# Patient Record
Sex: FEMALE | Race: OTHER | NOT HISPANIC OR LATINO | URBAN - METROPOLITAN AREA
[De-identification: names, ages, dates, MRNs, and addresses within clinical notes are randomized per-mention and may not be internally consistent; named-entity substitution may affect disease eponyms.]

---

## 2017-05-01 ENCOUNTER — OUTPATIENT (OUTPATIENT)
Dept: OUTPATIENT SERVICES | Facility: HOSPITAL | Age: 82
LOS: 1 days | End: 2017-05-01
Payer: MEDICAID

## 2017-05-04 DIAGNOSIS — R69 ILLNESS, UNSPECIFIED: ICD-10-CM

## 2017-06-01 PROCEDURE — G9001: CPT

## 2017-07-06 ENCOUNTER — RESULT REVIEW (OUTPATIENT)
Age: 82
End: 2017-07-06

## 2017-09-05 ENCOUNTER — INPATIENT (INPATIENT)
Facility: HOSPITAL | Age: 82
LOS: 2 days | Discharge: HOME | DRG: 378 | End: 2017-09-08
Attending: INTERNAL MEDICINE | Admitting: INTERNAL MEDICINE
Payer: COMMERCIAL

## 2017-09-05 VITALS
OXYGEN SATURATION: 93 % | RESPIRATION RATE: 18 BRPM | DIASTOLIC BLOOD PRESSURE: 75 MMHG | HEART RATE: 64 BPM | TEMPERATURE: 99 F | SYSTOLIC BLOOD PRESSURE: 127 MMHG

## 2017-09-05 DIAGNOSIS — K57.92 DIVERTICULITIS OF INTESTINE, PART UNSPECIFIED, WITHOUT PERFORATION OR ABSCESS WITHOUT BLEEDING: ICD-10-CM

## 2017-09-05 LAB
ALBUMIN SERPL ELPH-MCNC: 3.8 G/DL — SIGNIFICANT CHANGE UP (ref 3.3–5)
ALP SERPL-CCNC: 95 U/L — SIGNIFICANT CHANGE UP (ref 40–120)
ALT FLD-CCNC: 12 U/L RC — SIGNIFICANT CHANGE UP (ref 10–45)
ANION GAP SERPL CALC-SCNC: 12 MMOL/L — SIGNIFICANT CHANGE UP (ref 5–17)
APTT BLD: 28 SEC — SIGNIFICANT CHANGE UP (ref 27.5–37.4)
AST SERPL-CCNC: 19 U/L — SIGNIFICANT CHANGE UP (ref 10–40)
BASOPHILS # BLD AUTO: 0 K/UL — SIGNIFICANT CHANGE UP (ref 0–0.2)
BASOPHILS NFR BLD AUTO: 0.2 % — SIGNIFICANT CHANGE UP (ref 0–2)
BILIRUB SERPL-MCNC: 0.8 MG/DL — SIGNIFICANT CHANGE UP (ref 0.2–1.2)
BLD GP AB SCN SERPL QL: NEGATIVE — SIGNIFICANT CHANGE UP
BUN SERPL-MCNC: 14 MG/DL — SIGNIFICANT CHANGE UP (ref 7–23)
CALCIUM SERPL-MCNC: 9.8 MG/DL — SIGNIFICANT CHANGE UP (ref 8.4–10.5)
CHLORIDE SERPL-SCNC: 98 MMOL/L — SIGNIFICANT CHANGE UP (ref 96–108)
CO2 SERPL-SCNC: 30 MMOL/L — SIGNIFICANT CHANGE UP (ref 22–31)
CREAT SERPL-MCNC: 0.75 MG/DL — SIGNIFICANT CHANGE UP (ref 0.5–1.3)
EOSINOPHIL # BLD AUTO: 0 K/UL — SIGNIFICANT CHANGE UP (ref 0–0.5)
EOSINOPHIL NFR BLD AUTO: 0.3 % — SIGNIFICANT CHANGE UP (ref 0–6)
GAS PNL BLDV: SIGNIFICANT CHANGE UP
GLUCOSE SERPL-MCNC: 106 MG/DL — HIGH (ref 70–99)
HCT VFR BLD CALC: 43.1 % — SIGNIFICANT CHANGE UP (ref 34.5–45)
HGB BLD-MCNC: 14.2 G/DL — SIGNIFICANT CHANGE UP (ref 11.5–15.5)
INR BLD: 1.09 RATIO — SIGNIFICANT CHANGE UP (ref 0.88–1.16)
LYMPHOCYTES # BLD AUTO: 1.4 K/UL — SIGNIFICANT CHANGE UP (ref 1–3.3)
LYMPHOCYTES # BLD AUTO: 10.2 % — LOW (ref 13–44)
MCHC RBC-ENTMCNC: 31 PG — SIGNIFICANT CHANGE UP (ref 27–34)
MCHC RBC-ENTMCNC: 33 GM/DL — SIGNIFICANT CHANGE UP (ref 32–36)
MCV RBC AUTO: 94.2 FL — SIGNIFICANT CHANGE UP (ref 80–100)
MONOCYTES # BLD AUTO: 0.8 K/UL — SIGNIFICANT CHANGE UP (ref 0–0.9)
MONOCYTES NFR BLD AUTO: 5.6 % — SIGNIFICANT CHANGE UP (ref 2–14)
NEUTROPHILS # BLD AUTO: 11.5 K/UL — HIGH (ref 1.8–7.4)
NEUTROPHILS NFR BLD AUTO: 83.8 % — HIGH (ref 43–77)
PLATELET # BLD AUTO: 161 K/UL — SIGNIFICANT CHANGE UP (ref 150–400)
POTASSIUM SERPL-MCNC: 4.3 MMOL/L — SIGNIFICANT CHANGE UP (ref 3.5–5.3)
POTASSIUM SERPL-SCNC: 4.3 MMOL/L — SIGNIFICANT CHANGE UP (ref 3.5–5.3)
PROT SERPL-MCNC: 7.3 G/DL — SIGNIFICANT CHANGE UP (ref 6–8.3)
PROTHROM AB SERPL-ACNC: 11.8 SEC — SIGNIFICANT CHANGE UP (ref 9.8–12.7)
RBC # BLD: 4.58 M/UL — SIGNIFICANT CHANGE UP (ref 3.8–5.2)
RBC # FLD: 12 % — SIGNIFICANT CHANGE UP (ref 10.3–14.5)
RH IG SCN BLD-IMP: POSITIVE — SIGNIFICANT CHANGE UP
SODIUM SERPL-SCNC: 140 MMOL/L — SIGNIFICANT CHANGE UP (ref 135–145)
WBC # BLD: 13.7 K/UL — HIGH (ref 3.8–10.5)
WBC # FLD AUTO: 13.7 K/UL — HIGH (ref 3.8–10.5)

## 2017-09-05 PROCEDURE — 74177 CT ABD & PELVIS W/CONTRAST: CPT | Mod: 26

## 2017-09-05 PROCEDURE — 99285 EMERGENCY DEPT VISIT HI MDM: CPT

## 2017-09-05 RX ORDER — METRONIDAZOLE 500 MG
500 TABLET ORAL ONCE
Qty: 0 | Refills: 0 | Status: COMPLETED | OUTPATIENT
Start: 2017-09-05 | End: 2017-09-05

## 2017-09-05 RX ORDER — CIPROFLOXACIN LACTATE 400MG/40ML
400 VIAL (ML) INTRAVENOUS ONCE
Qty: 0 | Refills: 0 | Status: COMPLETED | OUTPATIENT
Start: 2017-09-05 | End: 2017-09-05

## 2017-09-05 RX ORDER — ACETAMINOPHEN 500 MG
1000 TABLET ORAL ONCE
Qty: 0 | Refills: 0 | Status: COMPLETED | OUTPATIENT
Start: 2017-09-05 | End: 2017-09-05

## 2017-09-05 RX ADMIN — Medication 400 MILLIGRAM(S): at 22:51

## 2017-09-05 RX ADMIN — Medication 100 MILLIGRAM(S): at 21:35

## 2017-09-05 RX ADMIN — Medication 200 MILLIGRAM(S): at 19:58

## 2017-09-05 NOTE — ED ADULT NURSE REASSESSMENT NOTE - NS ED NURSE REASSESS COMMENT FT1
received report from Chelsy PHILLIPS. Patient resting in bed with no complaints at this time. Awaiting CT results and disposition. VSS. Safety and comfort maintained.

## 2017-09-05 NOTE — ED PROVIDER NOTE - PROGRESS NOTE DETAILS
CT A/P showing acute diverticulitis. Bld Cx's already drawn and patient started on IV Cipro/Flagyl. Findings discussed with patient and grandson. [facility] Physician on call paged for admission. CT A/P showing acute diverticulitis. patient started on IV Cipro/Flagyl. Findings discussed with patient and grandson. [facility] Physician on call paged for admission. CT A/P showing acute diverticulitis. patient started on IV Cipro/Flagyl. Findings discussed with patient and grandson. Hospitalist paged for admission.

## 2017-09-05 NOTE — ED ADULT NURSE NOTE - OBJECTIVE STATEMENT
87 y/o female patient presents ambulatory to ED with family at the bedside complaining of diffuse abdominal pain and cramping with 4-5 episodes of bright red blood on bowel movement today. +nausea Patient states she feels generalized weakness/fatigue. Patient denies SOB and CP, no V/D, afebrile in ED, no headache, no lightheadedness/dizziness, no syncope, no cough, no URI, no numbness or tingling. Patient has PMHX HTN, HLD, CVA and Alzheimer's disease 87 y/o female patient presents ambulatory to ED with family at the bedside complaining of diffuse abdominal pain and cramping with 4-5 episodes of bright red blood on bowel movement today. +nausea +abdominal distention . Patient states she feels generalized weakness/fatigue. Patient denies SOB and CP, no V/D, afebrile in ED, no headache, no lightheadedness/dizziness, no syncope, no cough, no URI, no numbness or tingling. Patient has PMHX HTN, HLD, CVA and Alzheimer's disease

## 2017-09-05 NOTE — ED PROVIDER NOTE - MEDICAL DECISION MAKING DETAILS
87yo F HTN, HLD, CVA  p/w abdominal pain and BRBPR x 1 day. Patient with RUQ/RLQ pain on exam, +Guaiac. Will check CBC/CMP/T&S/Coags. Will check CT A/P. f/u labs and imaging and reassess.

## 2017-09-05 NOTE — ED PROVIDER NOTE - ATTENDING CONTRIBUTION TO CARE
Private Physician Reilly Hearn ([employer])  88y female pmh HTN,No habits, no hx abd surg,dm,hld, Pt comes to ed complains of since this am, across whole abd without localizing sign, and BRBPR with each bowel movement. 4-5episodes, No fever chills. nausea without vomiting, no prior episodes of similar problems. No travel. recent abx.  Fatigued without loc,sob, Private Physician Reilly Hearn ([employer])  88y female pmh HTN,No habits, no hx abd surg,dm,hld, Pt comes to ed complains of since this am, across whole abd without localizing sign, and BRBPR with each bowel movement. 4-5episodes, No fever chills. nausea without vomiting, no prior episodes of similar problems. No travel. recent abx.  Fatigued without loc,sob, PE WDWN elderly female NAD NCAT chest clear anterior & posterior abd soft +bs neruo no focal defects. Abd +bs, Mild mid-ruq ttp without montes's,. No mass guarding or rebound or cvat. Stool guiac +, CV no rubs, gallops or murmurs  Collin Donato MD, Facep

## 2017-09-05 NOTE — ED PROVIDER NOTE - OBJECTIVE STATEMENT
89yo F with HTN, HLD, CVA (2012) and Alzheimer's disease p/w abdominal pain and BRBPR x1 day. Patient reports sudden onset diffuse abdominal pain. Has had 4-5 BMs with BRBPR. Associated with nausea. Denies fever, chills, vomiting. No recent abdominal surgeries, travel, sick contacts.

## 2017-09-06 DIAGNOSIS — Z29.9 ENCOUNTER FOR PROPHYLACTIC MEASURES, UNSPECIFIED: ICD-10-CM

## 2017-09-06 DIAGNOSIS — E78.5 HYPERLIPIDEMIA, UNSPECIFIED: ICD-10-CM

## 2017-09-06 DIAGNOSIS — I63.9 CEREBRAL INFARCTION, UNSPECIFIED: ICD-10-CM

## 2017-09-06 DIAGNOSIS — K57.33 DIVERTICULITIS OF LARGE INTESTINE WITHOUT PERFORATION OR ABSCESS WITH BLEEDING: ICD-10-CM

## 2017-09-06 DIAGNOSIS — I10 ESSENTIAL (PRIMARY) HYPERTENSION: ICD-10-CM

## 2017-09-06 DIAGNOSIS — R41.89 OTHER SYMPTOMS AND SIGNS INVOLVING COGNITIVE FUNCTIONS AND AWARENESS: ICD-10-CM

## 2017-09-06 DIAGNOSIS — K92.2 GASTROINTESTINAL HEMORRHAGE, UNSPECIFIED: ICD-10-CM

## 2017-09-06 PROCEDURE — 12345: CPT | Mod: NC

## 2017-09-06 PROCEDURE — 99223 1ST HOSP IP/OBS HIGH 75: CPT | Mod: GC

## 2017-09-06 RX ORDER — METOPROLOL TARTRATE 50 MG
50 TABLET ORAL
Qty: 0 | Refills: 0 | Status: DISCONTINUED | OUTPATIENT
Start: 2017-09-06 | End: 2017-09-08

## 2017-09-06 RX ORDER — MORPHINE SULFATE 50 MG/1
2 CAPSULE, EXTENDED RELEASE ORAL EVERY 4 HOURS
Qty: 0 | Refills: 0 | Status: DISCONTINUED | OUTPATIENT
Start: 2017-09-06 | End: 2017-09-08

## 2017-09-06 RX ORDER — METRONIDAZOLE 500 MG
500 TABLET ORAL EVERY 8 HOURS
Qty: 0 | Refills: 0 | Status: DISCONTINUED | OUTPATIENT
Start: 2017-09-06 | End: 2017-09-08

## 2017-09-06 RX ORDER — METOPROLOL TARTRATE 50 MG
100 TABLET ORAL
Qty: 0 | Refills: 0 | Status: DISCONTINUED | OUTPATIENT
Start: 2017-09-06 | End: 2017-09-06

## 2017-09-06 RX ORDER — AMLODIPINE BESYLATE 2.5 MG/1
5 TABLET ORAL DAILY
Qty: 0 | Refills: 0 | Status: DISCONTINUED | OUTPATIENT
Start: 2017-09-06 | End: 2017-09-08

## 2017-09-06 RX ORDER — CIPROFLOXACIN LACTATE 400MG/40ML
400 VIAL (ML) INTRAVENOUS EVERY 12 HOURS
Qty: 0 | Refills: 0 | Status: DISCONTINUED | OUTPATIENT
Start: 2017-09-06 | End: 2017-09-08

## 2017-09-06 RX ORDER — SENNA PLUS 8.6 MG/1
2 TABLET ORAL AT BEDTIME
Qty: 0 | Refills: 0 | Status: DISCONTINUED | OUTPATIENT
Start: 2017-09-06 | End: 2017-09-08

## 2017-09-06 RX ORDER — ATORVASTATIN CALCIUM 80 MG/1
40 TABLET, FILM COATED ORAL AT BEDTIME
Qty: 0 | Refills: 0 | Status: DISCONTINUED | OUTPATIENT
Start: 2017-09-06 | End: 2017-09-08

## 2017-09-06 RX ORDER — HYDROCHLOROTHIAZIDE 25 MG
25 TABLET ORAL DAILY
Qty: 0 | Refills: 0 | Status: DISCONTINUED | OUTPATIENT
Start: 2017-09-06 | End: 2017-09-06

## 2017-09-06 RX ORDER — LISINOPRIL 2.5 MG/1
20 TABLET ORAL DAILY
Qty: 0 | Refills: 0 | Status: DISCONTINUED | OUTPATIENT
Start: 2017-09-06 | End: 2017-09-08

## 2017-09-06 RX ORDER — ACETAMINOPHEN 500 MG
650 TABLET ORAL EVERY 6 HOURS
Qty: 0 | Refills: 0 | Status: DISCONTINUED | OUTPATIENT
Start: 2017-09-06 | End: 2017-09-08

## 2017-09-06 RX ADMIN — Medication 100 MILLIGRAM(S): at 15:14

## 2017-09-06 RX ADMIN — Medication 650 MILLIGRAM(S): at 21:32

## 2017-09-06 RX ADMIN — Medication 100 MILLIGRAM(S): at 05:36

## 2017-09-06 RX ADMIN — LISINOPRIL 20 MILLIGRAM(S): 2.5 TABLET ORAL at 05:34

## 2017-09-06 RX ADMIN — Medication 650 MILLIGRAM(S): at 22:33

## 2017-09-06 RX ADMIN — Medication 1 TABLET(S): at 11:28

## 2017-09-06 RX ADMIN — SENNA PLUS 2 TABLET(S): 8.6 TABLET ORAL at 21:31

## 2017-09-06 RX ADMIN — Medication 50 MILLIGRAM(S): at 05:34

## 2017-09-06 RX ADMIN — Medication 200 MILLIGRAM(S): at 21:30

## 2017-09-06 RX ADMIN — Medication 650 MILLIGRAM(S): at 06:16

## 2017-09-06 RX ADMIN — Medication 650 MILLIGRAM(S): at 05:55

## 2017-09-06 RX ADMIN — AMLODIPINE BESYLATE 5 MILLIGRAM(S): 2.5 TABLET ORAL at 05:34

## 2017-09-06 RX ADMIN — Medication 50 MILLIGRAM(S): at 17:16

## 2017-09-06 RX ADMIN — ATORVASTATIN CALCIUM 40 MILLIGRAM(S): 80 TABLET, FILM COATED ORAL at 21:31

## 2017-09-06 RX ADMIN — Medication 200 MILLIGRAM(S): at 08:37

## 2017-09-06 NOTE — CHART NOTE - NSCHARTNOTEFT_GEN_A_CORE
Pt admitted with acute sigmoid diverticulitis. Abdominal pain improved. No diarrhea today.     Advance diet to clears. Continue iv abx.

## 2017-09-06 NOTE — H&P ADULT - PROBLEM SELECTOR PLAN 5
per family not AD as previously charted. rivastigamine patch used as ppx? hold for now given bowel disease c/w statin. hx of pfo per chart

## 2017-09-06 NOTE — H&P ADULT - HISTORY OF PRESENT ILLNESS
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 The patient indicates that she acutely developed abdominal pain after eating corn and foods that she knows triggers diverticulitis. Associated w/ the abdominal pain is diarrhea described as gelatinous and red. She also experienced sensation of inability to compelte evacuation of stool. She denies fevers, chills, vomiting, sick contacts, recent travel [place] of [place]lived here [place] 50yr, over 1yr to ), previous abdominal surgery, and colonoscopy positive for colon cancer (has had polyps cauterized per daughter). 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 The patient indicates that she acutely developed abdominal pain after eating corn and foods that she knows triggers diverticulitis. Associated w/ the abdominal pain is diarrhea described as gelatinous and red. She also experienced sensation of inability to compelte evacuation of stool. She denies fevers, chills, vomiting, sick contacts, recent travel outside of [place], over 1yr to ), previous abdominal surgery, and colonoscopy positive for colon cancer (has had polyps cauterized per daughter).

## 2017-09-06 NOTE — H&P ADULT - NSHPLABSRESULTS_GEN_ALL_CORE
14.2   13.7  )-----------( 161      ( 05 Sep 2017 16:23 )             43.1       09-05    140  |  98  |  14  ----------------------------<  106<H>  4.3   |  30  |  0.75    Ca    9.8      05 Sep 2017 16:23    TPro  7.3  /  Alb  3.8  /  TBili  0.8  /  DBili  x   /  AST  19  /  ALT  12  /  AlkPhos  95  09-05                < from: CT Abdomen and Pelvis w/ Oral Cont and w/ IV Cont (09.05.17 @ 18:35) >    EXAM:  CT ABDOMEN AND PELVIS OC IC                            PROCEDURE DATE:  09/05/2017            INTERPRETATION:  CLINICAL INFORMATION: Right-sided abdominal pain with   bright red blood per rectum    COMPARISON: Right-sided abdominal pain    PROCEDURE:   CT of the Abdomen and Pelvis was performed with intravenous contrast.   Intravenous contrast: 90 ml Omnipaque 350. 10 ml discarded.  Oral contrast: positive contrast was administered.  Sagittal and coronal reformats were performed.    FINDINGS:    LOWER CHEST: Aortic valve calcifications.    LIVER:2 mm right hepatic lobe lesion, too small to characterize (series   2, image 28).  BILE DUCTS: Normal caliber.  GALLBLADDER: Within normal limits.  SPLEEN: Within normal limits.  PANCREAS: Within normal limits.  ADRENALS: Within normal limits.  KIDNEYS/URETERS: Symmetrically enhance without hydronephrosis.   Subcentimeter bilateral hypodense lesions, too small to characterize.    BLADDER: Within normal limits.  REPRODUCTIVE ORGANS: The uterus and adnexa are within normal limits.    BOWEL: There is acute diverticulitis involving the sigmoid colon with   surrounding inflammatory changes. No pericolonic fluid collection or   abscess. Extensive diverticular disease of the colon. Appendix is normal.  PERITONEUM: No ascites.  VESSELS:  Atherosclerotic calcifications.  RETROPERITONEUM: No lymphadenopathy.    ABDOMINAL WALL: Small fat-containing bilateral inguinal hernias.  BONES: Multilevel degenerative changes.    IMPRESSION:   Acute uncomplicated diverticulitis of the sigmoid colon.    < end of copied text >      PT/INR - ( 05 Sep 2017 16:23 )   PT: 11.8 sec;   INR: 1.09 ratio         PTT - ( 05 Sep 2017 16:23 )  PTT:28.0 sec    Lactate Trend            CAPILLARY BLOOD GLUCOSE Personally reviewed imaging, labs.    14.2   13.7  )-----------( 161      ( 05 Sep 2017 16:23 )             43.1       09-05    140  |  98  |  14  ----------------------------<  106<H>  4.3   |  30  |  0.75    Ca    9.8      05 Sep 2017 16:23    TPro  7.3  /  Alb  3.8  /  TBili  0.8  /  DBili  x   /  AST  19  /  ALT  12  /  AlkPhos  95  09-05                < from: CT Abdomen and Pelvis w/ Oral Cont and w/ IV Cont (09.05.17 @ 18:35) >    EXAM:  CT ABDOMEN AND PELVIS OC IC                            PROCEDURE DATE:  09/05/2017            INTERPRETATION:  CLINICAL INFORMATION: Right-sided abdominal pain with   bright red blood per rectum    COMPARISON: Right-sided abdominal pain    PROCEDURE:   CT of the Abdomen and Pelvis was performed with intravenous contrast.   Intravenous contrast: 90 ml Omnipaque 350. 10 ml discarded.  Oral contrast: positive contrast was administered.  Sagittal and coronal reformats were performed.    FINDINGS:    LOWER CHEST: Aortic valve calcifications.    LIVER:2 mm right hepatic lobe lesion, too small to characterize (series   2, image 28).  BILE DUCTS: Normal caliber.  GALLBLADDER: Within normal limits.  SPLEEN: Within normal limits.  PANCREAS: Within normal limits.  ADRENALS: Within normal limits.  KIDNEYS/URETERS: Symmetrically enhance without hydronephrosis.   Subcentimeter bilateral hypodense lesions, too small to characterize.    BLADDER: Within normal limits.  REPRODUCTIVE ORGANS: The uterus and adnexa are within normal limits.    BOWEL: There is acute diverticulitis involving the sigmoid colon with   surrounding inflammatory changes. No pericolonic fluid collection or   abscess. Extensive diverticular disease of the colon. Appendix is normal.  PERITONEUM: No ascites.  VESSELS:  Atherosclerotic calcifications.  RETROPERITONEUM: No lymphadenopathy.    ABDOMINAL WALL: Small fat-containing bilateral inguinal hernias.  BONES: Multilevel degenerative changes.    IMPRESSION:   Acute uncomplicated diverticulitis of the sigmoid colon.    < end of copied text >      PT/INR - ( 05 Sep 2017 16:23 )   PT: 11.8 sec;   INR: 1.09 ratio         PTT - ( 05 Sep 2017 16:23 )  PTT:28.0 sec    Lactate Trend            CAPILLARY BLOOD GLUCOSE

## 2017-09-06 NOTE — H&P ADULT - PROBLEM SELECTOR PLAN 6
lovenox for dvt ppx per family not AD as previously charted. rivastigamine patch used as ppx? hold for now given bowel disease

## 2017-09-06 NOTE — H&P ADULT - PROBLEM SELECTOR PLAN 2
c/w home antihypertensives  - c/w metoprolol, amlodipine, lisinopril, & hctz - per history, likely 2/2 diverticulosis  - reportedly per daughter, last cscope was within the past 5 yrs  - Hb stable  - type and screen  - hold diuretics  - followup with GI as outpt for colonoscopy  - CBC daily unless large bleeding

## 2017-09-06 NOTE — H&P ADULT - NSHPREVIEWOFSYSTEMS_GEN_ALL_CORE
CONSTITUTIONAL: No fever, no chills  EYES: No eye pain, no visual disturbance  Mouth: no pain in mouth, no cavities  RESPIRATORY: No cough, No sob  CARDIOVASCULAR: No CP, no palpitations  GASTROINTESTINAL: abdominal pain+, blood in diarrhea+  GENITOURINARY: No dysuria, no hematuria  NEUROLOGICAL: No headaches, no weakness  SKIN: No itching, no rashes  MUSCULOSKELETAL: No joint pain, no joint swelling

## 2017-09-06 NOTE — H&P ADULT - ATTENDING COMMENTS
Pt seen and examined. Discussed case with resident. H&P reviewed and edited where appropriate. Time spent 70 minutes on total encounter.

## 2017-09-06 NOTE — H&P ADULT - PROBLEM SELECTOR PLAN 3
c/w atrovastatin c/w home antihypertensives  - c/w metoprolol, amlodipine, lisinopril  - well controlled, hold hctz for now  - consider reducing BP meds if pt symptomatic, or further bleeding c/w home antihypertensives  - c/w amlodipine, lisinopril  - reduce metoprolol dose  - well controlled, hold hctz for now  - consider reducing BP meds if pt symptomatic, or further bleeding

## 2017-09-06 NOTE — H&P ADULT - NSHPPHYSICALEXAM_GEN_ALL_CORE
Vital Signs Last 24 Hrs  T(C): 36.3 (06 Sep 2017 00:06), Max: 37.1 (05 Sep 2017 14:26)  T(F): 97.3 (06 Sep 2017 00:06), Max: 98.7 (05 Sep 2017 14:26)  HR: 60 (06 Sep 2017 00:06) (60 - 66)  BP: 127/69 (06 Sep 2017 00:06) (125/71 - 173/78)  BP(mean): --  RR: 16 (06 Sep 2017 00:06) (16 - 18)  SpO2: 94% (06 Sep 2017 00:06) (93% - 97%)    GENERAL: NAD, well-developed, sleeping  HEAD:  Atraumatic, Normocephalic  EYES: EOMI, PERRLA, conjunctiva and sclera clear  Mouth: MMM, no lesions  NECK: Supple, no appreciable masses  Pulm: normal work of breathing, cta b/l  Card: S1&S2+, rrr, no m/r/g appreciated  ABDOMEN: bs+, soft,mild tenderness over center and left abdomen, nd, no appreciable masses  EXTREMITIES:  2+ Peripheral Pulses, No clubbing, cyanosis, or edema  Neuro: A&Ox3, no focal deficits  SKIN: warm and dry, no visible rashes

## 2017-09-06 NOTE — H&P ADULT - PROBLEM SELECTOR PLAN 1
Noted to have uncomplicated diverticulitis  - c/w cipro/flagyl IV, switch to PO abx when pain subsides and pt tolerates po intake to be determined by primary team  - pt w/ diverticulitis in past, w/ know inducer given diet. previous colonoscopy w/in last 5 year was normal per daughter  - leukocytosis to 13-trend w. cbc daily. monitor for fevers  - treat pain w/ tylenol. currently pain free Noted to have uncomplicated diverticulitis  - c/w cipro/flagyl IV, switch to PO abx when pain subsides and pt tolerates po intake to be determined by primary team  - pt w/ diverticulitis in past. previous colonoscopy w/in last 5 year was normal per daughter  - leukocytosis to 13-trend w. cbc daily. monitor for fevers  - treat pain w/ IV morphine PRN Noted to have uncomplicated diverticulitis  - c/w cipro/flagyl IV, switch to PO abx when pain subsides and pt tolerates po intake to be determined by primary team  - pt w/ diverticulitis in past. previous colonoscopy w/in last 5 year was normal per daughter  - leukocytosis to 13-trend w. cbc daily. monitor for fevers  - treat pain w/ IV morphine PRN  - given hx of valvular and diastolic hf, hold IVF, hold diuretics encourage po intake when tolerates.

## 2017-09-06 NOTE — H&P ADULT - NSHPSOCIALHISTORY_GEN_ALL_CORE
no toxic habits. former smoker quit 30 yr a go w./ 10 years 4pack per day? (unclear if proper translation). lives with daughter

## 2017-09-06 NOTE — H&P ADULT - ASSESSMENT
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

## 2017-09-07 ENCOUNTER — TRANSCRIPTION ENCOUNTER (OUTPATIENT)
Age: 82
End: 2017-09-07

## 2017-09-07 LAB
ALBUMIN SERPL ELPH-MCNC: 3.5 G/DL — SIGNIFICANT CHANGE UP (ref 3.3–5)
ALP SERPL-CCNC: 85 U/L — SIGNIFICANT CHANGE UP (ref 40–120)
ALT FLD-CCNC: 12 U/L RC — SIGNIFICANT CHANGE UP (ref 10–45)
ANION GAP SERPL CALC-SCNC: 12 MMOL/L — SIGNIFICANT CHANGE UP (ref 5–17)
AST SERPL-CCNC: 17 U/L — SIGNIFICANT CHANGE UP (ref 10–40)
BASOPHILS # BLD AUTO: 0 K/UL — SIGNIFICANT CHANGE UP (ref 0–0.2)
BASOPHILS NFR BLD AUTO: 0.5 % — SIGNIFICANT CHANGE UP (ref 0–2)
BILIRUB SERPL-MCNC: 0.6 MG/DL — SIGNIFICANT CHANGE UP (ref 0.2–1.2)
BUN SERPL-MCNC: 9 MG/DL — SIGNIFICANT CHANGE UP (ref 7–23)
CALCIUM SERPL-MCNC: 9.4 MG/DL — SIGNIFICANT CHANGE UP (ref 8.4–10.5)
CHLORIDE SERPL-SCNC: 101 MMOL/L — SIGNIFICANT CHANGE UP (ref 96–108)
CO2 SERPL-SCNC: 30 MMOL/L — SIGNIFICANT CHANGE UP (ref 22–31)
CREAT SERPL-MCNC: 0.76 MG/DL — SIGNIFICANT CHANGE UP (ref 0.5–1.3)
EOSINOPHIL # BLD AUTO: 0.1 K/UL — SIGNIFICANT CHANGE UP (ref 0–0.5)
EOSINOPHIL NFR BLD AUTO: 1.7 % — SIGNIFICANT CHANGE UP (ref 0–6)
GLUCOSE SERPL-MCNC: 96 MG/DL — SIGNIFICANT CHANGE UP (ref 70–99)
HCT VFR BLD CALC: 41.8 % — SIGNIFICANT CHANGE UP (ref 34.5–45)
HGB BLD-MCNC: 13.6 G/DL — SIGNIFICANT CHANGE UP (ref 11.5–15.5)
LYMPHOCYTES # BLD AUTO: 1.6 K/UL — SIGNIFICANT CHANGE UP (ref 1–3.3)
LYMPHOCYTES # BLD AUTO: 23.6 % — SIGNIFICANT CHANGE UP (ref 13–44)
MCHC RBC-ENTMCNC: 30.8 PG — SIGNIFICANT CHANGE UP (ref 27–34)
MCHC RBC-ENTMCNC: 32.6 GM/DL — SIGNIFICANT CHANGE UP (ref 32–36)
MCV RBC AUTO: 94.5 FL — SIGNIFICANT CHANGE UP (ref 80–100)
MONOCYTES # BLD AUTO: 0.5 K/UL — SIGNIFICANT CHANGE UP (ref 0–0.9)
MONOCYTES NFR BLD AUTO: 7 % — SIGNIFICANT CHANGE UP (ref 2–14)
NEUTROPHILS # BLD AUTO: 4.6 K/UL — SIGNIFICANT CHANGE UP (ref 1.8–7.4)
NEUTROPHILS NFR BLD AUTO: 67.2 % — SIGNIFICANT CHANGE UP (ref 43–77)
PLATELET # BLD AUTO: 155 K/UL — SIGNIFICANT CHANGE UP (ref 150–400)
POTASSIUM SERPL-MCNC: 4.1 MMOL/L — SIGNIFICANT CHANGE UP (ref 3.5–5.3)
POTASSIUM SERPL-SCNC: 4.1 MMOL/L — SIGNIFICANT CHANGE UP (ref 3.5–5.3)
PROT SERPL-MCNC: 7.1 G/DL — SIGNIFICANT CHANGE UP (ref 6–8.3)
RBC # BLD: 4.43 M/UL — SIGNIFICANT CHANGE UP (ref 3.8–5.2)
RBC # FLD: 12 % — SIGNIFICANT CHANGE UP (ref 10.3–14.5)
SODIUM SERPL-SCNC: 143 MMOL/L — SIGNIFICANT CHANGE UP (ref 135–145)
WBC # BLD: 6.8 K/UL — SIGNIFICANT CHANGE UP (ref 3.8–10.5)
WBC # FLD AUTO: 6.8 K/UL — SIGNIFICANT CHANGE UP (ref 3.8–10.5)

## 2017-09-07 PROCEDURE — 99232 SBSQ HOSP IP/OBS MODERATE 35: CPT

## 2017-09-07 RX ORDER — ZALEPLON 10 MG
5 CAPSULE ORAL ONCE
Qty: 0 | Refills: 0 | Status: DISCONTINUED | OUTPATIENT
Start: 2017-09-07 | End: 2017-09-07

## 2017-09-07 RX ADMIN — AMLODIPINE BESYLATE 5 MILLIGRAM(S): 2.5 TABLET ORAL at 06:10

## 2017-09-07 RX ADMIN — Medication 100 MILLIGRAM(S): at 00:08

## 2017-09-07 RX ADMIN — ATORVASTATIN CALCIUM 40 MILLIGRAM(S): 80 TABLET, FILM COATED ORAL at 21:36

## 2017-09-07 RX ADMIN — LISINOPRIL 20 MILLIGRAM(S): 2.5 TABLET ORAL at 06:10

## 2017-09-07 RX ADMIN — SENNA PLUS 2 TABLET(S): 8.6 TABLET ORAL at 21:36

## 2017-09-07 RX ADMIN — Medication 200 MILLIGRAM(S): at 19:55

## 2017-09-07 RX ADMIN — Medication 50 MILLIGRAM(S): at 06:10

## 2017-09-07 RX ADMIN — Medication 100 MILLIGRAM(S): at 06:10

## 2017-09-07 RX ADMIN — Medication 5 MILLIGRAM(S): at 22:12

## 2017-09-07 RX ADMIN — Medication 100 MILLIGRAM(S): at 22:12

## 2017-09-07 RX ADMIN — Medication 100 MILLIGRAM(S): at 16:49

## 2017-09-07 RX ADMIN — Medication 50 MILLIGRAM(S): at 17:50

## 2017-09-07 RX ADMIN — Medication 1 TABLET(S): at 12:58

## 2017-09-07 RX ADMIN — Medication 200 MILLIGRAM(S): at 08:28

## 2017-09-07 NOTE — PHYSICAL THERAPY INITIAL EVALUATION ADULT - ADDITIONAL COMMENTS
Pt lives with pt in apartment and has 3 flights to enter house. Pt independnet with ADL's and ambulates with cane in the home.

## 2017-09-07 NOTE — PROGRESS NOTE ADULT - PROBLEM SELECTOR PLAN 6
per family not AD as previously charted. rivastigamine patch used as ppx? hold for now given bowel disease

## 2017-09-07 NOTE — DISCHARGE NOTE ADULT - PATIENT PORTAL LINK FT
“You can access the [employer] Patient Portal, offered by [facility], by registering with the following website: [email or web address]”

## 2017-09-07 NOTE — DISCHARGE NOTE ADULT - CARE PLAN
Principal Discharge DX:	Diverticulitis of large intestine without perforation or abscess with bleeding  Goal:	Resolution  Instructions for follow-up, activity and diet:	Continue antibiotic as prescribed  Secondary Diagnosis:	Lower GI bleed  Instructions for follow-up, activity and diet:	Resolved.  There are 2 common types of GI Bleed, Upper GI Bleed and Lower GI Bleed.  Upper GI Bleed affects the esophagus, stomach, and first part of the small intestine. Lower GI Bleed affects the colon and rectum.  Upper GI Bleed signs and symptoms to notify your Health Care Provider are vomiting blood, or coffee ground vomitus, and bowel movements that look like black tar.  Lower GI Bleed signs and symptoms to notify your health care provider are bright red bloody bowel movements.   Take your medications as prescribed by your Gastroenterologist.  If you have had an Endoscopy or Colonoscopy, follow up with your Gastroenterologist for Pathology results.  Avoid NSAIDs unless your Health Care Provider tells you that it is ok (Aspirin, Ibuprofen, Advil, Motrin, Aleve).  Follow up with your Gastroenterologist within 1-2 weeks of discharge.  Secondary Diagnosis:	Essential hypertension Principal Discharge DX:	Diverticulitis of large intestine without perforation or abscess with bleeding  Goal:	Resolution  Instructions for follow-up, activity and diet:	Continue antibiotic as prescribed for 7 days  Secondary Diagnosis:	Lower GI bleed  Instructions for follow-up, activity and diet:	Resolved.  There are 2 common types of GI Bleed, Upper GI Bleed and Lower GI Bleed.  Upper GI Bleed affects the esophagus, stomach, and first part of the small intestine. Lower GI Bleed affects the colon and rectum.  Upper GI Bleed signs and symptoms to notify your Health Care Provider are vomiting blood, or coffee ground vomitus, and bowel movements that look like black tar.  Lower GI Bleed signs and symptoms to notify your health care provider are bright red bloody bowel movements.   Take your medications as prescribed by your Gastroenterologist.  If you have had an Endoscopy or Colonoscopy, follow up with your Gastroenterologist for Pathology results.  Avoid NSAIDs unless your Health Care Provider tells you that it is ok (Aspirin, Ibuprofen, Advil, Motrin, Aleve).  Follow up with your Gastroenterologist within 1-2 weeks of discharge.  Secondary Diagnosis:	Essential hypertension  Instructions for follow-up, activity and diet:	Follow up with your medical doctor to establish long term blood pressure treatment goals.

## 2017-09-07 NOTE — DISCHARGE NOTE ADULT - ADDITIONAL INSTRUCTIONS
follow up with gastroenterologist in 6 weeks for an outpatient colonoscopy . Please speak wit your PMD for an outpatient visit with a gastroenterologist

## 2017-09-07 NOTE — PHYSICAL THERAPY INITIAL EVALUATION ADULT - PERTINENT HX OF CURRENT PROBLEM, REHAB EVAL
Pt is 88 year old female with pmh of diverticulitis, htn, cva, hld, alzheimers dementia. Pt admitted for diverticulitis and remains acute due to IV abt

## 2017-09-07 NOTE — DISCHARGE NOTE ADULT - CARE PROVIDER_API CALL
Reilly Patel), Internal Medicine; Pulmonary Disease  [facility]  [id number] [place] [address]  Phone: [phone]  Fax: [phone]

## 2017-09-07 NOTE — DISCHARGE NOTE ADULT - MEDICATION SUMMARY - MEDICATIONS TO STOP TAKING
I will STOP taking the medications listed below when I get home from the hospital:  None I will STOP taking the medications listed below when I get home from the hospital:    pravastatin 40 mg oral tablet  -- 1 tab(s) by mouth once a day (at bedtime) I will STOP taking the medications listed below when I get home from the hospital:    metoprolol tartrate 100 mg oral tablet  -- 1 tab(s) by mouth 2 times a day    pravastatin 40 mg oral tablet  -- 1 tab(s) by mouth once a day (at bedtime)

## 2017-09-07 NOTE — DISCHARGE NOTE ADULT - HOSPITAL COURSE
MD 88F f aw abdominal pain and bloody diarrhea.     CT a/p revealed acute sigmoid diverticulitis. Pt started on iv cipro and flagyl for presumed gram negative and anaerobic bacterial infection.     Pain and diarrhea gradually improved. Discharged on po abx to complete 10 day course. Follow up with GI in 6 weeks for an outpatient colonoscopy.     Diagnoses: Acute diverticulitis; Abdominal pain; Diarrhea; HTN

## 2017-09-07 NOTE — DISCHARGE NOTE ADULT - MEDICATION SUMMARY - MEDICATIONS TO TAKE
I will START or STAY ON the medications listed below when I get home from the hospital:    Flagyl 500 mg oral tablet  -- 1 tab(s) by mouth every 8 hours  -- Do not drink alcoholic beverages when taking this medication.  Finish all this medication unless otherwise directed by prescriber.  May discolor urine or feces.    -- Indication: For non infectious colitis    acetaminophen 325 mg oral tablet  -- 2 tab(s) by mouth every 6 hours, As needed, Mild to moderate pain  -- Indication: For mild pain    lisinopril 20 mg oral tablet  -- 1 tab(s) by mouth once a day  -- Indication: For htn    atorvastatin 40 mg oral tablet  -- 1 tab(s) by mouth once a day  -- Indication: For Dyslipidemia    metoprolol tartrate 100 mg oral tablet  -- 1 tab(s) by mouth 2 times a day  -- Indication: For htn    amLODIPine 5 mg oral tablet  -- 1 tab(s) by mouth once a day  -- Indication: For htn    Senna 8.6 mg oral tablet  -- 1 tab(s) by mouth once a day (at bedtime)  -- Indication: For Constiaption    Cipro 500 mg oral tablet  -- 1 tab(s) by mouth every 12 hours  -- Avoid prolonged or excessive exposure to direct and/or artificial sunlight while taking this medication.  Check with your doctor before becoming pregnant.  Do not take dairy products, antacids, or iron preparations within one hour of this medication.  Finish all this medication unless otherwise directed by prescriber.  Medication should be taken with plenty of water.    -- Indication: For non infectious colitis    Calcium 600+D 600 mg-200 intl units oral tablet  --  by mouth once a day  -- Indication: For supplement I will START or STAY ON the medications listed below when I get home from the hospital:    Flagyl 500 mg oral tablet  -- 1 tab(s) by mouth every 8 hours  -- Do not drink alcoholic beverages when taking this medication.  Finish all this medication unless otherwise directed by prescriber.  May discolor urine or feces.    -- Indication: For non infectious colitis    acetaminophen 325 mg oral tablet  -- 2 tab(s) by mouth every 6 hours, As needed, Mild to moderate pain  -- Indication: For mild pain    atorvastatin 40 mg oral tablet  -- 1 tab(s) by mouth once a day  -- Indication: For Dyslipidemia    hydrochlorothiazide-lisinopril 25 mg-20 mg oral tablet  -- 1 tab(s) by mouth once a day  -- Indication: For htn    metoprolol tartrate 50 mg oral tablet  -- 1 tab(s) by mouth 2 times a day  -- Indication: For htn    amLODIPine 5 mg oral tablet  -- 1 tab(s) by mouth once a day  -- Indication: For htn    Senna 8.6 mg oral tablet  -- 1 tab(s) by mouth once a day (at bedtime)  -- Indication: For Constipation    MiraLax oral powder for reconstitution  -- 17 gram(s) by mouth once a day as needed for constipation  -- Dilute this medication with liquid before administration.  It is very important that you take or use this exactly as directed.  Do not skip doses or discontinue unless directed by your doctor.    -- Indication: For Constipation    Cipro 500 mg oral tablet  -- 1 tab(s) by mouth every 12 hours  -- Avoid prolonged or excessive exposure to direct and/or artificial sunlight while taking this medication.  Check with your doctor before becoming pregnant.  Do not take dairy products, antacids, or iron preparations within one hour of this medication.  Finish all this medication unless otherwise directed by prescriber.  Medication should be taken with plenty of water.    -- Indication: For non infectious colitis    Calcium 600+D 600 mg-200 intl units oral tablet  --  by mouth once a day  -- Indication: For supplement

## 2017-09-07 NOTE — DISCHARGE NOTE ADULT - PLAN OF CARE
Resolution Continue antibiotic as prescribed Resolved.  There are 2 common types of GI Bleed, Upper GI Bleed and Lower GI Bleed.  Upper GI Bleed affects the esophagus, stomach, and first part of the small intestine. Lower GI Bleed affects the colon and rectum.  Upper GI Bleed signs and symptoms to notify your Health Care Provider are vomiting blood, or coffee ground vomitus, and bowel movements that look like black tar.  Lower GI Bleed signs and symptoms to notify your health care provider are bright red bloody bowel movements.   Take your medications as prescribed by your Gastroenterologist.  If you have had an Endoscopy or Colonoscopy, follow up with your Gastroenterologist for Pathology results.  Avoid NSAIDs unless your Health Care Provider tells you that it is ok (Aspirin, Ibuprofen, Advil, Motrin, Aleve).  Follow up with your Gastroenterologist within 1-2 weeks of discharge. Follow up with your medical doctor to establish long term blood pressure treatment goals. Continue antibiotic as prescribed for 7 days

## 2017-09-07 NOTE — PROGRESS NOTE ADULT - SUBJECTIVE AND OBJECTIVE BOX
Patient is a 88y old  Female who presents with a chief complaint of 88F w/ abdominal pain and bloody diarrhea (06 Sep 2017 03:42)    HPI: Abdominal pain improved. 2 BMs today    Vital Signs Last 24 Hrs  T(C): 36.7 (07 Sep 2017 07:26), Max: 36.7 (07 Sep 2017 07:26)  T(F): 98 (07 Sep 2017 07:26), Max: 98 (07 Sep 2017 07:26)  HR: 62 (07 Sep 2017 07:26) (60 - 83)  BP: 148/82 (07 Sep 2017 07:26) (142/74 - 158/75)  BP(mean): --  RR: 20 (07 Sep 2017 07:26) (16 - 20)  SpO2: 94% (07 Sep 2017 07:26) (93% - 94%)                          13.6   6.8   )-----------( 155      ( 07 Sep 2017 06:40 )             41.8     09-07    143  |  101  |  9   ----------------------------<  96  4.1   |  30  |  0.76    Ca    9.4      07 Sep 2017 06:40    TPro  7.1  /  Alb  3.5  /  TBili  0.6  /  DBili  x   /  AST  17  /  ALT  12  /  AlkPhos  85  09-07    MEDICATIONS  (STANDING):  ciprofloxacin   IVPB 400 milliGRAM(s) IV Intermittent every 12 hours  metroNIDAZOLE  IVPB 500 milliGRAM(s) IV Intermittent every 8 hours  amLODIPine   Tablet 5 milliGRAM(s) Oral daily  atorvastatin 40 milliGRAM(s) Oral at bedtime  calcium carbonate  625 mG + Vitamin D (OsCal 250 + D) 1 Tablet(s) Oral daily  senna 2 Tablet(s) Oral at bedtime  lisinopril 20 milliGRAM(s) Oral daily  metoprolol 50 milliGRAM(s) Oral two times a day    MEDICATIONS  (PRN):  acetaminophen   Tablet. 650 milliGRAM(s) Oral every 6 hours PRN Mild to moderate pain  morphine  - Injectable 2 milliGRAM(s) IV Push every 4 hours PRN Severe Pain (7 - 10)

## 2017-09-08 VITALS
OXYGEN SATURATION: 97 % | HEART RATE: 62 BPM | RESPIRATION RATE: 20 BRPM | SYSTOLIC BLOOD PRESSURE: 124 MMHG | DIASTOLIC BLOOD PRESSURE: 72 MMHG | TEMPERATURE: 97 F

## 2017-09-08 PROCEDURE — 99285 EMERGENCY DEPT VISIT HI MDM: CPT | Mod: 25

## 2017-09-08 PROCEDURE — 97161 PT EVAL LOW COMPLEX 20 MIN: CPT

## 2017-09-08 PROCEDURE — 82330 ASSAY OF CALCIUM: CPT

## 2017-09-08 PROCEDURE — 86850 RBC ANTIBODY SCREEN: CPT

## 2017-09-08 PROCEDURE — 86901 BLOOD TYPING SEROLOGIC RH(D): CPT

## 2017-09-08 PROCEDURE — 99239 HOSP IP/OBS DSCHRG MGMT >30: CPT

## 2017-09-08 PROCEDURE — 84132 ASSAY OF SERUM POTASSIUM: CPT

## 2017-09-08 PROCEDURE — 85027 COMPLETE CBC AUTOMATED: CPT

## 2017-09-08 PROCEDURE — 83605 ASSAY OF LACTIC ACID: CPT

## 2017-09-08 PROCEDURE — 82947 ASSAY GLUCOSE BLOOD QUANT: CPT

## 2017-09-08 PROCEDURE — 74177 CT ABD & PELVIS W/CONTRAST: CPT

## 2017-09-08 PROCEDURE — 82435 ASSAY OF BLOOD CHLORIDE: CPT

## 2017-09-08 PROCEDURE — 86900 BLOOD TYPING SEROLOGIC ABO: CPT

## 2017-09-08 PROCEDURE — 85730 THROMBOPLASTIN TIME PARTIAL: CPT

## 2017-09-08 PROCEDURE — 96374 THER/PROPH/DIAG INJ IV PUSH: CPT | Mod: XU

## 2017-09-08 PROCEDURE — 85014 HEMATOCRIT: CPT

## 2017-09-08 PROCEDURE — 85610 PROTHROMBIN TIME: CPT

## 2017-09-08 PROCEDURE — 80053 COMPREHEN METABOLIC PANEL: CPT

## 2017-09-08 PROCEDURE — 84295 ASSAY OF SERUM SODIUM: CPT

## 2017-09-08 PROCEDURE — 82803 BLOOD GASES ANY COMBINATION: CPT

## 2017-09-08 PROCEDURE — 82272 OCCULT BLD FECES 1-3 TESTS: CPT

## 2017-09-08 RX ORDER — POLYETHYLENE GLYCOL 3350 17 G/17G
17 POWDER, FOR SOLUTION ORAL
Qty: 510 | Refills: 0 | OUTPATIENT
Start: 2017-09-08 | End: 2017-10-08

## 2017-09-08 RX ORDER — METRONIDAZOLE 500 MG
1 TABLET ORAL
Qty: 21 | Refills: 0 | OUTPATIENT
Start: 2017-09-08 | End: 2017-09-15

## 2017-09-08 RX ORDER — LISINOPRIL 2.5 MG/1
1 TABLET ORAL
Qty: 0 | Refills: 0 | COMMUNITY
Start: 2017-09-08

## 2017-09-08 RX ORDER — LISINOPRIL/HYDROCHLOROTHIAZIDE 10-12.5 MG
1 TABLET ORAL
Qty: 0 | Refills: 0 | DISCHARGE
Start: 2017-09-08

## 2017-09-08 RX ORDER — ACETAMINOPHEN 500 MG
2 TABLET ORAL
Qty: 0 | Refills: 0 | COMMUNITY
Start: 2017-09-08

## 2017-09-08 RX ORDER — METOPROLOL TARTRATE 50 MG
1 TABLET ORAL
Qty: 60 | Refills: 0
Start: 2017-09-08 | End: 2017-10-08

## 2017-09-08 RX ORDER — MOXIFLOXACIN HYDROCHLORIDE TABLETS, 400 MG 400 MG/1
1 TABLET, FILM COATED ORAL
Qty: 14 | Refills: 0 | OUTPATIENT
Start: 2017-09-08 | End: 2017-09-15

## 2017-09-08 RX ORDER — INFLUENZA VIRUS VACCINE 15; 15; 15; 15 UG/.5ML; UG/.5ML; UG/.5ML; UG/.5ML
0.5 SUSPENSION INTRAMUSCULAR ONCE
Qty: 0 | Refills: 0 | Status: COMPLETED | OUTPATIENT
Start: 2017-09-08 | End: 2017-09-08

## 2017-09-08 RX ADMIN — Medication 1 TABLET(S): at 11:40

## 2017-09-08 RX ADMIN — LISINOPRIL 20 MILLIGRAM(S): 2.5 TABLET ORAL at 05:45

## 2017-09-08 RX ADMIN — Medication 200 MILLIGRAM(S): at 09:36

## 2017-09-08 RX ADMIN — Medication 50 MILLIGRAM(S): at 05:45

## 2017-09-08 RX ADMIN — AMLODIPINE BESYLATE 5 MILLIGRAM(S): 2.5 TABLET ORAL at 05:45

## 2017-09-08 RX ADMIN — INFLUENZA VIRUS VACCINE 0.5 MILLILITER(S): 15; 15; 15; 15 SUSPENSION INTRAMUSCULAR at 13:53

## 2017-09-08 RX ADMIN — Medication 100 MILLIGRAM(S): at 05:46

## 2017-09-08 NOTE — PROGRESS NOTE ADULT - PROBLEM SELECTOR PLAN 1
Clinically improving on abx. Transition to po cipro and flagyl to complete 10 days. Follow up with GI in 6 weeks for a colonoscopy

## 2017-09-08 NOTE — PROGRESS NOTE ADULT - SUBJECTIVE AND OBJECTIVE BOX
Patient is a 88y old  Female who presents with a chief complaint of 88F w/ abdominal pain and bloody diarrhea (07 Sep 2017 18:47)    HPI: Intermitted pain but significantly improved    Vital Signs Last 24 Hrs  T(C): 36.3 (08 Sep 2017 08:05), Max: 36.5 (08 Sep 2017 00:11)  T(F): 97.3 (08 Sep 2017 08:05), Max: 97.7 (08 Sep 2017 00:11)  HR: 62 (08 Sep 2017 08:05) (53 - 66)  BP: 124/72 (08 Sep 2017 08:05) (124/72 - 167/76)  BP(mean): --  RR: 20 (08 Sep 2017 08:05) (16 - 20)  SpO2: 97% (08 Sep 2017 08:05) (93% - 98%)                          13.6   6.8   )-----------( 155      ( 07 Sep 2017 06:40 )             41.8     09-07    143  |  101  |  9   ----------------------------<  96  4.1   |  30  |  0.76    Ca    9.4      07 Sep 2017 06:40    TPro  7.1  /  Alb  3.5  /  TBili  0.6  /  DBili  x   /  AST  17  /  ALT  12  /  AlkPhos  85  09-07    MEDICATIONS  (STANDING):  ciprofloxacin   IVPB 400 milliGRAM(s) IV Intermittent every 12 hours  metroNIDAZOLE  IVPB 500 milliGRAM(s) IV Intermittent every 8 hours  amLODIPine   Tablet 5 milliGRAM(s) Oral daily  atorvastatin 40 milliGRAM(s) Oral at bedtime  calcium carbonate  625 mG + Vitamin D (OsCal 250 + D) 1 Tablet(s) Oral daily  senna 2 Tablet(s) Oral at bedtime  lisinopril 20 milliGRAM(s) Oral daily  metoprolol 50 milliGRAM(s) Oral two times a day    MEDICATIONS  (PRN):  acetaminophen   Tablet. 650 milliGRAM(s) Oral every 6 hours PRN Mild to moderate pain  morphine  - Injectable 2 milliGRAM(s) IV Push every 4 hours PRN Severe Pain (7 - 10)

## 2017-09-08 NOTE — PROGRESS NOTE ADULT - ASSESSMENT
88F from  (Hungarian speaking) aw acute sigmoid diverticulitis. Symptomatically improving on iv abx. Tolerating diet.

## 2017-09-08 NOTE — CHART NOTE - NSCHARTNOTEFT_GEN_A_CORE
Verbal consult received for low fiber diet education prior to discharge. Pt with diverticulitis, discussed low fiber diet education with daughter at bedside, written materials in English and Sami provided.    RD to remain available as needed.    Esther Blackburn R.D., [facility], Pager #[phone]

## 2018-03-05 ENCOUNTER — OUTPATIENT (OUTPATIENT)
Dept: OUTPATIENT SERVICES | Facility: HOSPITAL | Age: 83
LOS: 1 days | End: 2018-03-05
Payer: COMMERCIAL

## 2018-03-05 ENCOUNTER — APPOINTMENT (OUTPATIENT)
Dept: CV DIAGNOSTICS | Facility: HOSPITAL | Age: 83
End: 2018-03-05

## 2018-03-05 DIAGNOSIS — I35.0 NONRHEUMATIC AORTIC (VALVE) STENOSIS: ICD-10-CM

## 2018-03-05 PROCEDURE — 78452 HT MUSCLE IMAGE SPECT MULT: CPT | Mod: 26

## 2018-03-05 PROCEDURE — A9505: CPT

## 2018-03-05 PROCEDURE — 93018 CV STRESS TEST I&R ONLY: CPT

## 2018-03-05 PROCEDURE — 93016 CV STRESS TEST SUPVJ ONLY: CPT

## 2018-03-05 PROCEDURE — 93017 CV STRESS TEST TRACING ONLY: CPT

## 2018-03-05 PROCEDURE — A9500: CPT

## 2018-03-05 PROCEDURE — 78452 HT MUSCLE IMAGE SPECT MULT: CPT

## 2018-04-18 ENCOUNTER — RESULT REVIEW (OUTPATIENT)
Age: 83
End: 2018-04-18

## 2018-05-03 ENCOUNTER — APPOINTMENT (OUTPATIENT)
Dept: THORACIC SURGERY | Facility: CLINIC | Age: 83
End: 2018-05-03
Payer: MEDICARE

## 2018-05-03 VITALS
WEIGHT: 164 LBS | OXYGEN SATURATION: 93 % | HEIGHT: 64 IN | DIASTOLIC BLOOD PRESSURE: 73 MMHG | SYSTOLIC BLOOD PRESSURE: 117 MMHG | HEART RATE: 58 BPM | BODY MASS INDEX: 28 KG/M2 | RESPIRATION RATE: 18 BRPM

## 2018-05-03 DIAGNOSIS — D68.9 COAGULATION DEFECT, UNSPECIFIED: ICD-10-CM

## 2018-05-03 DIAGNOSIS — Z86.79 PERSONAL HISTORY OF OTHER DISEASES OF THE CIRCULATORY SYSTEM: ICD-10-CM

## 2018-05-03 DIAGNOSIS — Z87.891 PERSONAL HISTORY OF NICOTINE DEPENDENCE: ICD-10-CM

## 2018-05-03 PROCEDURE — 99205 OFFICE O/P NEW HI 60 MIN: CPT

## 2018-05-14 ENCOUNTER — APPOINTMENT (OUTPATIENT)
Dept: INTERVENTIONAL RADIOLOGY/VASCULAR | Facility: CLINIC | Age: 83
End: 2018-05-14
Payer: MEDICARE

## 2018-05-14 VITALS
HEART RATE: 61 BPM | DIASTOLIC BLOOD PRESSURE: 66 MMHG | WEIGHT: 164 LBS | BODY MASS INDEX: 28 KG/M2 | OXYGEN SATURATION: 95 % | HEIGHT: 64 IN | SYSTOLIC BLOOD PRESSURE: 96 MMHG | RESPIRATION RATE: 16 BRPM

## 2018-05-14 PROCEDURE — 99204 OFFICE O/P NEW MOD 45 MIN: CPT

## 2018-05-15 ENCOUNTER — OUTPATIENT (OUTPATIENT)
Dept: OUTPATIENT SERVICES | Facility: HOSPITAL | Age: 83
LOS: 1 days | End: 2018-05-15
Payer: COMMERCIAL

## 2018-05-15 ENCOUNTER — APPOINTMENT (OUTPATIENT)
Dept: MRI IMAGING | Facility: IMAGING CENTER | Age: 83
End: 2018-05-15
Payer: MEDICARE

## 2018-05-15 DIAGNOSIS — R91.8 OTHER NONSPECIFIC ABNORMAL FINDING OF LUNG FIELD: ICD-10-CM

## 2018-05-15 PROCEDURE — 70553 MRI BRAIN STEM W/O & W/DYE: CPT

## 2018-05-15 PROCEDURE — A9585: CPT

## 2018-05-15 PROCEDURE — 70553 MRI BRAIN STEM W/O & W/DYE: CPT | Mod: 26

## 2018-05-15 PROCEDURE — 82565 ASSAY OF CREATININE: CPT

## 2018-05-16 ENCOUNTER — RESULT REVIEW (OUTPATIENT)
Age: 83
End: 2018-05-16

## 2018-05-16 ENCOUNTER — OUTPATIENT (OUTPATIENT)
Dept: OUTPATIENT SERVICES | Facility: HOSPITAL | Age: 83
LOS: 1 days | End: 2018-05-16
Payer: COMMERCIAL

## 2018-05-16 DIAGNOSIS — R91.8 OTHER NONSPECIFIC ABNORMAL FINDING OF LUNG FIELD: ICD-10-CM

## 2018-05-16 PROCEDURE — 32405: CPT

## 2018-05-16 PROCEDURE — 88367 INSITU HYBRIDIZATION AUTO: CPT | Mod: 26

## 2018-05-16 PROCEDURE — 88342 IMHCHEM/IMCYTCHM 1ST ANTB: CPT | Mod: 26,59

## 2018-05-16 PROCEDURE — 88341 IMHCHEM/IMCYTCHM EA ADD ANTB: CPT | Mod: 26

## 2018-05-16 PROCEDURE — 88364 INSITU HYBRIDIZATION (FISH): CPT | Mod: 26

## 2018-05-16 PROCEDURE — 88189 FLOWCYTOMETRY/READ 16 & >: CPT

## 2018-05-16 PROCEDURE — 88305 TISSUE EXAM BY PATHOLOGIST: CPT | Mod: 26

## 2018-05-16 PROCEDURE — 77012 CT SCAN FOR NEEDLE BIOPSY: CPT | Mod: 26

## 2018-05-16 PROCEDURE — 71045 X-RAY EXAM CHEST 1 VIEW: CPT | Mod: 26

## 2018-05-16 PROCEDURE — 88365 INSITU HYBRIDIZATION (FISH): CPT | Mod: 26,59

## 2018-05-17 LAB — TM INTERPRETATION: SIGNIFICANT CHANGE UP

## 2018-05-18 DIAGNOSIS — R91.8 OTHER NONSPECIFIC ABNORMAL FINDING OF LUNG FIELD: ICD-10-CM

## 2018-05-23 LAB — NON-GYNECOLOGICAL CYTOLOGY STUDY: SIGNIFICANT CHANGE UP

## 2018-05-29 ENCOUNTER — APPOINTMENT (OUTPATIENT)
Dept: THORACIC SURGERY | Facility: CLINIC | Age: 83
End: 2018-05-29
Payer: MEDICARE

## 2018-05-29 VITALS
RESPIRATION RATE: 16 BRPM | HEART RATE: 55 BPM | OXYGEN SATURATION: 98 % | SYSTOLIC BLOOD PRESSURE: 126 MMHG | BODY MASS INDEX: 28.67 KG/M2 | DIASTOLIC BLOOD PRESSURE: 74 MMHG | WEIGHT: 167 LBS

## 2018-05-29 PROCEDURE — 99214 OFFICE O/P EST MOD 30 MIN: CPT

## 2018-06-13 ENCOUNTER — OUTPATIENT (OUTPATIENT)
Dept: OUTPATIENT SERVICES | Facility: HOSPITAL | Age: 83
LOS: 1 days | End: 2018-06-13
Payer: MEDICARE

## 2018-06-13 VITALS
HEART RATE: 59 BPM | SYSTOLIC BLOOD PRESSURE: 106 MMHG | TEMPERATURE: 97 F | RESPIRATION RATE: 17 BRPM | HEIGHT: 62.5 IN | WEIGHT: 166.01 LBS | DIASTOLIC BLOOD PRESSURE: 68 MMHG

## 2018-06-13 DIAGNOSIS — R91.8 OTHER NONSPECIFIC ABNORMAL FINDING OF LUNG FIELD: ICD-10-CM

## 2018-06-13 DIAGNOSIS — Z98.890 OTHER SPECIFIED POSTPROCEDURAL STATES: Chronic | ICD-10-CM

## 2018-06-13 DIAGNOSIS — Z98.49 CATARACT EXTRACTION STATUS, UNSPECIFIED EYE: Chronic | ICD-10-CM

## 2018-06-13 DIAGNOSIS — I63.9 CEREBRAL INFARCTION, UNSPECIFIED: ICD-10-CM

## 2018-06-13 DIAGNOSIS — Z90.49 ACQUIRED ABSENCE OF OTHER SPECIFIED PARTS OF DIGESTIVE TRACT: Chronic | ICD-10-CM

## 2018-06-13 LAB
BLD GP AB SCN SERPL QL: NEGATIVE — SIGNIFICANT CHANGE UP
BUN SERPL-MCNC: 27 MG/DL — HIGH (ref 7–23)
CALCIUM SERPL-MCNC: 9.4 MG/DL — SIGNIFICANT CHANGE UP (ref 8.4–10.5)
CHLORIDE SERPL-SCNC: 99 MMOL/L — SIGNIFICANT CHANGE UP (ref 98–107)
CO2 SERPL-SCNC: 29 MMOL/L — SIGNIFICANT CHANGE UP (ref 22–31)
CREAT SERPL-MCNC: 0.84 MG/DL — SIGNIFICANT CHANGE UP (ref 0.5–1.3)
GLUCOSE SERPL-MCNC: 79 MG/DL — SIGNIFICANT CHANGE UP (ref 70–99)
HCT VFR BLD CALC: 37.4 % — SIGNIFICANT CHANGE UP (ref 34.5–45)
HGB BLD-MCNC: 11.9 G/DL — SIGNIFICANT CHANGE UP (ref 11.5–15.5)
MCHC RBC-ENTMCNC: 29.8 PG — SIGNIFICANT CHANGE UP (ref 27–34)
MCHC RBC-ENTMCNC: 31.8 % — LOW (ref 32–36)
MCV RBC AUTO: 93.5 FL — SIGNIFICANT CHANGE UP (ref 80–100)
NRBC # FLD: 0 — SIGNIFICANT CHANGE UP
PLATELET # BLD AUTO: 187 K/UL — SIGNIFICANT CHANGE UP (ref 150–400)
PMV BLD: 10.5 FL — SIGNIFICANT CHANGE UP (ref 7–13)
POTASSIUM SERPL-MCNC: 3.7 MMOL/L — SIGNIFICANT CHANGE UP (ref 3.5–5.3)
POTASSIUM SERPL-SCNC: 3.7 MMOL/L — SIGNIFICANT CHANGE UP (ref 3.5–5.3)
RBC # BLD: 4 M/UL — SIGNIFICANT CHANGE UP (ref 3.8–5.2)
RBC # FLD: 13.6 % — SIGNIFICANT CHANGE UP (ref 10.3–14.5)
RH IG SCN BLD-IMP: POSITIVE — SIGNIFICANT CHANGE UP
SODIUM SERPL-SCNC: 140 MMOL/L — SIGNIFICANT CHANGE UP (ref 135–145)
WBC # BLD: 6.95 K/UL — SIGNIFICANT CHANGE UP (ref 3.8–10.5)
WBC # FLD AUTO: 6.95 K/UL — SIGNIFICANT CHANGE UP (ref 3.8–10.5)

## 2018-06-13 PROCEDURE — 93010 ELECTROCARDIOGRAM REPORT: CPT

## 2018-06-13 RX ORDER — SODIUM CHLORIDE 9 MG/ML
1000 INJECTION, SOLUTION INTRAVENOUS
Qty: 0 | Refills: 0 | Status: DISCONTINUED | OUTPATIENT
Start: 2018-06-20 | End: 2018-06-22

## 2018-06-13 RX ORDER — SODIUM CHLORIDE 9 MG/ML
3 INJECTION INTRAMUSCULAR; INTRAVENOUS; SUBCUTANEOUS EVERY 8 HOURS
Qty: 0 | Refills: 0 | Status: DISCONTINUED | OUTPATIENT
Start: 2018-06-20 | End: 2018-06-26

## 2018-06-13 RX ORDER — ATORVASTATIN CALCIUM 80 MG/1
1 TABLET, FILM COATED ORAL
Qty: 0 | Refills: 0 | COMMUNITY

## 2018-06-13 NOTE — H&P PST ADULT - HISTORY OF PRESENT ILLNESS
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 Pt was then referred to surgeon for further evaluation. Now scheduled for flexible bronchoscopy, right video assisted thoracoscopy, resection of Pseudotumor with epidural on 06/20/18

## 2018-06-13 NOTE — H&P PST ADULT - RS GEN PE MLT RESP DETAILS PC
airway patent/good air movement/no chest wall tenderness/no intercostal retractions/no subcutaneous emphysema/no wheezes/no rales/respirations non-labored/breath sounds equal/no rhonchi/clear to auscultation bilaterally

## 2018-06-13 NOTE — H&P PST ADULT - NEGATIVE SKIN SYMPTOMS
no change in size/color of mole/no tumor/no pitted nails/no hair loss/no rash/no itching/no brittle nails

## 2018-06-13 NOTE — H&P PST ADULT - NEGATIVE GENERAL GENITOURINARY SYMPTOMS
no hematuria/no renal colic/no urine discoloration/no gas in urine/no bladder infections/no flank pain L

## 2018-06-13 NOTE — H&P PST ADULT - NEGATIVE BREAST SYMPTOMS
no breast tenderness L/no nipple discharge R/no breast tenderness R/no nipple discharge L/no breast lump R/no breast lump L

## 2018-06-13 NOTE — H&P PST ADULT - PMH
Alzheimer disease    Benign essential hypertension    CVA (cerebral infarction)    Dyslipidemia    Hypertension Alzheimer disease    Asthma, mild    Benign essential hypertension    CVA (cerebral infarction)    Dyslipidemia    Hypertension    Other nonspecific abnormal finding of lung field

## 2018-06-13 NOTE — H&P PST ADULT - PSH
No significant past surgical history H/O surgical biopsy  right lung; 05/2018  H/O umbilical hernia repair    H/O varicose vein ligation and stripping  ? right  History of abdominoplasty    History of appendectomy    History of bilateral breast reduction surgery    History of cataract extraction  bilateral  History of cosmetic plastic surgery  face lift  Status post coronary angiogram  2017

## 2018-06-13 NOTE — H&P PST ADULT - PROBLEM SELECTOR PLAN 2
Pt with hx of CVA on clopidogrel, as per pt's daughter, she was instructed to hold Clopidogrel 7 days prior to surgery  Pending cardiology evaluation

## 2018-06-13 NOTE — H&P PST ADULT - NEGATIVE OPHTHALMOLOGIC SYMPTOMS
no pain L/no discharge L/no pain R/no blurred vision L/no irritation L/no blurred vision R/no diplopia/no photophobia/no discharge R/no irritation R/no loss of vision L/no scleral injection L/no loss of vision R/no lacrimation L/no lacrimation R

## 2018-06-13 NOTE — H&P PST ADULT - PROBLEM SELECTOR PLAN 1
Scheduled for flexible bronchoscopy, right VAT, resection os Pseudotumor with epidural on 06/20/18. Pre op instructions, famotidine, chlorhexidine gluconate soap given and explained. Pt verbalized understanding.

## 2018-06-13 NOTE — H&P PST ADULT - NECK DETAILS
no JVD/normal thyroid gland/supple supple/no JVD/normal thyroid gland/decreased ROM on extension, flexion and lateral bending

## 2018-06-20 ENCOUNTER — RESULT REVIEW (OUTPATIENT)
Age: 83
End: 2018-06-20

## 2018-06-20 ENCOUNTER — INPATIENT (INPATIENT)
Facility: HOSPITAL | Age: 83
LOS: 5 days | Discharge: NURSING, REHAB OR LONG-TERM CARE | End: 2018-06-26
Attending: THORACIC SURGERY (CARDIOTHORACIC VASCULAR SURGERY) | Admitting: THORACIC SURGERY (CARDIOTHORACIC VASCULAR SURGERY)
Payer: MEDICARE

## 2018-06-20 ENCOUNTER — APPOINTMENT (OUTPATIENT)
Dept: THORACIC SURGERY | Facility: HOSPITAL | Age: 83
End: 2018-06-20
Payer: MEDICARE

## 2018-06-20 VITALS
HEART RATE: 58 BPM | WEIGHT: 166.01 LBS | HEIGHT: 62.5 IN | SYSTOLIC BLOOD PRESSURE: 130 MMHG | DIASTOLIC BLOOD PRESSURE: 69 MMHG | OXYGEN SATURATION: 95 % | TEMPERATURE: 98 F | RESPIRATION RATE: 16 BRPM

## 2018-06-20 DIAGNOSIS — Z98.890 OTHER SPECIFIED POSTPROCEDURAL STATES: Chronic | ICD-10-CM

## 2018-06-20 DIAGNOSIS — Z90.49 ACQUIRED ABSENCE OF OTHER SPECIFIED PARTS OF DIGESTIVE TRACT: Chronic | ICD-10-CM

## 2018-06-20 DIAGNOSIS — Z98.49 CATARACT EXTRACTION STATUS, UNSPECIFIED EYE: Chronic | ICD-10-CM

## 2018-06-20 DIAGNOSIS — R91.8 OTHER NONSPECIFIC ABNORMAL FINDING OF LUNG FIELD: ICD-10-CM

## 2018-06-20 PROCEDURE — 71045 X-RAY EXAM CHEST 1 VIEW: CPT | Mod: 26

## 2018-06-20 PROCEDURE — 32652 THORACOSCOPY REM TOTL CORTEX: CPT | Mod: AS

## 2018-06-20 PROCEDURE — 88307 TISSUE EXAM BY PATHOLOGIST: CPT | Mod: 26

## 2018-06-20 PROCEDURE — S2900 ROBOTIC SURGICAL SYSTEM: CPT | Mod: NC

## 2018-06-20 PROCEDURE — 32666 THORACOSCOPY W/WEDGE RESECT: CPT | Mod: AS

## 2018-06-20 PROCEDURE — 99233 SBSQ HOSP IP/OBS HIGH 50: CPT

## 2018-06-20 PROCEDURE — 88305 TISSUE EXAM BY PATHOLOGIST: CPT | Mod: 26

## 2018-06-20 PROCEDURE — 32666 THORACOSCOPY W/WEDGE RESECT: CPT

## 2018-06-20 PROCEDURE — 88312 SPECIAL STAINS GROUP 1: CPT | Mod: 26

## 2018-06-20 PROCEDURE — 88331 PATH CONSLTJ SURG 1 BLK 1SPC: CPT | Mod: 26

## 2018-06-20 PROCEDURE — 32652 THORACOSCOPY REM TOTL CORTEX: CPT

## 2018-06-20 RX ORDER — ATORVASTATIN CALCIUM 80 MG/1
40 TABLET, FILM COATED ORAL AT BEDTIME
Qty: 0 | Refills: 0 | Status: DISCONTINUED | OUTPATIENT
Start: 2018-06-20 | End: 2018-06-26

## 2018-06-20 RX ORDER — BUDESONIDE AND FORMOTEROL FUMARATE DIHYDRATE 160; 4.5 UG/1; UG/1
2 AEROSOL RESPIRATORY (INHALATION)
Qty: 0 | Refills: 0 | Status: DISCONTINUED | OUTPATIENT
Start: 2018-06-20 | End: 2018-06-26

## 2018-06-20 RX ORDER — NALOXONE HYDROCHLORIDE 4 MG/.1ML
0.1 SPRAY NASAL
Qty: 0 | Refills: 0 | Status: DISCONTINUED | OUTPATIENT
Start: 2018-06-20 | End: 2018-06-22

## 2018-06-20 RX ORDER — DOCUSATE SODIUM 100 MG
100 CAPSULE ORAL THREE TIMES A DAY
Qty: 0 | Refills: 0 | Status: DISCONTINUED | OUTPATIENT
Start: 2018-06-20 | End: 2018-06-26

## 2018-06-20 RX ORDER — ONDANSETRON 8 MG/1
4 TABLET, FILM COATED ORAL EVERY 6 HOURS
Qty: 0 | Refills: 0 | Status: DISCONTINUED | OUTPATIENT
Start: 2018-06-20 | End: 2018-06-26

## 2018-06-20 RX ORDER — KETOTIFEN FUMARATE 0.34 MG/ML
1 SOLUTION OPHTHALMIC
Qty: 0 | Refills: 0 | Status: DISCONTINUED | OUTPATIENT
Start: 2018-06-20 | End: 2018-06-26

## 2018-06-20 RX ORDER — HEPARIN SODIUM 5000 [USP'U]/ML
5000 INJECTION INTRAVENOUS; SUBCUTANEOUS EVERY 12 HOURS
Qty: 0 | Refills: 0 | Status: DISCONTINUED | OUTPATIENT
Start: 2018-06-20 | End: 2018-06-26

## 2018-06-20 RX ORDER — ACETAMINOPHEN 500 MG
1000 TABLET ORAL ONCE
Qty: 0 | Refills: 0 | Status: COMPLETED | OUTPATIENT
Start: 2018-06-20 | End: 2018-06-21

## 2018-06-20 RX ADMIN — BUDESONIDE AND FORMOTEROL FUMARATE DIHYDRATE 2 PUFF(S): 160; 4.5 AEROSOL RESPIRATORY (INHALATION) at 23:42

## 2018-06-20 RX ADMIN — SODIUM CHLORIDE 30 MILLILITER(S): 9 INJECTION, SOLUTION INTRAVENOUS at 17:06

## 2018-06-20 RX ADMIN — HEPARIN SODIUM 5000 UNIT(S): 5000 INJECTION INTRAVENOUS; SUBCUTANEOUS at 21:31

## 2018-06-20 RX ADMIN — SODIUM CHLORIDE 30 MILLILITER(S): 9 INJECTION, SOLUTION INTRAVENOUS at 19:06

## 2018-06-20 RX ADMIN — SODIUM CHLORIDE 3 MILLILITER(S): 9 INJECTION INTRAMUSCULAR; INTRAVENOUS; SUBCUTANEOUS at 21:16

## 2018-06-20 RX ADMIN — Medication 1 DROP(S): at 19:27

## 2018-06-20 RX ADMIN — SODIUM CHLORIDE 3 MILLILITER(S): 9 INJECTION INTRAMUSCULAR; INTRAVENOUS; SUBCUTANEOUS at 17:06

## 2018-06-20 RX ADMIN — Medication 100 MILLIGRAM(S): at 21:31

## 2018-06-20 RX ADMIN — ATORVASTATIN CALCIUM 40 MILLIGRAM(S): 80 TABLET, FILM COATED ORAL at 21:31

## 2018-06-20 RX ADMIN — SODIUM CHLORIDE 30 MILLILITER(S): 9 INJECTION, SOLUTION INTRAVENOUS at 09:15

## 2018-06-20 RX ADMIN — KETOTIFEN FUMARATE 1 DROP(S): 0.34 SOLUTION OPHTHALMIC at 19:27

## 2018-06-20 NOTE — ASU PATIENT PROFILE, ADULT - PSH
H/O surgical biopsy  right lung; 05/2018  H/O umbilical hernia repair    H/O varicose vein ligation and stripping  ? right  History of abdominoplasty    History of appendectomy    History of bilateral breast reduction surgery    History of cataract extraction  bilateral  History of cosmetic plastic surgery  face lift  Status post coronary angiogram  2017 H/O surgical biopsy  right lung; 05/2018  H/O umbilical hernia repair    H/O varicose vein ligation and stripping  both legs  History of abdominoplasty    History of appendectomy    History of bilateral breast reduction surgery    History of cataract extraction  bilateral, with lens implants  History of cosmetic plastic surgery  face lift  Status post coronary angiogram  2017

## 2018-06-20 NOTE — ASU PREOP CHECKLIST - TEMPERATURE IN FAHRENHEIT (DEGREES F)
December 1, 2017      Re:   Reshma Arellano  [address]  [place] [address]         To Whom It May Concern:        Reshma is under my care and needs a sit down only job for 8 hours a day until she is seen by the specialist.      Sincerely,           Marie Porter MD  [address]  [address]  [facility] [id number] [phone]  
97.9

## 2018-06-20 NOTE — PROGRESS NOTE ADULT - SUBJECTIVE AND OBJECTIVE BOX
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 Pt was then referred to surgeon for further evaluation. Now scheduled for flexible bronchoscopy, right video assisted thoracoscopy, resection of Pseudotumor with epidural on 06/20/18 (13 Jun 2018 13:12)      Procedure:   Right uniport VATS RUL wedge with enblock chest wall resection  06/20/2018      Issues:  RUL mass / Pseudotumor  Postop pain  Asthma  HTN  Hx of CVA  HLD                   Home Medications:  atorvastatin 40 mg oral tablet: 1 tab(s) orally once a day in pm (20 Jun 2018 09:34)  Calcium 600+D 600 mg-200 intl units oral tablet:  orally once a day (20 Jun 2018 09:34)  clopidogrel 75 mg oral tablet: 1 tab(s) orally once a day (20 Jun 2018 09:34)  Exelon 4.6 mg/24 hr transdermal film, extended release: 1 patch transdermal once a day (20 Jun 2018 09:34)  hydrochlorothiazide-lisinopril 25 mg-20 mg oral tablet: 1 tab(s) orally 2 times a day (20 Jun 2018 09:34)  Natural Tears preserved ophthalmic solution: 1 drop(s) to each affected eye 2 times a day, As Needed (20 Jun 2018 09:34)  olopatadine 0.1% ophthalmic solution: 1 drop(s) to each affected eye 2 times a day, As Needed (20 Jun 2018 09:34)  Senna 8.6 mg oral tablet: 1 tab(s) orally once a day (at bedtime) (20 Jun 2018 09:34)  Symbicort 160 mcg-4.5 mcg/inh inhalation aerosol: 2 puff(s) inhaled 2 times a day (20 Jun 2018 09:34)  zolpidem 5 mg oral tablet: 1 tab(s) orally once a day (at bedtime) (20 Jun 2018 09:34)      PAST MEDICAL & SURGICAL HISTORY:  Anxiety  Other nonspecific abnormal finding of lung field  Asthma, mild  Hypertension  Alzheimer disease  CVA (cerebral infarction)  Dyslipidemia  Benign essential hypertension  Status post coronary angiogram: 2017  H/O umbilical hernia repair  H/O varicose vein ligation and stripping: both legs  History of cataract extraction: bilateral, with lens implants  H/O surgical biopsy: right lung; 05/2018  History of abdominoplasty  History of cosmetic plastic surgery: face lift  History of appendectomy  History of bilateral breast reduction surgery        ICU Vital Signs Last 24 Hrs  T(C): 36.4 (20 Jun 2018 17:00), Max: 36.6 (20 Jun 2018 08:50)  T(F): 97.5 (20 Jun 2018 17:00), Max: 97.9 (20 Jun 2018 08:50)  HR: 56 (20 Jun 2018 19:00) (55 - 65)  BP: 130/69 (20 Jun 2018 08:50) (130/69 - 130/69)  BP(mean): --  ABP: 147/67 (20 Jun 2018 19:00) (136/72 - 149/75)  ABP(mean): 99 (20 Jun 2018 19:00) (98 - 105)  RR: 15 (20 Jun 2018 19:00) (15 - 19)  SpO2: 100% (20 Jun 2018 19:00) (95% - 100%)    I&O's Detail    20 Jun 2018 07:01  -  20 Jun 2018 19:07  --------------------------------------------------------  IN:    lactated ringers.: 90 mL  Total IN: 90 mL    OUT:    Chest Tube: 50 mL    Indwelling Catheter - Urethral: 165 mL  Total OUT: 215 mL    Total NET: -125 mL        CAPILLARY BLOOD GLUCOSE        Home Medications:  atorvastatin 40 mg oral tablet: 1 tab(s) orally once a day in pm (20 Jun 2018 09:34)  Calcium 600+D 600 mg-200 intl units oral tablet:  orally once a day (20 Jun 2018 09:34)  clopidogrel 75 mg oral tablet: 1 tab(s) orally once a day (20 Jun 2018 09:34)  Exelon 4.6 mg/24 hr transdermal film, extended release: 1 patch transdermal once a day (20 Jun 2018 09:34)  hydrochlorothiazide-lisinopril 25 mg-20 mg oral tablet: 1 tab(s) orally 2 times a day (20 Jun 2018 09:34)  Natural Tears preserved ophthalmic solution: 1 drop(s) to each affected eye 2 times a day, As Needed (20 Jun 2018 09:34)  olopatadine 0.1% ophthalmic solution: 1 drop(s) to each affected eye 2 times a day, As Needed (20 Jun 2018 09:34)  Senna 8.6 mg oral tablet: 1 tab(s) orally once a day (at bedtime) (20 Jun 2018 09:34)  Symbicort 160 mcg-4.5 mcg/inh inhalation aerosol: 2 puff(s) inhaled 2 times a day (20 Jun 2018 09:34)  zolpidem 5 mg oral tablet: 1 tab(s) orally once a day (at bedtime) (20 Jun 2018 09:34)      MEDICATIONS  (STANDING):  artificial  tears Solution 1 Drop(s) Both EYES two times a day  atorvastatin 40 milliGRAM(s) Oral at bedtime  buDESOnide 160 MICROgram(s)/formoterol 4.5 MICROgram(s) Inhaler 2 Puff(s) Inhalation two times a day  docusate sodium 100 milliGRAM(s) Oral three times a day  HYDROmorphone (10 MICROgram(s)/mL) + BUpivacaine 0.0625% in 0.9% Sodium Chloride PCEA 250 milliLiter(s) Epidural PCA Continuous  ketotifen 0.025% Ophthalmic Solution 1 Drop(s) Both EYES two times a day  lactated ringers. 1000 milliLiter(s) (30 mL/Hr) IV Continuous <Continuous>  sodium chloride 0.9% lock flush 3 milliLiter(s) IV Push every 8 hours    MEDICATIONS  (PRN):  HYDROmorphone (10 MICROgram(s)/mL) + BUpivacaine 0.0625% in 0.9% Sodium Chloride PCEA Rescue Clinician  Bolus 5 milliLiter(s) Epidural every 15 minutes PRN for Pain Score greater than 6  naloxone Injectable 0.1 milliGRAM(s) IV Push every 3 minutes PRN For ANY of the following changes in patient status:  A. RR LESS THAN 10 breaths per minute, B. Oxygen saturation LESS THAN 90%, C. Sedation score of 6  ondansetron Injectable 4 milliGRAM(s) IV Push every 6 hours PRN Nausea        Physical exam:                             General:               Pt is awake, alert,  not in any distress                                                  Neuro:                  Nonfocal                             Cardiovascular:   S1 & S2, regular                           Respiratory:         Air entry is fair and equal on both sides, clear on auscultation                          GI:                          Soft, nondistended and nontender, Bowel sounds active                            Ext:                        No cyanosis or edema     Labs:                                                                   CXR:    Postop changes, right chest tube in place       Plan:    General: 89yFemale s/p Right uniport VATS RUL wedge with enblock chest wall resection  06/20/2018  progressing well, experiencing  pain with deep breathing.                             Neuro:                                         Pain control with PCA / Tylenol IV                            Cardiovascular:                                            HTN: Monitor hemodynamic status and restart antihypertensives in AM as tolerated      HLD: On Lipitor                            Respiratory:                                         Pt is on 2L  nasal canula,                                           Comfortable, not in any distress.                                         Using incentive spirometry                                          Monitor chest tube output                                         Chest tube to  water seal, No air leak                                                                  Asthma:  Continue bronchodilators, pulmonary toilet                            GI                                         On clear liquids, advance to regular diet as tolerated                                         Continue Zofran / Reglan for nausea - PRN	                                                                 Renal:                                         Continue LR 30cc/hr                                         Monitor I/Os and electrolytes                                         D/C [place] in AM                                                 Hem/ Onc:                                                                                  Monitor chest tube output &  signs of bleeding.                                          Follow CBC in AM                           Infectious disease:                                            No signs of infection. Monitor for fever / leukocytosis.                                          All surgical incision / chest tube  sites look clean                            Endocrine                                             Continue Accu-Checks with coverage    Pt is on SQ Heparin and Venodyne boots for DVT prophylaxis.     Pertinent clinical, laboratory, radiographic, hemodynamic, echocardiographic, respiratory data, microbiologic data and chart were reviewed and analyzed frequently throughout the course of the day and night  Patient seen, examined and plan discussed with CT Surgeon Dr. Barron / CTICU team during rounds.    Pt's status discussed with family at bedside, updated status          Antonio Wolf MD

## 2018-06-20 NOTE — PATIENT PROFILE ADULT. - PSH
H/O surgical biopsy  right lung; 05/2018  H/O umbilical hernia repair    H/O varicose vein ligation and stripping  both legs  History of abdominoplasty    History of appendectomy    History of bilateral breast reduction surgery    History of cataract extraction  bilateral, with lens implants  History of cosmetic plastic surgery  face lift  Status post coronary angiogram  2017

## 2018-06-20 NOTE — BRIEF OPERATIVE NOTE - PROCEDURE
<<-----Click on this checkbox to enter Procedure Thoracic surgery  06/20/2018  Right uniport VATS RUL wedge with enblock chest wall resection.  Active  CSUMMERS

## 2018-06-20 NOTE — ASU PATIENT PROFILE, ADULT - PMH
Alzheimer disease    Asthma, mild    Benign essential hypertension    CVA (cerebral infarction)    Dyslipidemia    Hypertension    Other nonspecific abnormal finding of lung field Alzheimer disease    Anxiety    Asthma, mild    Benign essential hypertension    CVA (cerebral infarction)    Dyslipidemia    Hypertension    Other nonspecific abnormal finding of lung field

## 2018-06-20 NOTE — PATIENT PROFILE ADULT. - PMH
Alzheimer disease    Anxiety    Asthma, mild    Benign essential hypertension    CVA (cerebral infarction)    Dyslipidemia    Hypertension    Other nonspecific abnormal finding of lung field

## 2018-06-21 LAB
APTT BLD: 27 SEC — LOW (ref 27.5–37.4)
BUN SERPL-MCNC: 15 MG/DL — SIGNIFICANT CHANGE UP (ref 7–23)
CALCIUM SERPL-MCNC: 8.5 MG/DL — SIGNIFICANT CHANGE UP (ref 8.4–10.5)
CHLORIDE SERPL-SCNC: 98 MMOL/L — SIGNIFICANT CHANGE UP (ref 98–107)
CO2 SERPL-SCNC: 27 MMOL/L — SIGNIFICANT CHANGE UP (ref 22–31)
CREAT SERPL-MCNC: 0.68 MG/DL — SIGNIFICANT CHANGE UP (ref 0.5–1.3)
GLUCOSE SERPL-MCNC: 95 MG/DL — SIGNIFICANT CHANGE UP (ref 70–99)
HCT VFR BLD CALC: 40 % — SIGNIFICANT CHANGE UP (ref 34.5–45)
HGB BLD-MCNC: 13 G/DL — SIGNIFICANT CHANGE UP (ref 11.5–15.5)
INR BLD: 1.11 — SIGNIFICANT CHANGE UP (ref 0.88–1.17)
MCHC RBC-ENTMCNC: 29.8 PG — SIGNIFICANT CHANGE UP (ref 27–34)
MCHC RBC-ENTMCNC: 32.5 % — SIGNIFICANT CHANGE UP (ref 32–36)
MCV RBC AUTO: 91.7 FL — SIGNIFICANT CHANGE UP (ref 80–100)
NRBC # FLD: 0 — SIGNIFICANT CHANGE UP
PLATELET # BLD AUTO: 143 K/UL — LOW (ref 150–400)
PMV BLD: 10.1 FL — SIGNIFICANT CHANGE UP (ref 7–13)
POTASSIUM SERPL-MCNC: 4.2 MMOL/L — SIGNIFICANT CHANGE UP (ref 3.5–5.3)
POTASSIUM SERPL-SCNC: 4.2 MMOL/L — SIGNIFICANT CHANGE UP (ref 3.5–5.3)
PROTHROM AB SERPL-ACNC: 12.3 SEC — SIGNIFICANT CHANGE UP (ref 9.8–13.1)
RBC # BLD: 4.36 M/UL — SIGNIFICANT CHANGE UP (ref 3.8–5.2)
RBC # FLD: 13.2 % — SIGNIFICANT CHANGE UP (ref 10.3–14.5)
SODIUM SERPL-SCNC: 136 MMOL/L — SIGNIFICANT CHANGE UP (ref 135–145)
WBC # BLD: 8.3 K/UL — SIGNIFICANT CHANGE UP (ref 3.8–10.5)
WBC # FLD AUTO: 8.3 K/UL — SIGNIFICANT CHANGE UP (ref 3.8–10.5)

## 2018-06-21 PROCEDURE — 99233 SBSQ HOSP IP/OBS HIGH 50: CPT

## 2018-06-21 PROCEDURE — 71045 X-RAY EXAM CHEST 1 VIEW: CPT | Mod: 26

## 2018-06-21 RX ORDER — ACETAMINOPHEN 500 MG
1000 TABLET ORAL ONCE
Qty: 0 | Refills: 0 | Status: COMPLETED | OUTPATIENT
Start: 2018-06-21 | End: 2018-06-22

## 2018-06-21 RX ORDER — CLOPIDOGREL BISULFATE 75 MG/1
75 TABLET, FILM COATED ORAL DAILY
Qty: 0 | Refills: 0 | Status: DISCONTINUED | OUTPATIENT
Start: 2018-06-21 | End: 2018-06-26

## 2018-06-21 RX ORDER — CHLORHEXIDINE GLUCONATE 213 G/1000ML
1 SOLUTION TOPICAL
Qty: 0 | Refills: 0 | Status: DISCONTINUED | OUTPATIENT
Start: 2018-06-21 | End: 2018-06-26

## 2018-06-21 RX ORDER — HYDROMORPHONE HYDROCHLORIDE 2 MG/ML
0.5 INJECTION INTRAMUSCULAR; INTRAVENOUS; SUBCUTANEOUS
Qty: 0 | Refills: 0 | Status: DISCONTINUED | OUTPATIENT
Start: 2018-06-21 | End: 2018-06-24

## 2018-06-21 RX ORDER — ACETAMINOPHEN 500 MG
1000 TABLET ORAL ONCE
Qty: 0 | Refills: 0 | Status: COMPLETED | OUTPATIENT
Start: 2018-06-21 | End: 2018-06-21

## 2018-06-21 RX ORDER — SODIUM CHLORIDE 9 MG/ML
250 INJECTION, SOLUTION INTRAVENOUS
Qty: 0 | Refills: 0 | Status: DISCONTINUED | OUTPATIENT
Start: 2018-06-21 | End: 2018-06-22

## 2018-06-21 RX ORDER — TRAZODONE HCL 50 MG
25 TABLET ORAL AT BEDTIME
Qty: 0 | Refills: 0 | Status: DISCONTINUED | OUTPATIENT
Start: 2018-06-21 | End: 2018-06-26

## 2018-06-21 RX ORDER — OXYCODONE HYDROCHLORIDE 5 MG/1
5 TABLET ORAL
Qty: 0 | Refills: 0 | Status: DISCONTINUED | OUTPATIENT
Start: 2018-06-21 | End: 2018-06-24

## 2018-06-21 RX ADMIN — SODIUM CHLORIDE 3 MILLILITER(S): 9 INJECTION INTRAMUSCULAR; INTRAVENOUS; SUBCUTANEOUS at 21:04

## 2018-06-21 RX ADMIN — Medication 1000 MILLIGRAM(S): at 20:00

## 2018-06-21 RX ADMIN — Medication 100 MILLIGRAM(S): at 05:37

## 2018-06-21 RX ADMIN — KETOTIFEN FUMARATE 1 DROP(S): 0.34 SOLUTION OPHTHALMIC at 17:11

## 2018-06-21 RX ADMIN — SODIUM CHLORIDE 3 MILLILITER(S): 9 INJECTION INTRAMUSCULAR; INTRAVENOUS; SUBCUTANEOUS at 14:00

## 2018-06-21 RX ADMIN — BUDESONIDE AND FORMOTEROL FUMARATE DIHYDRATE 2 PUFF(S): 160; 4.5 AEROSOL RESPIRATORY (INHALATION) at 10:18

## 2018-06-21 RX ADMIN — BUDESONIDE AND FORMOTEROL FUMARATE DIHYDRATE 2 PUFF(S): 160; 4.5 AEROSOL RESPIRATORY (INHALATION) at 21:59

## 2018-06-21 RX ADMIN — Medication 1000 MILLIGRAM(S): at 06:00

## 2018-06-21 RX ADMIN — Medication 100 MILLIGRAM(S): at 16:50

## 2018-06-21 RX ADMIN — SODIUM CHLORIDE 1000 MILLILITER(S): 9 INJECTION, SOLUTION INTRAVENOUS at 09:00

## 2018-06-21 RX ADMIN — HEPARIN SODIUM 5000 UNIT(S): 5000 INJECTION INTRAVENOUS; SUBCUTANEOUS at 17:11

## 2018-06-21 RX ADMIN — Medication 1 DROP(S): at 17:11

## 2018-06-21 RX ADMIN — KETOTIFEN FUMARATE 1 DROP(S): 0.34 SOLUTION OPHTHALMIC at 05:37

## 2018-06-21 RX ADMIN — CLOPIDOGREL BISULFATE 75 MILLIGRAM(S): 75 TABLET, FILM COATED ORAL at 19:30

## 2018-06-21 RX ADMIN — Medication 1 DROP(S): at 05:37

## 2018-06-21 RX ADMIN — SODIUM CHLORIDE 3 MILLILITER(S): 9 INJECTION INTRAMUSCULAR; INTRAVENOUS; SUBCUTANEOUS at 05:37

## 2018-06-21 RX ADMIN — Medication 400 MILLIGRAM(S): at 05:30

## 2018-06-21 RX ADMIN — Medication 400 MILLIGRAM(S): at 19:30

## 2018-06-21 RX ADMIN — ATORVASTATIN CALCIUM 40 MILLIGRAM(S): 80 TABLET, FILM COATED ORAL at 21:04

## 2018-06-21 RX ADMIN — Medication 100 MILLIGRAM(S): at 21:04

## 2018-06-21 NOTE — PROGRESS NOTE ADULT - SUBJECTIVE AND OBJECTIVE BOX
Anesthesia Pain Management Service: Day _2_ of Epidural    SUBJECTIVE: Patient doing well with PCEA and no problems.  Pain Scale Score:   Refer to charted pain scores    THERAPY:  [x ] Epidural Bupivacaine 0.0625% and Hydromorphone  		[X ] 10 micrograms/mL	[ ] 5 micrograms/mL  [ ] Epidural Bupivacaine 0.0625% and Fentanyl - 2 micrograms/mL  [ ] Epidural Ropivacaine 0.1% plain – 1 mg/mL  [ ] Patient Controlled Regional Anesthesia (PCRA) Ropivacaine  		[ ] 0.2%			[ ] 0.1%    Demand dose __3_ lockout __15_ (minutes) Continuous Rate _6__ Total: _(cleared & off)__ Daily      MEDICATIONS  (STANDING):  artificial  tears Solution 1 Drop(s) Both EYES two times a day  atorvastatin 40 milliGRAM(s) Oral at bedtime  buDESOnide 160 MICROgram(s)/formoterol 4.5 MICROgram(s) Inhaler 2 Puff(s) Inhalation two times a day  chlorhexidine 4% Liquid 1 Application(s) Topical <User Schedule>  docusate sodium 100 milliGRAM(s) Oral three times a day  heparin  Injectable 5000 Unit(s) SubCutaneous every 12 hours  ketotifen 0.025% Ophthalmic Solution 1 Drop(s) Both EYES two times a day  lactated ringers. 1000 milliLiter(s) (30 mL/Hr) IV Continuous <Continuous>  sodium chloride 0.9% lock flush 3 milliLiter(s) IV Push every 8 hours    MEDICATIONS  (PRN):  HYDROmorphone  Injectable 0.5 milliGRAM(s) IV Push every 3 hours PRN Severe Pain (7 - 10) or if not tolerating oral meds  lactated ringers Bolus 250 milliLiter(s) IV Bolus every 15 minutes PRN for SBP<100  naloxone Injectable 0.1 milliGRAM(s) IV Push every 3 minutes PRN For ANY of the following changes in patient status:  A. RR LESS THAN 10 breaths per minute, B. Oxygen saturation LESS THAN 90%, C. Sedation score of 6  ondansetron Injectable 4 milliGRAM(s) IV Push every 6 hours PRN Nausea  oxyCODONE    IR 5 milliGRAM(s) Oral every 3 hours PRN Mild Pain (1 - 3) to Moderate Pain      OBJECTIVE:    Assessment of Catheter Site:	[ ] Left	[ ] Right  [x ] Epidural 	[ ] Femoral	      [ ] Saphenous   [ ] Supraclavicular   [ ] Other:    [x ] Dressing intact	[x ] Site non-tender	[ x] Site without erythema, discharge, edema  [x ] Epidural tubing and connection checked	[x] Gross neurological exam within normal limits  [X ] Catheter removed – tip intact		[ ] Afebrile	  [ ] Febrile: ___       [ X] see Temp under VS below)    PT/INR - ( 21 Jun 2018 03:00 )   PT: 12.3 SEC;   INR: 1.11          PTT - ( 21 Jun 2018 03:00 )  PTT:27.0 SEC                      13.0   8.30  )-----------( 143      ( 21 Jun 2018 03:00 )             40.0     Vital Signs Last 24 Hrs  T(C): 36.7 (06-21-18 @ 08:00), Max: 36.7 (06-21-18 @ 08:00)  T(F): 98 (06-21-18 @ 08:00), Max: 98 (06-21-18 @ 08:00)  HR: 66 (06-21-18 @ 10:00) (55 - 86)  BP: 91/50 (06-21-18 @ 10:00) (83/54 - 152/74)  BP(mean): 58 (06-21-18 @ 10:00) (58 - 91)  RR: 13 (06-21-18 @ 10:00) (11 - 19)  SpO2: 91% (06-21-18 @ 10:00) (88% - 100%)      Sedation Score:	[x ] Alert	[ ] Drowsy	[ ] Arousable	[ ] Asleep	[ ] Unresponsive    Side Effects:	[x ] None	[ ] Nausea	[ ] Vomiting	[ ] Pruritus  		[ ] Weakness		[ ] Numbness	[ ] Other:    ASSESSMENT/ PLAN:    Therapy to  be:	[ ] Continue   [ X] Discontinued   [ X] Change to prn Analgesics    Documentation and Verification of current medications:  [ X ] Done	[ ] Not done, not eligible, reason:    Comments: Changed to IV or oral opioid medication at this point. Epid removed as requested by CT ICU attending.    Progress Note written now but Patient was seen earlier.

## 2018-06-21 NOTE — PROGRESS NOTE ADULT - SUBJECTIVE AND OBJECTIVE BOX
HOWARD GONZALEZ            MRN-[id number]         Benadryl (Other)               89 y/o  female (Urdu speaking) w/ hx of diverticulitis, HTN, CVA w/o residual deficits, Alzheimer's disease, presents to PST for pre op evaluation with c/o   SOB with walking, Pt was evaluated by PCP, S/P chest X-RAY within the past 2 months. Pt was then referred to surgeon for further evaluation. Now scheduled for flexible bronchoscopy, right video assisted thoracoscopy, resection of Pseudotumor with epidural on 06/20/18 (13 Jun 2018 13:12)      Procedure:   Right uniport VATS RUL wedge with enblock chest wall resection  06/20/2018      Issues:  RUL mass / Pseudotumor  Postop pain  Asthma  HTN  Hx of CVA  HLD                 Home Medications:  atorvastatin 40 mg oral tablet: 1 tab(s) orally once a day in pm (20 Jun 2018 09:34)  Calcium 600+D 600 mg-200 intl units oral tablet:  orally once a day (20 Jun 2018 09:34)  clopidogrel 75 mg oral tablet: 1 tab(s) orally once a day (20 Jun 2018 09:34)  Exelon 4.6 mg/24 hr transdermal film, extended release: 1 patch transdermal once a day (20 Jun 2018 09:34)  hydrochlorothiazide-lisinopril 25 mg-20 mg oral tablet: 1 tab(s) orally 2 times a day (20 Jun 2018 09:34)  Natural Tears preserved ophthalmic solution: 1 drop(s) to each affected eye 2 times a day, As Needed (20 Jun 2018 09:34)  olopatadine 0.1% ophthalmic solution: 1 drop(s) to each affected eye 2 times a day, As Needed (20 Jun 2018 09:34)  Senna 8.6 mg oral tablet: 1 tab(s) orally once a day (at bedtime) (20 Jun 2018 09:34)  Symbicort 160 mcg-4.5 mcg/inh inhalation aerosol: 2 puff(s) inhaled 2 times a day (20 Jun 2018 09:34)  zolpidem 5 mg oral tablet: 1 tab(s) orally once a day (at bedtime) (20 Jun 2018 09:34)      PAST MEDICAL & SURGICAL HISTORY:  Anxiety  Other nonspecific abnormal finding of lung field  Asthma, mild  Hypertension  Alzheimer disease  CVA (cerebral infarction)  Dyslipidemia  Benign essential hypertension  Status post coronary angiogram: 2017  H/O umbilical hernia repair  H/O varicose vein ligation and stripping: both legs  History of cataract extraction: bilateral, with lens implants  H/O surgical biopsy: right lung; 05/2018  History of abdominoplasty  History of cosmetic plastic surgery: face lift  History of appendectomy  History of bilateral breast reduction surgery        ICU Vital Signs Last 24 Hrs  T(C): 36.2 (21 Jun 2018 04:00), Max: 36.6 (20 Jun 2018 08:50)  T(F): 97.2 (21 Jun 2018 04:00), Max: 97.9 (20 Jun 2018 08:50)  HR: 69 (21 Jun 2018 05:00) (55 - 86)  BP: 152/74 (20 Jun 2018 20:00) (130/69 - 152/74)  BP(mean): 91 (20 Jun 2018 20:00) (91 - 91)  ABP: 127/57 (21 Jun 2018 05:00) (122/55 - 158/72)  ABP(mean): 84 (21 Jun 2018 05:00) (80 - 106)  RR: 16 (21 Jun 2018 05:00) (12 - 19)  SpO2: 93% (21 Jun 2018 05:00) (93% - 100%)    I&O's Detail    20 Jun 2018 07:01  -  21 Jun 2018 06:01  --------------------------------------------------------  IN:    lactated ringers.: 420 mL  Total IN: 420 mL    OUT:    Chest Tube: 200 mL    Indwelling Catheter - Urethral: 970 mL  Total OUT: 1170 mL    Total NET: -750 mL        CAPILLARY BLOOD GLUCOSE          Home Medications:  atorvastatin 40 mg oral tablet: 1 tab(s) orally once a day in pm (20 Jun 2018 09:34)  Calcium 600+D 600 mg-200 intl units oral tablet:  orally once a day (20 Jun 2018 09:34)  clopidogrel 75 mg oral tablet: 1 tab(s) orally once a day (20 Jun 2018 09:34)  Exelon 4.6 mg/24 hr transdermal film, extended release: 1 patch transdermal once a day (20 Jun 2018 09:34)  hydrochlorothiazide-lisinopril 25 mg-20 mg oral tablet: 1 tab(s) orally 2 times a day (20 Jun 2018 09:34)  Natural Tears preserved ophthalmic solution: 1 drop(s) to each affected eye 2 times a day, As Needed (20 Jun 2018 09:34)  olopatadine 0.1% ophthalmic solution: 1 drop(s) to each affected eye 2 times a day, As Needed (20 Jun 2018 09:34)  Senna 8.6 mg oral tablet: 1 tab(s) orally once a day (at bedtime) (20 Jun 2018 09:34)  Symbicort 160 mcg-4.5 mcg/inh inhalation aerosol: 2 puff(s) inhaled 2 times a day (20 Jun 2018 09:34)  zolpidem 5 mg oral tablet: 1 tab(s) orally once a day (at bedtime) (20 Jun 2018 09:34)      MEDICATIONS  (STANDING):  artificial  tears Solution 1 Drop(s) Both EYES two times a day  atorvastatin 40 milliGRAM(s) Oral at bedtime  buDESOnide 160 MICROgram(s)/formoterol 4.5 MICROgram(s) Inhaler 2 Puff(s) Inhalation two times a day  docusate sodium 100 milliGRAM(s) Oral three times a day  heparin  Injectable 5000 Unit(s) SubCutaneous every 12 hours  HYDROmorphone (10 MICROgram(s)/mL) + BUpivacaine 0.0625% in 0.9% Sodium Chloride PCEA 250 milliLiter(s) Epidural PCA Continuous  ketotifen 0.025% Ophthalmic Solution 1 Drop(s) Both EYES two times a day  lactated ringers. 1000 milliLiter(s) (30 mL/Hr) IV Continuous <Continuous>  sodium chloride 0.9% lock flush 3 milliLiter(s) IV Push every 8 hours    MEDICATIONS  (PRN):  HYDROmorphone (10 MICROgram(s)/mL) + BUpivacaine 0.0625% in 0.9% Sodium Chloride PCEA Rescue Clinician  Bolus 5 milliLiter(s) Epidural every 15 minutes PRN for Pain Score greater than 6  naloxone Injectable 0.1 milliGRAM(s) IV Push every 3 minutes PRN For ANY of the following changes in patient status:  A. RR LESS THAN 10 breaths per minute, B. Oxygen saturation LESS THAN 90%, C. Sedation score of 6  ondansetron Injectable 4 milliGRAM(s) IV Push every 6 hours PRN Nausea          Physical exam:      General:               Pt is awake, alert,  not in any distress                                                  Neuro:                  Nonfocal                             Cardiovascular:   S1 & S2, regular                           Respiratory:         Air entry is fair and equal on both sides, clear on auscultation                          GI:                          Soft, nondistended and nontender, Bowel sounds active                            Ext:                        No cyanosis or edema                            Labs:                                                                           13.0   8.30  )-----------( 143      ( 21 Jun 2018 03:00 )             40.0             06-21    136  |  98  |  15  ----------------------------<  95  4.2   |  27  |  0.68    Ca    8.5      21 Jun 2018 03:00                    PT/INR - ( 21 Jun 2018 03:00 )   PT: 12.3 SEC;   INR: 1.11          PTT - ( 21 Jun 2018 03:00 )  PTT:27.0 SEC        CXR:    Postop changes, right chest tube in place     Plan:    General: 89yFemale s/p Right uniport VATS RUL wedge with enblock chest wall resection  06/20/2018  progressing well, experiencing  pain with deep breathing.                             Neuro:                                         Pain control with Tylenol    d/c Epidural                            Cardiovascular:                                            HTN: Monitor hemodynamic status and restart antihypertensives       HLD: On Lipitor                            Respiratory:                                         Pt is on 2L  nasal canula- wean off                                           Comfortable, not in any distress.                                         Using incentive spirometry                                          Monitor chest tube output                                         Chest tube to  water seal, No air leak  d/c chest tube                                                                  Asthma:  Continue bronchodilators, pulmonary toilet                            GI                                         On clear liquids, advance to regular diet as tolerated                                         Continue Zofran / Reglan for nausea - PRN	                                                                 Renal:                                         Continue LR 30cc/hr                                         Monitor I/Os and electrolytes                                         D/C Campo after epidural is out                                                 Hem/ Onc:                                                                                  Monitor chest tube output &  signs of bleeding.                                          Follow CBC in AM                           Infectious disease:                                            No signs of infection. Monitor for fever / leukocytosis.                                          All surgical incision / chest tube  sites look clean                            Endocrine                                             Continue Accu-Checks with coverage    Pt is on SQ Heparin and Venodyne boots for DVT prophylaxis.     Pertinent clinical, laboratory, radiographic, hemodynamic, echocardiographic, respiratory data, microbiologic data and chart were reviewed and analyzed frequently throughout the course of the day and night  Patient seen, examined and plan discussed with CT Surgeon Dr. Zeltsman / CTICU team during rounds.    d/c Epidural / Campo / Chest tube, possible discharge later today if no issues            Antonio Wolf MD

## 2018-06-21 NOTE — PROGRESS NOTE ADULT - SUBJECTIVE AND OBJECTIVE BOX
POST ANESTHESIA EVALUATION    89y Female POSTOP DAY 1 S/P     MENTAL STATUS: Patient participation [ X ] Awake     [  ] Arousable     [  ] Sedated    AIRWAY PATENCY: [X  ] Satisfactory  [  ] Other:     Vital Signs Last 24 Hrs  T(C): 36.7 (21 Jun 2018 08:00), Max: 36.7 (21 Jun 2018 08:00)  T(F): 98 (21 Jun 2018 08:00), Max: 98 (21 Jun 2018 08:00)  HR: 66 (21 Jun 2018 10:00) (55 - 86)  BP: 91/50 (21 Jun 2018 10:00) (83/54 - 152/74)  BP(mean): 58 (21 Jun 2018 10:00) (58 - 91)  RR: 13 (21 Jun 2018 10:00) (11 - 19)  SpO2: 91% (21 Jun 2018 10:00) (88% - 100%)  I&O's Summary    20 Jun 2018 07:01  -  21 Jun 2018 07:00  --------------------------------------------------------  IN: 450 mL / OUT: 1260 mL / NET: -810 mL    21 Jun 2018 07:01  -  21 Jun 2018 10:25  --------------------------------------------------------  IN: 460 mL / OUT: 65 mL / NET: 395 mL          NAUSEA/ VOMITTING:  [ X ] NONE  [  ] CONTROLLED [  ] OTHER     PAIN: [ X ] CONTROLLED WITH CURRENT REGIMEN  [  ] OTHER    [ X ] NO APPARENT ANESTHESIA COMPLICATIONS      Comments:

## 2018-06-22 LAB
APTT BLD: 28.2 SEC — SIGNIFICANT CHANGE UP (ref 27.5–37.4)
BUN SERPL-MCNC: 27 MG/DL — HIGH (ref 7–23)
CA-I BLD-SCNC: 1.03 MMOL/L — SIGNIFICANT CHANGE UP (ref 1.03–1.23)
CALCIUM SERPL-MCNC: 8.4 MG/DL — SIGNIFICANT CHANGE UP (ref 8.4–10.5)
CHLORIDE SERPL-SCNC: 96 MMOL/L — LOW (ref 98–107)
CO2 SERPL-SCNC: 26 MMOL/L — SIGNIFICANT CHANGE UP (ref 22–31)
CREAT SERPL-MCNC: 1.12 MG/DL — SIGNIFICANT CHANGE UP (ref 0.5–1.3)
GLUCOSE SERPL-MCNC: 123 MG/DL — HIGH (ref 70–99)
HCT VFR BLD CALC: 33.5 % — LOW (ref 34.5–45)
HGB BLD-MCNC: 11.1 G/DL — LOW (ref 11.5–15.5)
INR BLD: 1.09 — SIGNIFICANT CHANGE UP (ref 0.88–1.17)
MAGNESIUM SERPL-MCNC: 1.4 MG/DL — LOW (ref 1.6–2.6)
MCHC RBC-ENTMCNC: 29.7 PG — SIGNIFICANT CHANGE UP (ref 27–34)
MCHC RBC-ENTMCNC: 33.1 % — SIGNIFICANT CHANGE UP (ref 32–36)
MCV RBC AUTO: 89.6 FL — SIGNIFICANT CHANGE UP (ref 80–100)
NRBC # FLD: 0 — SIGNIFICANT CHANGE UP
PHOSPHATE SERPL-MCNC: 3.9 MG/DL — SIGNIFICANT CHANGE UP (ref 2.5–4.5)
PLATELET # BLD AUTO: 158 K/UL — SIGNIFICANT CHANGE UP (ref 150–400)
PMV BLD: 10.2 FL — SIGNIFICANT CHANGE UP (ref 7–13)
POTASSIUM SERPL-MCNC: 4.2 MMOL/L — SIGNIFICANT CHANGE UP (ref 3.5–5.3)
POTASSIUM SERPL-SCNC: 4.2 MMOL/L — SIGNIFICANT CHANGE UP (ref 3.5–5.3)
PROTHROM AB SERPL-ACNC: 12.5 SEC — SIGNIFICANT CHANGE UP (ref 9.8–13.1)
RBC # BLD: 3.74 M/UL — LOW (ref 3.8–5.2)
RBC # FLD: 13.4 % — SIGNIFICANT CHANGE UP (ref 10.3–14.5)
SODIUM SERPL-SCNC: 135 MMOL/L — SIGNIFICANT CHANGE UP (ref 135–145)
WBC # BLD: 10.31 K/UL — SIGNIFICANT CHANGE UP (ref 3.8–10.5)
WBC # FLD AUTO: 10.31 K/UL — SIGNIFICANT CHANGE UP (ref 3.8–10.5)

## 2018-06-22 PROCEDURE — 99233 SBSQ HOSP IP/OBS HIGH 50: CPT

## 2018-06-22 PROCEDURE — 71045 X-RAY EXAM CHEST 1 VIEW: CPT | Mod: 26

## 2018-06-22 RX ORDER — MAGNESIUM SULFATE 500 MG/ML
2 VIAL (ML) INJECTION EVERY 4 HOURS
Qty: 0 | Refills: 0 | Status: COMPLETED | OUTPATIENT
Start: 2018-06-22 | End: 2018-06-22

## 2018-06-22 RX ADMIN — HEPARIN SODIUM 5000 UNIT(S): 5000 INJECTION INTRAVENOUS; SUBCUTANEOUS at 18:51

## 2018-06-22 RX ADMIN — Medication 25 MILLIGRAM(S): at 22:56

## 2018-06-22 RX ADMIN — SODIUM CHLORIDE 3 MILLILITER(S): 9 INJECTION INTRAMUSCULAR; INTRAVENOUS; SUBCUTANEOUS at 21:15

## 2018-06-22 RX ADMIN — Medication 50 GRAM(S): at 06:21

## 2018-06-22 RX ADMIN — CHLORHEXIDINE GLUCONATE 1 APPLICATION(S): 213 SOLUTION TOPICAL at 05:15

## 2018-06-22 RX ADMIN — Medication 1 DROP(S): at 05:14

## 2018-06-22 RX ADMIN — HEPARIN SODIUM 5000 UNIT(S): 5000 INJECTION INTRAVENOUS; SUBCUTANEOUS at 05:14

## 2018-06-22 RX ADMIN — CLOPIDOGREL BISULFATE 75 MILLIGRAM(S): 75 TABLET, FILM COATED ORAL at 13:02

## 2018-06-22 RX ADMIN — SODIUM CHLORIDE 3 MILLILITER(S): 9 INJECTION INTRAMUSCULAR; INTRAVENOUS; SUBCUTANEOUS at 05:14

## 2018-06-22 RX ADMIN — BUDESONIDE AND FORMOTEROL FUMARATE DIHYDRATE 2 PUFF(S): 160; 4.5 AEROSOL RESPIRATORY (INHALATION) at 22:35

## 2018-06-22 RX ADMIN — Medication 100 MILLIGRAM(S): at 22:57

## 2018-06-22 RX ADMIN — HYDROMORPHONE HYDROCHLORIDE 0.5 MILLIGRAM(S): 2 INJECTION INTRAMUSCULAR; INTRAVENOUS; SUBCUTANEOUS at 22:56

## 2018-06-22 RX ADMIN — Medication 50 GRAM(S): at 09:46

## 2018-06-22 RX ADMIN — Medication 100 MILLIGRAM(S): at 13:02

## 2018-06-22 RX ADMIN — Medication 100 MILLIGRAM(S): at 05:14

## 2018-06-22 RX ADMIN — KETOTIFEN FUMARATE 1 DROP(S): 0.34 SOLUTION OPHTHALMIC at 18:51

## 2018-06-22 RX ADMIN — SODIUM CHLORIDE 3 MILLILITER(S): 9 INJECTION INTRAMUSCULAR; INTRAVENOUS; SUBCUTANEOUS at 13:01

## 2018-06-22 RX ADMIN — BUDESONIDE AND FORMOTEROL FUMARATE DIHYDRATE 2 PUFF(S): 160; 4.5 AEROSOL RESPIRATORY (INHALATION) at 09:27

## 2018-06-22 RX ADMIN — Medication 1000 MILLIGRAM(S): at 08:30

## 2018-06-22 RX ADMIN — ATORVASTATIN CALCIUM 40 MILLIGRAM(S): 80 TABLET, FILM COATED ORAL at 22:55

## 2018-06-22 RX ADMIN — Medication 400 MILLIGRAM(S): at 08:00

## 2018-06-22 RX ADMIN — Medication 1 DROP(S): at 18:51

## 2018-06-22 RX ADMIN — KETOTIFEN FUMARATE 1 DROP(S): 0.34 SOLUTION OPHTHALMIC at 05:14

## 2018-06-22 NOTE — PROGRESS NOTE ADULT - SUBJECTIVE AND OBJECTIVE BOX
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 Pt was then referred to surgeon for further evaluation.     062018:   Right uniport VATS RUL wedge with enblock chest wall resection      Issues:  RUL mass / Pseudotumor  Postop pain  Asthma  HTN  Hx of CVA  HLD                 Home Medications:  atorvastatin 40 mg oral tablet: 1 tab(s) orally once a day in pm (20 Jun 2018 09:34)  Calcium 600+D 600 mg-200 intl units oral tablet:  orally once a day (20 Jun 2018 09:34)  clopidogrel 75 mg oral tablet: 1 tab(s) orally once a day (20 Jun 2018 09:34)  Exelon 4.6 mg/24 hr transdermal film, extended release: 1 patch transdermal once a day (20 Jun 2018 09:34)  hydrochlorothiazide-lisinopril 25 mg-20 mg oral tablet: 1 tab(s) orally 2 times a day (20 Jun 2018 09:34)  Natural Tears preserved ophthalmic solution: 1 drop(s) to each affected eye 2 times a day, As Needed (20 Jun 2018 09:34)  olopatadine 0.1% ophthalmic solution: 1 drop(s) to each affected eye 2 times a day, As Needed (20 Jun 2018 09:34)  Senna 8.6 mg oral tablet: 1 tab(s) orally once a day (at bedtime) (20 Jun 2018 09:34)  Symbicort 160 mcg-4.5 mcg/inh inhalation aerosol: 2 puff(s) inhaled 2 times a day (20 Jun 2018 09:34)  zolpidem 5 mg oral tablet: 1 tab(s) orally once a day (at bedtime) (20 Jun 2018 09:34)    PAST MEDICAL & SURGICAL HISTORY:  Anxiety  Other nonspecific abnormal finding of lung field  Asthma, mild  Hypertension  Alzheimer disease  CVA (cerebral infarction)  Dyslipidemia  Benign essential hypertension  Status post coronary angiogram: 2017  H/O umbilical hernia repair  H/O varicose vein ligation and stripping: both legs  History of cataract extraction: bilateral, with lens implants  H/O surgical biopsy: right lung; 05/2018  History of abdominoplasty  History of cosmetic plastic surgery: face lift  History of appendectomy  History of bilateral breast reduction surgery      MEDICATIONS  (STANDING):  artificial  tears Solution 1 Drop(s) Both EYES two times a day  atorvastatin 40 milliGRAM(s) Oral at bedtime  buDESOnide 160 MICROgram(s)/formoterol 4.5 MICROgram(s) Inhaler 2 Puff(s) Inhalation two times a day  chlorhexidine 4% Liquid 1 Application(s) Topical <User Schedule>  clopidogrel Tablet 75 milliGRAM(s) Oral daily  docusate sodium 100 milliGRAM(s) Oral three times a day  heparin  Injectable 5000 Unit(s) SubCutaneous every 12 hours  ketotifen 0.025% Ophthalmic Solution 1 Drop(s) Both EYES two times a day  lactated ringers. 1000 milliLiter(s) (30 mL/Hr) IV Continuous <Continuous>  sodium chloride 0.9% lock flush 3 milliLiter(s) IV Push every 8 hours    MEDICATIONS  (PRN):  acetaminophen  IVPB. 1000 milliGRAM(s) IV Intermittent once PRN Moderate Pain (4 - 6)  HYDROmorphone  Injectable 0.5 milliGRAM(s) IV Push every 3 hours PRN Severe Pain (7 - 10) or if not tolerating oral meds  ondansetron Injectable 4 milliGRAM(s) IV Push every 6 hours PRN Nausea  oxyCODONE    IR 5 milliGRAM(s) Oral every 3 hours PRN Mild Pain (1 - 3) to Moderate Pain  traZODone 25 milliGRAM(s) Oral at bedtime PRN sleep      ICU Vital Signs Last 24 Hrs  T(C): 36.2 (22 Jun 2018 04:00), Max: 36.8 (21 Jun 2018 12:00)  T(F): 97.2 (22 Jun 2018 04:00), Max: 98.2 (21 Jun 2018 12:00)  HR: 88 (22 Jun 2018 04:00) (66 - 89)  BP: 124/48 (22 Jun 2018 04:00) (83/54 - 153/98)  BP(mean): 68 (22 Jun 2018 04:00) (58 - 110)  ABP: 108/49 (21 Jun 2018 06:00) (108/49 - 108/49)  ABP(mean): 71 (21 Jun 2018 06:00) (71 - 71)  RR: 16 (22 Jun 2018 04:00) (11 - 24)  SpO2: 95% (22 Jun 2018 04:00) (88% - 98%)      Physical exam:                                                   Gen;                  WD WN obese in NAD   Neuro:              Alert & Oriented X 3, no focal deficits                          Cardiovascular:   S1 & S2, regular                           Respiratory:        Good B/L Air entry is fair and equal on both sides, has bilateral conducted sounds / rhonchi /rales                          GI:                      Hypoactive bowel sounds, Soft, nondistended and nontender,                           Ext:                     No cyanosis or edema,       I&O's Summary    20 Jun 2018 07:01  -  21 Jun 2018 07:00  --------------------------------------------------------  IN: 450 mL / OUT: 1260 mL / NET: -810 mL    21 Jun 2018 07:01  -  22 Jun 2018 05:18  --------------------------------------------------------  IN: 1360 mL / OUT: 1335 mL / NET: 25 mL      Labs:                                                                           11.1   10.31 )-----------( 158      ( 22 Jun 2018 02:30 )             33.5                            06-22    135  |  96<L>  |  27<H>  ----------------------------<  123<H>  4.2   |  26  |  1.12    Ca    8.4      22 Jun 2018 02:30  Phos  3.9     06-22  Mg     1.4     06-22                      PT/INR - ( 22 Jun 2018 02:30 )   PT: 12.5 SEC;   INR: 1.09     PTT - ( 22 Jun 2018 02:30 )  PTT:28.2 SEC          CXR  [id number]  INTERPRETATION:     June 20, 2018 at 5:34 PM:  Right-sided chest tube is seen with its tip overlying the apex of this   hemithorax. Chain sutures overlie the right apex and upper lobe where the   pleural-based mass was present and now replaced with a more indistinct   opacity presumably postop in nature.  Remainder of the right lung and the left lung are clear. Heart is not   enlarged but there is paratracheal widening. No complicating pneumothorax   present.  June 21 at 6:13 AM:  Right-sided chest tube unchanged. Decreasing right midlung opacity likely   resolving atelectasis. No complicating pneumothorax.  COMPARISON:  May 16, 2018  IMPRESSION:  Follow-up studies post chest wall mass resection with chest   tube remaining on most recent exam.     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 Pt was then referred to surgeon for further evaluation.   062018:   Right uniport VATS RUL wedge with enblock chest wall resection    Issues:  RUL mass / Pseudotumor  Postop pain  Asthma  HTN  Hx of CVA  HLD                            Neuro:                                         Pain control with Tylenol                            Cardiovascular:                                            HTN: Monitor hemodynamic status and restart antihypertensives       HLD: On Lipitor                            Respiratory:                                         Pt is on 2L  nasal canula- wean off                                           Comfortable, not in any distress.                                         Using incentive spirometry                                          Monitor chest tube output                                         Chest tube to  water seal, No air leak  d/c chest tube                                                                  Asthma:  Continue bronchodilators, pulmonary toilet                            GI                                         On clear liquids, advance to regular diet as tolerated                                         Continue Zofran / Reglan for nausea - PRN	                                                                 Renal:                                         Monitor I/Os and electrolytes                                         D/C'd Campo                                                  Hem/ Onc:                                         SQH & SCDs for VTE prophylaxis                                         Monitor chest tube output &  signs of bleeding.                                          Follow CBCs                           Infectious disease:                                          No signs of infection. Monitor for fever / leukocytosis.                                          All surgical incision / chest tube  sites look clean                            Endocrine  Continue Finger stick glucose checks with coverage      All available pertinent clinical, laboratory, radiographic, hemodynamic, echocardiographic, respiratory data, microbiologic data and chart were reviewed and analyzed frequently. GI and DVT prophylaxis, glycemic control, head of bed elevation and skin care issues were addressed.  Patient seen, examined and plan discussed with CT Surgery / CTICU team during rounds    Blade Bonilla MD

## 2018-06-23 ENCOUNTER — TRANSCRIPTION ENCOUNTER (OUTPATIENT)
Age: 83
End: 2018-06-23

## 2018-06-23 LAB
APTT BLD: 27 SEC — LOW (ref 27.5–37.4)
BUN SERPL-MCNC: 14 MG/DL — SIGNIFICANT CHANGE UP (ref 7–23)
CA-I BLD-SCNC: 1.17 MMOL/L — SIGNIFICANT CHANGE UP (ref 1.03–1.23)
CALCIUM SERPL-MCNC: 8.9 MG/DL — SIGNIFICANT CHANGE UP (ref 8.4–10.5)
CHLORIDE SERPL-SCNC: 100 MMOL/L — SIGNIFICANT CHANGE UP (ref 98–107)
CO2 SERPL-SCNC: 31 MMOL/L — SIGNIFICANT CHANGE UP (ref 22–31)
CREAT SERPL-MCNC: 0.68 MG/DL — SIGNIFICANT CHANGE UP (ref 0.5–1.3)
GLUCOSE SERPL-MCNC: 126 MG/DL — HIGH (ref 70–99)
HCT VFR BLD CALC: 31.9 % — LOW (ref 34.5–45)
HGB BLD-MCNC: 10.7 G/DL — LOW (ref 11.5–15.5)
INR BLD: 1.04 — SIGNIFICANT CHANGE UP (ref 0.88–1.17)
MAGNESIUM SERPL-MCNC: 2 MG/DL — SIGNIFICANT CHANGE UP (ref 1.6–2.6)
MCHC RBC-ENTMCNC: 29.7 PG — SIGNIFICANT CHANGE UP (ref 27–34)
MCHC RBC-ENTMCNC: 33.5 % — SIGNIFICANT CHANGE UP (ref 32–36)
MCV RBC AUTO: 88.6 FL — SIGNIFICANT CHANGE UP (ref 80–100)
NRBC # FLD: 0 — SIGNIFICANT CHANGE UP
PHOSPHATE SERPL-MCNC: 2.2 MG/DL — LOW (ref 2.5–4.5)
PLATELET # BLD AUTO: 158 K/UL — SIGNIFICANT CHANGE UP (ref 150–400)
PMV BLD: 10.3 FL — SIGNIFICANT CHANGE UP (ref 7–13)
POTASSIUM SERPL-MCNC: 3.8 MMOL/L — SIGNIFICANT CHANGE UP (ref 3.5–5.3)
POTASSIUM SERPL-SCNC: 3.8 MMOL/L — SIGNIFICANT CHANGE UP (ref 3.5–5.3)
PROTHROM AB SERPL-ACNC: 11.5 SEC — SIGNIFICANT CHANGE UP (ref 9.8–13.1)
RBC # BLD: 3.6 M/UL — LOW (ref 3.8–5.2)
RBC # FLD: 13.5 % — SIGNIFICANT CHANGE UP (ref 10.3–14.5)
SODIUM SERPL-SCNC: 139 MMOL/L — SIGNIFICANT CHANGE UP (ref 135–145)
WBC # BLD: 8.47 K/UL — SIGNIFICANT CHANGE UP (ref 3.8–10.5)
WBC # FLD AUTO: 8.47 K/UL — SIGNIFICANT CHANGE UP (ref 3.8–10.5)

## 2018-06-23 PROCEDURE — 71045 X-RAY EXAM CHEST 1 VIEW: CPT | Mod: 26,76

## 2018-06-23 RX ORDER — POLYETHYLENE GLYCOL 3350 17 G/17G
17 POWDER, FOR SOLUTION ORAL DAILY
Qty: 0 | Refills: 0 | Status: DISCONTINUED | OUTPATIENT
Start: 2018-06-23 | End: 2018-06-26

## 2018-06-23 RX ORDER — ZOLPIDEM TARTRATE 10 MG/1
1 TABLET ORAL
Qty: 0 | Refills: 0 | COMMUNITY

## 2018-06-23 RX ORDER — DOCUSATE SODIUM 100 MG
1 CAPSULE ORAL
Qty: 0 | Refills: 0 | DISCHARGE
Start: 2018-06-23

## 2018-06-23 RX ORDER — POLYETHYLENE GLYCOL 3350 17 G/17G
17 POWDER, FOR SOLUTION ORAL
Qty: 0 | Refills: 0 | DISCHARGE
Start: 2018-06-23

## 2018-06-23 RX ADMIN — SODIUM CHLORIDE 3 MILLILITER(S): 9 INJECTION INTRAMUSCULAR; INTRAVENOUS; SUBCUTANEOUS at 13:44

## 2018-06-23 RX ADMIN — HYDROMORPHONE HYDROCHLORIDE 0.5 MILLIGRAM(S): 2 INJECTION INTRAMUSCULAR; INTRAVENOUS; SUBCUTANEOUS at 07:19

## 2018-06-23 RX ADMIN — HYDROMORPHONE HYDROCHLORIDE 0.5 MILLIGRAM(S): 2 INJECTION INTRAMUSCULAR; INTRAVENOUS; SUBCUTANEOUS at 06:25

## 2018-06-23 RX ADMIN — Medication 1 DROP(S): at 17:35

## 2018-06-23 RX ADMIN — KETOTIFEN FUMARATE 1 DROP(S): 0.34 SOLUTION OPHTHALMIC at 17:35

## 2018-06-23 RX ADMIN — CLOPIDOGREL BISULFATE 75 MILLIGRAM(S): 75 TABLET, FILM COATED ORAL at 12:33

## 2018-06-23 RX ADMIN — Medication 100 MILLIGRAM(S): at 13:44

## 2018-06-23 RX ADMIN — HEPARIN SODIUM 5000 UNIT(S): 5000 INJECTION INTRAVENOUS; SUBCUTANEOUS at 17:35

## 2018-06-23 RX ADMIN — SODIUM CHLORIDE 3 MILLILITER(S): 9 INJECTION INTRAMUSCULAR; INTRAVENOUS; SUBCUTANEOUS at 21:49

## 2018-06-23 RX ADMIN — BUDESONIDE AND FORMOTEROL FUMARATE DIHYDRATE 2 PUFF(S): 160; 4.5 AEROSOL RESPIRATORY (INHALATION) at 11:34

## 2018-06-23 RX ADMIN — HYDROMORPHONE HYDROCHLORIDE 0.5 MILLIGRAM(S): 2 INJECTION INTRAMUSCULAR; INTRAVENOUS; SUBCUTANEOUS at 00:09

## 2018-06-23 RX ADMIN — Medication 10 MILLIGRAM(S): at 12:33

## 2018-06-23 RX ADMIN — KETOTIFEN FUMARATE 1 DROP(S): 0.34 SOLUTION OPHTHALMIC at 06:25

## 2018-06-23 RX ADMIN — OXYCODONE HYDROCHLORIDE 5 MILLIGRAM(S): 5 TABLET ORAL at 17:51

## 2018-06-23 RX ADMIN — Medication 100 MILLIGRAM(S): at 22:08

## 2018-06-23 RX ADMIN — HEPARIN SODIUM 5000 UNIT(S): 5000 INJECTION INTRAVENOUS; SUBCUTANEOUS at 06:25

## 2018-06-23 RX ADMIN — Medication 1 DROP(S): at 06:25

## 2018-06-23 RX ADMIN — CHLORHEXIDINE GLUCONATE 1 APPLICATION(S): 213 SOLUTION TOPICAL at 05:18

## 2018-06-23 RX ADMIN — BUDESONIDE AND FORMOTEROL FUMARATE DIHYDRATE 2 PUFF(S): 160; 4.5 AEROSOL RESPIRATORY (INHALATION) at 22:09

## 2018-06-23 RX ADMIN — SODIUM CHLORIDE 3 MILLILITER(S): 9 INJECTION INTRAMUSCULAR; INTRAVENOUS; SUBCUTANEOUS at 05:18

## 2018-06-23 RX ADMIN — Medication 25 MILLIGRAM(S): at 22:08

## 2018-06-23 RX ADMIN — OXYCODONE HYDROCHLORIDE 5 MILLIGRAM(S): 5 TABLET ORAL at 18:39

## 2018-06-23 RX ADMIN — Medication 1 ENEMA: at 14:31

## 2018-06-23 RX ADMIN — ATORVASTATIN CALCIUM 40 MILLIGRAM(S): 80 TABLET, FILM COATED ORAL at 22:08

## 2018-06-23 RX ADMIN — Medication 100 MILLIGRAM(S): at 06:25

## 2018-06-23 NOTE — DISCHARGE NOTE ADULT - CARE PROVIDER_API CALL
Jamie Barron), Surgery; Thoracic Surgery  [address] [place] [address]  Phone: [phone]  Fax: [phone]

## 2018-06-23 NOTE — DISCHARGE NOTE ADULT - HOSPITAL COURSE
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 Pt was then referred to surgeon for further evaluation. Now scheduled for flexible bronchoscopy, right video assisted thoracoscopy, resection of Pseudotumor with epidural on 06/20/18. Pt underwent RVATS, RUL wedge w/ enbloc CW resection on 6/20. On 6/23 chest tube was removed and pt was discharged. Post op course uneventful 89 y/o  female ([place] speaking) w/ hx of diverticulitis, HTN, CVA w/o residual deficits, Alzheimer's disease, presents to PST for pre op evaluation with c/o SOB with walking, Pt was evaluated by PCP, S/P chest X-RAY within the past 2 months. Pt was then referred to surgeon for further evaluation. Now scheduled for flexible bronchoscopy, right video assisted thoracoscopy, resection of Pseudotumor with epidural on 06/20/18. Pt underwent RVATS, RUL wedge w/ enbloc CW resection on 6/20. On 6/23 chest tube was removed and pt was discharged to rehab facility to improver her mobility prior to returning home. Post op course uneventful

## 2018-06-23 NOTE — DISCHARGE NOTE ADULT - PATIENT PORTAL LINK FT
You can access the [facility] Patient Portal, offered by [facility], by registering with the following website: [email or web address]

## 2018-06-23 NOTE — DISCHARGE NOTE ADULT - CARE PLAN
Principal Discharge DX:	Other nonspecific abnormal finding of lung field  Goal:	lung resection specimen sent to pathology  Assessment and plan of treatment:	lung resection specimen sent to pathology Principal Discharge DX:	Other nonspecific abnormal finding of lung field  Goal:	lung resection specimen sent to pathology  Assessment and plan of treatment:	Follow up with Dr. Barron in 2 weeks or after rehab, you must call  to make an appointment. A chest x-ray is provided have it completed prior to seeing him

## 2018-06-23 NOTE — DISCHARGE NOTE ADULT - INSTRUCTIONS
resume usual diet Continue regular diet as tolerated Shower daily, wash incisions with mild soap and water, pat dry. No lotion , creams, or powder on incisions. Check incisions daily for signs of infection, redness, swelling drainage or temp greater than 101.0, CALL MD. No heavy lifting greater than 10-15 lbs. No driving if taking narcotics or until cleared by MD. Keep all follow up appointments and take all medications as prescribed.

## 2018-06-23 NOTE — DISCHARGE NOTE ADULT - PLAN OF CARE
lung resection specimen sent to pathology Follow up with Dr. Barron in 2 weeks or after rehab, you must call  to make an appointment. A chest x-ray is provided have it completed prior to seeing him

## 2018-06-23 NOTE — DISCHARGE NOTE ADULT - COMMUNITY RESOURCES
[facility]  [address] [place] [id number]  [phone]  [facility]  [phone]

## 2018-06-23 NOTE — DISCHARGE NOTE ADULT - ADDITIONAL INSTRUCTIONS
please call Dr Barron's office to schedule a follow up visit in 1-2 weeks. Please have a chest xray done 1-2 days prior to your appointment and bring a copy with you to the office.   Keep dressing dry and intact for 2 days. then remove dressing and shower. Cover stitch with a band aid You may remove the dressing in 48 hours, take a shower allowing warm water to run over incision pat dry and leave to air. Continue with daily ambulation and use of incentive spirometer. Please call the office at  if you experience an increase in shortness of breath, fever, purulent discharge from site of incision and pain not relieved by medication or rest.

## 2018-06-23 NOTE — DISCHARGE NOTE ADULT - MEDICATION SUMMARY - MEDICATIONS TO TAKE
I will START or STAY ON the medications listed below when I get home from the hospital:    Percocet 5/325 oral tablet  -- 1 tab(s) by mouth every 4 hours, As Needed -for moderate pain MDD:6   -- Caution federal law prohibits the transfer of this drug to any person other  than the person for whom it was prescribed.  May cause drowsiness.  Alcohol may intensify this effect.  Use care when operating dangerous machinery.  This prescription cannot be refilled.  This product contains acetaminophen.  Do not use  with any other product containing acetaminophen to prevent possible liver damage.  Using more of this medication than prescribed may cause serious breathing problems.    -- Indication: For moderate pain    atorvastatin 40 mg oral tablet  -- 1 tab(s) by mouth once a day in pm  -- Indication: For cholesterol    hydrochlorothiazide-lisinopril 25 mg-20 mg oral tablet  -- 1 tab(s) by mouth 2 times a day  -- Indication: For blood pressure    clopidogrel 75 mg oral tablet  -- 1 tab(s) by mouth once a day  -- Indication: For blood thinner    zolpidem 5 mg oral tablet  -- 1 tab(s) by mouth once a day (at bedtime) as needed. Do not take with percocet  -- Indication: For insomnia    metoprolol tartrate 50 mg oral tablet  -- 1 tab(s) by mouth 2 times a day  -- Indication: For blood pressure    Symbicort 160 mcg-4.5 mcg/inh inhalation aerosol  -- 2 puff(s) inhaled 2 times a day  -- Indication: For Asthma/copd    Exelon 4.6 mg/24 hr transdermal film, extended release  -- 1 patch by transdermal patch once a day  -- Indication: For dementia    docusate sodium 100 mg oral capsule  -- 1 cap(s) by mouth 3 times a day as needed for constipation  -- Indication: For Stool softener    polyethylene glycol 3350 oral powder for reconstitution  -- 17 gram(s) by mouth once a day as needed for constipation  -- Indication: For laxative    Senna 8.6 mg oral tablet  -- 1 tab(s) by mouth once a day (at bedtime) as needed for constipation  -- Indication: For laxative    Natural Tears preserved ophthalmic solution  -- 1 drop(s) to each affected eye 2 times a day, As Needed  -- Indication: For eye lubricant    olopatadine 0.1% ophthalmic solution  -- 1 drop(s) to each affected eye 2 times a day, As Needed  -- Indication: For itchy eyes    Calcium 600+D 600 mg-200 intl units oral tablet  --  by mouth once a day  -- Indication: For Supplement

## 2018-06-24 LAB
BUN SERPL-MCNC: 13 MG/DL — SIGNIFICANT CHANGE UP (ref 7–23)
CALCIUM SERPL-MCNC: 8.9 MG/DL — SIGNIFICANT CHANGE UP (ref 8.4–10.5)
CHLORIDE SERPL-SCNC: 97 MMOL/L — LOW (ref 98–107)
CO2 SERPL-SCNC: 31 MMOL/L — SIGNIFICANT CHANGE UP (ref 22–31)
CREAT SERPL-MCNC: 0.68 MG/DL — SIGNIFICANT CHANGE UP (ref 0.5–1.3)
GLUCOSE SERPL-MCNC: 116 MG/DL — HIGH (ref 70–99)
HCT VFR BLD CALC: 31.9 % — LOW (ref 34.5–45)
HGB BLD-MCNC: 10.6 G/DL — LOW (ref 11.5–15.5)
MCHC RBC-ENTMCNC: 29.7 PG — SIGNIFICANT CHANGE UP (ref 27–34)
MCHC RBC-ENTMCNC: 33.2 % — SIGNIFICANT CHANGE UP (ref 32–36)
MCV RBC AUTO: 89.4 FL — SIGNIFICANT CHANGE UP (ref 80–100)
NRBC # FLD: 0 — SIGNIFICANT CHANGE UP
PLATELET # BLD AUTO: 159 K/UL — SIGNIFICANT CHANGE UP (ref 150–400)
PMV BLD: 9.8 FL — SIGNIFICANT CHANGE UP (ref 7–13)
POTASSIUM SERPL-MCNC: 4.1 MMOL/L — SIGNIFICANT CHANGE UP (ref 3.5–5.3)
POTASSIUM SERPL-SCNC: 4.1 MMOL/L — SIGNIFICANT CHANGE UP (ref 3.5–5.3)
RBC # BLD: 3.57 M/UL — LOW (ref 3.8–5.2)
RBC # FLD: 13.4 % — SIGNIFICANT CHANGE UP (ref 10.3–14.5)
SODIUM SERPL-SCNC: 136 MMOL/L — SIGNIFICANT CHANGE UP (ref 135–145)
WBC # BLD: 8.21 K/UL — SIGNIFICANT CHANGE UP (ref 3.8–10.5)
WBC # FLD AUTO: 8.21 K/UL — SIGNIFICANT CHANGE UP (ref 3.8–10.5)

## 2018-06-24 PROCEDURE — 71045 X-RAY EXAM CHEST 1 VIEW: CPT | Mod: 26

## 2018-06-24 RX ORDER — ACETAMINOPHEN 500 MG
650 TABLET ORAL EVERY 6 HOURS
Qty: 0 | Refills: 0 | Status: DISCONTINUED | OUTPATIENT
Start: 2018-06-24 | End: 2018-06-26

## 2018-06-24 RX ORDER — OXYCODONE HYDROCHLORIDE 5 MG/1
2.5 TABLET ORAL EVERY 4 HOURS
Qty: 0 | Refills: 0 | Status: DISCONTINUED | OUTPATIENT
Start: 2018-06-24 | End: 2018-06-26

## 2018-06-24 RX ORDER — OXYCODONE HYDROCHLORIDE 5 MG/1
5 TABLET ORAL EVERY 4 HOURS
Qty: 0 | Refills: 0 | Status: DISCONTINUED | OUTPATIENT
Start: 2018-06-24 | End: 2018-06-26

## 2018-06-24 RX ADMIN — OXYCODONE HYDROCHLORIDE 5 MILLIGRAM(S): 5 TABLET ORAL at 04:40

## 2018-06-24 RX ADMIN — KETOTIFEN FUMARATE 1 DROP(S): 0.34 SOLUTION OPHTHALMIC at 17:16

## 2018-06-24 RX ADMIN — SODIUM CHLORIDE 3 MILLILITER(S): 9 INJECTION INTRAMUSCULAR; INTRAVENOUS; SUBCUTANEOUS at 13:06

## 2018-06-24 RX ADMIN — CHLORHEXIDINE GLUCONATE 1 APPLICATION(S): 213 SOLUTION TOPICAL at 05:35

## 2018-06-24 RX ADMIN — SODIUM CHLORIDE 3 MILLILITER(S): 9 INJECTION INTRAMUSCULAR; INTRAVENOUS; SUBCUTANEOUS at 05:15

## 2018-06-24 RX ADMIN — Medication 1 DROP(S): at 05:35

## 2018-06-24 RX ADMIN — HEPARIN SODIUM 5000 UNIT(S): 5000 INJECTION INTRAVENOUS; SUBCUTANEOUS at 17:16

## 2018-06-24 RX ADMIN — Medication 1 DROP(S): at 17:16

## 2018-06-24 RX ADMIN — BUDESONIDE AND FORMOTEROL FUMARATE DIHYDRATE 2 PUFF(S): 160; 4.5 AEROSOL RESPIRATORY (INHALATION) at 21:45

## 2018-06-24 RX ADMIN — KETOTIFEN FUMARATE 1 DROP(S): 0.34 SOLUTION OPHTHALMIC at 05:35

## 2018-06-24 RX ADMIN — POLYETHYLENE GLYCOL 3350 17 GRAM(S): 17 POWDER, FOR SOLUTION ORAL at 13:07

## 2018-06-24 RX ADMIN — HEPARIN SODIUM 5000 UNIT(S): 5000 INJECTION INTRAVENOUS; SUBCUTANEOUS at 05:35

## 2018-06-24 RX ADMIN — OXYCODONE HYDROCHLORIDE 5 MILLIGRAM(S): 5 TABLET ORAL at 10:32

## 2018-06-24 RX ADMIN — Medication 100 MILLIGRAM(S): at 21:45

## 2018-06-24 RX ADMIN — Medication 100 MILLIGRAM(S): at 13:07

## 2018-06-24 RX ADMIN — BUDESONIDE AND FORMOTEROL FUMARATE DIHYDRATE 2 PUFF(S): 160; 4.5 AEROSOL RESPIRATORY (INHALATION) at 10:31

## 2018-06-24 RX ADMIN — OXYCODONE HYDROCHLORIDE 5 MILLIGRAM(S): 5 TABLET ORAL at 05:15

## 2018-06-24 RX ADMIN — Medication 100 MILLIGRAM(S): at 05:35

## 2018-06-24 RX ADMIN — OXYCODONE HYDROCHLORIDE 5 MILLIGRAM(S): 5 TABLET ORAL at 11:30

## 2018-06-24 RX ADMIN — CLOPIDOGREL BISULFATE 75 MILLIGRAM(S): 75 TABLET, FILM COATED ORAL at 13:07

## 2018-06-24 RX ADMIN — SODIUM CHLORIDE 3 MILLILITER(S): 9 INJECTION INTRAMUSCULAR; INTRAVENOUS; SUBCUTANEOUS at 21:35

## 2018-06-24 RX ADMIN — ATORVASTATIN CALCIUM 40 MILLIGRAM(S): 80 TABLET, FILM COATED ORAL at 21:45

## 2018-06-24 NOTE — PROGRESS NOTE ADULT - SUBJECTIVE AND OBJECTIVE BOX
89 yo female w/ PMHx HTN, CVA, HLD, alzheimer's who underwent RVATS RUL wedge w/ enblock chest wall resection on 6/20. Post-op course was uncomplicated and chest tube came out w/ stable CXRs. Pt refusing to go home w/ home PT and wants to go to rehab.     PMHx/PSHx:PAST MEDICAL & SURGICAL HISTORY:  Anxiety  Other nonspecific abnormal finding of lung field  Asthma, mild  Hypertension  Alzheimer disease  CVA (cerebral infarction)  Dyslipidemia  Benign essential hypertension  Status post coronary angiogram: 2017  H/O umbilical hernia repair  H/O varicose vein ligation and stripping: both legs  History of cataract extraction: bilateral, with lens implants  H/O surgical biopsy: right lung; 05/2018  History of abdominoplasty  History of cosmetic plastic surgery: face lift  History of appendectomy  History of bilateral breast reduction surgery      Vital Signs:  Vital Signs Last 24 Hrs  T(C): 36.4 (06-24-18 @ 16:31), Max: 37.2 (06-24-18 @ 13:00)  T(F): 97.6 (06-24-18 @ 16:31), Max: 98.9 (06-24-18 @ 13:00)  HR: 100 (06-24-18 @ 16:31) (81 - 105)  BP: 115/50 (06-24-18 @ 16:31) (100/61 - 142/64)  RR: 18 (06-24-18 @ 16:31) (17 - 18)  SpO2: 96% (06-24-18 @ 16:31) (93% - 98%) on (O2)    Telemetry/Alarms: NSR on monitor   Gen: Aox3, no acute distress   Neuro: Grossly intact weak while ambulating  Head/Neck: Normocephalic, trachea is midline   Pulm: Lungs clear to auscultation b/l   Card: s1/s2 rrr no murmur  Abd: Abd soft non-tender   LE: No edema non-tender   Skin: R dressing CDI    CXR **Pending official Review**:   Right pleural chest tube ultimately removed without an appreciable   residual pneumothorax or subcutaneous emphysema.    Stable right upper hemithorax postsurgical changes.    The remaining visualized lungs. No pleural effusions.    Stable cardiac and mediastinal silhouettes.    Trachea midline.    < end of copied text >      Relevant labs, radiology and Medications reviewed                        10.6   8.21  )-----------( 159      ( 24 Jun 2018 06:30 )             31.9     06-24    136  |  97<L>  |  13  ----------------------------<  116<H>  4.1   |  31  |  0.68    Ca    8.9      24 Jun 2018 06:30  Phos  2.2     06-23  Mg     2.0     06-23      PT/INR - ( 23 Jun 2018 05:30 )   PT: 11.5 SEC;   INR: 1.04          PTT - ( 23 Jun 2018 05:30 )  PTT:27.0 SEC  Pertinent Physical Exam  I&O's Summary    23 Jun 2018 07:01  -  24 Jun 2018 07:00  --------------------------------------------------------  IN: 0 mL / OUT: 1950 mL / NET: -1950 mL    24 Jun 2018 07:01  -  24 Jun 2018 17:30  --------------------------------------------------------  IN: 200 mL / OUT: 0 mL / NET: 200 mL        Assessment  89y Female  w/ PAST MEDICAL & SURGICAL HISTORY:  Anxiety  Other nonspecific abnormal finding of lung field  Asthma, mild  Hypertension  Alzheimer disease  CVA (cerebral infarction)  Dyslipidemia  Benign essential hypertension  Status post coronary angiogram: 2017  H/O umbilical hernia repair  H/O varicose vein ligation and stripping: both legs  History of cataract extraction: bilateral, with lens implants  H/O surgical biopsy: right lung; 05/2018  History of abdominoplasty  History of cosmetic plastic surgery: face lift  History of appendectomy  History of bilateral breast reduction surgery   Patient is a 89y old  Female who presents with a chief complaint of " She has a mass on her right lung" (23 Jun 2018 11:44)  .  On (Date), patient underwent Thoracic surgery  .     Postoperative course/issues:                                                                                                        PLAN    -Pain control w/ PO meds  - HOme meds restarted   - Aggressive PT   - Ambulation w/ cane   - incentive spirometer  - Rehab tomorrow once set up        Discussed with Cardiothoracic Team at AM rounds.                                                                                                      Contact spectra: [phone]

## 2018-06-25 RX ORDER — MULTIVIT WITH MIN/MFOLATE/K2 340-15/3 G
200 POWDER (GRAM) ORAL DAILY
Qty: 0 | Refills: 0 | Status: DISCONTINUED | OUTPATIENT
Start: 2018-06-25 | End: 2018-06-26

## 2018-06-25 RX ADMIN — Medication 100 MILLIGRAM(S): at 22:30

## 2018-06-25 RX ADMIN — POLYETHYLENE GLYCOL 3350 17 GRAM(S): 17 POWDER, FOR SOLUTION ORAL at 13:13

## 2018-06-25 RX ADMIN — Medication 1 DROP(S): at 17:47

## 2018-06-25 RX ADMIN — Medication 100 MILLIGRAM(S): at 05:45

## 2018-06-25 RX ADMIN — ATORVASTATIN CALCIUM 40 MILLIGRAM(S): 80 TABLET, FILM COATED ORAL at 22:30

## 2018-06-25 RX ADMIN — SODIUM CHLORIDE 3 MILLILITER(S): 9 INJECTION INTRAMUSCULAR; INTRAVENOUS; SUBCUTANEOUS at 05:44

## 2018-06-25 RX ADMIN — CLOPIDOGREL BISULFATE 75 MILLIGRAM(S): 75 TABLET, FILM COATED ORAL at 13:13

## 2018-06-25 RX ADMIN — Medication 100 MILLIGRAM(S): at 13:13

## 2018-06-25 RX ADMIN — HEPARIN SODIUM 5000 UNIT(S): 5000 INJECTION INTRAVENOUS; SUBCUTANEOUS at 17:47

## 2018-06-25 RX ADMIN — Medication 650 MILLIGRAM(S): at 23:07

## 2018-06-25 RX ADMIN — HEPARIN SODIUM 5000 UNIT(S): 5000 INJECTION INTRAVENOUS; SUBCUTANEOUS at 05:45

## 2018-06-25 RX ADMIN — Medication 1 DROP(S): at 05:45

## 2018-06-25 RX ADMIN — SODIUM CHLORIDE 3 MILLILITER(S): 9 INJECTION INTRAMUSCULAR; INTRAVENOUS; SUBCUTANEOUS at 13:01

## 2018-06-25 RX ADMIN — SODIUM CHLORIDE 3 MILLILITER(S): 9 INJECTION INTRAMUSCULAR; INTRAVENOUS; SUBCUTANEOUS at 22:22

## 2018-06-25 RX ADMIN — Medication 650 MILLIGRAM(S): at 22:37

## 2018-06-25 RX ADMIN — KETOTIFEN FUMARATE 1 DROP(S): 0.34 SOLUTION OPHTHALMIC at 17:47

## 2018-06-25 RX ADMIN — BUDESONIDE AND FORMOTEROL FUMARATE DIHYDRATE 2 PUFF(S): 160; 4.5 AEROSOL RESPIRATORY (INHALATION) at 17:47

## 2018-06-25 RX ADMIN — KETOTIFEN FUMARATE 1 DROP(S): 0.34 SOLUTION OPHTHALMIC at 05:44

## 2018-06-25 RX ADMIN — CHLORHEXIDINE GLUCONATE 1 APPLICATION(S): 213 SOLUTION TOPICAL at 05:46

## 2018-06-25 RX ADMIN — BUDESONIDE AND FORMOTEROL FUMARATE DIHYDRATE 2 PUFF(S): 160; 4.5 AEROSOL RESPIRATORY (INHALATION) at 09:01

## 2018-06-25 NOTE — PROGRESS NOTE ADULT - SUBJECTIVE AND OBJECTIVE BOX
88 yo female w/ PMHx HTN, CVA, HLD, alzheimer's who underwent RVATS RUL wedge w/ enblock chest wall resection on 6/20. Post-op course was uncomplicated and chest tube came out w/ stable CXRs. Pt refusing to go home w/ home PT and wants to go to rehab.     6/25 Pt admits to constipation and wrist pain states that her joints are tight. Waiting for rehab to get approved by insurance    PMHx/PSHx:PAST MEDICAL & SURGICAL HISTORY:  Anxiety  Other nonspecific abnormal finding of lung field  Asthma, mild  Hypertension  Alzheimer disease  CVA (cerebral infarction)  Dyslipidemia  Benign essential hypertension  Status post coronary angiogram: 2017  H/O umbilical hernia repair  H/O varicose vein ligation and stripping: both legs  History of cataract extraction: bilateral, with lens implants  H/O surgical biopsy: right lung; 05/2018  History of abdominoplasty  History of cosmetic plastic surgery: face lift  History of appendectomy  History of bilateral breast reduction surgery      Vital Signs:  Vital Signs Last 24 Hrs  T(C): 36.4 (06-25-18 @ 16:22), Max: 37 (06-24-18 @ 21:19)  T(F): 97.6 (06-25-18 @ 16:22), Max: 98.6 (06-24-18 @ 21:19)  HR: 98 (06-25-18 @ 16:22) (80 - 113)  BP: 119/63 (06-25-18 @ 16:22) (112/97 - 154/47)  RR: 18 (06-25-18 @ 16:22) (18 - 20)  SpO2: 95% (06-25-18 @ 16:22) (92% - 95%) on (O2)      Relevant labs, radiology and Medications reviewed                        10.6   8.21  )-----------( 159      ( 24 Jun 2018 06:30 )             31.9     06-24    136  |  97<L>  |  13  ----------------------------<  116<H>  4.1   |  31  |  0.68    Ca    8.9      24 Jun 2018 06:30        Pertinent Physical Exam  I&O's Summary    24 Jun 2018 07:01  -  25 Jun 2018 07:00  --------------------------------------------------------  IN: 200 mL / OUT: 500 mL / NET: -300 mL    25 Jun 2018 07:01  -  25 Jun 2018 17:30  --------------------------------------------------------  IN: 0 mL / OUT: 500 mL / NET: -500 mL        Assessment  89y Female  w/ PAST MEDICAL & SURGICAL HISTORY:  Anxiety  Other nonspecific abnormal finding of lung field  Asthma, mild  Hypertension  Alzheimer disease  CVA (cerebral infarction)  Dyslipidemia  Benign essential hypertension  Status post coronary angiogram: 2017  H/O umbilical hernia repair  H/O varicose vein ligation and stripping: both legs  History of cataract extraction: bilateral, with lens implants  H/O surgical biopsy: right lung; 05/2018  History of abdominoplasty  History of cosmetic plastic surgery: face lift  History of appendectomy  History of bilateral breast reduction surgery   Patient is a 89y old  Female who presents with a chief complaint of " She has a mass on her right lung" (23 Jun 2018 11:44)  .  On (Date), patient underwent Thoracic surgery  .     Postoperative course/issues:                                                                                                        PLAN    -Pain control w/ PO meds  - HOme meds restarted   - Aggressive PT   - Ambulation w/ cane   - incentive spirometer  - Rehab tomorrow once approved by insurance    Discussed with Cardiothoracic Team at AM rounds.                                                                                                      Contact spectra: [phone]

## 2018-06-26 VITALS
HEART RATE: 111 BPM | TEMPERATURE: 99 F | RESPIRATION RATE: 18 BRPM | DIASTOLIC BLOOD PRESSURE: 61 MMHG | SYSTOLIC BLOOD PRESSURE: 109 MMHG | OXYGEN SATURATION: 99 %

## 2018-06-26 LAB — SURGICAL PATHOLOGY STUDY: SIGNIFICANT CHANGE UP

## 2018-06-26 RX ADMIN — Medication 10 MILLIGRAM(S): at 11:54

## 2018-06-26 RX ADMIN — Medication 100 MILLIGRAM(S): at 05:02

## 2018-06-26 RX ADMIN — SODIUM CHLORIDE 3 MILLILITER(S): 9 INJECTION INTRAMUSCULAR; INTRAVENOUS; SUBCUTANEOUS at 13:13

## 2018-06-26 RX ADMIN — BUDESONIDE AND FORMOTEROL FUMARATE DIHYDRATE 2 PUFF(S): 160; 4.5 AEROSOL RESPIRATORY (INHALATION) at 09:38

## 2018-06-26 RX ADMIN — KETOTIFEN FUMARATE 1 DROP(S): 0.34 SOLUTION OPHTHALMIC at 05:02

## 2018-06-26 RX ADMIN — CLOPIDOGREL BISULFATE 75 MILLIGRAM(S): 75 TABLET, FILM COATED ORAL at 11:55

## 2018-06-26 RX ADMIN — SODIUM CHLORIDE 3 MILLILITER(S): 9 INJECTION INTRAMUSCULAR; INTRAVENOUS; SUBCUTANEOUS at 05:01

## 2018-06-26 RX ADMIN — Medication 100 MILLIGRAM(S): at 13:13

## 2018-06-26 RX ADMIN — Medication 1 DROP(S): at 05:03

## 2018-06-26 RX ADMIN — POLYETHYLENE GLYCOL 3350 17 GRAM(S): 17 POWDER, FOR SOLUTION ORAL at 11:55

## 2018-06-26 RX ADMIN — HEPARIN SODIUM 5000 UNIT(S): 5000 INJECTION INTRAVENOUS; SUBCUTANEOUS at 05:02

## 2018-07-10 ENCOUNTER — APPOINTMENT (OUTPATIENT)
Dept: THORACIC SURGERY | Facility: CLINIC | Age: 83
End: 2018-07-10
Payer: MEDICAID

## 2018-07-10 ENCOUNTER — APPOINTMENT (OUTPATIENT)
Dept: MAMMOGRAPHY | Facility: IMAGING CENTER | Age: 83
End: 2018-07-10

## 2018-07-10 VITALS
OXYGEN SATURATION: 97 % | WEIGHT: 159 LBS | DIASTOLIC BLOOD PRESSURE: 59 MMHG | BODY MASS INDEX: 27.14 KG/M2 | HEART RATE: 59 BPM | HEIGHT: 64 IN | SYSTOLIC BLOOD PRESSURE: 113 MMHG | RESPIRATION RATE: 17 BRPM

## 2018-07-10 DIAGNOSIS — R91.8 OTHER NONSPECIFIC ABNORMAL FINDING OF LUNG FIELD: ICD-10-CM

## 2018-07-10 DIAGNOSIS — J84.89 OTHER SPECIFIED INTERSTITIAL PULMONARY DISEASES: ICD-10-CM

## 2018-07-10 PROCEDURE — 99024 POSTOP FOLLOW-UP VISIT: CPT

## 2018-07-10 RX ORDER — CALCIUM CARBONATE/VITAMIN D3 600MG-5MCG
600-200 TABLET ORAL
Refills: 0 | Status: ACTIVE | COMMUNITY

## 2018-07-10 RX ORDER — METOPROLOL TARTRATE 50 MG/1
50 TABLET, FILM COATED ORAL
Refills: 0 | Status: DISCONTINUED | COMMUNITY
End: 2018-07-10

## 2018-07-10 RX ORDER — CHOLECALCIFEROL (VITAMIN D3) 25 MCG
TABLET ORAL
Refills: 0 | Status: DISCONTINUED | COMMUNITY
End: 2018-07-10

## 2018-07-16 ENCOUNTER — OUTPATIENT (OUTPATIENT)
Dept: OUTPATIENT SERVICES | Facility: HOSPITAL | Age: 83
LOS: 1 days | End: 2018-07-16
Payer: COMMERCIAL

## 2018-07-16 ENCOUNTER — APPOINTMENT (OUTPATIENT)
Dept: RADIOLOGY | Facility: IMAGING CENTER | Age: 83
End: 2018-07-16
Payer: MEDICARE

## 2018-07-16 DIAGNOSIS — Z98.890 OTHER SPECIFIED POSTPROCEDURAL STATES: Chronic | ICD-10-CM

## 2018-07-16 DIAGNOSIS — Z98.49 CATARACT EXTRACTION STATUS, UNSPECIFIED EYE: Chronic | ICD-10-CM

## 2018-07-16 DIAGNOSIS — Z00.8 ENCOUNTER FOR OTHER GENERAL EXAMINATION: ICD-10-CM

## 2018-07-16 DIAGNOSIS — Z90.49 ACQUIRED ABSENCE OF OTHER SPECIFIED PARTS OF DIGESTIVE TRACT: Chronic | ICD-10-CM

## 2018-07-16 PROCEDURE — 71046 X-RAY EXAM CHEST 2 VIEWS: CPT

## 2018-07-16 PROCEDURE — 71046 X-RAY EXAM CHEST 2 VIEWS: CPT | Mod: 26

## 2018-07-17 PROBLEM — R91.8 OTHER NONSPECIFIC ABNORMAL FINDING OF LUNG FIELD: Chronic | Status: ACTIVE | Noted: 2018-06-13

## 2018-07-17 PROBLEM — I10 ESSENTIAL (PRIMARY) HYPERTENSION: Chronic | Status: ACTIVE | Noted: 2018-06-13

## 2018-07-17 PROBLEM — J45.998 OTHER ASTHMA: Chronic | Status: ACTIVE | Noted: 2018-06-13

## 2018-07-17 PROBLEM — F41.9 ANXIETY DISORDER, UNSPECIFIED: Chronic | Status: ACTIVE | Noted: 2018-06-20

## 2018-10-02 NOTE — PHYSICAL THERAPY INITIAL EVALUATION ADULT - PERTINENT HX OF CURRENT PROBLEM, REHAB EVAL
stage 4 colon cancer with mets to the liver and lung  as recorded by information from [facility] DM admitted with R lung mass, underwent R VATs, resection of Pseudo-Tumor on 6/20.  Now seen for PT Consult

## 2019-01-23 ENCOUNTER — OUTPATIENT (OUTPATIENT)
Dept: OUTPATIENT SERVICES | Facility: HOSPITAL | Age: 84
LOS: 1 days | End: 2019-01-23
Payer: COMMERCIAL

## 2019-01-23 ENCOUNTER — APPOINTMENT (OUTPATIENT)
Dept: RADIOLOGY | Facility: IMAGING CENTER | Age: 84
End: 2019-01-23
Payer: MEDICARE

## 2019-01-23 ENCOUNTER — APPOINTMENT (OUTPATIENT)
Dept: MAMMOGRAPHY | Facility: IMAGING CENTER | Age: 84
End: 2019-01-23
Payer: MEDICARE

## 2019-01-23 DIAGNOSIS — Z98.890 OTHER SPECIFIED POSTPROCEDURAL STATES: Chronic | ICD-10-CM

## 2019-01-23 DIAGNOSIS — Z90.49 ACQUIRED ABSENCE OF OTHER SPECIFIED PARTS OF DIGESTIVE TRACT: Chronic | ICD-10-CM

## 2019-01-23 DIAGNOSIS — Z98.49 CATARACT EXTRACTION STATUS, UNSPECIFIED EYE: Chronic | ICD-10-CM

## 2019-01-23 DIAGNOSIS — Z00.00 ENCOUNTER FOR GENERAL ADULT MEDICAL EXAMINATION WITHOUT ABNORMAL FINDINGS: ICD-10-CM

## 2019-01-23 PROCEDURE — 77067 SCR MAMMO BI INCL CAD: CPT | Mod: 26

## 2019-01-23 PROCEDURE — 71046 X-RAY EXAM CHEST 2 VIEWS: CPT

## 2019-01-23 PROCEDURE — 77063 BREAST TOMOSYNTHESIS BI: CPT | Mod: 26

## 2019-01-23 PROCEDURE — 71046 X-RAY EXAM CHEST 2 VIEWS: CPT | Mod: 26

## 2019-01-23 PROCEDURE — 77067 SCR MAMMO BI INCL CAD: CPT

## 2019-01-23 PROCEDURE — 77063 BREAST TOMOSYNTHESIS BI: CPT

## 2019-06-07 NOTE — PROGRESS NOTE ADULT - PROBLEM SELECTOR PROBLEM 1
Data: Pt to OB PACU at 1202 via cart. PIV infusing without complications, gordon with pale yellow urine to gravity, pt denies any pain, denies any nausea and vomiting.  Interventions: IV to pump, monitors and alarms on, SCD on.  Response: stable.  Plan: Patient instructed to notify RN for pain or nausea, routine post op cares, initiate breastfeeding/pumping as soon as patient/infant able.     Other nonspecific abnormal finding of lung field

## 2019-09-24 NOTE — PROGRESS NOTE ADULT - PROBLEM/PLAN-3
Patient was new patient 08/08/2019    Blood work was done  She is extremely tired  How long does the thyroid medicine take to get in the system  Patient is overly exhausted and sleeps all day    Is it the thyroid or could it be something else   should she go to pcp for maybe being something else    Her mother called  Jolene  Call    DISPLAY PLAN FREE TEXT

## 2019-10-17 NOTE — PATIENT PROFILE ADULT. - BLOOD TRANSFUSION, PREVIOUS, PROFILE
no no renal colic/no incontinence/no dysuria/no flank pain L/no hematuria/no flank pain R/no bladder infections

## 2019-11-01 ENCOUNTER — OUTPATIENT (OUTPATIENT)
Dept: OUTPATIENT SERVICES | Facility: HOSPITAL | Age: 84
LOS: 1 days | End: 2019-11-01
Payer: MEDICARE

## 2019-11-01 DIAGNOSIS — Z98.890 OTHER SPECIFIED POSTPROCEDURAL STATES: Chronic | ICD-10-CM

## 2019-11-01 DIAGNOSIS — Z98.49 CATARACT EXTRACTION STATUS, UNSPECIFIED EYE: Chronic | ICD-10-CM

## 2019-11-01 DIAGNOSIS — Z90.49 ACQUIRED ABSENCE OF OTHER SPECIFIED PARTS OF DIGESTIVE TRACT: Chronic | ICD-10-CM

## 2019-11-01 PROCEDURE — G9001: CPT

## 2019-11-07 ENCOUNTER — EMERGENCY (EMERGENCY)
Facility: HOSPITAL | Age: 84
LOS: 1 days | Discharge: HOME | End: 2019-11-07
Attending: STUDENT IN AN ORGANIZED HEALTH CARE EDUCATION/TRAINING PROGRAM
Payer: COMMERCIAL

## 2019-11-07 VITALS
TEMPERATURE: 98 F | HEIGHT: 64 IN | HEART RATE: 61 BPM | OXYGEN SATURATION: 94 % | RESPIRATION RATE: 18 BRPM | SYSTOLIC BLOOD PRESSURE: 130 MMHG | DIASTOLIC BLOOD PRESSURE: 70 MMHG | WEIGHT: 179.9 LBS

## 2019-11-07 VITALS — SYSTOLIC BLOOD PRESSURE: 135 MMHG | DIASTOLIC BLOOD PRESSURE: 78 MMHG

## 2019-11-07 DIAGNOSIS — Z98.890 OTHER SPECIFIED POSTPROCEDURAL STATES: Chronic | ICD-10-CM

## 2019-11-07 DIAGNOSIS — Z98.49 CATARACT EXTRACTION STATUS, UNSPECIFIED EYE: Chronic | ICD-10-CM

## 2019-11-07 DIAGNOSIS — Z90.49 ACQUIRED ABSENCE OF OTHER SPECIFIED PARTS OF DIGESTIVE TRACT: Chronic | ICD-10-CM

## 2019-11-07 LAB
ALBUMIN SERPL ELPH-MCNC: 3.9 G/DL — SIGNIFICANT CHANGE UP (ref 3.3–5)
ALP SERPL-CCNC: 83 U/L — SIGNIFICANT CHANGE UP (ref 40–120)
ALT FLD-CCNC: 16 U/L — SIGNIFICANT CHANGE UP (ref 10–45)
ANION GAP SERPL CALC-SCNC: 9 MMOL/L — SIGNIFICANT CHANGE UP (ref 5–17)
APTT BLD: 28.4 SEC — SIGNIFICANT CHANGE UP (ref 27.5–36.3)
AST SERPL-CCNC: 20 U/L — SIGNIFICANT CHANGE UP (ref 10–40)
BASOPHILS # BLD AUTO: 0.04 K/UL — SIGNIFICANT CHANGE UP (ref 0–0.2)
BASOPHILS NFR BLD AUTO: 0.6 % — SIGNIFICANT CHANGE UP (ref 0–2)
BILIRUB SERPL-MCNC: 0.3 MG/DL — SIGNIFICANT CHANGE UP (ref 0.2–1.2)
BUN SERPL-MCNC: 23 MG/DL — SIGNIFICANT CHANGE UP (ref 7–23)
CALCIUM SERPL-MCNC: 9.5 MG/DL — SIGNIFICANT CHANGE UP (ref 8.4–10.5)
CHLORIDE SERPL-SCNC: 98 MMOL/L — SIGNIFICANT CHANGE UP (ref 96–108)
CO2 SERPL-SCNC: 31 MMOL/L — SIGNIFICANT CHANGE UP (ref 22–31)
CREAT SERPL-MCNC: 0.75 MG/DL — SIGNIFICANT CHANGE UP (ref 0.5–1.3)
EOSINOPHIL # BLD AUTO: 0.09 K/UL — SIGNIFICANT CHANGE UP (ref 0–0.5)
EOSINOPHIL NFR BLD AUTO: 1.3 % — SIGNIFICANT CHANGE UP (ref 0–6)
GLUCOSE SERPL-MCNC: 97 MG/DL — SIGNIFICANT CHANGE UP (ref 70–99)
HCT VFR BLD CALC: 38.3 % — SIGNIFICANT CHANGE UP (ref 34.5–45)
HGB BLD-MCNC: 12.9 G/DL — SIGNIFICANT CHANGE UP (ref 11.5–15.5)
IMM GRANULOCYTES NFR BLD AUTO: 0.4 % — SIGNIFICANT CHANGE UP (ref 0–1.5)
INR BLD: 1.02 RATIO — SIGNIFICANT CHANGE UP (ref 0.88–1.16)
LYMPHOCYTES # BLD AUTO: 1.37 K/UL — SIGNIFICANT CHANGE UP (ref 1–3.3)
LYMPHOCYTES # BLD AUTO: 19.8 % — SIGNIFICANT CHANGE UP (ref 13–44)
MCHC RBC-ENTMCNC: 31.9 PG — SIGNIFICANT CHANGE UP (ref 27–34)
MCHC RBC-ENTMCNC: 33.7 GM/DL — SIGNIFICANT CHANGE UP (ref 32–36)
MCV RBC AUTO: 94.6 FL — SIGNIFICANT CHANGE UP (ref 80–100)
MONOCYTES # BLD AUTO: 0.6 K/UL — SIGNIFICANT CHANGE UP (ref 0–0.9)
MONOCYTES NFR BLD AUTO: 8.7 % — SIGNIFICANT CHANGE UP (ref 2–14)
NEUTROPHILS # BLD AUTO: 4.8 K/UL — SIGNIFICANT CHANGE UP (ref 1.8–7.4)
NEUTROPHILS NFR BLD AUTO: 69.2 % — SIGNIFICANT CHANGE UP (ref 43–77)
NRBC # BLD: 0 /100 WBCS — SIGNIFICANT CHANGE UP (ref 0–0)
PLATELET # BLD AUTO: 153 K/UL — SIGNIFICANT CHANGE UP (ref 150–400)
POTASSIUM SERPL-MCNC: 3.6 MMOL/L — SIGNIFICANT CHANGE UP (ref 3.5–5.3)
POTASSIUM SERPL-SCNC: 3.6 MMOL/L — SIGNIFICANT CHANGE UP (ref 3.5–5.3)
PROT SERPL-MCNC: 6.7 G/DL — SIGNIFICANT CHANGE UP (ref 6–8.3)
PROTHROM AB SERPL-ACNC: 11.6 SEC — SIGNIFICANT CHANGE UP (ref 10–12.9)
RBC # BLD: 4.05 M/UL — SIGNIFICANT CHANGE UP (ref 3.8–5.2)
RBC # FLD: 12.7 % — SIGNIFICANT CHANGE UP (ref 10.3–14.5)
SODIUM SERPL-SCNC: 138 MMOL/L — SIGNIFICANT CHANGE UP (ref 135–145)
WBC # BLD: 6.93 K/UL — SIGNIFICANT CHANGE UP (ref 3.8–10.5)
WBC # FLD AUTO: 6.93 K/UL — SIGNIFICANT CHANGE UP (ref 3.8–10.5)

## 2019-11-07 PROCEDURE — 72131 CT LUMBAR SPINE W/O DYE: CPT | Mod: 26

## 2019-11-07 PROCEDURE — 73502 X-RAY EXAM HIP UNI 2-3 VIEWS: CPT

## 2019-11-07 PROCEDURE — 73552 X-RAY EXAM OF FEMUR 2/>: CPT

## 2019-11-07 PROCEDURE — 80053 COMPREHEN METABOLIC PANEL: CPT

## 2019-11-07 PROCEDURE — 85027 COMPLETE CBC AUTOMATED: CPT

## 2019-11-07 PROCEDURE — 71260 CT THORAX DX C+: CPT

## 2019-11-07 PROCEDURE — 73502 X-RAY EXAM HIP UNI 2-3 VIEWS: CPT | Mod: 26,RT

## 2019-11-07 PROCEDURE — 74177 CT ABD & PELVIS W/CONTRAST: CPT

## 2019-11-07 PROCEDURE — 99284 EMERGENCY DEPT VISIT MOD MDM: CPT

## 2019-11-07 PROCEDURE — 74177 CT ABD & PELVIS W/CONTRAST: CPT | Mod: 26

## 2019-11-07 PROCEDURE — 70450 CT HEAD/BRAIN W/O DYE: CPT

## 2019-11-07 PROCEDURE — 85610 PROTHROMBIN TIME: CPT

## 2019-11-07 PROCEDURE — 99284 EMERGENCY DEPT VISIT MOD MDM: CPT | Mod: 25

## 2019-11-07 PROCEDURE — 73552 X-RAY EXAM OF FEMUR 2/>: CPT | Mod: 26,RT

## 2019-11-07 PROCEDURE — 72125 CT NECK SPINE W/O DYE: CPT

## 2019-11-07 PROCEDURE — 85730 THROMBOPLASTIN TIME PARTIAL: CPT

## 2019-11-07 PROCEDURE — 70450 CT HEAD/BRAIN W/O DYE: CPT | Mod: 26

## 2019-11-07 PROCEDURE — 72125 CT NECK SPINE W/O DYE: CPT | Mod: 26

## 2019-11-07 PROCEDURE — 71260 CT THORAX DX C+: CPT | Mod: 26

## 2019-11-07 NOTE — ED PROVIDER NOTE - PHYSICAL EXAMINATION
CONSTITUTIONAL: Patient is awake, alert and oriented x 3. Patient is well appearing and in no acute distress.  HEAD: NCAT,   EYES: PERRL b/l, EOMI,   LUNGS: CTA B/L, mild ttp right lateral rib cage   HEART: RRR.+S1S2 no murmurs,   ABDOMEN: Soft nd, ttp RUQ otherwise nttp, +bs no rebound or guarding.   EXTREMITY: no edema or calf tenderness b/l, FROM upper and lower ext b/l. Mild ttp right lateral hip. No midline cervical, thoracic ttp. + Midline lower lumbar ttp and right paraspinal ttp   NEURO: No focal deficits

## 2019-11-07 NOTE — ED PROVIDER NOTE - NSFOLLOWUPINSTRUCTIONS_ED_ALL_ED_FT
1. Rest. Apply cold pack for 20 minutes multiple times daily. Elevate your legs at home   2. For pain your may take Tylenol 650mg every 6 hours and/or Motrin 400mg every 6 hours  3. Follow up with your primary care doctor in the next 1-2 days   4. Return to ER for new or worsened symptoms including severe pain, swelling, numbness/tingling, inability to walk or any concern.

## 2019-11-07 NOTE — ED ADULT NURSE NOTE - EXTENSIONS OF SELF_ADULT
I have reviewed and confirmed nurses' notes for patient's medications, allergies, medical history, and surgical history.
None

## 2019-11-07 NOTE — ED ADULT NURSE NOTE - OBJECTIVE STATEMENT
Patient  is  alert  and  oriented  x3.  Color is  good  and  skin warm to touch.  She  fell off  her  bed last night  onto  her  buttock.  She  is  rt  rib and  hip pain.    No  swelling  , shortening  or  external  rotation  of  rt  leg  noted.

## 2019-11-07 NOTE — ED PROVIDER NOTE - OBJECTIVE STATEMENT
89 year old  female with pmhx HTN, HLD, CVA w/o residual deficits, Alzheimer's disease, right lung pseudotumor wedge resection presents to ED c/o lower back pain, right hip pain and right rib pain s/p fall at 3am. Patient was coming back from bathroom attempting to get into bed which is low to the ground when she missed the edge falling landing on her glutes b/l then onto her right side. Patient daughter heard the fall and assisted her into bed. Pt has been ambulatory since fall but has complained of right hip and lower back pain worsened with walking as well as strange sensation and pain in her right upper back and rib cage. She also reports her arms and legs feel achy. She does not believe she hit her head but fall was not witnessed. Denies HA, dizziness, chest pain, sob, abd pain, n/v

## 2019-11-07 NOTE — ED PROVIDER NOTE - PATIENT PORTAL LINK FT
You can access the FollowMyHealth Patient Portal offered by [facility] by registering at the following website: [email or web address]. By joining [facility]’s FollowMyHealth portal, you will also be able to view your health information using other applications (apps) compatible with our system.

## 2019-11-07 NOTE — ED PROVIDER NOTE - ATTENDING CONTRIBUTION TO CARE
Attending MD [place]:   I personally have seen and examined this patient.  ACP, Resident, medical student note reviewed and agree on plan of care and except where noted.    89y F PMH HTN, HLD, CVA, Alzheimer's disease, right lung pseudotumor wedge resection accompanied by daughter who translated and elaborates history presents with lower back pain, right hip pain and right rib pain s/p fall at 3am. Patient was ambulating back from bathroom, tripped and fell on the edge of her bed which is low to ground, landing on her back. Daughter heard her fall and helped her back into bed, unclear if any loss of consciousness. Patient has been ambulatory since but complains of right hip and lower back pain.    On exam patient is well appearing, vitals wnl. rrr s1s2, lungs ctab, abdomen soft, mild tenderness to palpation right upper quadrant, back with midline lumbar tenderness to palpation, lumbar paraspinal tenderness to palpation, tenderness to palpation right hip, no shortening of RLE, no deformity.    Concern for traumatic injury, patient is a poor historian and episode was unwitnessed, will obtain pan scan, labs, give pain meds, reassess.

## 2019-11-08 ENCOUNTER — NON-APPOINTMENT (OUTPATIENT)
Age: 84
End: 2019-11-08

## 2019-11-08 ENCOUNTER — APPOINTMENT (OUTPATIENT)
Dept: CARDIOTHORACIC SURGERY | Facility: CLINIC | Age: 84
End: 2019-11-08
Payer: MEDICARE

## 2019-11-08 VITALS
BODY MASS INDEX: 41.66 KG/M2 | DIASTOLIC BLOOD PRESSURE: 72 MMHG | HEART RATE: 82 BPM | SYSTOLIC BLOOD PRESSURE: 114 MMHG | WEIGHT: 180 LBS | HEIGHT: 55 IN | RESPIRATION RATE: 16 BRPM | TEMPERATURE: 98.2 F | OXYGEN SATURATION: 94 %

## 2019-11-08 VITALS — DIASTOLIC BLOOD PRESSURE: 78 MMHG | SYSTOLIC BLOOD PRESSURE: 113 MMHG

## 2019-11-08 DIAGNOSIS — Z71.89 OTHER SPECIFIED COUNSELING: ICD-10-CM

## 2019-11-08 PROCEDURE — 99204 OFFICE O/P NEW MOD 45 MIN: CPT

## 2019-11-08 PROCEDURE — 93000 ELECTROCARDIOGRAM COMPLETE: CPT

## 2019-11-08 RX ORDER — BIMATOPROST 0.1 MG/ML
0.01 SOLUTION/ DROPS OPHTHALMIC
Qty: 2 | Refills: 0 | Status: DISCONTINUED | COMMUNITY
Start: 2019-09-04

## 2019-11-08 RX ORDER — DESONIDE 0.5 MG/ML
0.05 LOTION TOPICAL
Qty: 118 | Refills: 0 | Status: DISCONTINUED | COMMUNITY
Start: 2019-06-13

## 2019-11-08 RX ORDER — SENNOSIDES 8.6 MG TABLETS 8.6 MG/1
8.6 TABLET ORAL
Refills: 0 | Status: DISCONTINUED | COMMUNITY
End: 2019-11-08

## 2019-11-08 RX ORDER — FLUOCINOLONE ACETONIDE 0.1 MG/ML
0.01 SOLUTION TOPICAL
Qty: 60 | Refills: 0 | Status: DISCONTINUED | COMMUNITY
Start: 2019-08-13

## 2019-11-08 RX ORDER — AMOXICILLIN 500 MG/1
500 CAPSULE ORAL
Qty: 20 | Refills: 0 | Status: DISCONTINUED | COMMUNITY
Start: 2019-09-26

## 2019-11-08 RX ORDER — AMMONIUM LACTATE 12 %
12 CREAM (GRAM) TOPICAL
Qty: 280 | Refills: 0 | Status: ACTIVE | COMMUNITY
Start: 2019-06-12

## 2019-11-08 RX ORDER — SODIUM SULFACETAMIDE 100 MG/ML
10 LOTION TOPICAL
Qty: 118 | Refills: 0 | Status: DISCONTINUED | COMMUNITY
Start: 2019-09-09

## 2019-11-08 RX ORDER — IBUPROFEN 600 MG/1
600 TABLET, FILM COATED ORAL
Qty: 20 | Refills: 0 | Status: ACTIVE | COMMUNITY
Start: 2019-09-26

## 2019-11-08 RX ORDER — AZITHROMYCIN 250 MG/1
250 TABLET, FILM COATED ORAL
Qty: 6 | Refills: 0 | Status: DISCONTINUED | COMMUNITY
Start: 2019-09-16

## 2019-11-08 RX ORDER — DOCUSATE SODIUM 100 MG/1
100 CAPSULE, LIQUID FILLED ORAL
Refills: 0 | Status: DISCONTINUED | COMMUNITY
End: 2019-11-08

## 2019-11-08 RX ORDER — DICLOFENAC SODIUM 10 MG/G
1 GEL TOPICAL
Qty: 300 | Refills: 0 | Status: ACTIVE | COMMUNITY
Start: 2019-06-14

## 2019-11-08 RX ORDER — ATORVASTATIN CALCIUM 20 MG/1
20 TABLET, FILM COATED ORAL
Qty: 90 | Refills: 0 | Status: DISCONTINUED | COMMUNITY
Start: 2019-06-14

## 2019-11-08 RX ORDER — IMIQUIMOD 50 MG/G
5 CREAM TOPICAL
Qty: 24 | Refills: 0 | Status: DISCONTINUED | COMMUNITY
Start: 2019-09-09

## 2019-11-08 RX ORDER — RIVASTIGMINE 4.6 MG/24H
4.6 PATCH, EXTENDED RELEASE TRANSDERMAL
Refills: 0 | Status: DISCONTINUED | COMMUNITY
End: 2019-11-08

## 2019-11-08 RX ORDER — METRONIDAZOLE 7.5 MG/G
0.75 GEL TOPICAL
Qty: 60 | Refills: 0 | Status: DISCONTINUED | COMMUNITY
Start: 2019-06-30

## 2019-11-08 RX ORDER — LISINOPRIL AND HYDROCHLOROTHIAZIDE TABLETS 20; 12.5 MG/1; MG/1
20-12.5 TABLET ORAL
Qty: 180 | Refills: 0 | Status: DISCONTINUED | COMMUNITY
Start: 2019-06-12

## 2019-11-08 NOTE — PHYSICAL EXAM
[General Appearance - Well Developed] : well developed [Well Groomed] : well groomed [Normal Appearance] : normal appearance [General Appearance - In No Acute Distress] : no acute distress [No Deformities] : no deformities [General Appearance - Well Nourished] : well nourished [Normal Oral Mucosa] : normal oral mucosa [Normal Conjunctiva] : the conjunctiva exhibited no abnormalities [No Oral Pallor] : no oral pallor [No Oral Cyanosis] : no oral cyanosis [Normal Jugular Venous V Waves Present] : normal jugular venous V waves present [Normal Jugular Venous A Waves Present] : normal jugular venous A waves present [No Jugular Venous Casas A Waves] : no jugular venous casas A waves [Normal Rate] : normal [Normal S1] : normal S1 [Heart Rate ___] : [unfilled] bpm [Normal S2] : normal S2 [No Gallop] : no gallop heard [2+] : left 2+ [No Abnormalities] : the abdominal aorta was not enlarged and no bruit was heard [___ +] : bilateral [unfilled]U+ pretibial pitting edema [Respiration, Rhythm And Depth] : normal respiratory rhythm and effort [Exaggerated Use Of Accessory Muscles For Inspiration] : no accessory muscle use [Bowel Sounds] : normal bowel sounds [Abdomen Soft] : soft [Auscultation Breath Sounds / Voice Sounds] : lungs were clear to auscultation bilaterally [Abnormal Walk] : normal gait [Abdomen Tenderness] : non-tender [Nail Clubbing] : no clubbing of the fingernails [Cyanosis, Localized] : no localized cyanosis [Petechial Hemorrhages (___cm)] : no petechial hemorrhages [Skin Turgor] : normal skin turgor [Skin Color & Pigmentation] : normal skin color and pigmentation [No Venous Stasis] : no venous stasis [] : no rash [Oriented To Time, Place, And Person] : oriented to person, place, and time [Impaired Insight] : insight and judgment were intact [Affect] : the affect was normal [Memory Recent] : recent memory was not impaired [Mood] : the mood was normal [No Anxiety] : not feeling anxious [Memory Remote] : remote memory was not impaired [Pericardial Rub] : no pericardial rub [Distant] : the heart sounds were ~L not distant [Click] : no click [Left Carotid Bruit] : no bruit heard over the left carotid [Right Carotid Bruit] : no bruit heard over the right carotid [Bruit] : no bruit heard

## 2019-11-08 NOTE — REASON FOR VISIT
[Initial Evaluation] : an initial evaluation of [Other: _____] : [unfilled] [Patient Declined  Services] : - None: Patient declined  services [Dyspnea] : dyspnea [Hyperlipidemia] : hyperlipidemia [Medication Management] : Medication management [Hypertension] : hypertension

## 2019-11-08 NOTE — REVIEW OF SYSTEMS
[Shortness Of Breath] : shortness of breath [Dyspnea on exertion] : dyspnea during exertion [Joint Pain] : joint pain [Lower Ext Edema] : lower extremity edema [Joint Stiffness] : joint stiffness [Tremor] : a tremor was seen

## 2019-11-08 NOTE — DISCUSSION/SUMMARY
[Hyperlipidemia] : hyperlipidemia [Essential Hypertension] : essential hypertension [Stable] : stable [Responding to Treatment] : responding to treatment [None] : none [Patient] : the patient [Family] : the patient's family [___ Month(s)] : [unfilled] month(s) [With Me] : with me [FreeTextEntry1] : Nichole presents to establish cardiac care.  All of her prior and recent records were reviewed--her angiogram from 2015 (mild CAD), her recent TTE (in August 2019, normal LV function), and her recent labs (LDL at goal; TG mildly elevated).  She will decrease her sugars and carbohydrates (she eats a lot of fruit and rice).  I am making no changes to her optimized regimen.

## 2019-11-08 NOTE — HISTORY OF PRESENT ILLNESS
[FreeTextEntry1] : HOWARD SCHULZ is a 89 year old female here to establish with a new cardiologist. She has a history of HTN and HLD which is controlled with medication.  She had lung resection in the past due to benign tumor in 2018 and did very well.  She sometimes gets right chest pressure which she feels may be muscle pain. It occurs every time she gets up in the morning. She forgets about it and it goes away. She does feel like she is suffocating at times when she gets out of bed and with walking.  She has to stop in the middle of the stairs to rest. She feels "insecure" when she goes out because she is fearful of falling due to dizziness, lightheadedness.  She has a lot of energy but can't exercise b/c she feels like she is suffocating. Her daughter would like to have someone come to the house for therapy.  Great appetite.  Her daughter thinks she had an echo 2 months ago and had blood work at her PCP's office. \par \par Howard presents to establish cardiac care as her prior Cardiologist, Dr ONI Hall, is on maternity leave without a known return date (her her daughter).  She had a normal stress test in 2018 (see scanned report).  She is on Plavix for a prior stroke (ASA was stopped due to bruising).  Her daughter notes the stroke presented with aphasia, which lasted for "a few minutes".  She had a TTE in August that demonstrated normal LV function and no significant valvular disease. \par \par PCP: Dr. Reilly Patel

## 2020-01-29 ENCOUNTER — APPOINTMENT (OUTPATIENT)
Dept: UROGYNECOLOGY | Facility: CLINIC | Age: 85
End: 2020-01-29
Payer: MEDICARE

## 2020-01-29 VITALS
DIASTOLIC BLOOD PRESSURE: 60 MMHG | HEIGHT: 56 IN | BODY MASS INDEX: 40.72 KG/M2 | SYSTOLIC BLOOD PRESSURE: 120 MMHG | WEIGHT: 181 LBS

## 2020-01-29 DIAGNOSIS — N39.46 MIXED INCONTINENCE: ICD-10-CM

## 2020-01-29 DIAGNOSIS — R35.0 FREQUENCY OF MICTURITION: ICD-10-CM

## 2020-01-29 DIAGNOSIS — N95.2 POSTMENOPAUSAL ATROPHIC VAGINITIS: ICD-10-CM

## 2020-01-29 DIAGNOSIS — R39.11 HESITANCY OF MICTURITION: ICD-10-CM

## 2020-01-29 PROCEDURE — 51725 SIMPLE CYSTOMETROGRAM: CPT

## 2020-01-29 PROCEDURE — 99204 OFFICE O/P NEW MOD 45 MIN: CPT | Mod: 25

## 2020-01-29 PROCEDURE — 51741 ELECTRO-UROFLOWMETRY FIRST: CPT

## 2020-01-29 NOTE — HISTORY OF PRESENT ILLNESS
[FreeTextEntry1] : This is a pleasant, 89-year-old with her daughter present translating in [place]. The complaint of mixed urinary incontinence, which is quite severe. She feels using heavy pads or a type I would be independent and like help with her bladder symptoms. On examination she has a very small. Vagina significant atrophic changes. However, the urethra can be visualized, was posterior recur. 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  There is no vaginal access for sling and botox I would not consider with severe atrophy and no ability to teach self catheterization.  Declines PTNS.\par \par If she resolves constipation I would consider anticholinergic and have asked her daughter to verify medical safety of bladder meds assuming constipation is resolved. She has mildly elevated ocular pressures and will need optho clearance for anticholinergics

## 2020-05-08 ENCOUNTER — APPOINTMENT (OUTPATIENT)
Dept: CARDIOTHORACIC SURGERY | Facility: CLINIC | Age: 85
End: 2020-05-08

## 2020-08-21 ENCOUNTER — APPOINTMENT (OUTPATIENT)
Dept: CARDIOTHORACIC SURGERY | Facility: CLINIC | Age: 85
End: 2020-08-21

## 2020-10-12 ENCOUNTER — APPOINTMENT (OUTPATIENT)
Dept: CARDIOTHORACIC SURGERY | Facility: CLINIC | Age: 85
End: 2020-10-12

## 2020-12-28 ENCOUNTER — APPOINTMENT (OUTPATIENT)
Dept: CARDIOTHORACIC SURGERY | Facility: CLINIC | Age: 85
End: 2020-12-28

## 2021-04-16 ENCOUNTER — APPOINTMENT (OUTPATIENT)
Dept: CARDIOTHORACIC SURGERY | Facility: CLINIC | Age: 86
End: 2021-04-16
Payer: MEDICARE

## 2021-04-16 ENCOUNTER — NON-APPOINTMENT (OUTPATIENT)
Age: 86
End: 2021-04-16

## 2021-04-16 VITALS
SYSTOLIC BLOOD PRESSURE: 128 MMHG | TEMPERATURE: 98.1 F | RESPIRATION RATE: 22 BRPM | OXYGEN SATURATION: 95 % | BODY MASS INDEX: 32.94 KG/M2 | HEIGHT: 62 IN | DIASTOLIC BLOOD PRESSURE: 64 MMHG | HEART RATE: 65 BPM | WEIGHT: 179 LBS

## 2021-04-16 VITALS — SYSTOLIC BLOOD PRESSURE: 132 MMHG | DIASTOLIC BLOOD PRESSURE: 74 MMHG

## 2021-04-16 PROCEDURE — 99214 OFFICE O/P EST MOD 30 MIN: CPT

## 2021-04-16 PROCEDURE — 99072 ADDL SUPL MATRL&STAF TM PHE: CPT

## 2021-04-16 PROCEDURE — 93000 ELECTROCARDIOGRAM COMPLETE: CPT

## 2021-04-16 RX ORDER — TRETINOIN 0.25 MG/G
0.03 CREAM TOPICAL
Qty: 45 | Refills: 0 | Status: DISCONTINUED | COMMUNITY
Start: 2019-09-09 | End: 2021-04-16

## 2021-04-16 RX ORDER — SENNOSIDES 8.6 MG TABLETS 8.6 MG/1
8.6 TABLET ORAL
Refills: 0 | Status: ACTIVE | COMMUNITY
Start: 2021-04-16

## 2021-04-16 RX ORDER — TACROLIMUS 0.3 MG/G
0.03 OINTMENT TOPICAL
Qty: 100 | Refills: 0 | Status: DISCONTINUED | COMMUNITY
Start: 2019-06-13 | End: 2021-04-16

## 2021-04-16 RX ORDER — KETOCONAZOLE 20.5 MG/ML
2 SHAMPOO, SUSPENSION TOPICAL
Qty: 120 | Refills: 0 | Status: DISCONTINUED | COMMUNITY
Start: 2019-06-12 | End: 2021-04-16

## 2021-04-16 RX ORDER — CYCLOSPORINE 0.5 MG/ML
0.05 EMULSION OPHTHALMIC
Refills: 0 | Status: ACTIVE | COMMUNITY
Start: 2021-04-16

## 2021-04-16 RX ORDER — CICLOPIROX 10 MG/.96ML
1 SHAMPOO TOPICAL
Qty: 120 | Refills: 0 | Status: DISCONTINUED | COMMUNITY
Start: 2020-01-07 | End: 2021-04-16

## 2021-04-16 RX ORDER — FLUOCINONIDE 0.5 MG/ML
0.05 SOLUTION TOPICAL
Qty: 120 | Refills: 0 | Status: DISCONTINUED | COMMUNITY
Start: 2020-01-21 | End: 2021-04-16

## 2021-04-16 RX ORDER — ACETIC ACID 20 MG/ML
2 SOLUTION AURICULAR (OTIC)
Qty: 15 | Refills: 0 | Status: DISCONTINUED | COMMUNITY
Start: 2019-09-09 | End: 2021-04-16

## 2021-04-16 RX ORDER — BIMATOPROST 0.1 MG/ML
0.01 SOLUTION/ DROPS OPHTHALMIC
Refills: 0 | Status: ACTIVE | COMMUNITY
Start: 2021-04-16

## 2021-04-16 NOTE — DISCUSSION/SUMMARY
[FreeTextEntry1] : 1) Complaints of shortness of breath and LE edema - repeat  TTE (previous TTE with moderate TR)\par 2) Multiple non-cardiac complaints. Frequent falls.  Hand and feet tremors with tingling. Poor balance. Neurology consultation - Dr. Jamie Murcia, contact information provided.\par 3) BP at goal. Medication changes: none\par 4) ECG stable: NSR, rate 68\par 5) Recent testing: Normal pharm NST 2018, EF >70%; TTE 2018: Normal LV function, moderate LVH, no WMA, grade I diastolic dysfunction, Mild AR and AS, mild to moderate TR. Cath 2015: non-obstructive disease pLAD of 30%. \par 6) Follow-up in 6 months, or sooner if needed.\par 7) She will bring her recent labs when she comes for her TTE; we will discuss the results of her labs and TTE by phone once completed.\par

## 2021-04-16 NOTE — PHYSICAL EXAM
[General Appearance - Well Developed] : well developed [Normal Appearance] : normal appearance [Well Groomed] : well groomed [General Appearance - Well Nourished] : well nourished [No Deformities] : no deformities [General Appearance - In No Acute Distress] : no acute distress [Normal Conjunctiva] : the conjunctiva exhibited no abnormalities [Normal Oral Mucosa] : normal oral mucosa [No Oral Pallor] : no oral pallor [No Oral Cyanosis] : no oral cyanosis [Normal Jugular Venous A Waves Present] : normal jugular venous A waves present [Normal Jugular Venous V Waves Present] : normal jugular venous V waves present [No Jugular Venous Casas A Waves] : no jugular venous casas A waves [Respiration, Rhythm And Depth] : normal respiratory rhythm and effort [Exaggerated Use Of Accessory Muscles For Inspiration] : no accessory muscle use [Auscultation Breath Sounds / Voice Sounds] : lungs were clear to auscultation bilaterally [Normal Rate] : normal [Heart Rate ___] : [unfilled] bpm [Normal S1] : normal S1 [Normal S2] : normal S2 [No Gallop] : no gallop heard [I] : a grade 1 [2+] : left 2+ [No Abnormalities] : the abdominal aorta was not enlarged and no bruit was heard [___ +] : bilateral [unfilled]U+ pretibial pitting edema [Abdomen Soft] : soft [Bowel Sounds] : normal bowel sounds [Abdomen Tenderness] : non-tender [Nail Clubbing] : no clubbing of the fingernails [Cyanosis, Localized] : no localized cyanosis [Petechial Hemorrhages (___cm)] : no petechial hemorrhages [Skin Color & Pigmentation] : normal skin color and pigmentation [Skin Turgor] : normal skin turgor [] : no rash [No Venous Stasis] : no venous stasis [Oriented To Time, Place, And Person] : oriented to person, place, and time [Impaired Insight] : insight and judgment were intact [Affect] : the affect was normal [Mood] : the mood was normal [Memory Recent] : recent memory was not impaired [Memory Remote] : remote memory was not impaired [Click] : no click [Pericardial Rub] : no pericardial rub [Right Carotid Bruit] : no bruit heard over the right carotid [Left Carotid Bruit] : no bruit heard over the left carotid [Bruit] : no bruit heard [FreeTextEntry1] : Purple bruising noted left lower leg

## 2021-04-16 NOTE — REVIEW OF SYSTEMS
[Shortness Of Breath] : shortness of breath [Dyspnea on exertion] : dyspnea during exertion [Lower Ext Edema] : lower extremity edema [Joint Pain] : joint pain [Joint Stiffness] : joint stiffness [Tremor] : a tremor was seen [Fever] : no fever [Chills] : no chills

## 2021-04-16 NOTE — HISTORY OF PRESENT ILLNESS
[FreeTextEntry1] : HOWARD SCHULZ is a 89 year old female here on 4/16/2021, 1.5 years after she was last seen for an initial consultation in November 2019. At that visit, her BP and LDL were at goal and TG were mildly elevated. No changes to her medication were made and dietary instructions were given.  \par \par She comes to the office today reporting "not feeling too good". She is shaking too much in her hands and feet with tingling. She is coughing frequently today due because she was eating nuts just prior to coming into the exam room and swallowed it wrong. She feels short of breath when she gets agitated and states she is not dyspneic at rest. Her daughter states she used to be active but not so much anymore. \par \par She has fallen 2x. Daughter states she woke up about 2 weeks ago with one part of her body hurting but doesn't remember falling down but her daughter reports bruising on her feet. Last week, she fell and was bruised and is still bruised. The left part of her body was painful. She was not dizzy but her balance was off. She has noticed more edema of the lower extremities she reports since the fall \par \par She currently denies fever, chills, palpitations, angina, dyspnea, dizziness, lightheadedness, syncope, or peripheral edema. Her energy level is "good" and her appetite is good. She does have slight bowel  incontinence and urinary incontinence. She does not exercise. She is to see her PCP next week. She was in the [place] for the past year. She did [place] ([place] from [place] per daughter) \par \par As noted, she just returned from the [place]. She had a recent fall with bruising on her arms and legs. She has some dyspnea on exertion but "she doesn't feel anything in the heart". Her tremors (UE and LE) are bothersome. Her appetite is "terrific". \par PCP: Dr. Reilly Saravia

## 2021-04-23 ENCOUNTER — APPOINTMENT (OUTPATIENT)
Dept: CARDIOLOGY | Facility: CLINIC | Age: 86
End: 2021-04-23
Payer: MEDICARE

## 2021-04-23 DIAGNOSIS — Q21.1 ATRIAL SEPTAL DEFECT: ICD-10-CM

## 2021-04-23 DIAGNOSIS — Z86.73 PERSONAL HISTORY OF TRANSIENT ISCHEMIC ATTACK (TIA), AND CEREBRAL INFARCTION W/OUT RESIDUAL DEFICITS: ICD-10-CM

## 2021-04-23 DIAGNOSIS — R01.1 CARDIAC MURMUR, UNSPECIFIED: ICD-10-CM

## 2021-04-23 PROCEDURE — 99072 ADDL SUPL MATRL&STAF TM PHE: CPT

## 2021-04-23 PROCEDURE — 93306 TTE W/DOPPLER COMPLETE: CPT

## 2021-04-28 ENCOUNTER — OUTPATIENT (OUTPATIENT)
Dept: OUTPATIENT SERVICES | Facility: HOSPITAL | Age: 86
LOS: 1 days | End: 2021-04-28

## 2021-04-28 ENCOUNTER — APPOINTMENT (OUTPATIENT)
Dept: CT IMAGING | Facility: CLINIC | Age: 86
End: 2021-04-28

## 2021-04-28 ENCOUNTER — APPOINTMENT (OUTPATIENT)
Dept: RADIOLOGY | Facility: IMAGING CENTER | Age: 86
End: 2021-04-28
Payer: MEDICARE

## 2021-04-28 ENCOUNTER — OUTPATIENT (OUTPATIENT)
Dept: OUTPATIENT SERVICES | Facility: HOSPITAL | Age: 86
LOS: 1 days | End: 2021-04-28
Payer: COMMERCIAL

## 2021-04-28 DIAGNOSIS — Z98.890 OTHER SPECIFIED POSTPROCEDURAL STATES: Chronic | ICD-10-CM

## 2021-04-28 DIAGNOSIS — Z00.8 ENCOUNTER FOR OTHER GENERAL EXAMINATION: ICD-10-CM

## 2021-04-28 DIAGNOSIS — Z98.49 CATARACT EXTRACTION STATUS, UNSPECIFIED EYE: Chronic | ICD-10-CM

## 2021-04-28 DIAGNOSIS — Z90.49 ACQUIRED ABSENCE OF OTHER SPECIFIED PARTS OF DIGESTIVE TRACT: Chronic | ICD-10-CM

## 2021-04-28 PROCEDURE — 71046 X-RAY EXAM CHEST 2 VIEWS: CPT | Mod: 26

## 2021-04-28 PROCEDURE — 71046 X-RAY EXAM CHEST 2 VIEWS: CPT

## 2021-05-16 PROBLEM — R01.1 MURMUR: Status: ACTIVE | Noted: 2021-05-16

## 2021-05-16 PROBLEM — Z86.73 HISTORY OF STROKE: Status: RESOLVED | Noted: 2018-05-03 | Resolved: 2021-05-16

## 2021-06-17 ENCOUNTER — APPOINTMENT (OUTPATIENT)
Dept: CARDIOTHORACIC SURGERY | Facility: CLINIC | Age: 86
End: 2021-06-17

## 2021-06-24 ENCOUNTER — APPOINTMENT (OUTPATIENT)
Dept: CARDIOTHORACIC SURGERY | Facility: CLINIC | Age: 86
End: 2021-06-24
Payer: MEDICARE

## 2021-06-24 ENCOUNTER — NON-APPOINTMENT (OUTPATIENT)
Age: 86
End: 2021-06-24

## 2021-06-24 VITALS
SYSTOLIC BLOOD PRESSURE: 102 MMHG | BODY MASS INDEX: 32.39 KG/M2 | RESPIRATION RATE: 18 BRPM | WEIGHT: 176 LBS | HEIGHT: 62 IN | TEMPERATURE: 99.4 F | DIASTOLIC BLOOD PRESSURE: 74 MMHG | HEART RATE: 69 BPM | OXYGEN SATURATION: 94 %

## 2021-06-24 VITALS — SYSTOLIC BLOOD PRESSURE: 124 MMHG | DIASTOLIC BLOOD PRESSURE: 68 MMHG

## 2021-06-24 VITALS — DIASTOLIC BLOOD PRESSURE: 64 MMHG | SYSTOLIC BLOOD PRESSURE: 100 MMHG

## 2021-06-24 DIAGNOSIS — Z01.810 ENCOUNTER FOR PREPROCEDURAL CARDIOVASCULAR EXAMINATION: ICD-10-CM

## 2021-06-24 DIAGNOSIS — I10 ESSENTIAL (PRIMARY) HYPERTENSION: ICD-10-CM

## 2021-06-24 PROCEDURE — 93000 ELECTROCARDIOGRAM COMPLETE: CPT

## 2021-06-24 PROCEDURE — 99213 OFFICE O/P EST LOW 20 MIN: CPT

## 2021-06-24 RX ORDER — BUDESONIDE AND FORMOTEROL FUMARATE DIHYDRATE 160; 4.5 UG/1; UG/1
160-4.5 AEROSOL RESPIRATORY (INHALATION) TWICE DAILY
Refills: 0 | Status: ACTIVE | COMMUNITY

## 2021-06-24 RX ORDER — OLOPATADINE HYDROCHLORIDE 1 MG/ML
0.1 SOLUTION/ DROPS OPHTHALMIC TWICE DAILY
Refills: 0 | Status: DISCONTINUED | COMMUNITY
End: 2021-06-24

## 2021-06-24 RX ORDER — CELECOXIB 100 MG/1
100 CAPSULE ORAL
Qty: 180 | Refills: 0 | Status: DISCONTINUED | COMMUNITY
Start: 2019-06-12 | End: 2021-06-24

## 2021-06-25 NOTE — DISCUSSION/SUMMARY
[FreeTextEntry1] : Mrs. Galaviz comes to the office prior to planned right knee arthroscopy on 7/6/2021.  Recent TTE 4/23/2021 demonstrates moderate MR, mild AS, mild AR, mild concentric LVH, LVEF 63%, No wall motion abnormalities, mild TR and minimal MA. Pharm stress from 3/5/2018 was normal. She is having no signs of arrhythmia, heart failure (has chronic exertional dyspnea) or angina.  She is having a great deal of pain and dysfunction in both knees but the right is worse than the left and she is anxious to proceed with the procedure. Her BP was a bit low (asymptomatic) on first check (manual) and, on recheck, was improved to 124/68. ECG shows NSR with Felecia .212, rate 68. She may proceed with the scheduled procedure from a cardiac perspective. Of note, she had a low grade temp of 99.4 today and denies any signs of infection. She does require medical clearance. Reviewed with Dr. Belal who agrees that there are no cardiac contraindications to proceed with planned procedure.

## 2021-06-25 NOTE — CARDIOLOGY SUMMARY
[de-identified] : 6/24/2021: NSR, rate 68. Low voltage precordial leads. [de-identified] : Stress Test: 3/5/2018: Regadenoson stress test - normal. EF >70%  [de-identified] : 4/23/2021: Mitral annular calcification, otherwise normal mitral valve. Moderate MR. Peak/mean transaortic valve gradients equal 34/19 mm Hg, consistent with mild AS. Mild AR. Mild concentric LVH. Normal LV systolic function. LVEF 63%. No segmental WMA. Mild TR. Minimal WA.  [de-identified] : Cardiac Cath: 3/13/2015: The coronary circulation is right dominant. LM: Normal. pLAD: discrete 30% stenosis.dLAD: minor luminal irregularities with no flow limiting lesions. Cx: Normal. RCA: minor luminal irregularities with no flow limiting lesions.

## 2021-06-25 NOTE — PHYSICAL EXAM
[I] : a grade 1 [Well Developed] : well developed [Well Nourished] : well nourished [No Acute Distress] : no acute distress [Obese] : obese [Normal Conjunctiva] : normal conjunctiva [Normal Venous Pressure] : normal venous pressure [No Carotid Bruit] : no carotid bruit [Clear Lung Fields] : clear lung fields [Good Air Entry] : good air entry [No Respiratory Distress] : no respiratory distress  [Soft] : abdomen soft [Non Tender] : non-tender [No Masses/organomegaly] : no masses/organomegaly [Normal Bowel Sounds] : normal bowel sounds [No Edema] : no edema [No Cyanosis] : no cyanosis [No Clubbing] : no clubbing [No Varicosities] : no varicosities [Normal Radial B/L] : normal radial B/L [Normal PT B/L] : normal PT B/L [No Rash] : no rash [No Skin Lesions] : no skin lesions [Moves all extremities] : moves all extremities [No Focal Deficits] : no focal deficits [Normal Speech] : normal speech [Alert and Oriented] : alert and oriented [Normal memory] : normal memory [de-identified] : comes to the office in a wheelchair. Gait antalgic to exam table.

## 2021-08-11 NOTE — PATIENT PROFILE ADULT. - [facility]
OFFICE VISIT      Patient: Brooks Paris   : 1986 MRN: [id number]    SUBJECTIVE:  Chief Complaint   Patient presents with   • Annual Exam       A 34 year old male presents today for a follow-up.     Patient has given consent to record this visit for documentation in their clinical record.      HISTORY OF PRESENT ILLNESS:    Encounter for routine adult health examination without abnormal findings:  He wants to have his labs done which he had a year ago. He was told to follow up after six months. He is fasting. Had water this morning.   Inquires about booster dose of COVID-19 vaccination.    Diet-controlled hyperlipidemia: His last labs revealed elevated lipid profile.     Overweight with body mass index (BMI) of 29 to 29.9 in adult: He has lost 20 lbs since the last visit. Currently, weighs 230 lbs.     Elevated LFTs: Last labs revealed elevated liver enzymes.     Fingertip eczema: Mentions having the habit of biting his fingertips. Reports wearing cotton gloves as a measure of prevention.    PAST MEDICAL HISTORY:  Past Medical History:   Diagnosis Date   • Elevated LFTs 2020     MEDICATIONS:  Current Outpatient Medications   Medication Sig   • triamcinolone (ARISTOCORT) 0.1 % cream Apply topically 2 times daily.   • ergocalciferol (DRISDOL) 1.25 mg (50,000 units) capsule Take 1 capsule by mouth 1 day a week. Do not start before 2020.     No current facility-administered medications for this visit.     ALLERGIES:  ALLERGIES:   Allergen Reactions   • Squid Oil NAUSEA and VOMITING     PAST SURGICAL HISTORY:  Past Surgical History:   Procedure Laterality Date   • Inguinal hernia repair       FAMILY HISTORY:  Family History   Problem Relation Age of Onset   • Diabetes Father    • Cancer, Lung Maternal Grandmother    • Diabetes Maternal Uncle      SOCIAL HISTORY:  Social History     Tobacco Use   Smoking Status Never Smoker   Smokeless Tobacco Never Used     Social History     Substance and  Sexual Activity   Alcohol Use Yes       Review of Systems    Constitutional: Per HPI.  HENT: Negative for congestion, ear discharge, ear pain, hearing loss, nosebleeds, sinus pain and sore throat.    Eyes: Negative for blurred vision, pain, discharge and redness.   Respiratory: Negative for cough, shortness of breath and wheezing.    Cardiovascular: Negative for chest pain and palpitations.   Gastrointestinal: Negative for constipation, diarrhea, nausea and vomiting.   Genitourinary: Negative for dysuria.   Musculoskeletal: Negative for myalgias.   Skin: Negative for itching and rash.   Neurological: Negative for dizziness and headaches.   Psychiatric/Behavioral: Per HPI.  All other systems reviewed and are negative.    OBJECTIVE:  Vitals:    08/10/21 1819   BP: 126/67   BP Location: RUE - Right upper extremity   Patient Position: Sitting   Pulse: 82   Temp: 99.4 °F (37.4 °C)   TempSrc: Oral   Weight: 105 kg (231 lb 7.7 oz)   Height: 6' 2\" (1.88 m)       Physical Exam    Constitutional: Oriented to person, place, and time. Appears well-developed and well-nourished. No distress.   HENT:   Head: Normocephalic and atraumatic.   Right Ear: External ear normal.   Left Ear: External ear normal.   Nose: Nose normal.   Mouth/Throat: Oropharynx is clear and moist. No oropharyngeal exudate.   Eyes: Pupils are equal, round, and reactive to light. Conjunctivae and EOM are normal. Right eye exhibits no discharge. Left eye exhibits no discharge. No scleral icterus.   Neck: Normal range of motion. Neck supple. No JVD present. No tracheal deviation present. No thyromegaly present.   Cardiovascular: Normal rate, regular rhythm and normal heart sounds. Exam reveals no gallop and no friction rub.   No murmur heard.  Pulmonary/Chest: Effort normal and breath sounds normal. No stridor. No respiratory distress. No wheezes. No rales. Exhibits no tenderness.   Abdominal: Soft. Bowel sounds are normal. Exhibits no distension and no mass.  There is no tenderness. There is no rebound and no guarding. Musculoskeletal: Normal range of motion.   Lymphadenopathy: No cervical adenopathy.   Neurological: Alert and oriented to person, place, and time.   Skin: Skin is warm. Fingertip eczema noted.  Psychiatric: Normal mood and affect. Behavior is normal. Judgment and thought content normal.    DIAGNOSTIC STUDIES:  LAB RESULTS:  No visits with results within 1 Month(s) from this visit.   Latest known visit with results is:   Lab Services on 09/18/2020   Component Date Value Ref Range Status   • TSH 09/18/2020 2.275  0.350 - 5.000 mcUnits/mL Final   • COLOR 09/18/2020 YELLOW  YELLOW Final   • APPEARANCE 09/18/2020 CLOUDY   Final   • GLUCOSE(URINE) 09/18/2020 NEGATIVE  NEGATIVE mg/dL Final   • BILIRUBIN 09/18/2020 NEGATIVE  NEGATIVE Final   • KETONES 09/18/2020 NEGATIVE  NEGATIVE mg/dL Final   • SPECIFIC GRAVITY 09/18/2020 1.029  1.005 - 1.030 Final   • BLOOD 09/18/2020 NEGATIVE  NEGATIVE Final   • pH 09/18/2020 5.0  5.0 - 7.0 Units Final   • PROTEIN(URINE) 09/18/2020 NEGATIVE  NEGATIVE mg/dL Final   • UROBILINOGEN 09/18/2020 0.2  0.0 - 1.0 mg/dL Final   • NITRITE 09/18/2020 NEGATIVE  NEGATIVE Final   • LEUKOCYTE ESTERASE 09/18/2020 NEGATIVE  NEGATIVE Final   • SPECIMEN TYPE 09/18/2020 URINE, CLEAN CATCH/MIDSTREAM   Final   • B12 09/18/2020 435  211 - 911 pg/mL Final   • VITAMIND, 25 HYDROXY 09/18/2020 11.4* 30.0 - 100.0 ng/mL Final   • FASTING STATUS 09/18/2020 12  hrs Final   • CHOLESTEROL 09/18/2020 210* <200 mg/dL Final   • CALCULATED LDL 09/18/2020 122  <130 mg/dL Final   • HDL 09/18/2020 34* >39 mg/dL Final   • TRIGLYCERIDE 09/18/2020 269* <150 mg/dL Final   • CALCULATED NON HDL 09/18/2020 176  mg/dL Final   • CHOL/HDL 09/18/2020 6.2* <4.5 Final   • Hemoglobin A1C 09/18/2020 5.0  4.5 - 5.6 % Final   • FOLATE 09/18/2020 12.6  >5.4 ng/mL Final   • Fasting Status 09/18/2020 12  hrs Final   • Sodium 09/18/2020 137  135 - 145 mmol/L Final   • Potassium  09/18/2020 4.2  3.4 - 5.1 mmol/L Final   • Chloride 09/18/2020 104  98 - 107 mmol/L Final   • Carbon Dioxide 09/18/2020 27  21 - 32 mmol/L Final   • Anion Gap 09/18/2020 10  10 - 20 mmol/L Final   • Glucose 09/18/2020 86  65 - 99 mg/dL Final   • BUN 09/18/2020 20  6 - 20 mg/dL Final   • Creatinine 09/18/2020 0.99  0.67 - 1.17 mg/dL Final   • GFR Estimate,  09/18/2020 >90   Final   • GFR Estimate, Non  09/18/2020 >90   Final   • BUN/Creatinine Ratio 09/18/2020 20  7 - 25 Final   • CALCIUM 09/18/2020 9.5  8.4 - 10.2 mg/dL Final   • TOTAL BILIRUBIN 09/18/2020 0.8  0.2 - 1.0 mg/dL Final   • AST/SGOT 09/18/2020 30  <38 Units/L Final   • ALT/SGPT 09/18/2020 66* <64 Units/L Final   • ALK PHOSPHATASE 09/18/2020 72  45 - 117 Units/L Final   • TOTAL PROTEIN 09/18/2020 7.5  6.4 - 8.2 g/dL Final   • Albumin 09/18/2020 4.4  3.6 - 5.1 g/dL Final   • GLOBULIN 09/18/2020 3.1  2.0 - 4.0 g/dL Final   • A/G Ratio, Serum 09/18/2020 1.4  1.0 - 2.4 Final   • WBC 09/18/2020 7.8  4.2 - 11.0 K/mcL Final   • RBC 09/18/2020 5.66  4.50 - 5.90 mil/mcL Final   • HGB 09/18/2020 15.8  13.0 - 17.0 g/dL Final   • HCT 09/18/2020 47.5  39.0 - 51.0 % Final   • MCV 09/18/2020 83.9  78.0 - 100.0 fl Final   • MCH 09/18/2020 27.9  26.0 - 34.0 pg Final   • MCHC 09/18/2020 33.3  32.0 - 36.5 g/dL Final   • RDW-CV 09/18/2020 13.0  11.0 - 15.0 % Final   • PLT 09/18/2020 248  140 - 450 K/mcL Final   • NRBC 09/18/2020 0  0 /100 WBC Final   • DIFF TYPE 09/18/2020 AUTOMATED DIFFERENTIAL   Final   • Neutrophil 09/18/2020 53  % Final   • LYMPH 09/18/2020 33  % Final   • MONO 09/18/2020 10  % Final   • EOSIN 09/18/2020 3  % Final   • BASO 09/18/2020 1  % Final   • Percent Immature Granuloctyes 09/18/2020 0  % Final   • Absolute Neutrophil 09/18/2020 4.2  1.8 - 7.7 K/mcL Final   • Absolute Lymph 09/18/2020 2.6  1.0 - 4.8 K/mcL Final   • Absolute Mono 09/18/2020 0.7  0.3 - 0.9 K/mcL Final   • Absolute Eos 09/18/2020 0.2  0.1 - 0.5 K/mcL  Final   • Absolute Baso 09/18/2020 0.1  0.0 - 0.3 K/mcL Final   • Absolute Immature Granulocytes 09/18/2020 0.0  0 - 0.2 K/mcl Final       ASSESSMENT AND PLAN:  This is a 34 year old male who presents with :  1. Encounter for routine adult health examination without abnormal findings    2. Diet-controlled hyperlipidemia    3. Vitamin D deficiency    4. Overweight with body mass index (BMI) of 29 to 29.9 in adult    5. Elevated LFTs    6. Fingertip eczema        Orders Placed This Encounter   • CBC with Automated Differential   • Comprehensive Metabolic Panel   • Folate   • Glycohemoglobin   • Lipid Panel With Reflex   • Vitamin D -25 Hydroxy   • Vitamin B12   • Thyroid Stimulating Hormone Reflex   • Urinalysis & Reflex Microscopy With Culture If Indicated   • CBC with Automated Differential (performable only)   • triamcinolone (ARISTOCORT) 0.1 % cream       Plan:    Encounter for routine adult health examination without abnormal findings:   Reviewed and discussed previous reports.    Ordered labs; refer to orders. Patient will be notified after the lab result.  Informed, may drink water before fasting labs.   COVID-19 vaccination details updated in his medical profile.   Informed, no official protocol has been released regarding the COVID-19 vaccine booster dose.     Diet-controlled hyperlipidemia:  Reviewed and discussed previous reports.    Ordered labs; refer to orders.    Explained about the non-alcoholic fatty liver disease and its complications.     Vitamin D deficiency:  Ordered labs; refer to orders.      Overweight with body mass index (BMI) of 29 to 29.9 in adult:  Improving.   The goal is 200-210 lbs.   Recommended exercising and weight loss.   Recommended dietary and lifestyle modifications    Elevated LFTs:  Reviewed and discussed previous reports.  Ordered labs; refer to orders.      Fingertip eczema:  Patient has fingertip eczema due to fingertip biting habit.   Prescribed Triamcinolone 0.1 % to be  taken as directed; mode of action, benefits and side effects explained.  Informed about the possibility of fingertip infection and its complications.     Follow up as recommended or sooner if needed.    Refer to orders.   Patient verbalized understanding of the plan and is in agreement with the plan.  Instructions provided as documented in the AVS.    I,  Dr. Mallorie Justice, have created a visit summary document based on the audio recording between Dr. Pravin Sebastian MD and this patient for the physician to review, edit as needed, and authenticate.  Creation Date: 8/10/2021    (4) no impairment

## 2021-10-12 NOTE — CARDIOLOGY SUMMARY
[de-identified] : Stress Test: 3/5/2018: Regadenoson stress test - normal. EF >70% \par  [de-identified] : 4/23/2021: Mitral annular calcification, otherwise normal mitral valve. Moderate MR. Peak/mean transaortic valve gradients equal 34/19 mm Hg, consistent with mild AS. Mild AR. Mild concentric LVH. Normal LV systolic function. LVEF 63%. No segmental WMA. Mild TR. Minimal IA. \par  [de-identified] : Cardiac Cath: 3/13/2015: The coronary circulation is right dominant. LM: Normal. pLAD: discrete 30% stenosis.dLAD: minor luminal irregularities with no flow limiting lesions. Cx: Normal. RCA: minor luminal irregularities with no flow limiting lesions. \par

## 2021-10-12 NOTE — HISTORY OF PRESENT ILLNESS
[FreeTextEntry1] : HOWARD SCHULZ is a 89 year old female here on 10/15/2021, 4 months after she was last seen for evaluation prior to right knee arthroscopy scheduled for 7/6/2021. She underwent the procedure and had ___ cardiac complications.  \par \par She comes to the office today reporting\par She currently denies fever, chills, cough, loss of smell or taste, palpitations, angina, dyspnea, dizziness, lightheadedness, syncope, or peripheral edema. Her energy level is and her  appetite is good. Denies bowel or bladder problems. She does/does not exercise. No new hospitalizations, emergency room/urgent care visits, new medical diagnoses, surgery, or cardiac testing since her last office visit. She has/has not had recent labs. She is fully vaccinated against COVID (Pfizer, last dose 6/9/2021).\par \par PCP: Dr. Reilly Patel\par \par ***NOTE INCOMPLETE***\par \par

## 2021-10-15 ENCOUNTER — NON-APPOINTMENT (OUTPATIENT)
Age: 86
End: 2021-10-15

## 2021-10-15 ENCOUNTER — APPOINTMENT (OUTPATIENT)
Dept: CARDIOTHORACIC SURGERY | Facility: CLINIC | Age: 86
End: 2021-10-15

## 2021-10-21 ENCOUNTER — NON-APPOINTMENT (OUTPATIENT)
Age: 86
End: 2021-10-21

## 2021-11-15 NOTE — H&P PST ADULT - BP NONINVASIVE DIASTOLIC (MM HG)
Cardiology Focus Note    Chart reviewed.  Labs and BPs stable.    Continue current meds off BB.    Emily A Ollerman, CNP / Dr NEREYDA Osborne MD   Cardiology  [phone]     68

## 2021-12-15 NOTE — DISCHARGE NOTE ADULT - MEDICATION SUMMARY - MEDICATIONS TO CHANGE
Pt BIBA from [facility] for witness ground level fall -blood thinners.  Pt seen here last night for dx UTI.  Per staff, pt combative    I will SWITCH the dose or number of times a day I take the medications listed below when I get home from the hospital:  None Detail Level: Detailed

## 2022-02-02 ENCOUNTER — APPOINTMENT (OUTPATIENT)
Dept: GASTROENTEROLOGY | Facility: CLINIC | Age: 87
End: 2022-02-02
Payer: MEDICARE

## 2022-02-02 VITALS
SYSTOLIC BLOOD PRESSURE: 105 MMHG | OXYGEN SATURATION: 90 % | BODY MASS INDEX: 26.87 KG/M2 | HEART RATE: 75 BPM | TEMPERATURE: 97.7 F | DIASTOLIC BLOOD PRESSURE: 69 MMHG | WEIGHT: 146 LBS | HEIGHT: 62 IN

## 2022-02-02 DIAGNOSIS — Z78.9 OTHER SPECIFIED HEALTH STATUS: ICD-10-CM

## 2022-02-02 PROCEDURE — 99204 OFFICE O/P NEW MOD 45 MIN: CPT

## 2022-02-02 NOTE — HISTORY OF PRESENT ILLNESS
[Heartburn] : heartburn worsened [Nausea] : denies nausea [Vomiting] : denies vomiting [Constipation] : denies constipation [Yellow Skin Or Eyes (Jaundice)] : denies jaundice [Diarrhea] : diarrhea [de-identified] : This is a 89 year old female with HTN, HLD, CVAs now on Plavix, s/p right VATS and wedge resection RUL with 2018 for organizing pneumonia, who presents for evaluation of diarrhea\par \par The patient lost 30 lb in September-October of 2021, after having episodes of vomiting and not feeling well overall. She lives in the  for a good part of the year, and reports having had negative stool testing there but does not know what exact tests were done. Since then, the vomiting stopped and she only reports acid reflux now. No trouble swallowing or keeping food down. Otherwise, she reports new diarrhea since then, 3-4 times a day and right after eating. She reports anything she eats, it just comes out. Her daughter showed us an image - stool is loose, [place] scale 6, but she denies it being difficult to flush or being oily, though it does float. She reports bloating, but no abdominal pain now. Her weight has stabilized in the last few months since October. She had a colonoscopy <10 years ago, and there were benign polyps seen. She does not take NSAIDs. \par \par 1/2022: TSH normal\par \par 4/16/21: WBC 7, hgb 12.6, Hct 38.5, \par Iron 68, TIBC 296, %saturation 23%\par Ferritin 163\par \par TTE 4/23/21: Moderate MR, mild AR, normal EF, normal pulmonary pressures, mild AS

## 2022-02-02 NOTE — CONSULT LETTER
[Dear  ___] : Dear  [unfilled], [Courtesy Letter:] : I had the pleasure of seeing your patient, [unfilled], in my office today. [Please see my note below.] : Please see my note below. [Consult Closing:] : Thank you very much for allowing me to participate in the care of this patient.  If you have any questions, please do not hesitate to contact me. [Sincerely,] : Sincerely, [FreeTextEntry2] : Dr. Reilly Patel [FreeTextEntry3] : Pieter Dudley MD\par [facility]\par  of Medicine, [facility] [facility] [employer] [facility]\par Tel: [phone]\par Fax: [phone]\par

## 2022-02-02 NOTE — PHYSICAL EXAM
[General Appearance - Alert] : alert [General Appearance - In No Acute Distress] : in no acute distress [Sclera] : the sclera and conjunctiva were normal [Outer Ear] : the ears and nose were normal in appearance [Neck Appearance] : the appearance of the neck was normal [] : no respiratory distress [Exaggerated Use Of Accessory Muscles For Inspiration] : no accessory muscle use [Auscultation Breath Sounds / Voice Sounds] : lungs were clear to auscultation bilaterally [Heart Rate And Rhythm] : heart rate was normal and rhythm regular [Heart Sounds] : normal S1 and S2 [Murmurs] : no murmurs [Edema] : there was no peripheral edema [Bowel Sounds] : normal bowel sounds [Abdomen Soft] : soft [Abdomen Tenderness] : non-tender [Abdomen Mass (___ Cm)] : no abdominal mass palpated [Involuntary Movements] : no involuntary movements were seen [Skin Color & Pigmentation] : normal skin color and pigmentation [No Focal Deficits] : no focal deficits [Affect] : the affect was normal

## 2022-02-02 NOTE — REASON FOR VISIT
[Initial Evaluation] : an initial evaluation [Family Member] : family member [Source: ______] : History obtained from [unfilled] [FreeTextEntry1] : Diarrhea

## 2022-02-02 NOTE — ASSESSMENT
[FreeTextEntry1] : Impression:\par 1. Diarrhea with weight loss- unlikely malignancy given colonoscopy done within the last few years; differential includes exocrine pancreatic insufficiency, infectious diarrhea, microscopic colitis, inflammatory bowel disease\par 2. Dyspepsia/GERD\par \par Plan:\par -Given advanced age would try to avoid invasive testing\par -Will start with stool studies - check stool calprotectin, elastase and stool fat, infectious workup for GI PCR/culture\par -Advised trial of omeprazole once daily on an empty stomach 30 minutes before breakfast, new prescription sent to patient\par -If stool testing are all negative and diarrhea continues, will consider imaging vs endoscopic evaluation\par \par RTC after stool testing

## 2022-02-12 RX ADMIN — RIVASTIGMINE 1 PATCH: 4.6 PATCH, EXTENDED RELEASE TRANSDERMAL at 19:00

## 2022-02-16 ENCOUNTER — APPOINTMENT (OUTPATIENT)
Dept: GASTROENTEROLOGY | Facility: CLINIC | Age: 87
End: 2022-02-16
Payer: MEDICARE

## 2022-02-16 VITALS
DIASTOLIC BLOOD PRESSURE: 80 MMHG | BODY MASS INDEX: 26.87 KG/M2 | TEMPERATURE: 98 F | HEART RATE: 80 BPM | OXYGEN SATURATION: 90 % | RESPIRATION RATE: 18 BRPM | HEIGHT: 62 IN | SYSTOLIC BLOOD PRESSURE: 125 MMHG | WEIGHT: 146 LBS

## 2022-02-16 DIAGNOSIS — K21.9 GASTRO-ESOPHAGEAL REFLUX DISEASE W/OUT ESOPHAGITIS: ICD-10-CM

## 2022-02-16 LAB
BACTERIA STL CULT: NORMAL
C DIFF TOXIN B QL PCR REFLEX: NORMAL
CALPROTECTIN FECAL: 85 UG/G
FAT STL QN: NORMAL
FAT STL QN: NORMAL
GDH ANTIGEN: NOT DETECTED
GI PCR PANEL, STOOL: NORMAL
TOXIN A AND B: NOT DETECTED

## 2022-02-16 PROCEDURE — 99214 OFFICE O/P EST MOD 30 MIN: CPT

## 2022-02-16 RX ORDER — LOPERAMIDE HYDROCHLORIDE 2 MG/1
2 CAPSULE ORAL 4 TIMES DAILY
Qty: 120 | Refills: 1 | Status: ACTIVE | COMMUNITY
Start: 2022-02-16 | End: 1900-01-01

## 2022-02-16 NOTE — HISTORY OF PRESENT ILLNESS
[Heartburn] : stable heartburn [Nausea] : denies nausea [Vomiting] : denies vomiting [Diarrhea] : stable diarrhea [Constipation] : denies constipation [Yellow Skin Or Eyes (Jaundice)] : denies jaundice [Abdominal Pain] : denies abdominal pain [Abdominal Swelling] : denies abdominal swelling [Wt Loss ___ Lbs] : no recent weight loss [de-identified] : This is a 89 year old female with HTN, HLD, CVAs now on Plavix, s/p right VATS and wedge resection RUL with 2018 for organizing pneumonia, who presents for follow-up of diarrhea\par \par Since the last visit, the diarrhea is still the same. She has diarrhea after she eats, at least 3 times a day. No abdominal pain, no nausea/vomiting. Stool is still loose, Grimes scale 6. No melena/hematochezia. Her weight is stable since the last visit. On review of her diet, she eats dairy frequently, has cheese or milk with most meals. \par \par 2/8/22:\par GI PCR negative\par Stool culture negative\par C diff negative\par \par 1/19/22:\par Cr 1.24, BUN 38, glucose 92, Na 141, K 4.1, Cl 105, bicarb 26, Ca 9.1, T protein 6.4, albumin 3.7, T bili 0.6, ALP 70, AST 25, ALT 22\par A1C 5.5%\par TSH 0.51\par WBC 7.2, Hgb 11.5, MCV 91.8, \par Iron 56, TIBC 264, %saturation 21%, ferritin 511\par  \par TTE: 1/31/22: moderate TR, mild-moderate pulmonary hypertension, EF 60-65%\par

## 2022-02-16 NOTE — ASSESSMENT
[FreeTextEntry1] : Impression:\par 1. Diarrhea with weight loss- no infection seen on stool testing; normal calprotectin; pending elastase to rule out pancreatic insufficiency. Differential also includes microscopic colitis, vs dietary related diarrhea/lactose intolerance\par 2. Dyspepsia/GERD\par \par Plan:\par -Pending elastase still, sent and received\par -Discussed that since the infectious workup has been negative, would advise that she start loperamide PRN\par -Asked patient to cut out all dairy including cheeses\par -If no improvement and elastase is normal, will consider colonoscopy for biopsies for microscopic colitis

## 2022-02-17 LAB — PANCREATIC ELASTASE, FECAL: <50

## 2022-02-18 LAB — A1AT STL-MCNC: 0.14 MG/G

## 2022-02-25 ENCOUNTER — RESULT REVIEW (OUTPATIENT)
Age: 87
End: 2022-02-25

## 2022-02-26 NOTE — ED ADULT NURSE NOTE - TEMPLATE
Fall Pt % if the 95th percentile for age, according to [facility] guidelines it is appropriate to proceed as scheduled.

## 2022-03-10 ENCOUNTER — APPOINTMENT (OUTPATIENT)
Dept: MRI IMAGING | Facility: CLINIC | Age: 87
End: 2022-03-10
Payer: MEDICARE

## 2022-03-10 ENCOUNTER — OUTPATIENT (OUTPATIENT)
Dept: OUTPATIENT SERVICES | Facility: HOSPITAL | Age: 87
LOS: 1 days | End: 2022-03-10
Payer: COMMERCIAL

## 2022-03-10 DIAGNOSIS — Z98.890 OTHER SPECIFIED POSTPROCEDURAL STATES: Chronic | ICD-10-CM

## 2022-03-10 DIAGNOSIS — K86.81 EXOCRINE PANCREATIC INSUFFICIENCY: ICD-10-CM

## 2022-03-10 DIAGNOSIS — Z98.49 CATARACT EXTRACTION STATUS, UNSPECIFIED EYE: Chronic | ICD-10-CM

## 2022-03-10 DIAGNOSIS — Z00.8 ENCOUNTER FOR OTHER GENERAL EXAMINATION: ICD-10-CM

## 2022-03-10 DIAGNOSIS — Z90.49 ACQUIRED ABSENCE OF OTHER SPECIFIED PARTS OF DIGESTIVE TRACT: Chronic | ICD-10-CM

## 2022-03-10 PROCEDURE — 72197 MRI PELVIS W/O & W/DYE: CPT | Mod: 26

## 2022-03-10 PROCEDURE — 74183 MRI ABD W/O CNTR FLWD CNTR: CPT

## 2022-03-10 PROCEDURE — 74183 MRI ABD W/O CNTR FLWD CNTR: CPT | Mod: 26

## 2022-03-10 PROCEDURE — A9585: CPT

## 2022-03-10 PROCEDURE — 72197 MRI PELVIS W/O & W/DYE: CPT

## 2022-03-16 ENCOUNTER — APPOINTMENT (OUTPATIENT)
Dept: GASTROENTEROLOGY | Facility: CLINIC | Age: 87
End: 2022-03-16

## 2022-04-04 ENCOUNTER — INPATIENT (INPATIENT)
Facility: HOSPITAL | Age: 87
LOS: 6 days | Discharge: HOME | End: 2022-04-11
Attending: GENERAL PRACTICE | Admitting: GENERAL PRACTICE
Payer: MEDICARE

## 2022-04-04 VITALS
DIASTOLIC BLOOD PRESSURE: 60 MMHG | RESPIRATION RATE: 18 BRPM | TEMPERATURE: 99 F | HEIGHT: 64 IN | SYSTOLIC BLOOD PRESSURE: 113 MMHG | HEART RATE: 66 BPM | OXYGEN SATURATION: 98 %

## 2022-04-04 DIAGNOSIS — Z98.49 CATARACT EXTRACTION STATUS, UNSPECIFIED EYE: Chronic | ICD-10-CM

## 2022-04-04 DIAGNOSIS — Z98.890 OTHER SPECIFIED POSTPROCEDURAL STATES: Chronic | ICD-10-CM

## 2022-04-04 DIAGNOSIS — Z90.49 ACQUIRED ABSENCE OF OTHER SPECIFIED PARTS OF DIGESTIVE TRACT: Chronic | ICD-10-CM

## 2022-04-04 LAB
ALBUMIN SERPL ELPH-MCNC: 3.7 G/DL — SIGNIFICANT CHANGE UP (ref 3.3–5)
ALP SERPL-CCNC: 81 U/L — SIGNIFICANT CHANGE UP (ref 40–120)
ALT FLD-CCNC: 27 U/L — SIGNIFICANT CHANGE UP (ref 4–33)
ANION GAP SERPL CALC-SCNC: 13 MMOL/L — SIGNIFICANT CHANGE UP (ref 7–14)
APPEARANCE UR: CLEAR — SIGNIFICANT CHANGE UP
AST SERPL-CCNC: 37 U/L — HIGH (ref 4–32)
BACTERIA # UR AUTO: ABNORMAL
BASOPHILS # BLD AUTO: 0.03 K/UL — SIGNIFICANT CHANGE UP (ref 0–0.2)
BASOPHILS NFR BLD AUTO: 0.3 % — SIGNIFICANT CHANGE UP (ref 0–2)
BILIRUB SERPL-MCNC: 0.4 MG/DL — SIGNIFICANT CHANGE UP (ref 0.2–1.2)
BILIRUB UR-MCNC: NEGATIVE — SIGNIFICANT CHANGE UP
BUN SERPL-MCNC: 20 MG/DL — SIGNIFICANT CHANGE UP (ref 7–23)
CALCIUM SERPL-MCNC: 8.5 MG/DL — SIGNIFICANT CHANGE UP (ref 8.4–10.5)
CHLORIDE SERPL-SCNC: 104 MMOL/L — SIGNIFICANT CHANGE UP (ref 98–107)
CO2 SERPL-SCNC: 21 MMOL/L — LOW (ref 22–31)
COLOR SPEC: YELLOW — SIGNIFICANT CHANGE UP
CREAT SERPL-MCNC: 1.08 MG/DL — SIGNIFICANT CHANGE UP (ref 0.5–1.3)
DIFF PNL FLD: NEGATIVE — SIGNIFICANT CHANGE UP
EGFR: 48 ML/MIN/1.73M2 — LOW
EOSINOPHIL # BLD AUTO: 0.03 K/UL — SIGNIFICANT CHANGE UP (ref 0–0.5)
EOSINOPHIL NFR BLD AUTO: 0.3 % — SIGNIFICANT CHANGE UP (ref 0–6)
EPI CELLS # UR: 1 /HPF — SIGNIFICANT CHANGE UP (ref 0–5)
GAS PNL BLDV: SIGNIFICANT CHANGE UP
GLUCOSE SERPL-MCNC: 85 MG/DL — SIGNIFICANT CHANGE UP (ref 70–99)
GLUCOSE UR QL: NEGATIVE — SIGNIFICANT CHANGE UP
HCT VFR BLD CALC: 35 % — SIGNIFICANT CHANGE UP (ref 34.5–45)
HGB BLD-MCNC: 11.5 G/DL — SIGNIFICANT CHANGE UP (ref 11.5–15.5)
HYALINE CASTS # UR AUTO: 4 /LPF — SIGNIFICANT CHANGE UP (ref 0–7)
IANC: 8.98 K/UL — HIGH (ref 1.8–7.4)
IMM GRANULOCYTES NFR BLD AUTO: 0.4 % — SIGNIFICANT CHANGE UP (ref 0–1.5)
KETONES UR-MCNC: NEGATIVE — SIGNIFICANT CHANGE UP
LEUKOCYTE ESTERASE UR-ACNC: NEGATIVE — SIGNIFICANT CHANGE UP
LIDOCAIN IGE QN: 31 U/L — SIGNIFICANT CHANGE UP (ref 7–60)
LYMPHOCYTES # BLD AUTO: 1.87 K/UL — SIGNIFICANT CHANGE UP (ref 1–3.3)
LYMPHOCYTES # BLD AUTO: 15.6 % — SIGNIFICANT CHANGE UP (ref 13–44)
MCHC RBC-ENTMCNC: 31.2 PG — SIGNIFICANT CHANGE UP (ref 27–34)
MCHC RBC-ENTMCNC: 32.9 GM/DL — SIGNIFICANT CHANGE UP (ref 32–36)
MCV RBC AUTO: 94.9 FL — SIGNIFICANT CHANGE UP (ref 80–100)
MONOCYTES # BLD AUTO: 1.02 K/UL — HIGH (ref 0–0.9)
MONOCYTES NFR BLD AUTO: 8.5 % — SIGNIFICANT CHANGE UP (ref 2–14)
NEUTROPHILS # BLD AUTO: 8.98 K/UL — HIGH (ref 1.8–7.4)
NEUTROPHILS NFR BLD AUTO: 74.9 % — SIGNIFICANT CHANGE UP (ref 43–77)
NITRITE UR-MCNC: POSITIVE
NRBC # BLD: 0 /100 WBCS — SIGNIFICANT CHANGE UP
NRBC # FLD: 0 K/UL — SIGNIFICANT CHANGE UP
PH UR: 5.5 — SIGNIFICANT CHANGE UP (ref 5–8)
PLATELET # BLD AUTO: 143 K/UL — LOW (ref 150–400)
POTASSIUM SERPL-MCNC: 4.5 MMOL/L — SIGNIFICANT CHANGE UP (ref 3.5–5.3)
POTASSIUM SERPL-SCNC: 4.5 MMOL/L — SIGNIFICANT CHANGE UP (ref 3.5–5.3)
PROT SERPL-MCNC: 6.7 G/DL — SIGNIFICANT CHANGE UP (ref 6–8.3)
PROT UR-MCNC: ABNORMAL
RBC # BLD: 3.69 M/UL — LOW (ref 3.8–5.2)
RBC # FLD: 12.7 % — SIGNIFICANT CHANGE UP (ref 10.3–14.5)
RBC CASTS # UR COMP ASSIST: 4 /HPF — SIGNIFICANT CHANGE UP (ref 0–4)
SODIUM SERPL-SCNC: 138 MMOL/L — SIGNIFICANT CHANGE UP (ref 135–145)
SP GR SPEC: 1.02 — SIGNIFICANT CHANGE UP (ref 1–1.05)
TROPONIN T, HIGH SENSITIVITY RESULT: 17 NG/L — SIGNIFICANT CHANGE UP
TROPONIN T, HIGH SENSITIVITY RESULT: 20 NG/L — SIGNIFICANT CHANGE UP
UROBILINOGEN FLD QL: SIGNIFICANT CHANGE UP
WBC # BLD: 11.98 K/UL — HIGH (ref 3.8–10.5)
WBC # FLD AUTO: 11.98 K/UL — HIGH (ref 3.8–10.5)
WBC UR QL: 1 /HPF — SIGNIFICANT CHANGE UP (ref 0–5)

## 2022-04-04 PROCEDURE — 93010 ELECTROCARDIOGRAM REPORT: CPT

## 2022-04-04 PROCEDURE — 75635 CT ANGIO ABDOMINAL ARTERIES: CPT | Mod: 26,MA

## 2022-04-04 PROCEDURE — 71275 CT ANGIOGRAPHY CHEST: CPT | Mod: 26,MA

## 2022-04-04 PROCEDURE — 99285 EMERGENCY DEPT VISIT HI MDM: CPT | Mod: 25

## 2022-04-04 RX ORDER — ACETAMINOPHEN 500 MG
1000 TABLET ORAL ONCE
Refills: 0 | Status: COMPLETED | OUTPATIENT
Start: 2022-04-04 | End: 2022-04-04

## 2022-04-04 RX ORDER — SODIUM CHLORIDE 9 MG/ML
1000 INJECTION, SOLUTION INTRAVENOUS ONCE
Refills: 0 | Status: COMPLETED | OUTPATIENT
Start: 2022-04-04 | End: 2022-04-04

## 2022-04-04 RX ORDER — CEFTRIAXONE 500 MG/1
1000 INJECTION, POWDER, FOR SOLUTION INTRAMUSCULAR; INTRAVENOUS ONCE
Refills: 0 | Status: COMPLETED | OUTPATIENT
Start: 2022-04-04 | End: 2022-04-04

## 2022-04-04 RX ADMIN — Medication 400 MILLIGRAM(S): at 18:14

## 2022-04-04 RX ADMIN — CEFTRIAXONE 100 MILLIGRAM(S): 500 INJECTION, POWDER, FOR SOLUTION INTRAMUSCULAR; INTRAVENOUS at 23:38

## 2022-04-04 RX ADMIN — SODIUM CHLORIDE 1000 MILLILITER(S): 9 INJECTION, SOLUTION INTRAVENOUS at 18:14

## 2022-04-04 NOTE — ED ADULT NURSE NOTE - NSIMPLEMENTINTERV_GEN_ALL_ED
Implemented All Fall with Harm Risk Interventions:  [place] to call system. Call bell, personal items and telephone within reach. Instruct patient to call for assistance. Room bathroom lighting operational. Non-slip footwear when patient is off stretcher. Physically safe environment: no spills, clutter or unnecessary equipment. Stretcher in lowest position, wheels locked, appropriate side rails in place. Provide visual cue, wrist band, yellow gown, etc. Monitor gait and stability. Monitor for mental status changes and reorient to person, place, and time. Review medications for side effects contributing to fall risk. Reinforce activity limits and safety measures with patient and family. Provide visual clues: red socks.

## 2022-04-04 NOTE — ED PROVIDER NOTE - PHYSICAL EXAMINATION
GENERAL: Awake, alert, NAD  HEENT: NC/AT, moist mucous membranes, PERRL  LUNGS: CTAB, no wheezes or crackles   CARDIAC: RRR, no m/r/g  ABDOMEN: Soft, normal BS, non tender, non distended, no rebound, no guarding  EXT: No edema, no calf tenderness, 2+ DP pulses bilaterally, no deformities.  NEURO: A&Ox2. Moving all extremities.  SKIN: Warm and dry. No rash.  PSYCH: Normal affect.

## 2022-04-04 NOTE — ED ADULT NURSE NOTE - PRO INTERPRETER NEED 2
PCP: Maria Esther Ingram MD  Medication: Pantoprazole  Last office visit: 2/17/2020  Next visit scheduled: 7/21/20    Last written 2/10/20 by DEBBI Marrufo after ED encounter for peptic ulcers. Please advise if continuation is approved and therapy is appropriate   English

## 2022-04-04 NOTE — ED PROVIDER NOTE - NSICDXPASTSURGICALHX_GEN_ALL_CORE_FT
PAST SURGICAL HISTORY:  H/O surgical biopsy right lung; 05/2018    H/O umbilical hernia repair     H/O varicose vein ligation and stripping both legs    History of abdominoplasty     History of appendectomy     History of bilateral breast reduction surgery     History of cataract extraction bilateral, with lens implants    History of cosmetic plastic surgery face lift    Status post coronary angiogram 2017

## 2022-04-04 NOTE — ED PROVIDER NOTE - OTHER FINDINGS
SR at 65 no latrice no std.  1st deg aVB  qtc 407.  TW flattening aVL, V2.  Incomplete RBBB.  Low voltage.

## 2022-04-04 NOTE — ED ADULT NURSE NOTE - OBJECTIVE STATEMENT
Pt presents to room 3, alert&orientedx2-3, ambulatory at baseline, PMHX CVA, HTN, anxiety and alzheimers coming to ED for mid-sternal chest pain and mild SOB that started this afternoon. Pt is rectally febrile to 100.2, breathing non-labored. As per daughter, pt walks on her own and has been doing PT. 20g IV established on left hand by EMS, 20g IV established on left ac, labs drawn and sent, medications given as ordered. Fall precautions in place. Denies n/v/d or SOB at this time.  Pending CT

## 2022-04-04 NOTE — ED PROVIDER NOTE - NSICDXPASTMEDICALHX_GEN_ALL_CORE_FT
PAST MEDICAL HISTORY:  Alzheimer disease     Anxiety     Asthma, mild     Benign essential hypertension     CVA (cerebral infarction)     Dyslipidemia     Hypertension     Other nonspecific abnormal finding of lung field

## 2022-04-04 NOTE — ED PROVIDER NOTE - PROGRESS NOTE DETAILS
[place]-PGY3: pt received at sign-out, seen and evaluated at bedside.  Labs consistent with UTI, CTA showing no acute change of AAA, ?ileal diverticulitis, no vomiting or diarrhea, no abdominal tenderness. Discussed with daughter, pt's cardiologist Dr. Delacruz. Discussed with Dr. Mcdonnell, accepted for admission. Text page was sent to the Tele PA to inform them of the patient's admission. The page included the patient's name, MR number, admitting doctor, as well as diagnosis. My call back number was included for any follow up questions.

## 2022-04-04 NOTE — ED PROVIDER NOTE - OBJECTIVE STATEMENT
89 y/o F with PMH alzheimer's disease, HTN, CVA, HLD, AAA, presents to the ED with CP and SOB. Per daughter, patient began complaining of symptoms today, although has difficulty characterizing the symptoms. She also has had recent weight loss and [place] prolonged [place]. She has not had recent cardiac workup. No fever or chills. No nausea or vomiting.

## 2022-04-04 NOTE — ED PROVIDER NOTE - CLINICAL SUMMARY MEDICAL DECISION MAKING FREE TEXT BOX
89 y/o F presenting to the ED with CP and SOB. Patient borderline febrile, 100.2F on arrival. Her exam is non focal. Plan for labs, CXR, EKG. Will obtain UA in setting of fever. Given hx of AAA will obtain CTA to evaluate for aortic dissection in setting of chest pain. Reassess following labs and imaging. Silvestre Leiva, DO PGY3

## 2022-04-04 NOTE — ED PROVIDER NOTE - ATTENDING CONTRIBUTION TO CARE
89F p/w chest pain.  Hx limited as pt has alzheimer's.  Previous visit was in 2019 for a fall.  Other PMHX asthma, CVA, HLD, HTN.  Pt had stress test in 2017 with likely attenuation artifact and normal LV function.  Today pt was not feeling good this morning; pt was fasting prior to blood test and also didn't take her meds this morning.  Soon after started having upper abd pain rad to chest and lower abd.  In office getting blood draw apparently her blood pressure was elevated, so pt was sent here.  Pt has known AAA; given CP and abd pain will check CTA.  Check urine as well.  Given having CP would admit if no acute findings on imaging.  VS:  unremarkable    GEN - NAD;  malaise;   A+O x2   HEAD - NC/AT     ENT - PEERL, EOMI, mucous membranes  dry , no discharge      NECK: Neck supple, non-tender without lymphadenopathy, no masses, no JVD  PULM - CTA b/l,  symmetric breath sounds  COR -  normal heart sounds    ABD - , ND, periumbilical ttp, soft,  BACK - no CVA tenderness, nontender spine     EXTREMS - no edema, no deformity, warm and well perfused    SKIN - no rash    or bruising      NEUROLOGIC - alert, face symmetric, speech fluent, sensation nl, motor generally weak but no focal deficit.

## 2022-04-05 DIAGNOSIS — I10 ESSENTIAL (PRIMARY) HYPERTENSION: ICD-10-CM

## 2022-04-05 DIAGNOSIS — K57.92 DIVERTICULITIS OF INTESTINE, PART UNSPECIFIED, WITHOUT PERFORATION OR ABSCESS WITHOUT BLEEDING: ICD-10-CM

## 2022-04-05 DIAGNOSIS — Z86.73 PERSONAL HISTORY OF TRANSIENT ISCHEMIC ATTACK (TIA), AND CEREBRAL INFARCTION WITHOUT RESIDUAL DEFICITS: ICD-10-CM

## 2022-04-05 DIAGNOSIS — R07.9 CHEST PAIN, UNSPECIFIED: ICD-10-CM

## 2022-04-05 DIAGNOSIS — I71.4 ABDOMINAL AORTIC ANEURYSM, WITHOUT RUPTURE: ICD-10-CM

## 2022-04-05 DIAGNOSIS — Z79.899 OTHER LONG TERM (CURRENT) DRUG THERAPY: ICD-10-CM

## 2022-04-05 DIAGNOSIS — K86.89 OTHER SPECIFIED DISEASES OF PANCREAS: ICD-10-CM

## 2022-04-05 DIAGNOSIS — G30.9 ALZHEIMER'S DISEASE, UNSPECIFIED: ICD-10-CM

## 2022-04-05 DIAGNOSIS — Z29.9 ENCOUNTER FOR PROPHYLACTIC MEASURES, UNSPECIFIED: ICD-10-CM

## 2022-04-05 DIAGNOSIS — R93.89 ABNORMAL FINDINGS ON DIAGNOSTIC IMAGING OF OTHER SPECIFIED BODY STRUCTURES: ICD-10-CM

## 2022-04-05 DIAGNOSIS — N39.0 URINARY TRACT INFECTION, SITE NOT SPECIFIED: ICD-10-CM

## 2022-04-05 DIAGNOSIS — R07.0 PAIN IN THROAT: ICD-10-CM

## 2022-04-05 LAB
ALBUMIN SERPL ELPH-MCNC: 3.2 G/DL — LOW (ref 3.3–5)
ALP SERPL-CCNC: 74 U/L — SIGNIFICANT CHANGE UP (ref 40–120)
ALT FLD-CCNC: 21 U/L — SIGNIFICANT CHANGE UP (ref 4–33)
ANION GAP SERPL CALC-SCNC: 14 MMOL/L — SIGNIFICANT CHANGE UP (ref 7–14)
ANION GAP SERPL CALC-SCNC: 15 MMOL/L — HIGH (ref 7–14)
AST SERPL-CCNC: 28 U/L — SIGNIFICANT CHANGE UP (ref 4–32)
BASOPHILS # BLD AUTO: 0.02 K/UL — SIGNIFICANT CHANGE UP (ref 0–0.2)
BASOPHILS # BLD AUTO: 0.02 K/UL — SIGNIFICANT CHANGE UP (ref 0–0.2)
BASOPHILS NFR BLD AUTO: 0.2 % — SIGNIFICANT CHANGE UP (ref 0–2)
BASOPHILS NFR BLD AUTO: 0.2 % — SIGNIFICANT CHANGE UP (ref 0–2)
BILIRUB SERPL-MCNC: 0.4 MG/DL — SIGNIFICANT CHANGE UP (ref 0.2–1.2)
BUN SERPL-MCNC: 17 MG/DL — SIGNIFICANT CHANGE UP (ref 7–23)
BUN SERPL-MCNC: 18 MG/DL — SIGNIFICANT CHANGE UP (ref 7–23)
CALCIUM SERPL-MCNC: 7.9 MG/DL — LOW (ref 8.4–10.5)
CALCIUM SERPL-MCNC: 8.3 MG/DL — LOW (ref 8.4–10.5)
CHLORIDE SERPL-SCNC: 101 MMOL/L — SIGNIFICANT CHANGE UP (ref 98–107)
CHLORIDE SERPL-SCNC: 99 MMOL/L — SIGNIFICANT CHANGE UP (ref 98–107)
CO2 SERPL-SCNC: 21 MMOL/L — LOW (ref 22–31)
CO2 SERPL-SCNC: 22 MMOL/L — SIGNIFICANT CHANGE UP (ref 22–31)
CREAT SERPL-MCNC: 0.89 MG/DL — SIGNIFICANT CHANGE UP (ref 0.5–1.3)
CREAT SERPL-MCNC: 0.92 MG/DL — SIGNIFICANT CHANGE UP (ref 0.5–1.3)
EGFR: 58 ML/MIN/1.73M2 — LOW
EGFR: 60 ML/MIN/1.73M2 — SIGNIFICANT CHANGE UP
EOSINOPHIL # BLD AUTO: 0.01 K/UL — SIGNIFICANT CHANGE UP (ref 0–0.5)
EOSINOPHIL # BLD AUTO: 0.06 K/UL — SIGNIFICANT CHANGE UP (ref 0–0.5)
EOSINOPHIL NFR BLD AUTO: 0.1 % — SIGNIFICANT CHANGE UP (ref 0–6)
EOSINOPHIL NFR BLD AUTO: 0.5 % — SIGNIFICANT CHANGE UP (ref 0–6)
GLUCOSE SERPL-MCNC: 101 MG/DL — HIGH (ref 70–99)
GLUCOSE SERPL-MCNC: 89 MG/DL — SIGNIFICANT CHANGE UP (ref 70–99)
HCT VFR BLD CALC: 34.6 % — SIGNIFICANT CHANGE UP (ref 34.5–45)
HCT VFR BLD CALC: 37.8 % — SIGNIFICANT CHANGE UP (ref 34.5–45)
HGB BLD-MCNC: 11.2 G/DL — LOW (ref 11.5–15.5)
HGB BLD-MCNC: 12.4 G/DL — SIGNIFICANT CHANGE UP (ref 11.5–15.5)
IANC: 10.37 K/UL — HIGH (ref 1.8–7.4)
IANC: 9.94 K/UL — HIGH (ref 1.8–7.4)
IMM GRANULOCYTES NFR BLD AUTO: 0.4 % — SIGNIFICANT CHANGE UP (ref 0–1.5)
IMM GRANULOCYTES NFR BLD AUTO: 0.5 % — SIGNIFICANT CHANGE UP (ref 0–1.5)
LYMPHOCYTES # BLD AUTO: 1.13 K/UL — SIGNIFICANT CHANGE UP (ref 1–3.3)
LYMPHOCYTES # BLD AUTO: 1.35 K/UL — SIGNIFICANT CHANGE UP (ref 1–3.3)
LYMPHOCYTES # BLD AUTO: 10.7 % — LOW (ref 13–44)
LYMPHOCYTES # BLD AUTO: 9.4 % — LOW (ref 13–44)
MAGNESIUM SERPL-MCNC: 1 MG/DL — CRITICAL LOW (ref 1.6–2.6)
MAGNESIUM SERPL-MCNC: 1.5 MG/DL — LOW (ref 1.6–2.6)
MCHC RBC-ENTMCNC: 30.5 PG — SIGNIFICANT CHANGE UP (ref 27–34)
MCHC RBC-ENTMCNC: 30.6 PG — SIGNIFICANT CHANGE UP (ref 27–34)
MCHC RBC-ENTMCNC: 32.4 GM/DL — SIGNIFICANT CHANGE UP (ref 32–36)
MCHC RBC-ENTMCNC: 32.8 GM/DL — SIGNIFICANT CHANGE UP (ref 32–36)
MCV RBC AUTO: 92.9 FL — SIGNIFICANT CHANGE UP (ref 80–100)
MCV RBC AUTO: 94.5 FL — SIGNIFICANT CHANGE UP (ref 80–100)
MONOCYTES # BLD AUTO: 0.76 K/UL — SIGNIFICANT CHANGE UP (ref 0–0.9)
MONOCYTES # BLD AUTO: 0.83 K/UL — SIGNIFICANT CHANGE UP (ref 0–0.9)
MONOCYTES NFR BLD AUTO: 6.1 % — SIGNIFICANT CHANGE UP (ref 2–14)
MONOCYTES NFR BLD AUTO: 6.9 % — SIGNIFICANT CHANGE UP (ref 2–14)
NEUTROPHILS # BLD AUTO: 10.37 K/UL — HIGH (ref 1.8–7.4)
NEUTROPHILS # BLD AUTO: 9.94 K/UL — HIGH (ref 1.8–7.4)
NEUTROPHILS NFR BLD AUTO: 82.5 % — HIGH (ref 43–77)
NEUTROPHILS NFR BLD AUTO: 82.5 % — HIGH (ref 43–77)
NRBC # BLD: 0 /100 WBCS — SIGNIFICANT CHANGE UP
NRBC # BLD: 0 /100 WBCS — SIGNIFICANT CHANGE UP
NRBC # FLD: 0 K/UL — SIGNIFICANT CHANGE UP
NRBC # FLD: 0 K/UL — SIGNIFICANT CHANGE UP
PHOSPHATE SERPL-MCNC: 3.6 MG/DL — SIGNIFICANT CHANGE UP (ref 2.5–4.5)
PHOSPHATE SERPL-MCNC: 3.8 MG/DL — SIGNIFICANT CHANGE UP (ref 2.5–4.5)
PLATELET # BLD AUTO: 138 K/UL — LOW (ref 150–400)
PLATELET # BLD AUTO: 142 K/UL — LOW (ref 150–400)
POTASSIUM SERPL-MCNC: 3.7 MMOL/L — SIGNIFICANT CHANGE UP (ref 3.5–5.3)
POTASSIUM SERPL-MCNC: 4 MMOL/L — SIGNIFICANT CHANGE UP (ref 3.5–5.3)
POTASSIUM SERPL-SCNC: 3.7 MMOL/L — SIGNIFICANT CHANGE UP (ref 3.5–5.3)
POTASSIUM SERPL-SCNC: 4 MMOL/L — SIGNIFICANT CHANGE UP (ref 3.5–5.3)
PROT SERPL-MCNC: 6 G/DL — SIGNIFICANT CHANGE UP (ref 6–8.3)
RBC # BLD: 3.66 M/UL — LOW (ref 3.8–5.2)
RBC # BLD: 4.07 M/UL — SIGNIFICANT CHANGE UP (ref 3.8–5.2)
RBC # FLD: 12.6 % — SIGNIFICANT CHANGE UP (ref 10.3–14.5)
RBC # FLD: 12.8 % — SIGNIFICANT CHANGE UP (ref 10.3–14.5)
SARS-COV-2 RNA SPEC QL NAA+PROBE: SIGNIFICANT CHANGE UP
SODIUM SERPL-SCNC: 136 MMOL/L — SIGNIFICANT CHANGE UP (ref 135–145)
SODIUM SERPL-SCNC: 136 MMOL/L — SIGNIFICANT CHANGE UP (ref 135–145)
TROPONIN T, HIGH SENSITIVITY RESULT: 21 NG/L — SIGNIFICANT CHANGE UP
TSH SERPL-MCNC: 0.37 UIU/ML — SIGNIFICANT CHANGE UP (ref 0.27–4.2)
WBC # BLD: 12.04 K/UL — HIGH (ref 3.8–10.5)
WBC # BLD: 12.56 K/UL — HIGH (ref 3.8–10.5)
WBC # FLD AUTO: 12.04 K/UL — HIGH (ref 3.8–10.5)
WBC # FLD AUTO: 12.56 K/UL — HIGH (ref 3.8–10.5)

## 2022-04-05 PROCEDURE — 99222 1ST HOSP IP/OBS MODERATE 55: CPT | Mod: GC

## 2022-04-05 PROCEDURE — 99223 1ST HOSP IP/OBS HIGH 75: CPT

## 2022-04-05 RX ORDER — AMLODIPINE BESYLATE 2.5 MG/1
1 TABLET ORAL
Qty: 0 | Refills: 0 | DISCHARGE

## 2022-04-05 RX ORDER — BUDESONIDE AND FORMOTEROL FUMARATE DIHYDRATE 160; 4.5 UG/1; UG/1
2 AEROSOL RESPIRATORY (INHALATION)
Refills: 0 | Status: DISCONTINUED | OUTPATIENT
Start: 2022-04-05 | End: 2022-04-11

## 2022-04-05 RX ORDER — ONDANSETRON 8 MG/1
4 TABLET, FILM COATED ORAL EVERY 8 HOURS
Refills: 0 | Status: DISCONTINUED | OUTPATIENT
Start: 2022-04-05 | End: 2022-04-11

## 2022-04-05 RX ORDER — METOPROLOL TARTRATE 50 MG
1 TABLET ORAL
Qty: 0 | Refills: 0 | DISCHARGE

## 2022-04-05 RX ORDER — MAGNESIUM SULFATE 500 MG/ML
2 VIAL (ML) INJECTION ONCE
Refills: 0 | Status: COMPLETED | OUTPATIENT
Start: 2022-04-05 | End: 2022-04-05

## 2022-04-05 RX ORDER — CLOPIDOGREL BISULFATE 75 MG/1
75 TABLET, FILM COATED ORAL DAILY
Refills: 0 | Status: DISCONTINUED | OUTPATIENT
Start: 2022-04-05 | End: 2022-04-11

## 2022-04-05 RX ORDER — RIVASTIGMINE 4.6 MG/24H
1 PATCH, EXTENDED RELEASE TRANSDERMAL
Qty: 0 | Refills: 0 | DISCHARGE

## 2022-04-05 RX ORDER — SENNA PLUS 8.6 MG/1
1 TABLET ORAL
Qty: 0 | Refills: 0 | DISCHARGE

## 2022-04-05 RX ORDER — PREGABALIN 225 MG/1
5 CAPSULE ORAL
Qty: 0 | Refills: 0 | DISCHARGE

## 2022-04-05 RX ORDER — ESCITALOPRAM OXALATE 10 MG/1
10 TABLET, FILM COATED ORAL DAILY
Refills: 0 | Status: DISCONTINUED | OUTPATIENT
Start: 2022-04-05 | End: 2022-04-11

## 2022-04-05 RX ORDER — LIPASE/PROTEASE/AMYLASE 16-48-48K
1 CAPSULE,DELAYED RELEASE (ENTERIC COATED) ORAL
Qty: 0 | Refills: 0 | DISCHARGE

## 2022-04-05 RX ORDER — LANOLIN ALCOHOL/MO/W.PET/CERES
3 CREAM (GRAM) TOPICAL AT BEDTIME
Refills: 0 | Status: DISCONTINUED | OUTPATIENT
Start: 2022-04-05 | End: 2022-04-07

## 2022-04-05 RX ORDER — METOPROLOL SUCCINATE AND HYDROCHLOROTHIAZIDE 100; 12.5 MG/1; MG/1
1 TABLET, FILM COATED ORAL
Qty: 0 | Refills: 0 | DISCHARGE

## 2022-04-05 RX ORDER — METOPROLOL TARTRATE 50 MG
50 TABLET ORAL DAILY
Refills: 0 | Status: DISCONTINUED | OUTPATIENT
Start: 2022-04-05 | End: 2022-04-11

## 2022-04-05 RX ORDER — PREGABALIN 225 MG/1
1000 CAPSULE ORAL DAILY
Refills: 0 | Status: DISCONTINUED | OUTPATIENT
Start: 2022-04-05 | End: 2022-04-11

## 2022-04-05 RX ORDER — CYCLOSPORINE 0.5 MG/ML
1 EMULSION OPHTHALMIC
Qty: 0 | Refills: 0 | DISCHARGE

## 2022-04-05 RX ORDER — BIMATOPROST 0.3 MG/ML
1 SOLUTION/ DROPS OPHTHALMIC
Qty: 0 | Refills: 0 | DISCHARGE

## 2022-04-05 RX ORDER — RIVASTIGMINE 4.6 MG/24H
1 PATCH, EXTENDED RELEASE TRANSDERMAL EVERY 24 HOURS
Refills: 0 | Status: DISCONTINUED | OUTPATIENT
Start: 2022-04-06 | End: 2022-04-11

## 2022-04-05 RX ORDER — ATORVASTATIN CALCIUM 80 MG/1
1 TABLET, FILM COATED ORAL
Qty: 0 | Refills: 0 | DISCHARGE

## 2022-04-05 RX ORDER — METRONIDAZOLE 500 MG
500 TABLET ORAL ONCE
Refills: 0 | Status: COMPLETED | OUTPATIENT
Start: 2022-04-05 | End: 2022-04-05

## 2022-04-05 RX ORDER — LIPASE/PROTEASE/AMYLASE 16-48-48K
2 CAPSULE,DELAYED RELEASE (ENTERIC COATED) ORAL
Qty: 0 | Refills: 0 | DISCHARGE

## 2022-04-05 RX ORDER — PYRIDOXINE HCL (VITAMIN B6) 100 MG
100 TABLET ORAL DAILY
Refills: 0 | Status: DISCONTINUED | OUTPATIENT
Start: 2022-04-05 | End: 2022-04-11

## 2022-04-05 RX ORDER — TIMOLOL 0.5 %
1 DROPS OPHTHALMIC (EYE)
Qty: 0 | Refills: 0 | DISCHARGE

## 2022-04-05 RX ORDER — CEFTRIAXONE 500 MG/1
2000 INJECTION, POWDER, FOR SOLUTION INTRAMUSCULAR; INTRAVENOUS EVERY 24 HOURS
Refills: 0 | Status: DISCONTINUED | OUTPATIENT
Start: 2022-04-05 | End: 2022-04-10

## 2022-04-05 RX ORDER — TIMOLOL 0.5 %
1 DROPS OPHTHALMIC (EYE)
Refills: 0 | Status: DISCONTINUED | OUTPATIENT
Start: 2022-04-05 | End: 2022-04-05

## 2022-04-05 RX ORDER — LIPASE/PROTEASE/AMYLASE 16-48-48K
2 CAPSULE,DELAYED RELEASE (ENTERIC COATED) ORAL
Refills: 0 | Status: DISCONTINUED | OUTPATIENT
Start: 2022-04-05 | End: 2022-04-11

## 2022-04-05 RX ORDER — BRIMONIDINE TARTRATE 2 MG/MG
0 SOLUTION/ DROPS OPHTHALMIC
Qty: 0 | Refills: 0 | DISCHARGE

## 2022-04-05 RX ORDER — FOLIC ACID 0.8 MG
1 TABLET ORAL
Qty: 0 | Refills: 0 | DISCHARGE

## 2022-04-05 RX ORDER — ZOLPIDEM TARTRATE 10 MG/1
5 TABLET ORAL AT BEDTIME
Refills: 0 | Status: DISCONTINUED | OUTPATIENT
Start: 2022-04-05 | End: 2022-04-11

## 2022-04-05 RX ORDER — RIVASTIGMINE 4.6 MG/24H
1 PATCH, EXTENDED RELEASE TRANSDERMAL EVERY 24 HOURS
Refills: 0 | Status: DISCONTINUED | OUTPATIENT
Start: 2022-04-05 | End: 2022-04-05

## 2022-04-05 RX ORDER — FOLIC ACID/VIT B COMPLEX AND C 400 MCG
1 TABLET ORAL
Qty: 0 | Refills: 0 | DISCHARGE

## 2022-04-05 RX ORDER — CHOLECALCIFEROL (VITAMIN D3) 125 MCG
1 CAPSULE ORAL
Qty: 0 | Refills: 0 | DISCHARGE

## 2022-04-05 RX ORDER — OMEPRAZOLE 10 MG/1
1 CAPSULE, DELAYED RELEASE ORAL
Qty: 0 | Refills: 0 | DISCHARGE

## 2022-04-05 RX ORDER — ATORVASTATIN CALCIUM 80 MG/1
20 TABLET, FILM COATED ORAL AT BEDTIME
Refills: 0 | Status: DISCONTINUED | OUTPATIENT
Start: 2022-04-05 | End: 2022-04-11

## 2022-04-05 RX ORDER — TIMOLOL 0.5 %
1 DROPS OPHTHALMIC (EYE) DAILY
Refills: 0 | Status: DISCONTINUED | OUTPATIENT
Start: 2022-04-05 | End: 2022-04-11

## 2022-04-05 RX ORDER — METRONIDAZOLE 500 MG
TABLET ORAL
Refills: 0 | Status: DISCONTINUED | OUTPATIENT
Start: 2022-04-05 | End: 2022-04-10

## 2022-04-05 RX ORDER — ACETAMINOPHEN 500 MG
650 TABLET ORAL EVERY 6 HOURS
Refills: 0 | Status: DISCONTINUED | OUTPATIENT
Start: 2022-04-05 | End: 2022-04-11

## 2022-04-05 RX ORDER — OLOPATADINE HYDROCHLORIDE 1 MG/ML
1 SOLUTION/ DROPS OPHTHALMIC
Qty: 0 | Refills: 0 | DISCHARGE

## 2022-04-05 RX ORDER — LISINOPRIL/HYDROCHLOROTHIAZIDE 10-12.5 MG
1 TABLET ORAL
Qty: 0 | Refills: 0 | DISCHARGE

## 2022-04-05 RX ORDER — ZOLPIDEM TARTRATE 10 MG/1
5 TABLET ORAL AT BEDTIME
Refills: 0 | Status: DISCONTINUED | OUTPATIENT
Start: 2022-04-05 | End: 2022-04-05

## 2022-04-05 RX ORDER — METRONIDAZOLE 500 MG
500 TABLET ORAL EVERY 8 HOURS
Refills: 0 | Status: DISCONTINUED | OUTPATIENT
Start: 2022-04-05 | End: 2022-04-10

## 2022-04-05 RX ADMIN — Medication 2 CAPSULE(S): at 17:15

## 2022-04-05 RX ADMIN — CEFTRIAXONE 100 MILLIGRAM(S): 500 INJECTION, POWDER, FOR SOLUTION INTRAMUSCULAR; INTRAVENOUS at 22:00

## 2022-04-05 RX ADMIN — Medication 100 MILLIGRAM(S): at 14:05

## 2022-04-05 RX ADMIN — ZOLPIDEM TARTRATE 5 MILLIGRAM(S): 10 TABLET ORAL at 01:15

## 2022-04-05 RX ADMIN — Medication 100 MILLIGRAM(S): at 22:36

## 2022-04-05 RX ADMIN — RIVASTIGMINE 1 PATCH: 4.6 PATCH, EXTENDED RELEASE TRANSDERMAL at 06:12

## 2022-04-05 RX ADMIN — Medication 650 MILLIGRAM(S): at 11:44

## 2022-04-05 RX ADMIN — ZOLPIDEM TARTRATE 5 MILLIGRAM(S): 10 TABLET ORAL at 22:15

## 2022-04-05 RX ADMIN — CLOPIDOGREL BISULFATE 75 MILLIGRAM(S): 75 TABLET, FILM COATED ORAL at 17:15

## 2022-04-05 RX ADMIN — Medication 100 MILLIGRAM(S): at 03:39

## 2022-04-05 RX ADMIN — Medication 50 MILLIGRAM(S): at 17:14

## 2022-04-05 RX ADMIN — BUDESONIDE AND FORMOTEROL FUMARATE DIHYDRATE 2 PUFF(S): 160; 4.5 AEROSOL RESPIRATORY (INHALATION) at 22:39

## 2022-04-05 RX ADMIN — ONDANSETRON 4 MILLIGRAM(S): 8 TABLET, FILM COATED ORAL at 20:38

## 2022-04-05 RX ADMIN — Medication 25 GRAM(S): at 07:25

## 2022-04-05 RX ADMIN — Medication 1 DROP(S): at 06:12

## 2022-04-05 RX ADMIN — Medication 25 GRAM(S): at 15:19

## 2022-04-05 RX ADMIN — Medication 650 MILLIGRAM(S): at 12:45

## 2022-04-05 RX ADMIN — ATORVASTATIN CALCIUM 20 MILLIGRAM(S): 80 TABLET, FILM COATED ORAL at 21:58

## 2022-04-05 NOTE — H&P ADULT - PROBLEM SELECTOR PLAN 2
- Noted to have b/l perinephric stranding with mildly positive UA, patient with recent fevers at home and feeling unwell  - s/p ceftriaxone in ED, would c/w for now  - Has elevated WBC, mild fever rectally in ED, would monitor  - UCx in lab  - Lactate wnl

## 2022-04-05 NOTE — CONSULT NOTE ADULT - SUBJECTIVE AND OBJECTIVE BOX
CHIEF COMPLAINT: cp    HISTORY OF PRESENT ILLNESS:   89F with history of Alzheimer's Dementia, HTN, CVAs on Plavix, HLD, AAA, and Pancreatic Insufficiency who presents to the hospital with SOB and chest pain. Patient unable to fully describe her symptoms, when asked about the chest pain with a  phone she said that she has been having chest pain due to the social situation in her home country. As per Grandson, patient has had a history of palpitations occasionally and believes that the patient had another episode of palpitations on the afternoon of 4/4/22 and was therefore brought to the hospital. Patient otherwise is c/o some throat pain but the grandson was not aware of this. He did state that the patient has been feeling unwell recently and might have had a fever but does not know of any other specific complaints for the patient. Attempted to reach patient's daughter for collateral but there was no pickup, left VM for call back.     On arrival to the ED, her vitals were T 98.9 -> 100.2 (rectal), P 66, /60, RR 18, o2 sat 98% RA. Her lab work showed mild leukocytosis, mildly positive UA. She had a CTA Chest/Abd for dissection eval which was negative but did show several incidental findings (heterogenous and enlarged thyroid, 1.3x1.3cm hypodense structure in spleen, dilated pancreatic duct, b/l adrenal gland thickening, b/l perinephric stranding, ileal diverticlutis, stable AAA, and a healing L anterior rib fracture). Patient was given LR 1L, CTX 1G, ofirmev 1g, and ambien 1tab x1. She was admitted to medicine on telemetry.       Allergies    Benadryl (Other)    Intolerances    	    MEDICATIONS:    cefTRIAXone   IVPB 2000 milliGRAM(s) IV Intermittent every 24 hours  metroNIDAZOLE  IVPB      metroNIDAZOLE  IVPB 500 milliGRAM(s) IV Intermittent every 8 hours      acetaminophen     Tablet .. 650 milliGRAM(s) Oral every 6 hours PRN  melatonin 3 milliGRAM(s) Oral at bedtime PRN  ondansetron Injectable 4 milliGRAM(s) IV Push every 8 hours PRN  rivastigmine patch  9.5 mG/24 Hr(s) 1 Patch Transdermal every 24 hours    aluminum hydroxide/magnesium hydroxide/simethicone Suspension 30 milliLiter(s) Oral every 4 hours PRN      timolol 0.25% Solution 1 Drop(s) Both EYES two times a day      PAST MEDICAL & SURGICAL HISTORY:  Benign essential hypertension    Dyslipidemia    CVA (cerebral infarction)    Alzheimer disease    Hypertension    Asthma, mild    Other nonspecific abnormal finding of lung field    Anxiety    History of bilateral breast reduction surgery    History of appendectomy    History of cosmetic plastic surgery  face lift    History of abdominoplasty    H/O surgical biopsy  right lung; 05/2018    History of cataract extraction  bilateral, with lens implants    H/O varicose vein ligation and stripping  both legs    H/O umbilical hernia repair    Status post coronary angiogram  2017        FAMILY HISTORY:  No pertinent family history in first degree relatives        SOCIAL HISTORY:    non smoker. indep in adl      REVIEW OF SYSTEMS:  See HPI, otherwise complete 10 point review of systems negative    [ ] All others negative	      PHYSICAL EXAM:  T(C): 36.8 (04-05-22 @ 03:54), Max: 37.9 (04-04-22 @ 18:22)  HR: 78 (04-05-22 @ 03:54) (66 - 89)  BP: 133/77 (04-05-22 @ 03:54) (113/60 - 163/82)  RR: 18 (04-05-22 @ 03:54) (16 - 18)  SpO2: 95% (04-05-22 @ 03:54) (95% - 100%)  Wt(kg): --  I&O's Summary      Appearance: No Acute Distress	  HEENT:  Normal oral mucosa, PERRL, EOMI	  Cardiovascular: Normal S1 S2, No JVD, 2/6 ricardo  Respiratory: Lungs clear to auscultation bilaterally  Gastrointestinal:  Soft, Non-tender, + BS	  Skin: No rashes, No ecchymoses, No cyanosis	  Neurologic: Non-focal  Extremities: No clubbing, cyanosis or edema  Vascular: Peripheral pulses palpable 2+ bilaterally  Psychiatry: A & O x 3, Mood & affect appropriate    Laboratory Data:	 	    CBC Full  -  ( 05 Apr 2022 06:28 )  WBC Count : 12.56 K/uL  Hemoglobin : 11.2 g/dL  Hematocrit : 34.6 %  Platelet Count - Automated : 138 K/uL  Mean Cell Volume : 94.5 fL  Mean Cell Hemoglobin : 30.6 pg  Mean Cell Hemoglobin Concentration : 32.4 gm/dL  Auto Neutrophil # : 10.37 K/uL  Auto Lymphocyte # : 1.35 K/uL  Auto Monocyte # : 0.76 K/uL  Auto Eosinophil # : 0.01 K/uL  Auto Basophil # : 0.02 K/uL  Auto Neutrophil % : 82.5 %  Auto Lymphocyte % : 10.7 %  Auto Monocyte % : 6.1 %  Auto Eosinophil % : 0.1 %  Auto Basophil % : 0.2 %    04-05    136  |  101  |  18  ----------------------------<  101<H>  3.7   |  21<L>  |  0.92  04-04    138  |  104  |  20  ----------------------------<  85  4.5   |  21<L>  |  1.08    Ca    7.9<L>      05 Apr 2022 06:28  Ca    8.5      04 Apr 2022 18:21  Phos  3.8     04-05  Mg     1.00     04-05    TPro  6.0  /  Alb  3.2<L>  /  TBili  0.4  /  DBili  x   /  AST  28  /  ALT  21  /  AlkPhos  74  04-05  TPro  6.7  /  Alb  3.7  /  TBili  0.4  /  DBili  x   /  AST  37<H>  /  ALT  27  /  AlkPhos  81  04-04      proBNP:   Lipid Profile:   HgA1c:   TSH: Thyroid Stimulating Hormone, Serum: 0.37 uIU/mL (04-05 @ 06:28)        CARDIAC MARKERS:      LUNGS AND LARGE AIRWAYS: Patent central airways. Right upper lobe wedge   resection with atelectasis/scarring in the right lung. Bibasilar   atelectasis. Stable scattered nodules in the left lung.  PLEURA: No pleural effusion.  VESSELS: Atherosclerosis. No thoracic aortic intramural hematoma or   dissection. Stable ectatic ascending aorta and aortic arch.  HEART: Stable heart size. No pericardial effusion. Aortic valve and   mitral annular calcification.  MEDIASTINUM AND BELKYS: Stable small follicles, for example, 1.5 x 0.9 cm   subcarinal lymph node.  CHEST WALL AND LOWER NECK: Heterogeneously enlarged thyroid gland.    ABDOMEN AND PELVIS:    LIVER: Calcified granuloma.  BILE DUCTS: Normal caliber.  GALLBLADDER: No significant gallbladder wall edema.  SPLEEN: Degraded by artifact. A 1.3 x 1.3 cm hypodense structure, as   noted on prior MR.  PANCREAS: Dilated duct (up to 0.6 cm).    ADRENALS: Nonspecific left > right adrenal thickening.  KIDNEYS/URETERS: Nonspecific bilateral perinephric stranding without   hydroureteronephrosis. Again noted, left renal cyst.  BLADDER: Within normal limits.  REPRODUCTIVE ORGANS: Anteverted uterus.    BOWEL: No bowel obstruction. The appendix not visualized. Diverticulosis   of the colon and distal small bowel. Focal area of ileal wall thickening   with peridiverticular inflammatory change. Nonspecific fluid-filled,   small and large bowel loops.  PERITONEUM: No free air or drainable fluid collection.  VESSELS: Atherosclerosis with resultant proximal narrowing. Stable AAA   (3.0 x 3.2 cm in transaxial diameter).  RETROPERITONEUM/LYMPH NODES: No lymphadenopathy.  ABDOMINAL WALL: Postsurgical changes along the anterior abdominal and   pelvic wall soft tissue. Left buttock injection granuloma.  BONES: Degenerative changes/anterior longitudinal ligament ossification   of the spine. Stable grade 1 anterolisthesis of L3 on L4 and L4 on L5.   Again noted, chronic-appearing bilateral rib deformity. Healing/subacute   left fifth anterior rib fracture.    IMPRESSION:    No aortic dissection. Stable ectatic ascending aorta/aortic arch. Stable   AAA.    Heterogenous thyroid gland, which can be further characterized on a   nonemergent ultrasound as indicated.    Suggest ileal diverticulitis.    Pancreatic ductal dilatation, which can be further characterize on a   nonemergent MR/MRCP.    Additional findings as described.      Interpretation of Telemetry: 	    ECG:  	nsr no acute st changes  RADIOLOGY:  OTHER: 	    PREVIOUS DIAGNOSTIC TESTING:    [ ] Echocardiogram:  [ ] Catheterization:  [ ] Stress Test:  	    Assessment:  chest pain  AAA, stable  TAA, stable  hx of CVA  dementia  nonobs CAD on prior cath  ? ileal diverticulitis  UTI, ? pyelo  HTN    Recs:  cardiac stable  no e/o acs by ecg or biomarkers  obtain 2d echo  pharm nst once acute issues attended to, as daughter would like everything done  cw antiplatelets and antianginals  GI consult for imaging findings  abx per primary team  will follow        Greater than 60 minutes spent on total encounter; more than 50% of the visit was spent counseling and/or coordinating care by the attending physician.   	  Redd Delacruz MD   Cardiovascular Diseases  [phone]

## 2022-04-05 NOTE — CONSULT NOTE ADULT - SUBJECTIVE AND OBJECTIVE BOX
HPI: History limited given patient with Dementia history obtain through chart review.     This is a 89F with history of Alzheimer's Dementia, HTN, CVAs on Plavix, HLD, AAA, and Pancreatic Insufficiency who presents to the hospital with SOB and chest pain. Patient also reporting abdominal pain for the past 6 months. Diarrhea better since being on th Creon.  In the ED, patient with CTA Chest/Abd for dissection eval which was negative but did show several incidental findings (heterogenous and enlarged thyroid, 1.3x1.3cm hypodense structure in spleen, dilated pancreatic duct (known), b/l adrenal gland thickening, b/l perinephric stranding, ileal diverticlutis, stable AAA, and a healing L anterior rib fracture). Per daughter, patient with new chest pain and abdominal pain and saw PCP. On evaluation, patient was found with fever and was sent to the ED. GI called for further recs.       Allergies:  Benadryl (Other)        Hospital Medications:  acetaminophen     Tablet .. 650 milliGRAM(s) Oral every 6 hours PRN  aluminum hydroxide/magnesium hydroxide/simethicone Suspension 30 milliLiter(s) Oral every 4 hours PRN  cefTRIAXone   IVPB 2000 milliGRAM(s) IV Intermittent every 24 hours  melatonin 3 milliGRAM(s) Oral at bedtime PRN  metroNIDAZOLE  IVPB      metroNIDAZOLE  IVPB 500 milliGRAM(s) IV Intermittent every 8 hours  ondansetron Injectable 4 milliGRAM(s) IV Push every 8 hours PRN  rivastigmine patch  9.5 mG/24 Hr(s) 1 Patch Transdermal every 24 hours  timolol 0.25% Solution 1 Drop(s) Both EYES two times a day      PMHX/PSHX:  Benign essential hypertension    Dyslipidemia    CVA (cerebral infarction)    Alzheimer disease    Hypertension    Asthma, mild    Other nonspecific abnormal finding of lung field    Anxiety    No significant past surgical history    History of bilateral breast reduction surgery    History of appendectomy    History of cosmetic plastic surgery    History of abdominoplasty    H/O surgical biopsy    History of cataract extraction    H/O varicose vein ligation and stripping    H/O umbilical hernia repair    Status post coronary angiogram        Family history:  No pertinent family history in first degree relatives        Social History: no smoking    ROS:   All negative except as mentioned above.       PHYSICAL EXAM:   GENERAL:  NAD  HEENT:  NC/AT,  conjunctivae clear and pink, sclera -anicteric  CHEST:  Normal effort  HEART:  RRR S1/S2,  ABDOMEN:  Soft, non-tender, non-distended,  no masses   EXTREMITIES:  No cyanosis or Edema  SKIN:  Warm & Dry. No rash or erythema  NEURO:  Alert, oriented,     Vital Signs:  Vital Signs Last 24 Hrs  T(C): 36.6 (2022 10:43), Max: 37.9 (2022 18:22)  T(F): 97.9 (2022 10:43), Max: 100.2 (2022 18:22)  HR: 73 (2022 10:43) (66 - 89)  BP: 145/81 (2022 10:43) (113/60 - 163/82)  BP(mean): --  RR: 18 (2022 10:43) (16 - 18)  SpO2: 98% (2022 10:43) (95% - 100%)  Daily Height in cm: 162.56 (2022 15:04)    Daily     LABS:                        11.2   12.56 )-----------( 138      ( 2022 06:28 )             34.6     Mean Cell Volume: 94.5 fL (22 @ 06:28)    04-    136  |  99  |  17  ----------------------------<  89  4.0   |  22  |  0.89    Ca    8.3<L>      2022 11:27  Phos  3.6     04-05  Mg     1.50     04-05    TPro  6.0  /  Alb  3.2<L>  /  TBili  0.4  /  DBili  x   /  AST  28  /  ALT  21  /  AlkPhos  74  04-05    LIVER FUNCTIONS - ( 2022 06:28 )  Alb: 3.2 g/dL / Pro: 6.0 g/dL / ALK PHOS: 74 U/L / ALT: 21 U/L / AST: 28 U/L / GGT: x             Urinalysis Basic - ( 2022 18:21 )    Color: Yellow / Appearance: Clear / S.020 / pH: x  Gluc: x / Ketone: Negative  / Bili: Negative / Urobili: <2 mg/dL   Blood: x / Protein: Trace / Nitrite: Positive   Leuk Esterase: Negative / RBC: 4 /HPF / WBC 1 /HPF   Sq Epi: x / Non Sq Epi: 1 /HPF / Bacteria: Many      Amylase Serum--      Lipase serum31       Ammonia--                          11.2   12.56 )-----------( 138      ( 2022 06:28 )             34.6                         11.5   11.98 )-----------( 143      ( 2022 18:21 )             35.0     Imaging:  Reviewed,   < from: CT Angio Abd Aorta w/run-off w/ IV Cont (22 @ 21:42) >  LUNGS AND LARGE AIRWAYS: Patent central airways. Right upper lobe wedge   resection with atelectasis/scarring in the right lung. Bibasilar   atelectasis. Stable scattered nodules in the left lung.  PLEURA: No pleural effusion.  VESSELS: Atherosclerosis. No thoracic aortic intramural hematoma or   dissection. Stable ectatic ascending aorta and aortic arch.  HEART: Stable heart size. No pericardial effusion. Aortic valve and   mitral annular calcification.  MEDIASTINUM AND BELKYS: Stable small follicles, for example, 1.5 x 0.9 cm   subcarinal lymph node.  CHEST WALL AND LOWER NECK: Heterogeneously enlarged thyroid gland.    ABDOMEN AND PELVIS:    LIVER: Calcified granuloma.  BILE DUCTS: Normal caliber.  GALLBLADDER: No significant gallbladder wall edema.  SPLEEN: Degraded by artifact. A 1.3 x 1.3 cm hypodense structure, as   noted on prior MR.  PANCREAS: Dilated duct (up to 0.6 cm).    ADRENALS: Nonspecific left > right adrenal thickening.  KIDNEYS/URETERS: Nonspecific bilateral perinephric stranding without   hydroureteronephrosis. Again noted, left renal cyst.  BLADDER: Within normal limits.  REPRODUCTIVE ORGANS: Anteverted uterus.    BOWEL: No bowel obstruction. The appendix not visualized. Diverticulosis   of the colon and distal small bowel. Focal area of ileal wall thickening   with peridiverticular inflammatory change. Nonspecific fluid-filled,   small and large bowel loops.  PERITONEUM: No free air or drainable fluid collection.  VESSELS: Atherosclerosis with resultant proximal narrowing. Stable AAA   (3.0 x 3.2 cm in transaxial diameter).  RETROPERITONEUM/LYMPH NODES: No lymphadenopathy.  ABDOMINAL WALL: Postsurgical changes along the anterior abdominal and   pelvic wall soft tissue. Left buttock injection granuloma.  BONES: Degenerative changes/anterior longitudinal ligament ossification   of the spine. Stable grade 1 anterolisthesis of L3 on L4 and L4 on L5.   Again noted, chronic-appearing bilateral rib deformity. Healing/subacute   left fifth anterior rib fracture.    IMPRESSION:    No aortic dissection. Stable ectatic ascending aorta/aortic arch. Stable   AAA.    Heterogenous thyroid gland, which can be further characterized on a   nonemergent ultrasound as indicated.    Suggest ileal diverticulitis.    Pancreatic ductal dilatation, which can be further characterize on a   nonemergent MR/MRCP.    < end of copied text >     HPI: History limited from patient, obtained from chart review and family.     89F with alzheimer's dementia, HTN, CVAs on Plavix, HLD, AAA, and pancreatic insufficiency who presented to hospital with complaints of chest pain, SOB, and abdominal pain.     Patient reports abdominal pain and recent diarrhea. States pain improved since arriving to hospital. Diarrhea is chronic and has improved with Creon in the past until recently. No nausea, vomiting reported. Patient unsure of fevers, but noted chills prior to admission.     In the ED, patient with CTA Chest/Abd for dissection eval which was negative but did show several incidental findings (heterogenous and enlarged thyroid, 1.3x1.3cm hypodense structure in spleen, dilated pancreatic duct (known), b/l adrenal gland thickening, b/l perinephric stranding, ileal diverticulitis, stable AAA, and a healing L anterior rib fracture).     Allergies:  Benadryl (Other)        Hospital Medications:  acetaminophen     Tablet .. 650 milliGRAM(s) Oral every 6 hours PRN  aluminum hydroxide/magnesium hydroxide/simethicone Suspension 30 milliLiter(s) Oral every 4 hours PRN  cefTRIAXone   IVPB 2000 milliGRAM(s) IV Intermittent every 24 hours  melatonin 3 milliGRAM(s) Oral at bedtime PRN  metroNIDAZOLE  IVPB      metroNIDAZOLE  IVPB 500 milliGRAM(s) IV Intermittent every 8 hours  ondansetron Injectable 4 milliGRAM(s) IV Push every 8 hours PRN  rivastigmine patch  9.5 mG/24 Hr(s) 1 Patch Transdermal every 24 hours  timolol 0.25% Solution 1 Drop(s) Both EYES two times a day      PMHX/PSHX:  Benign essential hypertension    Dyslipidemia    CVA (cerebral infarction)    Alzheimer disease    Hypertension    Asthma, mild    Other nonspecific abnormal finding of lung field    Anxiety    No significant past surgical history    History of bilateral breast reduction surgery    History of appendectomy    History of cosmetic plastic surgery    History of abdominoplasty    H/O surgical biopsy    History of cataract extraction    H/O varicose vein ligation and stripping    H/O umbilical hernia repair    Status post coronary angiogram        Family history:  No pertinent family history in first degree relatives        Social History: no smoking    ROS:   Limited history; All negative except as mentioned above.       PHYSICAL EXAM:   GENERAL:  NAD, resting comfortably  HEENT:  NC/AT,  conjunctivae clear and pink, sclera -anicteric, MMM  CHEST:  Normal effort, CTAB  HEART:  RRR S1/S2, no murmurs  ABDOMEN:  Soft, mildly tender diffusely without rebound or guarding, non-distended, no masses   EXTREMITIES:  No cyanosis or Edema  SKIN:  Warm & Dry. No rash or erythema  NEURO/PSYCH:  Alert, oriented, but tangential, cooperative, calm    Vital Signs:  Vital Signs Last 24 Hrs  T(C): 36.6 (2022 10:43), Max: 37.9 (2022 18:22)  T(F): 97.9 (2022 10:43), Max: 100.2 (2022 18:22)  HR: 73 (2022 10:43) (66 - 89)  BP: 145/81 (2022 10:43) (113/60 - 163/82)  BP(mean): --  RR: 18 (2022 10:43) (16 - 18)  SpO2: 98% (2022 10:43) (95% - 100%)  Daily Height in cm: 162.56 (2022 15:04)    Daily     LABS:                        11.2   12.56 )-----------( 138      ( 2022 06:28 )             34.6     Mean Cell Volume: 94.5 fL (22 @ 06:28)    -    136  |  99  |  17  ----------------------------<  89  4.0   |  22  |  0.89    Ca    8.3<L>      2022 11:27  Phos  3.6     04-05  Mg     1.50     04-05    TPro  6.0  /  Alb  3.2<L>  /  TBili  0.4  /  DBili  x   /  AST  28  /  ALT  21  /  AlkPhos  74  04-05    LIVER FUNCTIONS - ( 2022 06:28 )  Alb: 3.2 g/dL / Pro: 6.0 g/dL / ALK PHOS: 74 U/L / ALT: 21 U/L / AST: 28 U/L / GGT: x             Urinalysis Basic - ( 2022 18:21 )    Color: Yellow / Appearance: Clear / S.020 / pH: x  Gluc: x / Ketone: Negative  / Bili: Negative / Urobili: <2 mg/dL   Blood: x / Protein: Trace / Nitrite: Positive   Leuk Esterase: Negative / RBC: 4 /HPF / WBC 1 /HPF   Sq Epi: x / Non Sq Epi: 1 /HPF / Bacteria: Many      Amylase Serum--      Lipase serum31       Ammonia--                          11.2   12.56 )-----------( 138      ( 2022 06:28 )             34.6                         11.5   11.98 )-----------( 143      ( 2022 18:21 )             35.0     Imaging:  Reviewed,   < from: CT Angio Abd Aorta w/run-off w/ IV Cont (22 @ 21:42) >  LUNGS AND LARGE AIRWAYS: Patent central airways. Right upper lobe wedge   resection with atelectasis/scarring in the right lung. Bibasilar   atelectasis. Stable scattered nodules in the left lung.  PLEURA: No pleural effusion.  VESSELS: Atherosclerosis. No thoracic aortic intramural hematoma or   dissection. Stable ectatic ascending aorta and aortic arch.  HEART: Stable heart size. No pericardial effusion. Aortic valve and   mitral annular calcification.  MEDIASTINUM AND BELKYS: Stable small follicles, for example, 1.5 x 0.9 cm   subcarinal lymph node.  CHEST WALL AND LOWER NECK: Heterogeneously enlarged thyroid gland.    ABDOMEN AND PELVIS:    LIVER: Calcified granuloma.  BILE DUCTS: Normal caliber.  GALLBLADDER: No significant gallbladder wall edema.  SPLEEN: Degraded by artifact. A 1.3 x 1.3 cm hypodense structure, as   noted on prior MR.  PANCREAS: Dilated duct (up to 0.6 cm).    ADRENALS: Nonspecific left > right adrenal thickening.  KIDNEYS/URETERS: Nonspecific bilateral perinephric stranding without   hydroureteronephrosis. Again noted, left renal cyst.  BLADDER: Within normal limits.  REPRODUCTIVE ORGANS: Anteverted uterus.    BOWEL: No bowel obstruction. The appendix not visualized. Diverticulosis   of the colon and distal small bowel. Focal area of ileal wall thickening   with peridiverticular inflammatory change. Nonspecific fluid-filled,   small and large bowel loops.  PERITONEUM: No free air or drainable fluid collection.  VESSELS: Atherosclerosis with resultant proximal narrowing. Stable AAA   (3.0 x 3.2 cm in transaxial diameter).  RETROPERITONEUM/LYMPH NODES: No lymphadenopathy.  ABDOMINAL WALL: Postsurgical changes along the anterior abdominal and   pelvic wall soft tissue. Left buttock injection granuloma.  BONES: Degenerative changes/anterior longitudinal ligament ossification   of the spine. Stable grade 1 anterolisthesis of L3 on L4 and L4 on L5.   Again noted, chronic-appearing bilateral rib deformity. Healing/subacute   left fifth anterior rib fracture.    IMPRESSION:    No aortic dissection. Stable ectatic ascending aorta/aortic arch. Stable   AAA.    Heterogenous thyroid gland, which can be further characterized on a   nonemergent ultrasound as indicated.    Suggest ileal diverticulitis.    Pancreatic ductal dilatation, which can be further characterize on a   nonemergent MR/MRCP.    < end of copied text >

## 2022-04-05 NOTE — H&P ADULT - NSHPLABSRESULTS_GEN_ALL_CORE
LABS and ADDITIONAL STUDIES:                        11.5   11.98 )-----------( 143      ( 2022 18:21 )             35.0     04-04    138  |  104  |  20  ----------------------------<  85  4.5   |  21<L>  |  1.08    Ca    8.5      2022 18:21    TPro  6.7  /  Alb  3.7  /  TBili  0.4  /  DBili  x   /  AST  37<H>  /  ALT  27  /  AlkPhos  81  04-04    LIVER FUNCTIONS - ( 2022 18:21 )  Alb: 3.7 g/dL / Pro: 6.7 g/dL / ALK PHOS: 81 U/L / ALT: 27 U/L / AST: 37 U/L / GGT: x           Trop 17 -> 20    Urinalysis Basic - ( 2022 18:21 )  Color: Yellow / Appearance: Clear / S.020 / pH: x  Gluc: x / Ketone: Negative  / Bili: Negative / Urobili: <2 mg/dL   Blood: x / Protein: Trace / Nitrite: Positive   Leuk Esterase: Negative / RBC: 4 /HPF / WBC 1 /HPF   Sq Epi: x / Non Sq Epi: 1 /HPF / Bacteria: Many    < from: CT Angio Chest Aorta w/wo IV Cont (22 @ 21:42) >    FINDINGS: Artifact from the patient's arms and respiratory motion degrading images.    CHEST:    LUNGS AND LARGE AIRWAYS: Patent central airways. Right upper lobe wedge resection with atelectasis/scarring in the right lung. Bibasilar atelectasis. Stable scattered nodules in the left lung.  PLEURA: No pleural effusion.  VESSELS: Atherosclerosis. No thoracic aortic intramural hematoma or dissection. Stable ectatic ascending aorta and aortic arch.  HEART: Stable heart size. No pericardial effusion. Aortic valve and mitral annular calcification.  MEDIASTINUM AND BELKYS: Stable small follicles, for example, 1.5 x 0.9 cm subcarinal lymph node.  CHEST WALL AND LOWER NECK: Heterogeneously enlarged thyroid gland.    ABDOMEN AND PELVIS:    LIVER: Calcified granuloma.  BILE DUCTS: Normal caliber.  GALLBLADDER: No significant gallbladder wall edema.  SPLEEN: Degraded by artifact. A 1.3 x 1.3 cm hypodense structure, as noted on prior MR.  PANCREAS: Dilated duct (up to 0.6 cm).    ADRENALS: Nonspecific left > right adrenal thickening.  KIDNEYS/URETERS: Nonspecific bilateral perinephric stranding without hydroureteronephrosis. Again noted, left renal cyst.  BLADDER: Within normal limits.  REPRODUCTIVE ORGANS: Anteverted uterus.    BOWEL: No bowel obstruction. The appendix not visualized. Diverticulosis of the colon and distal small bowel. Focal area of ileal wall thickening with peridiverticular inflammatory change. Nonspecific fluid-filled, small and large bowel loops.  PERITONEUM: No free air or drainable fluid collection.  VESSELS: Atherosclerosis with resultant proximal narrowing. Stable AAA (3.0 x 3.2 cm in transaxial diameter).  RETROPERITONEUM/LYMPH NODES: No lymphadenopathy.  ABDOMINAL WALL: Postsurgical changes along the anterior abdominal and pelvic wall soft tissue. Left buttock injection granuloma.  BONES: Degenerative changes/anterior longitudinal ligament ossification of the spine. Stable grade 1 anterolisthesis of L3 on L4 and L4 on L5. Again noted, chronic-appearing bilateral rib deformity. Healing/subacute left fifth anterior rib fracture.    IMPRESSION:  No aortic dissection. Stable ectatic ascending aorta/aortic arch. Stable AAA.    Heterogenous thyroid gland, which can be further characterized on a nonemergent ultrasound as indicated.    Suggest ileal diverticulitis.    Pancreatic ductal dilatation, which can be further characterize on a nonemergent MR/MRCP.    Additional findings as described.    --- End of Report ---    EKG - LABS and ADDITIONAL STUDIES:                        11.5   11.98 )-----------( 143      ( 2022 18:21 )             35.0     04-04    138  |  104  |  20  ----------------------------<  85  4.5   |  21<L>  |  1.08    Ca    8.5      2022 18:21    TPro  6.7  /  Alb  3.7  /  TBili  0.4  /  DBili  x   /  AST  37<H>  /  ALT  27  /  AlkPhos  81  04-04    LIVER FUNCTIONS - ( 2022 18:21 )  Alb: 3.7 g/dL / Pro: 6.7 g/dL / ALK PHOS: 81 U/L / ALT: 27 U/L / AST: 37 U/L / GGT: x           Trop 17 -> 20    Urinalysis Basic - ( 2022 18:21 )  Color: Yellow / Appearance: Clear / S.020 / pH: x  Gluc: x / Ketone: Negative  / Bili: Negative / Urobili: <2 mg/dL   Blood: x / Protein: Trace / Nitrite: Positive   Leuk Esterase: Negative / RBC: 4 /HPF / WBC 1 /HPF   Sq Epi: x / Non Sq Epi: 1 /HPF / Bacteria: Many    < from: CT Angio Chest Aorta w/wo IV Cont (22 @ 21:42) >    FINDINGS: Artifact from the patient's arms and respiratory motion degrading images.    CHEST:    LUNGS AND LARGE AIRWAYS: Patent central airways. Right upper lobe wedge resection with atelectasis/scarring in the right lung. Bibasilar atelectasis. Stable scattered nodules in the left lung.  PLEURA: No pleural effusion.  VESSELS: Atherosclerosis. No thoracic aortic intramural hematoma or dissection. Stable ectatic ascending aorta and aortic arch.  HEART: Stable heart size. No pericardial effusion. Aortic valve and mitral annular calcification.  MEDIASTINUM AND BELKYS: Stable small follicles, for example, 1.5 x 0.9 cm subcarinal lymph node.  CHEST WALL AND LOWER NECK: Heterogeneously enlarged thyroid gland.    ABDOMEN AND PELVIS:    LIVER: Calcified granuloma.  BILE DUCTS: Normal caliber.  GALLBLADDER: No significant gallbladder wall edema.  SPLEEN: Degraded by artifact. A 1.3 x 1.3 cm hypodense structure, as noted on prior MR.  PANCREAS: Dilated duct (up to 0.6 cm).    ADRENALS: Nonspecific left > right adrenal thickening.  KIDNEYS/URETERS: Nonspecific bilateral perinephric stranding without hydroureteronephrosis. Again noted, left renal cyst.  BLADDER: Within normal limits.  REPRODUCTIVE ORGANS: Anteverted uterus.    BOWEL: No bowel obstruction. The appendix not visualized. Diverticulosis of the colon and distal small bowel. Focal area of ileal wall thickening with peridiverticular inflammatory change. Nonspecific fluid-filled, small and large bowel loops.  PERITONEUM: No free air or drainable fluid collection.  VESSELS: Atherosclerosis with resultant proximal narrowing. Stable AAA (3.0 x 3.2 cm in transaxial diameter).  RETROPERITONEUM/LYMPH NODES: No lymphadenopathy.  ABDOMINAL WALL: Postsurgical changes along the anterior abdominal and pelvic wall soft tissue. Left buttock injection granuloma.  BONES: Degenerative changes/anterior longitudinal ligament ossification of the spine. Stable grade 1 anterolisthesis of L3 on L4 and L4 on L5. Again noted, chronic-appearing bilateral rib deformity. Healing/subacute left fifth anterior rib fracture.    IMPRESSION:  No aortic dissection. Stable ectatic ascending aorta/aortic arch. Stable AAA.    Heterogenous thyroid gland, which can be further characterized on a nonemergent ultrasound as indicated.    Suggest ileal diverticulitis.    Pancreatic ductal dilatation, which can be further characterize on a nonemergent MR/MRCP.    Additional findings as described.    --- End of Report ---    EKG - Sinus rhythm with PACs, rate 65, QTc 407, T wave flat/mild inversion stable from outpatient EKG on [employer] on , No significant ST-T wave changes

## 2022-04-05 NOTE — SWALLOW BEDSIDE ASSESSMENT ADULT - SWALLOW EVAL: DIAGNOSIS
1. Mild oral dysphagia for regular solids marked by adequate oral aperture with mildly prolonged mastication and bolus collection with delayed A-P transport. Trace lingual residue observed after the swallow which cleared with a liquid wash. 2. Functional oral phase for thin liquids and pureed marked by adequate oral aperture, bolus collection and timely A-P transport. 3. Functional pharyngeal phase for thin liquids, pureed, and regular solids marked by initiation of the pharyngeal swallow with hyolaryngeal elevation and excursion upon digital palpation without evidence laryngeal penetration/aspiration. Patient reported feeling "food was slow to go down" with inconsistent globus sensation across consistencies.

## 2022-04-05 NOTE — H&P ADULT - PROBLEM SELECTOR PLAN 1
- Patient with reports of chest pain, as per Grandson has had issues with palpitations for some time and believes she had a similar issue at home on 4/4 prompting her to come to the hospital  - Currently patient without pain in her chest   - EKG non-ischemic, trops stable, currently low suspicion for ACS for her chest pain  - Based on story from grandvane and what patient told the  she possibly had palpitations and felt unwell and therefore presented to the hospital, palpitations might be related to issues as related by patient but unclear  - c/w telemetry monitoring  - Check TTE  - Repeat trop in AM

## 2022-04-05 NOTE — H&P ADULT - PROBLEM SELECTOR PROBLEM 1
"Patient escalating for over hour after mother left from visiting. Would calm briefly with one to one distraction, playing in quiet space and making a book with staff. Targeting particular staff \"I mad at you you mean\", even when limits were not being set. When other patient escalated, [facility], she again tried to hit staff, laid on floor of hallway. Doors closed between pods of unit and she looked between crack of door, accused us of harming peer, Became more aggressive, charging at staff, hitting and kicking, wasrestrained to floor. Transported by back board to seclusion room area. Mother notified and states she has issues at school being defensive of peers and attacking.   " Chest pain

## 2022-04-05 NOTE — H&P ADULT - ASSESSMENT
This is a 89F with history as above who presents to the hospital with chest pain/palpitations with CTA showing multiple findings.

## 2022-04-05 NOTE — CHART NOTE - NSCHARTNOTEFT_GEN_A_CORE
Patient seen, examined, and interviewed by me, case discussed with ACP, chart reviewed,  H/P already done by nocturnist earlier.  See  full h/p  appreciated all specialty noted  continue abx and meds otherwise  will follow   Dr. RICO Mcdonnell ([phone])

## 2022-04-05 NOTE — H&P ADULT - HISTORY OF PRESENT ILLNESS
Please note, patient is a poor historian 2/2 Dementia, attempted to use  phone with patient but she kept on saying to call her daughter in the morning for information. Attempted to call daughter but the phone went directly to , left  for call back, called grandson at number listed (Wicho Crandall [phone]) and he deferred patient's medical history to his mother. For now HPI as per outpatient notes and ED note.     This is a 89F with history of Alzheimer's Dementia, HTN, CVAs on Plavix, HLD, AAA, and Pancreatic Insufficiency who presents to the hospital with SOB and chest pain. Patient unable to fully describe her symptoms, when asked about the chest pain with a  phone she said that she has been having chest pain due to the social situation in [place]. As per Grandson, patient has had a history of palpitations occasionally and believes that the patient had another episode of palpitations on the afternoon of 4/4/22 and was therefore brought to the hospital. Patient otherwise is c/o some throat pain but the grandson was not aware of this. He did state that the patient has been feeling unwell recently and might have had a fever but does not know of any other specific complaints for the patient. Attempted to reach patient's daughter for collateral but there was no pickup, left  for call back.     On arrival to the ED, her vitals were T 98.9 -> 100.2 (rectal), P 66, /60, RR 18, o2 sat 98% RA. Her lab work showed mild leukocytosis, mildly positive UA. She had a CTA Chest/Abd for dissection eval which was negative but did show several incidental findings (heterogenous and enlarged thyroid, 1.3x1.3cm hypodense structure in spleen, dilated pancreatic duct, b/l adrenal gland thickening, b/l perinephric stranding, ileal diverticlutis, stable AAA, and a healing L anterior rib fracture). Patient was given LR 1L, CTX 1G, ofirmev 1g, and ambien 1tab x1. She was admitted to medicine on telemetry.

## 2022-04-05 NOTE — CONSULT NOTE ADULT - ASSESSMENT
#Pancreatic insufficiency:  With known atrophy of pancreas on MRI from 3/22, improvement in diarrhea one Creon (2 tab with meals)  #Pancreatic duct dilation, no mass on MRI    #Ileal diverticulitis  With leukocytosis and fever    Recommendations:  -Agree with IV antibiotic  -Continue with Creon 24,000 2 tab w/ meals  -no further work up for pancreatic duct dilation. This is known and has been worked up by Dr. Dudley.     GI will continue to follow    Recommendations preliminary until signed by attending.     Antione Cuevas MD  Gastroenterology/Hepatology Fellow  1st option: [phone] (text or call), ONLY available from 7:00 am to 5:00 pm.   **Contact on-call GI fellow via answering service ([phone]) from 5pm-7am AND on weekends/holidays**  2nd option: Available via Microsoft Teams  3rd option: Pager: [phone]            #Ileal diverticulitis with leukocytosis and fever  #Pancreatic insufficiency with known atrophy of pancreas on MRI from 3/22, improvement in diarrhea one Creon (2 tab with meals)  #Pancreatic duct dilation, no mass on MRI    Recommendations:  -Agree with IV antibiotic for suspected ileal diverticulitis  -Continue with Creon 24,000 2 tab w/ meals  -No further work up for pancreatic duct dilation. This is known and has been worked up by Dr. Dudley.   -No role for endoscopic evaluation at this time  -Will need close outpatient follow up with Dr. Dudley    GI will continue to follow    Recommendations preliminary until signed by attending.     Antione Cuevas MD  Gastroenterology/Hepatology Fellow  1st option: [phone] (text or call), ONLY available from 7:00 am to 5:00 pm.   **Contact on-call GI fellow via answering service ([phone]) from 5pm-7am AND on weekends/holidays**  2nd option: Available via Microsoft Teams  3rd option: Pager: [phone]

## 2022-04-05 NOTE — H&P ADULT - PROBLEM SELECTOR PLAN 5
- c/o throat pain, inspection of the oropharynx did not reveal much, no coughing or rhinorrhea noted, attempted dysphagia screen and patient failed it  - Would need to clarify throat pain with family in AM  - Consider RVP but currently no s/s of URI

## 2022-04-05 NOTE — H&P ADULT - PROBLEM SELECTOR PLAN 4
- noted to have an heterogenously enlarge thyroid gland -> will add on TSH to patient's lab work, likely would need outpatient US thyroid (would need to discuss this with her daughter in AM)  - Noted to have a splenic lesion on CT, also noted on MR in March 2022, seems stable overall in size, would recommend outpatient follow up  - Has dilated pancreatic duct on imaging, also noted on the recent MR, was evaluated by Dr. Dudley of GI, diagnosed with pancreatic insufficiency and deemed to not need additional w/u for the duct dilation 2/2 age and comorbidities -> would c/w creon, defer further f/u for pancreatic ducts to family if they want follow up  - AAA stable on imaging -> c/w outpatient follow up as needed - noted to have an heterogenously enlarge thyroid gland -> will add on TSH to patient's lab work, likely would need outpatient US thyroid (would need to discuss this with her daughter in AM)  - Noted to have a splenic lesion on CT, also noted on MR in March 2022, seems stable overall in size, would recommend outpatient follow up  - Has dilated pancreatic duct on imaging, also noted on the recent MR, was evaluated by Dr. Dudley of GI, diagnosed with pancreatic insufficiency and deemed to not need additional w/u for the duct dilation 2/2 age and comorbidities -> would c/w creon, f/u with Dr. Dudley as per [employer] note  - AAA stable on imaging -> c/w outpatient follow up for surveillance   - noted to have b/l adrenal thickening, was not noted on recent MR, unclear if due to b/l perinephric stranding or new from recent imaging, likely would need follow up as outpatient for further eval - noted to have an heterogenously enlarge thyroid gland -> will add on TSH to patient's lab work, likely would need outpatient US thyroid (would need to discuss this with her daughter in AM)  - Noted to have a splenic lesion on CT, also noted on MR in March 2022, seems stable overall in size, would recommend outpatient follow up  - Has dilated pancreatic duct on imaging, also noted on the recent MR, was evaluated by Dr. Dudley of GI, diagnosed with pancreatic insufficiency and deemed to not need additional w/u for the duct dilation 2/2 age and comorbidities -> would c/w creon, f/u outpatient with Dr. Dudley in 2-3 months  - AAA stable on imaging -> c/w outpatient follow up for surveillance   - noted to have b/l adrenal thickening, was not noted on recent MR, unclear if due to b/l perinephric stranding or new from recent imaging, likely would need follow up as outpatient for further eval

## 2022-04-05 NOTE — H&P ADULT - NSHPPHYSICALEXAM_GEN_ALL_CORE
Vital Signs Last 24 Hrs  T(C): 36.8 (05 Apr 2022 01:17), Max: 37.9 (04 Apr 2022 18:22)  T(F): 98.3 (05 Apr 2022 01:17), Max: 100.2 (04 Apr 2022 18:22)  HR: 80 (05 Apr 2022 01:17) (66 - 80)  BP: 163/82 (05 Apr 2022 01:17) (113/60 - 163/82)  BP(mean): --  RR: 18 (05 Apr 2022 01:17) (16 - 18)  SpO2: 97% (05 Apr 2022 01:17) (95% - 100%)    GENERAL: No acute distress, well-developed  ENT: EOMI, PERRLA, conjunctiva and sclera clear, Neck supple, No JVD, moist mucosa  CHEST/LUNG: Clear to auscultation bilaterally; No wheeze, equal breath sounds bilaterally   BACK: No spinal tenderness  HEART: Regular rate and rhythm; No murmurs, rubs, or gallops  ABDOMEN: Soft, Nontender, Nondistended; Bowel sounds present  EXTREMITIES:  No clubbing, cyanosis, or edema  PSYCH: Nl behavior, nl affect  NEUROLOGY: AAOx3, non-focal, cranial nerves intact  SKIN: Normal color, No rashes or lesions Vital Signs Last 24 Hrs  T(C): 36.8 (05 Apr 2022 01:17), Max: 37.9 (04 Apr 2022 18:22)  T(F): 98.3 (05 Apr 2022 01:17), Max: 100.2 (04 Apr 2022 18:22)  HR: 80 (05 Apr 2022 01:17) (66 - 80)  BP: 163/82 (05 Apr 2022 01:17) (113/60 - 163/82)  BP(mean): --  RR: 18 (05 Apr 2022 01:17) (16 - 18)  SpO2: 97% (05 Apr 2022 01:17) (95% - 100%)    GENERAL: No acute distress, well-developed  EYES: EOMI, PERRL, conjunctiva and sclera clear  ENT: Neck supple, No JVD, moist mucosa  CHEST/LUNG: Clear to auscultation bilaterally; No wheeze, equal breath sounds bilaterally   BACK: No spinal tenderness, no CVA TTP  HEART: Regular rate and rhythm; No murmurs, rubs, or gallops  ABDOMEN: Soft, Nontender, Nondistended; Bowel sounds present  EXTREMITIES: No LE edema or asymmetry noted, +DP/PT/Radial pulses  PSYCH: Nl behavior, nl affect  NEUROLOGY: AAOx1 (though examination limited 2/2 dementia and language barrier), non-focal, moves all extremities, sensation seems intact, cranial nerves intact  SKIN: Normal color, No rashes or lesions

## 2022-04-05 NOTE — H&P ADULT - PROBLEM SELECTOR PLAN 3
- Incidentally noted on CTA, patient does not seem to have abdominal issues, abdomen is benign on examination  - Would c/w ceftriaxone but increase dosing to 2g, add flagyl  - Monitor temp, WBC, BMs  - Check BCx x2

## 2022-04-05 NOTE — H&P ADULT - PROBLEM SELECTOR PLAN 12
- Medications reconciled as per list left in patient's chart, med history pharmacist emailed to help verify her medications - Medications reconciled as per list left in patient's chart, med history pharmacist emailed to help verify her medications  - of note, patient on lumify as per list but no stated dosing interval, would need to clarify in AM and restart as needed

## 2022-04-05 NOTE — H&P ADULT - PROBLEM SELECTOR PLAN 11
DVT ppx - SCDs  Diet - failed dysphagia screen, restart if able to tolerate diet as per S&S eval  Activity - OOB to chair/with assistance  Fall and Aspiration

## 2022-04-05 NOTE — PATIENT PROFILE ADULT - FALL HARM RISK - HARM RISK INTERVENTIONS

## 2022-04-05 NOTE — SWALLOW BEDSIDE ASSESSMENT ADULT - COMMENTS
As per H&P "This is a 89F with history of Alzheimer's Dementia, HTN, CVAs on Plavix, HLD, AAA, and Pancreatic Insufficiency who presents to the hospital with SOB and chest pain. Patient unable to fully describe her symptoms, when asked about the chest pain with a  phone she said that she has been having chest pain due to the social situation in her home country. As per Grandson, patient has had a history of palpitations occasionally and believes that the patient had another episode of palpitations on the afternoon of 4/4/22 and was therefore brought to the hospital. Patient otherwise is c/o some throat pain but the grandson was not aware of this. He did state that the patient has been feeling unwell recently and might have had a fever but does not know of any other specific complaints for the patient. Attempted to reach patient's daughter for collateral but there was no pickup, left VM for call back."     CT angio chest 4/5/22 "LUNGS AND LARGE AIRWAYS: Patent central airways. Right upper lobe wedge resection with atelectasis/scarring in the right  telectasis. Stable scattered nodules in the left lung."     Patient seen for bedside swallow assessment in the [facility], evaluation was completed in Yoruba with Yoruba speaking clinician. Received upright in stretcher, reported stomach discomfort RN aware, left with GI team at bedside at the end of evaluation. Patient reported occasional pharyngeal globus sensation prior to admission and feels foods are "slow to go down". Patient reported mild throat pain, Hoarse vocal quality noted. Patient able to follow all necessary directives, and was able to make wants and needs known. As per H&P "This is a 89F with history of Alzheimer's Dementia, HTN, CVAs on Plavix, HLD, AAA, and Pancreatic Insufficiency who presents to the hospital with SOB and chest pain. Patient unable to fully describe her symptoms, when asked about the chest pain with a  phone she said that she has been having chest pain due to the social situation in [place]. As per Grandson, patient has had a history of palpitations occasionally and believes that the patient had another episode of palpitations on the afternoon of 4/4/22 and was therefore brought to the hospital. Patient otherwise is c/o some throat pain but the grandson was not aware of this. He did state that the patient has been feeling unwell recently and might have had a fever but does not know of any other specific complaints for the patient. Attempted to reach patient's daughter for collateral but there was no pickup, left VM for call back."     CT angio chest 4/5/22 "LUNGS AND LARGE AIRWAYS: Patent central airways.......left lung."  Of note, patient received a cinesophagram during a previous admission in January 2012 with recommendations for regular solids with thin liquids. See report for details.     Patient seen for bedside swallow assessment in the [facility], evaluation was completed in Par[place]ayan with [place]aguayan speaking clinician. Received upright in stretcher, reported stomach discomfort RN aware, left with GI team at bedside at the end of evaluation. Patient reported occasional pharyngeal globus sensation prior to admission and feels foods are "slow to go down". Patient reported mild throat pain, Hoarse vocal quality noted. Patient able to follow all necessary directives, and was able to make wants and needs known.

## 2022-04-05 NOTE — CONSULT NOTE ADULT - ATTENDING COMMENTS
#Abdominal pain, fever  #CT with findings c/f ileal diverticulitis  #Stable PD dilation    --Reasonable to continue antibiotics for possible ileal diverticulitis  --Continue supportive care  --Outpatient follow up with established GI, Dr. Dudley, once stable for discharge

## 2022-04-06 LAB
ALBUMIN SERPL ELPH-MCNC: 2.9 G/DL — LOW (ref 3.3–5)
ALP SERPL-CCNC: 66 U/L — SIGNIFICANT CHANGE UP (ref 40–120)
ALT FLD-CCNC: 17 U/L — SIGNIFICANT CHANGE UP (ref 4–33)
ANION GAP SERPL CALC-SCNC: 12 MMOL/L — SIGNIFICANT CHANGE UP (ref 7–14)
ANION GAP SERPL CALC-SCNC: 13 MMOL/L — SIGNIFICANT CHANGE UP (ref 7–14)
AST SERPL-CCNC: 23 U/L — SIGNIFICANT CHANGE UP (ref 4–32)
BILIRUB SERPL-MCNC: 0.2 MG/DL — SIGNIFICANT CHANGE UP (ref 0.2–1.2)
BUN SERPL-MCNC: 16 MG/DL — SIGNIFICANT CHANGE UP (ref 7–23)
BUN SERPL-MCNC: 17 MG/DL — SIGNIFICANT CHANGE UP (ref 7–23)
CALCIUM SERPL-MCNC: 8.2 MG/DL — LOW (ref 8.4–10.5)
CALCIUM SERPL-MCNC: 8.6 MG/DL — SIGNIFICANT CHANGE UP (ref 8.4–10.5)
CHLORIDE SERPL-SCNC: 101 MMOL/L — SIGNIFICANT CHANGE UP (ref 98–107)
CHLORIDE SERPL-SCNC: 101 MMOL/L — SIGNIFICANT CHANGE UP (ref 98–107)
CO2 SERPL-SCNC: 22 MMOL/L — SIGNIFICANT CHANGE UP (ref 22–31)
CO2 SERPL-SCNC: 24 MMOL/L — SIGNIFICANT CHANGE UP (ref 22–31)
CREAT SERPL-MCNC: 0.92 MG/DL — SIGNIFICANT CHANGE UP (ref 0.5–1.3)
CREAT SERPL-MCNC: 1.06 MG/DL — SIGNIFICANT CHANGE UP (ref 0.5–1.3)
EGFR: 49 ML/MIN/1.73M2 — LOW
EGFR: 58 ML/MIN/1.73M2 — LOW
GLUCOSE SERPL-MCNC: 104 MG/DL — HIGH (ref 70–99)
GLUCOSE SERPL-MCNC: 110 MG/DL — HIGH (ref 70–99)
HCT VFR BLD CALC: 32.8 % — LOW (ref 34.5–45)
HCT VFR BLD CALC: 37.8 % — SIGNIFICANT CHANGE UP (ref 34.5–45)
HGB BLD-MCNC: 10.7 G/DL — LOW (ref 11.5–15.5)
HGB BLD-MCNC: 12.2 G/DL — SIGNIFICANT CHANGE UP (ref 11.5–15.5)
MAGNESIUM SERPL-MCNC: 1.8 MG/DL — SIGNIFICANT CHANGE UP (ref 1.6–2.6)
MAGNESIUM SERPL-MCNC: 1.9 MG/DL — SIGNIFICANT CHANGE UP (ref 1.6–2.6)
MCHC RBC-ENTMCNC: 30.2 PG — SIGNIFICANT CHANGE UP (ref 27–34)
MCHC RBC-ENTMCNC: 30.7 PG — SIGNIFICANT CHANGE UP (ref 27–34)
MCHC RBC-ENTMCNC: 32.3 GM/DL — SIGNIFICANT CHANGE UP (ref 32–36)
MCHC RBC-ENTMCNC: 32.6 GM/DL — SIGNIFICANT CHANGE UP (ref 32–36)
MCV RBC AUTO: 92.7 FL — SIGNIFICANT CHANGE UP (ref 80–100)
MCV RBC AUTO: 95.2 FL — SIGNIFICANT CHANGE UP (ref 80–100)
NRBC # BLD: 0 /100 WBCS — SIGNIFICANT CHANGE UP
NRBC # BLD: 0 /100 WBCS — SIGNIFICANT CHANGE UP
NRBC # FLD: 0 K/UL — SIGNIFICANT CHANGE UP
NRBC # FLD: 0 K/UL — SIGNIFICANT CHANGE UP
PHOSPHATE SERPL-MCNC: 2.9 MG/DL — SIGNIFICANT CHANGE UP (ref 2.5–4.5)
PHOSPHATE SERPL-MCNC: 3.1 MG/DL — SIGNIFICANT CHANGE UP (ref 2.5–4.5)
PLATELET # BLD AUTO: 154 K/UL — SIGNIFICANT CHANGE UP (ref 150–400)
PLATELET # BLD AUTO: 162 K/UL — SIGNIFICANT CHANGE UP (ref 150–400)
POTASSIUM SERPL-MCNC: 3.8 MMOL/L — SIGNIFICANT CHANGE UP (ref 3.5–5.3)
POTASSIUM SERPL-MCNC: 4.1 MMOL/L — SIGNIFICANT CHANGE UP (ref 3.5–5.3)
POTASSIUM SERPL-SCNC: 3.8 MMOL/L — SIGNIFICANT CHANGE UP (ref 3.5–5.3)
POTASSIUM SERPL-SCNC: 4.1 MMOL/L — SIGNIFICANT CHANGE UP (ref 3.5–5.3)
PROT SERPL-MCNC: 5.9 G/DL — LOW (ref 6–8.3)
RBC # BLD: 3.54 M/UL — LOW (ref 3.8–5.2)
RBC # BLD: 3.97 M/UL — SIGNIFICANT CHANGE UP (ref 3.8–5.2)
RBC # FLD: 12.8 % — SIGNIFICANT CHANGE UP (ref 10.3–14.5)
RBC # FLD: 12.8 % — SIGNIFICANT CHANGE UP (ref 10.3–14.5)
SODIUM SERPL-SCNC: 135 MMOL/L — SIGNIFICANT CHANGE UP (ref 135–145)
SODIUM SERPL-SCNC: 138 MMOL/L — SIGNIFICANT CHANGE UP (ref 135–145)
TSH SERPL-MCNC: 0.34 UIU/ML — SIGNIFICANT CHANGE UP (ref 0.27–4.2)
TSH SERPL-MCNC: 0.9 UIU/ML — SIGNIFICANT CHANGE UP (ref 0.27–4.2)
WBC # BLD: 10.13 K/UL — SIGNIFICANT CHANGE UP (ref 3.8–10.5)
WBC # BLD: 10.46 K/UL — SIGNIFICANT CHANGE UP (ref 3.8–10.5)
WBC # FLD AUTO: 10.13 K/UL — SIGNIFICANT CHANGE UP (ref 3.8–10.5)
WBC # FLD AUTO: 10.46 K/UL — SIGNIFICANT CHANGE UP (ref 3.8–10.5)

## 2022-04-06 PROCEDURE — 99232 SBSQ HOSP IP/OBS MODERATE 35: CPT | Mod: GC

## 2022-04-06 PROCEDURE — 74230 X-RAY XM SWLNG FUNCJ C+: CPT | Mod: 26

## 2022-04-06 PROCEDURE — 93306 TTE W/DOPPLER COMPLETE: CPT | Mod: 26

## 2022-04-06 RX ORDER — LANOLIN ALCOHOL/MO/W.PET/CERES
3 CREAM (GRAM) TOPICAL AT BEDTIME
Refills: 0 | Status: DISCONTINUED | OUTPATIENT
Start: 2022-04-06 | End: 2022-04-06

## 2022-04-06 RX ADMIN — ESCITALOPRAM OXALATE 10 MILLIGRAM(S): 10 TABLET, FILM COATED ORAL at 11:21

## 2022-04-06 RX ADMIN — CEFTRIAXONE 100 MILLIGRAM(S): 500 INJECTION, POWDER, FOR SOLUTION INTRAMUSCULAR; INTRAVENOUS at 17:22

## 2022-04-06 RX ADMIN — Medication 100 MILLIGRAM(S): at 12:55

## 2022-04-06 RX ADMIN — RIVASTIGMINE 1 PATCH: 4.6 PATCH, EXTENDED RELEASE TRANSDERMAL at 20:00

## 2022-04-06 RX ADMIN — RIVASTIGMINE 1 PATCH: 4.6 PATCH, EXTENDED RELEASE TRANSDERMAL at 06:56

## 2022-04-06 RX ADMIN — Medication 50 MILLIGRAM(S): at 05:33

## 2022-04-06 RX ADMIN — ATORVASTATIN CALCIUM 20 MILLIGRAM(S): 80 TABLET, FILM COATED ORAL at 21:21

## 2022-04-06 RX ADMIN — RIVASTIGMINE 1 PATCH: 4.6 PATCH, EXTENDED RELEASE TRANSDERMAL at 10:36

## 2022-04-06 RX ADMIN — Medication 650 MILLIGRAM(S): at 10:42

## 2022-04-06 RX ADMIN — ONDANSETRON 4 MILLIGRAM(S): 8 TABLET, FILM COATED ORAL at 23:09

## 2022-04-06 RX ADMIN — Medication 650 MILLIGRAM(S): at 11:23

## 2022-04-06 RX ADMIN — PREGABALIN 1000 MICROGRAM(S): 225 CAPSULE ORAL at 11:20

## 2022-04-06 RX ADMIN — BUDESONIDE AND FORMOTEROL FUMARATE DIHYDRATE 2 PUFF(S): 160; 4.5 AEROSOL RESPIRATORY (INHALATION) at 21:22

## 2022-04-06 RX ADMIN — CLOPIDOGREL BISULFATE 75 MILLIGRAM(S): 75 TABLET, FILM COATED ORAL at 11:20

## 2022-04-06 RX ADMIN — Medication 2 CAPSULE(S): at 12:12

## 2022-04-06 RX ADMIN — Medication 2 CAPSULE(S): at 17:22

## 2022-04-06 RX ADMIN — Medication 100 MILLIGRAM(S): at 21:21

## 2022-04-06 RX ADMIN — BUDESONIDE AND FORMOTEROL FUMARATE DIHYDRATE 2 PUFF(S): 160; 4.5 AEROSOL RESPIRATORY (INHALATION) at 09:45

## 2022-04-06 RX ADMIN — Medication 100 MILLIGRAM(S): at 14:02

## 2022-04-06 RX ADMIN — Medication 100 MILLIGRAM(S): at 06:56

## 2022-04-06 RX ADMIN — Medication 1 DROP(S): at 11:21

## 2022-04-06 RX ADMIN — Medication 3 MILLIGRAM(S): at 21:21

## 2022-04-06 NOTE — SWALLOW VFSS/MBS ASSESSMENT ADULT - DIAGNOSTIC IMPRESSIONS
1. Functional oral stage deficits given Thin Liquids and Puree marked by adequate bolus containment, adequate bolus manipulation and adequate anterior-posterior transport. There was one episode of premature loss over the lateral surface of the epiglottis given Thin Liquids secondary to reduced tongue to palate seal. Adequate oral clearance noted post primary swallow.   2.Mild 1. Functional oral stage given Thin Liquids, Puree, and Regular Solids marked by adequate bolus containment, adequate bolus manipulation, prolonged mastication for Regular Solids secondary to missing lower dentition and adequate anterior-posterior transport. There was one episode of premature loss over the lateral surface of the epiglottis given Thin Liquids secondary to reduced tongue to palate seal. Adequate oral clearance noted post primary swallow.   2. Functional pharyngeal stage given Thin Liquids, Puree, and Regular Solids marked by adequate tongue base retraction, adequate epiglottic deflection, adequate initiation of the pharyngeal swallow trigger, adequate laryngeal elevation, adequate pharyngeal constriction and adequate laryngeal vestibule closure. There was one episode of Trace Laryngeal Penetration during the swallow given Thin Liquids with retrieval and adequate airway protection maintained. There was No Aspiration before, during or after the swallow given Thin Liquids, Puree and Regular Solids.

## 2022-04-06 NOTE — SWALLOW VFSS/MBS ASSESSMENT ADULT - RECOMMENDED CONSISTENCY
1. Soft & Bite Sized Solids (per patient preference) with Thin Liquids.   2. Aspiration/Reflux Precautions  3. Daily Oral Hygiene to reduce colonization of oral bacteria.

## 2022-04-06 NOTE — SWALLOW VFSS/MBS ASSESSMENT ADULT - ORAL PHASE COMMENTS
Adequate bolus containment, prolonged bolus manipulation, prolonged mastication with adequate ability to break down solid textures and prolonged anterior-posterior transport. There was adequate oral clearance noted post primary swallow. Adequate bolus containment, adequate bolus manipulation and adequate anterior-posterior transport. Adequate oral clearance noted post primary swallow. Adequate bolus containment, adequate bolus manipulation and adequate anterior-posterior transport. There was trace premature loss over the lateral surface of the epiglottis secondary to reduced tongue to palate seal. Adequate oral clearance noted post primary swallow.

## 2022-04-06 NOTE — PROVIDER CONTACT NOTE (OTHER) - ACTION/TREATMENT ORDERED:
Provider notified. Zofran IV given per order for nausea. O2 NC ordered. Pt placed on 1 L NC for comfort. Awaiting further interventions. Safety maintained. Will continue to monitor.

## 2022-04-06 NOTE — PROGRESS NOTE ADULT - SUBJECTIVE AND OBJECTIVE BOX
Cardiovascular Disease Progress Note    Overnight events: No acute events overnight.  no cp/sob/palps  Otherwise review of systems negative    Objective Findings:  T(C): 36.6 (04-06-22 @ 05:30), Max: 36.7 (04-05-22 @ 19:52)  HR: 77 (04-06-22 @ 05:30) (69 - 77)  BP: 137/76 (04-06-22 @ 05:30) (134/60 - 154/77)  RR: 17 (04-06-22 @ 05:30) (17 - 18)  SpO2: 95% (04-06-22 @ 05:30) (95% - 100%)  Wt(kg): --  Daily     Daily       Physical Exam:  Gen: NAD  HEENT: EOMI  CV: RRR, normal S1 + S2, no m/r/g  Lungs: CTAB  Abd: soft, non-tender  Ext: No edema    Telemetry:    Laboratory Data:                        10.7   10.46 )-----------( 154      ( 06 Apr 2022 06:41 )             32.8     04-06    135  |  101  |  16  ----------------------------<  104<H>  3.8   |  22  |  0.92    Ca    8.2<L>      06 Apr 2022 06:41  Phos  3.1     04-06  Mg     1.90     04-06    TPro  5.9<L>  /  Alb  2.9<L>  /  TBili  0.2  /  DBili  x   /  AST  23  /  ALT  17  /  AlkPhos  66  04-06              Inpatient Medications:  MEDICATIONS  (STANDING):  atorvastatin 20 milliGRAM(s) Oral at bedtime  budesonide 160 MICROgram(s)/formoterol 4.5 MICROgram(s) Inhaler 2 Puff(s) Inhalation two times a day  cefTRIAXone   IVPB 2000 milliGRAM(s) IV Intermittent every 24 hours  clopidogrel Tablet 75 milliGRAM(s) Oral daily  cyanocobalamin 1000 MICROGram(s) Oral daily  escitalopram 10 milliGRAM(s) Oral daily  metoprolol succinate ER 50 milliGRAM(s) Oral daily  metroNIDAZOLE  IVPB      metroNIDAZOLE  IVPB 500 milliGRAM(s) IV Intermittent every 8 hours  pancrelipase  (CREON 24,000 Lipase Units) 2 Capsule(s) Oral three times a day with meals  pyridoxine 100 milliGRAM(s) Oral daily  rivastigmine patch  4.6 mG/24 Hr(s) 1 Patch Transdermal every 24 hours  timolol 0.25% Solution 1 Drop(s) Both EYES daily      Assessment:  chest pain  AAA, stable  TAA, stable  hx of CVA  dementia  nonobs CAD on prior cath  ? ileal diverticulitis  UTI, ? pyelo  HTN    Recs:  cardiac stable  no e/o acs by ecg or biomarkers  obtain 2d echo  pharm nst once acute issues attended to, as daughter would like everything done  cw antiplatelets and antianginals  GI recs appreciated  abx per primary team  will follow          Over 25 minutes spent on total encounter; more than 50% of the visit was spent counseling and/or coordinating care by the attending physician.      Redd Delacruz MD   Cardiovascular Disease  [phone]

## 2022-04-06 NOTE — PROGRESS NOTE ADULT - ATTENDING COMMENTS
#Abdominal pain, fever  #CT with findings c/f ileal diverticulitis  #Stable PD dilation    --Reasonable to continue antibiotics for possible ileal diverticulitis and UTI  --Continue supportive care  --Outpatient follow up with established GI, Dr. Dudley, once stable for discharge

## 2022-04-06 NOTE — SWALLOW VFSS/MBS ASSESSMENT ADULT - COMMENTS
As per H&P "This is a 89F with history of Alzheimer's Dementia, HTN, CVAs on Plavix, HLD, AAA, and Pancreatic Insufficiency who presents to the hospital with SOB and chest pain.    Per GI Fellow Note 4/5/22: "Patient reports abdominal pain and recent diarrhea. States pain improved since arriving to hospital. Diarrhea is chronic and has improved with Creon in the past until recently. No nausea, vomiting reported. Patient unsure of fevers, but noted chills prior to admission.     In the ED, patient with CTA Chest/Abd for dissection eval which was negative but did show several incidental findings (heterogenous and enlarged thyroid, 1.3x1.3cm hypodense structure in spleen, dilated pancreatic duct (known), b/l adrenal gland thickening, b/l perinephric stranding, ileal diverticulitis, stable AAA, and a healing L anterior rib fracture)."     Patient is familiar to this department as the patient was seen yesterday for an initial swallow evaluation with recommendation of soft & bite sized solids with thin liquids and a cinesphagram. Refer to full report for further details.     Patient received in Radiology Suite awake, alert and communicative with ability to participate in conversational speech, follow commands and answer yes/no directives. Language Line  utilized throughout the evaluation (Ana Maria). Patient continues to endorse stuck sensation at level of the thyroid notch, though denies odynophagia. As per H&P "This is a 89F with history of Alzheimer's Dementia, HTN, CVAs on Plavix, HLD, AAA, and Pancreatic Insufficiency who presents to the hospital with SOB and chest pain.    Per GI Fellow Note 4/5/22: "Patient reports abdominal pain and recent diarrhea. States pain improved since arriving to hospital. Diarrhea is chronic and has improved with Creon in the past until recently. No nausea, vomiting reported. Patient unsure of fevers, but noted chills prior to admission.     In the ED, patient with CTA Chest/Abd for dissection eval which was negative but did show several incidental findings (heterogenous and enlarged thyroid, 1.3x1.3cm hypodense structure in spleen, dilated pancreatic duct (known), b/l adrenal gland thickening, b/l perinephric stranding, ileal diverticulitis, stable AAA, and a healing L anterior rib fracture)."     Patient is familiar to this department as the patient was seen yesterday for an initial swallow evaluation with recommendation of soft & bite sized solids with thin liquids and a cinesophagram. Refer to full report for further details.     Patient received in Radiology Suite awake, alert and communicative with ability to participate in conversational speech, follow commands and answer yes/no directives. Language Line  utilized throughout the evaluation (Ana Maria). Patient continues to endorse stuck sensation at level of the thyroid notch, though denies odynophagia.

## 2022-04-06 NOTE — PROVIDER CONTACT NOTE (OTHER) - ASSESSMENT
Pt A&Ox2, confused. Burmese speaking-  used at bedside. Pt states she is feeling nauseous and uncomfortable and is worried she might be SOB overnight. Vital signs stable. 96% O2 RA. Pt denies chest pain, palpitations, dizziness, fatigue. Safety maintained. Will continue to monitor.

## 2022-04-06 NOTE — SWALLOW VFSS/MBS ASSESSMENT ADULT - PHARYNGEAL PHASE COMMENTS
Adequate pharyngeal clearance post primary swallow There was trace pharyngeal residue located primarily at the pyriform sinuses post primary swallow though adequate pharyngeal clearance with use of spontaneous re-swallow.

## 2022-04-06 NOTE — SWALLOW VFSS/MBS ASSESSMENT ADULT - ORAL PHASE
Incomplete tongue to palate contact/Uncontrolled bolus / spillover in hypopharynx within functional limits Delayed oral transit time/Reduced anterior - posterior transport

## 2022-04-06 NOTE — SWALLOW VFSS/MBS ASSESSMENT ADULT - DEMONSTRATES NEED FOR REFERRAL TO ANOTHER SERVICE
1. ENT Consult is recommended at MD's discretion given patient report of stuck sensation at level of thyroid notch./ENT

## 2022-04-06 NOTE — PROGRESS NOTE ADULT - ASSESSMENT
_________________________________________________________________________________________  ========>>  M E D I C A L   A T T E N D I N G    F O L L O W  U P  N O T E  <<=========  -----------------------------------------------------------------------------------------------------    - Patient seen and examined by me earlier today.   - In summary,  HOWARD SCHULZ is a 89y year old woman admitted with CP  - Patient today overall doing ok, comfortable, eating OK.  no pain reported at time of exam     ==================>> REVIEW OF SYSTEM <<=================    GEN: no fever, no chills, no pain  RESP: no SOB, no cough, no sputum  CVS: no chest pain, no palpitations, no edema  GI: no abdominal pain, no nausea  : no dysuria, no frequency  Neuro: no headache, no dizziness  Derm : no itching, no rash    ==================>> PHYSICAL EXAM <<=================    GEN: A&O X 3 , NAD , comfortable, pleasant, calm   HEENT: NCAT, PERRL, MMM, hearing intact  Neck: supple , no JVD appreciated  CVS: S1S2 , regular , No M/R/G appreciated  PULM: CTA B/L,  no W/R/R appreciated  ABD.: soft. non tender, non distended,  bowel sounds present  Extrem: intact pulses , no edema   PSYCH : normal mood,  not anxious                            ( Note Written / Date of service :  22 )    ==================>> MEDICATIONS <<====================    MEDICATIONS  (STANDING):  atorvastatin 20 milliGRAM(s) Oral at bedtime  budesonide 160 MICROgram(s)/formoterol 4.5 MICROgram(s) Inhaler 2 Puff(s) Inhalation two times a day  cefTRIAXone   IVPB 2000 milliGRAM(s) IV Intermittent every 24 hours  clopidogrel Tablet 75 milliGRAM(s) Oral daily  cyanocobalamin 1000 MICROGram(s) Oral daily  escitalopram 10 milliGRAM(s) Oral daily  metoprolol succinate ER 50 milliGRAM(s) Oral daily  metroNIDAZOLE  IVPB      metroNIDAZOLE  IVPB 500 milliGRAM(s) IV Intermittent every 8 hours  pancrelipase  (CREON 24,000 Lipase Units) 2 Capsule(s) Oral three times a day with meals  pyridoxine 100 milliGRAM(s) Oral daily  rivastigmine patch  4.6 mG/24 Hr(s) 1 Patch Transdermal every 24 hours  timolol 0.25% Solution 1 Drop(s) Both EYES daily    MEDICATIONS  (PRN):  acetaminophen     Tablet .. 650 milliGRAM(s) Oral every 6 hours PRN Temp greater or equal to 38C (100.4F), Mild Pain (1 - 3)  aluminum hydroxide/magnesium hydroxide/simethicone Suspension 30 milliLiter(s) Oral every 4 hours PRN Dyspepsia  melatonin 3 milliGRAM(s) Oral at bedtime PRN Insomnia  ondansetron Injectable 4 milliGRAM(s) IV Push every 8 hours PRN Nausea and/or Vomiting  zolpidem 5 milliGRAM(s) Oral at bedtime PRN Insomnia    ___________  Active diet:  Diet, Soft and Bite Sized:   DASH/TLC Sodium & Cholesterol Restricted (DASH)  ___________________    ==================>> VITAL SIGNS <<==================    T(C): 36.7 (22 @ 17:26), Max: 36.7 (22 @ 19:52)  HR: 64 (22 @ 17:26) (64 - 77)  BP: 154/74 (22 @ 17:26) (129/84 - 154/77)  BP(mean): --  RR: 18 (22 @ 17:26) (17 - 18)  SpO2: 94% (22 @ 17:26) (94% - 100%)      ==================>> LAB AND IMAGING <<==================                        12.2   10.13 )-----------( 162      ( 2022 12:39 )             37.8        04-06    138  |  101  |  17  ----------------------------<  110<H>  4.1   |  24  |  1.06    Ca    8.6      2022 12:39  Phos  2.9     04-06  Mg     1.80     04-06    TPro  5.9<L>  /  Alb  2.9<L>  /  TBili  0.2  /  DBili  x   /  AST  23  /  ALT  17  /  AlkPhos  66  04-06     Urinalysis Basic - ( 2022 18:21 )  Color: Yellow / Appearance: Clear / S.020 / pH: x  Gluc: x / Ketone: Negative  / Bili: Negative / Urobili: <2 mg/dL   Blood: x / Protein: Trace / Nitrite: Positive   Leuk Esterase: Negative / RBC: 4 /HPF / WBC 1 /HPF   Sq Epi: x / Non Sq Epi: 1 /HPF / Bacteria: Many    TSH:      0.90   (22)       ,     0.34   (22)           < from: Transthoracic Echocardiogram (22 @ 08:12) >  CONCLUSIONS:  1. Mitral annular calcification, otherwise normal mitral  valve. Mild-moderate mitral regurgitation.  2. Calcified trileaflet aortic valve with decreased  opening. Peak transaortic valve gradient equals 32 mm Hg,  mean transaortic valve gradient equals 16 mm Hg, estimated  aortic valve area equals 1.7 sqcm (by continuity equation),  consistent with mild aortic stenosis. Mild-moderate aortic regurgitation.  3. Moderately dilated left atrium.  LA volume index = 46 cc/m2.  4. Normal left ventricular internal dimensions and wall thicknesses.  5. Normal left ventricular systolic function. No segmental wall motion abnormalities.  6. Mild diastolic dysfunction (Stage I).  7. Normal right ventricular size and function.  < end of copied text >    < from: CT Angio Chest Aorta w/wo IV Cont (22 @ 21:42) >  IMPRESSION:  No aortic dissection. Stable ectatic ascending aorta/aortic arch. Stable AAA.  Heterogenous thyroid gland, which can be further characterized on a   nonemergent ultrasound as indicated.  Suggest ileal diverticulitis.  Pancreatic ductal dilatation, which can be further characterize on a nonemergent MR/MRCP.  < end of copied text >    ___________________________________________________________________________________  ===============>>  A S S E S S M E N T   A N D   P L A N <<===============  ------------------------------------------------------------------------------------------    · Assessment	  This is a 89F with history as above who presents to the hospital with chest pain/palpitations with CTA showing multiple findings.     Problem/Plan - 1:  ·  Problem: Chest pain.   - Currently patient without pain in her chest   - c/w telemetry monitoring  - TTE as above with valvulopathy, mild chronic DHF   - cardi appreciated     stress test likely for tomorrow if stable     Problem/Plan - 2:  ·  Problem: Acute UTI.   - s/p ceftriaxone in ED, would c/w for now  - Has elevated WBC, mild fever rectally in ED, would monitor  - UCx in lab > follow     Problem/Plan - 3:  ·  Problem: Diverticulitis of intestine.   ·  Plan: - Incidentally noted on CTA, patient does not seem to have abdominal issues, abdomen is benign on examination  - Would c/w ceftriaxone but increase dosing to 2g, add flagyl  - Monitor temp, WBC, BMs  - Check BCx x2.  - GI allowing appreciated     Problem/Plan - 4:  ·  Problem: Imaging abnormalities.       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 Dudley of GI, diagnosed with pancreatic insufficiency and deemed to not need additional w/u for the duct dilation 2/2 age and comorbidities -> would c/w creon, f/u outpatient with Dr. Dudley in 2-3 months  - AAA stable on imaging -> c/w outpatient follow up for surveillance   - noted to have b/l adrenal thickening, was not noted on recent MR, unclear if due to b/l perinephric stranding or new from recent imaging, likely would need follow up as outpatient for further eval.    Problem/Plan - 5:  ·  Problem: Throat pain.      unclear etiology  d/d eval and MBS noted  check thyroid sono  ENT eval if not improved ..     Problem/Plan - 6:  ·  Problem: Alzheimer disease.   ·  Plan: - On rivastigmine patch, will restart here.    Problem/Plan - 7:  ·  Problem: History of CVA (cerebrovascular accident).   ·  Plan: - On plavix, failed dysphagia screen, restart if able to tolerate diet as per S&S eval.    Problem/Plan - 8:  ·  Problem: Pancreatic insufficiency.   ·  Plan: - On creon, failed dysphagia screen, restart if able to tolerate diet as per S&S eval.    Problem/Plan - 9:  ·  Problem: Abdominal aortic aneurysm (AAA).   ·  Plan: - stable, outpatient follow up.    Problem/Plan - 10:  ·  Problem: Essential hypertension.   ·  Plan; - on several meds, failed dysphagia screen, restart if able to tolerate diet as per S&S eval.    Problem/Plan - 11:  ·  Problem: Need for prophylactic measure.   ·  Plan: DVT ppx - SCDs  Diet - as recomended   Activity - OOB to chair/with assistance  Fall and Aspiration.    --------------------------------------------  Case discussed with pt, team..   Education given on findings and plan of care  ___________________________  H. TERI Mcdonnell.  Pager: [phone]

## 2022-04-06 NOTE — PROGRESS NOTE ADULT - SUBJECTIVE AND OBJECTIVE BOX
Interval Events:   Patient with 1 x episode of vomiting, clarifyied with nurse who reported patient drinking cranberry juice prior, thus, explaining red color.     Hospital Medications:  acetaminophen     Tablet .. 650 milliGRAM(s) Oral every 6 hours PRN  aluminum hydroxide/magnesium hydroxide/simethicone Suspension 30 milliLiter(s) Oral every 4 hours PRN  atorvastatin 20 milliGRAM(s) Oral at bedtime  budesonide 160 MICROgram(s)/formoterol 4.5 MICROgram(s) Inhaler 2 Puff(s) Inhalation two times a day  cefTRIAXone   IVPB 2000 milliGRAM(s) IV Intermittent every 24 hours  clopidogrel Tablet 75 milliGRAM(s) Oral daily  cyanocobalamin 1000 MICROGram(s) Oral daily  escitalopram 10 milliGRAM(s) Oral daily  melatonin 3 milliGRAM(s) Oral at bedtime PRN  metoprolol succinate ER 50 milliGRAM(s) Oral daily  metroNIDAZOLE  IVPB      metroNIDAZOLE  IVPB 500 milliGRAM(s) IV Intermittent every 8 hours  ondansetron Injectable 4 milliGRAM(s) IV Push every 8 hours PRN  pancrelipase  (CREON 24,000 Lipase Units) 2 Capsule(s) Oral three times a day with meals  pyridoxine 100 milliGRAM(s) Oral daily  rivastigmine patch  4.6 mG/24 Hr(s) 1 Patch Transdermal every 24 hours  timolol 0.25% Solution 1 Drop(s) Both EYES daily  zolpidem 5 milliGRAM(s) Oral at bedtime PRN      ROS: All system reviewed and negative except as mentioned above.    PHYSICAL EXAM:   Vital Signs:  Vital Signs Last 24 Hrs  T(C): 36.6 (2022 05:30), Max: 36.7 (2022 19:52)  T(F): 97.9 (2022 05:30), Max: 98.1 (2022 21:48)  HR: 77 (2022 05:30) (69 - 77)  BP: 137/76 (2022 05:30) (134/60 - 154/77)  BP(mean): --  RR: 17 (2022 05:30) (17 - 18)  SpO2: 95% (2022 05:30) (95% - 100%)  Daily     Daily     GENERAL:  NAD, Appears stated age  HEENT:  NC/AT,  conjunctivae clear and pink, sclera -anicteric  CHEST:  Normal Effort, Breath sounds clear  HEART:  RRR, S1 + S2, no murmurs  ABDOMEN:  Soft, non-tender, non-distended, normoactive bowel sounds,  no masses  EXTREMITIES:  no cyanosis or edema  SKIN:  Warm & Dry. No rash or erythema  NEURO:  Alert, oriented    LABS:                        10.7   10.46 )-----------( 154      ( 2022 06:41 )             32.8     Mean Cell Volume: 92.7 fL (22 @ 06:41)        136  |  99  |  17  ----------------------------<  89  4.0   |  22  |  0.89    Ca    8.3<L>      2022 11:27  Phos  3.6     04-05  Mg     1.50     04-    TPro  6.0  /  Alb  3.2<L>  /  TBili  0.4  /  DBili  x   /  AST  28  /  ALT  21  /  AlkPhos  74  04-05    LIVER FUNCTIONS - ( 2022 06:28 )  Alb: 3.2 g/dL / Pro: 6.0 g/dL / ALK PHOS: 74 U/L / ALT: 21 U/L / AST: 28 U/L / GGT: x             Urinalysis Basic - ( 2022 18:21 )    Color: Yellow / Appearance: Clear / S.020 / pH: x  Gluc: x / Ketone: Negative  / Bili: Negative / Urobili: <2 mg/dL   Blood: x / Protein: Trace / Nitrite: Positive   Leuk Esterase: Negative / RBC: 4 /HPF / WBC 1 /HPF   Sq Epi: x / Non Sq Epi: 1 /HPF / Bacteria: Many                              10.7   10.46 )-----------( 154      ( 2022 06:41 )             32.8                         12.4   12.04 )-----------( 142      ( 2022 21:50 )             37.8                         11.2   12.56 )-----------( 138      ( 2022 06:28 )             34.6                         11.5   11.98 )-----------( 143      ( 2022 18:21 )             35.0       Imaging: Images reviewed.     Interval Events:   Patient with 1 x episode of vomiting, clarifyied with nurse who reported patient drinking cranberry juice prior, thus, explaining red color.     Hospital Medications:  acetaminophen     Tablet .. 650 milliGRAM(s) Oral every 6 hours PRN  aluminum hydroxide/magnesium hydroxide/simethicone Suspension 30 milliLiter(s) Oral every 4 hours PRN  atorvastatin 20 milliGRAM(s) Oral at bedtime  budesonide 160 MICROgram(s)/formoterol 4.5 MICROgram(s) Inhaler 2 Puff(s) Inhalation two times a day  cefTRIAXone   IVPB 2000 milliGRAM(s) IV Intermittent every 24 hours  clopidogrel Tablet 75 milliGRAM(s) Oral daily  cyanocobalamin 1000 MICROGram(s) Oral daily  escitalopram 10 milliGRAM(s) Oral daily  melatonin 3 milliGRAM(s) Oral at bedtime PRN  metoprolol succinate ER 50 milliGRAM(s) Oral daily  metroNIDAZOLE  IVPB      metroNIDAZOLE  IVPB 500 milliGRAM(s) IV Intermittent every 8 hours  ondansetron Injectable 4 milliGRAM(s) IV Push every 8 hours PRN  pancrelipase  (CREON 24,000 Lipase Units) 2 Capsule(s) Oral three times a day with meals  pyridoxine 100 milliGRAM(s) Oral daily  rivastigmine patch  4.6 mG/24 Hr(s) 1 Patch Transdermal every 24 hours  timolol 0.25% Solution 1 Drop(s) Both EYES daily  zolpidem 5 milliGRAM(s) Oral at bedtime PRN      ROS: All system reviewed and negative except as mentioned above.    PHYSICAL EXAM:   Vital Signs:  Vital Signs Last 24 Hrs  T(C): 36.6 (2022 05:30), Max: 36.7 (2022 19:52)  T(F): 97.9 (2022 05:30), Max: 98.1 (2022 21:48)  HR: 77 (2022 05:30) (69 - 77)  BP: 137/76 (2022 05:30) (134/60 - 154/77)  BP(mean): --  RR: 17 (2022 05:30) (17 - 18)  SpO2: 95% (2022 05:30) (95% - 100%)  Daily     Daily     GENERAL:  NAD, Appears stated age  HEENT:  NC/AT,  conjunctivae clear and pink, sclera -anicteric  CHEST:  Normal Effort  ABDOMEN:  Soft, non-tender, non-distended, no masses  EXTREMITIES:  no cyanosis or edema  SKIN:  Warm & Dry. No rash or erythema  NEURO:  Alert, oriented    LABS:                        10.7   10.46 )-----------( 154      ( 2022 06:41 )             32.8     Mean Cell Volume: 92.7 fL (22 @ 06:41)        136  |  99  |  17  ----------------------------<  89  4.0   |  22  |  0.89    Ca    8.3<L>      2022 11:27  Phos  3.6     04-05  Mg     1.50     04-    TPro  6.0  /  Alb  3.2<L>  /  TBili  0.4  /  DBili  x   /  AST  28  /  ALT  21  /  AlkPhos  74  04-05    LIVER FUNCTIONS - ( 2022 06:28 )  Alb: 3.2 g/dL / Pro: 6.0 g/dL / ALK PHOS: 74 U/L / ALT: 21 U/L / AST: 28 U/L / GGT: x             Urinalysis Basic - ( 2022 18:21 )    Color: Yellow / Appearance: Clear / S.020 / pH: x  Gluc: x / Ketone: Negative  / Bili: Negative / Urobili: <2 mg/dL   Blood: x / Protein: Trace / Nitrite: Positive   Leuk Esterase: Negative / RBC: 4 /HPF / WBC 1 /HPF   Sq Epi: x / Non Sq Epi: 1 /HPF / Bacteria: Many                              10.7   10.46 )-----------( 154      ( 2022 06:41 )             32.8                         12.4   12.04 )-----------( 142      ( 2022 21:50 )             37.8                         11.2   12.56 )-----------( 138      ( 2022 06:28 )             34.6                         11.5   11.98 )-----------( 143      ( 2022 18:21 )             35.0       Imaging: Images reviewed.

## 2022-04-06 NOTE — PROGRESS NOTE ADULT - ASSESSMENT
#Ileal diverticulitis with leukocytosis and fever.   #Pancreatic insufficiency with known atrophy of pancreas on MRI from 3/22, improvement in diarrhea on Creon (2 tab with meals)  #Pancreatic duct dilation, no mass on MRI    Patient feeling better this am    Recommendations:  -Agree with IV antibiotic for suspected ileal diverticulitis, can transition to PO (Augmentin or Cipro + Flagyl) for 7 days course if able to tolerate PO diet.   -Continue with Creon 24,000 2 tab w/ meals  -No further work up for pancreatic duct dilation. This is known and has been worked up by Dr. Dudley.   -No role for endoscopic evaluation at this time  -Will need close outpatient follow up with Dr. Dudley    No further work up from our perspective, please reach out to us for any question or concern.    Recommendations preliminary until signed by attending.     Antione Cuevas MD  Gastroenterology/Hepatology Fellow  1st option: [phone] (text or call), ONLY available from 7:00 am to 5:00 pm.   **Contact on-call GI fellow via answering service ([phone]) from 5pm-7am AND on weekends/holidays**  2nd option: Available via [employer] Teams  3rd option: Pager: [phone]            #Ileal diverticulitis with leukocytosis and fever.   #Pancreatic insufficiency with known atrophy of pancreas on MRI from 3/22, improvement in diarrhea on Creon (2 tab with meals)  #Pancreatic duct dilation, no mass on MRI    Patient feeling better this am    Recommendations:  -Agree with IV antibiotic for suspected ileal diverticulitis, can transition to PO once able to tolerate PO diet. Defer to primary team.   -Continue with Creon 24,000 2 tab w/ meals  -No further work up for pancreatic duct dilation. This is known and has been worked up by Dr. Dudley.   -No role for endoscopic evaluation at this time  -Will need close outpatient follow up with Dr. Dudley    No further work up from our perspective, please reach out to us for any question or concern.    Recommendations preliminary until signed by attending.     Antione Cuevas MD  Gastroenterology/Hepatology Fellow  1st option: [phone] (text or call), ONLY available from 7:00 am to 5:00 pm.   **Contact on-call GI fellow via answering service ([phone]) from 5pm-7am AND on weekends/holidays**  2nd option: Available via [employer] Teams  3rd option: Pager: [phone]            #Ileal diverticulitis with leukocytosis and fever  #Pancreatic insufficiency with known atrophy of pancreas on MRI from 3/22, improvement in diarrhea on Creon (2 tab with meals)  #Pancreatic duct dilation, no mass on MRI    Patient feeling better this am    Recommendations:  -Agree with IV antibiotic for suspected ileal diverticulitis, can transition to PO once able to tolerate PO diet. Defer to primary team.   -Continue with Creon 24,000 2 tab w/ meals  -No further work up for pancreatic duct dilation. This is known and has been worked up by Dr. Dudley.   -No role for endoscopic evaluation at this time  -Will need close outpatient follow up with Dr. Dudley    No further work up from our perspective, please reach out to us for any question or concern.    Recommendations preliminary until signed by attending.     Antione Cuevas MD  Gastroenterology/Hepatology Fellow  1st option: [phone] (text or call), ONLY available from 7:00 am to 5:00 pm.   **Contact on-call GI fellow via answering service ([phone]) from 5pm-7am AND on weekends/holidays**  2nd option: Available via [employer] Teams  3rd option: Pager: [phone]

## 2022-04-07 LAB
-  AMIKACIN: SIGNIFICANT CHANGE UP
-  AMOXICILLIN/CLAVULANIC ACID: SIGNIFICANT CHANGE UP
-  AMPICILLIN/SULBACTAM: SIGNIFICANT CHANGE UP
-  AMPICILLIN: SIGNIFICANT CHANGE UP
-  AZTREONAM: SIGNIFICANT CHANGE UP
-  CEFAZOLIN: SIGNIFICANT CHANGE UP
-  CEFEPIME: SIGNIFICANT CHANGE UP
-  CEFOXITIN: SIGNIFICANT CHANGE UP
-  CEFTRIAXONE: SIGNIFICANT CHANGE UP
-  CIPROFLOXACIN: SIGNIFICANT CHANGE UP
-  ERTAPENEM: SIGNIFICANT CHANGE UP
-  GENTAMICIN: SIGNIFICANT CHANGE UP
-  IMIPENEM: SIGNIFICANT CHANGE UP
-  LEVOFLOXACIN: SIGNIFICANT CHANGE UP
-  MEROPENEM: SIGNIFICANT CHANGE UP
-  NITROFURANTOIN: SIGNIFICANT CHANGE UP
-  PIPERACILLIN/TAZOBACTAM: SIGNIFICANT CHANGE UP
-  TIGECYCLINE: SIGNIFICANT CHANGE UP
-  TOBRAMYCIN: SIGNIFICANT CHANGE UP
-  TRIMETHOPRIM/SULFAMETHOXAZOLE: SIGNIFICANT CHANGE UP
ANION GAP SERPL CALC-SCNC: 12 MMOL/L — SIGNIFICANT CHANGE UP (ref 7–14)
BUN SERPL-MCNC: 16 MG/DL — SIGNIFICANT CHANGE UP (ref 7–23)
CALCIUM SERPL-MCNC: 8.4 MG/DL — SIGNIFICANT CHANGE UP (ref 8.4–10.5)
CHLORIDE SERPL-SCNC: 105 MMOL/L — SIGNIFICANT CHANGE UP (ref 98–107)
CO2 SERPL-SCNC: 24 MMOL/L — SIGNIFICANT CHANGE UP (ref 22–31)
CREAT SERPL-MCNC: 0.94 MG/DL — SIGNIFICANT CHANGE UP (ref 0.5–1.3)
CULTURE RESULTS: SIGNIFICANT CHANGE UP
EGFR: 57 ML/MIN/1.73M2 — LOW
GLUCOSE SERPL-MCNC: 103 MG/DL — HIGH (ref 70–99)
HCT VFR BLD CALC: 33.7 % — LOW (ref 34.5–45)
HGB BLD-MCNC: 11 G/DL — LOW (ref 11.5–15.5)
MAGNESIUM SERPL-MCNC: 1.6 MG/DL — SIGNIFICANT CHANGE UP (ref 1.6–2.6)
MCHC RBC-ENTMCNC: 30.7 PG — SIGNIFICANT CHANGE UP (ref 27–34)
MCHC RBC-ENTMCNC: 32.6 GM/DL — SIGNIFICANT CHANGE UP (ref 32–36)
MCV RBC AUTO: 94.1 FL — SIGNIFICANT CHANGE UP (ref 80–100)
METHOD TYPE: SIGNIFICANT CHANGE UP
NRBC # BLD: 0 /100 WBCS — SIGNIFICANT CHANGE UP
NRBC # FLD: 0 K/UL — SIGNIFICANT CHANGE UP
ORGANISM # SPEC MICROSCOPIC CNT: SIGNIFICANT CHANGE UP
ORGANISM # SPEC MICROSCOPIC CNT: SIGNIFICANT CHANGE UP
PHOSPHATE SERPL-MCNC: 3.1 MG/DL — SIGNIFICANT CHANGE UP (ref 2.5–4.5)
PLATELET # BLD AUTO: 149 K/UL — LOW (ref 150–400)
POTASSIUM SERPL-MCNC: 3.6 MMOL/L — SIGNIFICANT CHANGE UP (ref 3.5–5.3)
POTASSIUM SERPL-SCNC: 3.6 MMOL/L — SIGNIFICANT CHANGE UP (ref 3.5–5.3)
RBC # BLD: 3.58 M/UL — LOW (ref 3.8–5.2)
RBC # FLD: 12.7 % — SIGNIFICANT CHANGE UP (ref 10.3–14.5)
SODIUM SERPL-SCNC: 141 MMOL/L — SIGNIFICANT CHANGE UP (ref 135–145)
SPECIMEN SOURCE: SIGNIFICANT CHANGE UP
WBC # BLD: 8.45 K/UL — SIGNIFICANT CHANGE UP (ref 3.8–10.5)
WBC # FLD AUTO: 8.45 K/UL — SIGNIFICANT CHANGE UP (ref 3.8–10.5)

## 2022-04-07 RX ADMIN — BUDESONIDE AND FORMOTEROL FUMARATE DIHYDRATE 2 PUFF(S): 160; 4.5 AEROSOL RESPIRATORY (INHALATION) at 21:01

## 2022-04-07 RX ADMIN — CLOPIDOGREL BISULFATE 75 MILLIGRAM(S): 75 TABLET, FILM COATED ORAL at 13:26

## 2022-04-07 RX ADMIN — Medication 2 CAPSULE(S): at 13:25

## 2022-04-07 RX ADMIN — RIVASTIGMINE 1 PATCH: 4.6 PATCH, EXTENDED RELEASE TRANSDERMAL at 05:57

## 2022-04-07 RX ADMIN — Medication 2 CAPSULE(S): at 19:13

## 2022-04-07 RX ADMIN — BUDESONIDE AND FORMOTEROL FUMARATE DIHYDRATE 2 PUFF(S): 160; 4.5 AEROSOL RESPIRATORY (INHALATION) at 09:19

## 2022-04-07 RX ADMIN — RIVASTIGMINE 1 PATCH: 4.6 PATCH, EXTENDED RELEASE TRANSDERMAL at 05:56

## 2022-04-07 RX ADMIN — Medication 100 MILLIGRAM(S): at 05:50

## 2022-04-07 RX ADMIN — Medication 100 MILLIGRAM(S): at 14:00

## 2022-04-07 RX ADMIN — CEFTRIAXONE 100 MILLIGRAM(S): 500 INJECTION, POWDER, FOR SOLUTION INTRAMUSCULAR; INTRAVENOUS at 19:12

## 2022-04-07 RX ADMIN — Medication 2 CAPSULE(S): at 09:19

## 2022-04-07 RX ADMIN — ESCITALOPRAM OXALATE 10 MILLIGRAM(S): 10 TABLET, FILM COATED ORAL at 13:25

## 2022-04-07 RX ADMIN — ZOLPIDEM TARTRATE 5 MILLIGRAM(S): 10 TABLET ORAL at 21:01

## 2022-04-07 RX ADMIN — Medication 100 MILLIGRAM(S): at 13:26

## 2022-04-07 RX ADMIN — Medication 100 MILLIGRAM(S): at 21:01

## 2022-04-07 RX ADMIN — Medication 650 MILLIGRAM(S): at 13:25

## 2022-04-07 RX ADMIN — Medication 650 MILLIGRAM(S): at 14:05

## 2022-04-07 RX ADMIN — PREGABALIN 1000 MICROGRAM(S): 225 CAPSULE ORAL at 13:26

## 2022-04-07 RX ADMIN — RIVASTIGMINE 1 PATCH: 4.6 PATCH, EXTENDED RELEASE TRANSDERMAL at 07:00

## 2022-04-07 RX ADMIN — ATORVASTATIN CALCIUM 20 MILLIGRAM(S): 80 TABLET, FILM COATED ORAL at 21:01

## 2022-04-07 RX ADMIN — Medication 1 DROP(S): at 13:25

## 2022-04-07 NOTE — PHYSICAL THERAPY INITIAL EVALUATION ADULT - ADDITIONAL COMMENTS
Pt is Chinese speaking.  Pt states she lives with her daughter.  Pt states she has 2 steps to enter with a landing, and 7 additional steps with carpet.  Uses straight cane at baseline.  Pt reports 3 falls within the last year.

## 2022-04-07 NOTE — OCCUPATIONAL THERAPY INITIAL EVALUATION ADULT - LIVES WITH, PROFILE
Pt.'s daughter reports pt. lives with her in an apartment building with steps to enter. Once inside, pt. reports pt. has steps to negotiate to reach apartment. Per pt.'s daughter, pt has a bathtub in her bathroom, +shower chair available.

## 2022-04-07 NOTE — PROGRESS NOTE ADULT - SUBJECTIVE AND OBJECTIVE BOX
Cardiovascular Disease Progress Note    Overnight events: No acute events overnight.  no cp/sob/palps/dizziness  Otherwise review of systems negative    Objective Findings:  T(C): 36.6 (04-07-22 @ 05:51), Max: 37.2 (04-06-22 @ 21:42)  HR: 69 (04-07-22 @ 05:51) (64 - 74)  BP: 107/66 (04-07-22 @ 05:51) (107/66 - 154/74)  RR: 18 (04-07-22 @ 05:51) (18 - 18)  SpO2: 95% (04-07-22 @ 05:51) (94% - 96%)  Wt(kg): --  Daily     Daily       Physical Exam:  Gen: NAD  HEENT: EOMI  CV: RRR, normal S1 + S2, no m/r/g  Lungs: CTAB  Abd: soft, non-tender  Ext: No edema    Telemetry:    Laboratory Data:                        11.0   8.45  )-----------( 149      ( 07 Apr 2022 06:54 )             33.7     04-06    138  |  101  |  17  ----------------------------<  110<H>  4.1   |  24  |  1.06    Ca    8.6      06 Apr 2022 12:39  Phos  2.9     04-06  Mg     1.80     04-06    TPro  5.9<L>  /  Alb  2.9<L>  /  TBili  0.2  /  DBili  x   /  AST  23  /  ALT  17  /  AlkPhos  66  04-06              Inpatient Medications:  MEDICATIONS  (STANDING):  atorvastatin 20 milliGRAM(s) Oral at bedtime  budesonide 160 MICROgram(s)/formoterol 4.5 MICROgram(s) Inhaler 2 Puff(s) Inhalation two times a day  cefTRIAXone   IVPB 2000 milliGRAM(s) IV Intermittent every 24 hours  clopidogrel Tablet 75 milliGRAM(s) Oral daily  cyanocobalamin 1000 MICROGram(s) Oral daily  escitalopram 10 milliGRAM(s) Oral daily  metoprolol succinate ER 50 milliGRAM(s) Oral daily  metroNIDAZOLE  IVPB      metroNIDAZOLE  IVPB 500 milliGRAM(s) IV Intermittent every 8 hours  pancrelipase  (CREON 24,000 Lipase Units) 2 Capsule(s) Oral three times a day with meals  pyridoxine 100 milliGRAM(s) Oral daily  rivastigmine patch  4.6 mG/24 Hr(s) 1 Patch Transdermal every 24 hours  timolol 0.25% Solution 1 Drop(s) Both EYES daily      Assessment:  chest pain  AAA, stable  TAA, stable  hx of CVA  dementia  nonobs CAD on prior cath  ? ileal diverticulitis  UTI, ? pyelo  HTN    Recs:  cardiac stable  no e/o acs by ecg or biomarkers  obtain 2d echo  pharm nst pending. reviewed with daughter and she would like everything done  cw antiplatelets and antianginals  GI recs appreciated  s/p 2d echo, results reviewed  abx per primary team  will follow        Over 25 minutes spent on total encounter; more than 50% of the visit was spent counseling and/or coordinating care by the attending physician.      Redd Delacruz MD   Cardiovascular Disease  [phone]

## 2022-04-07 NOTE — OCCUPATIONAL THERAPY INITIAL EVALUATION ADULT - ADDITIONAL COMMENTS
Pt.'s daughter explains pt. has a HHA 7 days/week x 5 hours to assist her as needed. Per pt.'s daughter, when HHA is not there, herself or family are always with her. Prior to hospitalization, pt's daughter explains that pt was receiving home OT and PT services, each 2x/week.     (See continued from above) CT Angio Chest Aorta and Abd Aorta on 4/4/22 displayed no aortic dissection. Stable ectatic ascending aorta/aortic arch. Stable AAA. Heterogenous thyroid gland, which can be further characterized on a nonemergent ultrasound as indicated. Suggest ileal diverticulitis. Pancreatic ductal dilatation, which can be further characterize on a nonemergent MR/MRCP.

## 2022-04-07 NOTE — OCCUPATIONAL THERAPY INITIAL EVALUATION ADULT - PERTINENT HX OF CURRENT PROBLEM, REHAB EVAL
Pt is a 89 year old female with hx of Alzheimer's Dementia, HTN, CVAs, HLD, AAA, and Pancreatic Insufficiency, who presented to [facility] on 4/5/22 with SOB and chest pain. (See continued below)

## 2022-04-07 NOTE — PROGRESS NOTE ADULT - ASSESSMENT
_________________________________________________________________________________________  ========>>  M E D I C A L   A T T E N D I N G    F O L L O W  U P  N O T E  <<=========  -----------------------------------------------------------------------------------------------------    - Patient seen and examined by me earlier today.   - In summary,  HOWARD SCHULZ is a 89y year old woman admitted with CP  - Patient today overall doing ok, comfortable, eating OK.  c/o diffuse / generalized abd pain , no diarrhea, no constipation + gas > encouraged OOB to chair / increase activity ..     ==================>> REVIEW OF SYSTEM <<=================    GEN: no fever, no chills, as above   RESP: no SOB, no cough, no sputum  CVS: no chest pain, no palpitations, no edema  GI: abdominal pain, no nausea  : no dysuria, no frequency  Neuro: no headache, no dizziness  Derm : no itching, no rash    ==================>> PHYSICAL EXAM <<=================    GEN: A&O X 3 , NAD , comfortable, pleasant, calm , resting in bed   HEENT: NCAT, PERRL, MMM, hearing intact  Neck: supple , no JVD appreciated  CVS: S1S2 , regular , No M/R/G appreciated  PULM: CTA B/L,  no W/R/R appreciated  ABD.: soft. mild diffusely tender, non distended,  bowel sounds present  Extrem: intact pulses , no edema   PSYCH : normal mood,  not anxious                             ( Note written / Date of service 04-07-22 )    ==================>> MEDICATIONS <<====================    atorvastatin 20 milliGRAM(s) Oral at bedtime  budesonide 160 MICROgram(s)/formoterol 4.5 MICROgram(s) Inhaler 2 Puff(s) Inhalation two times a day  cefTRIAXone   IVPB 2000 milliGRAM(s) IV Intermittent every 24 hours  clopidogrel Tablet 75 milliGRAM(s) Oral daily  cyanocobalamin 1000 MICROGram(s) Oral daily  escitalopram 10 milliGRAM(s) Oral daily  metoprolol succinate ER 50 milliGRAM(s) Oral daily  metroNIDAZOLE  IVPB      metroNIDAZOLE  IVPB 500 milliGRAM(s) IV Intermittent every 8 hours  pancrelipase  (CREON 24,000 Lipase Units) 2 Capsule(s) Oral three times a day with meals  pyridoxine 100 milliGRAM(s) Oral daily  rivastigmine patch  4.6 mG/24 Hr(s) 1 Patch Transdermal every 24 hours  timolol 0.25% Solution 1 Drop(s) Both EYES daily    MEDICATIONS  (PRN):  acetaminophen     Tablet .. 650 milliGRAM(s) Oral every 6 hours PRN Temp greater or equal to 38C (100.4F), Mild Pain (1 - 3)  aluminum hydroxide/magnesium hydroxide/simethicone Suspension 30 milliLiter(s) Oral every 4 hours PRN Dyspepsia  ondansetron Injectable 4 milliGRAM(s) IV Push every 8 hours PRN Nausea and/or Vomiting  zolpidem 5 milliGRAM(s) Oral at bedtime PRN Insomnia    ___________  Active diet:  Diet, Soft and Bite Sized:   DASH/TLC Sodium & Cholesterol Restricted (DASH)  No Dairy  ___________________    ==================>> VITAL SIGNS <<==================    Vital Signs Last 24 HrsT(C): 36.7 (04-07-22 @ 21:00)  T(F): 98.1 (04-07-22 @ 21:00), Max: 98.9 (04-06-22 @ 21:42)  HR: 75 (04-07-22 @ 21:00) (69 - 75)  BP: 160/79 (04-07-22 @ 21:00)  RR: 18 (04-07-22 @ 21:00) (15 - 18)  SpO2: 97% (04-07-22 @ 21:00) (95% - 97%)       ==================>> LAB AND IMAGING <<==================                        11.0   8.45  )-----------( 149      ( 07 Apr 2022 06:54 )             33.7        04-07    141  |  105  |  16  ----------------------------<  103<H>  3.6   |  24  |  0.94    Ca    8.4      07 Apr 2022 06:54  Phos  3.1     04-07  Mg     1.60     04-07    TPro  5.9<L>  /  Alb  2.9<L>  /  TBili  0.2  /  DBili  x   /  AST  23  /  ALT  17  /  AlkPhos  66  04-06    WBC count:   8.45 <<== ,  10.13 <<== ,  10.46 <<== ,  12.04 <<== ,  12.56 <<== ,  11.98 <<==   Hemoglobin:   11.0 <<==,  12.2 <<==,  10.7 <<==,  12.4 <<==,  11.2 <<==,  11.5 <<==  platelets:  149 <==, 162 <==, 154 <==, 142 <==, 138 <==, 143 <==    Creatinine:  0.94  <<==, 1.06  <<==, 0.92  <<==, 0.89  <<==, 0.92  <<==, 1.08  <<==  Sodium:   141  <==, 138  <==, 135  <==, 136  <==, 136  <==, 138  <==       AST:          23 <== , 28 <== , 37 <==      ALT:        17  <== , 21  <== , 27  <==      AP:        66  <=, 74  <=, 81  <=     Bili:        0.2  <=, 0.4  <=, 0.4  <=    ____________________________    M I C R O B I O L O G Y :    Culture - Blood (collected 05 Apr 2022 08:27)  Source: .Blood Blood-Peripheral  Preliminary Report (06 Apr 2022 09:01):    No growth to date.    Culture - Blood (collected 05 Apr 2022 08:27)  Source: .Blood Blood-Peripheral  Preliminary Report (06 Apr 2022 09:01):    No growth to date.    Culture - Urine (collected 04 Apr 2022 20:40)  Source: Clean Catch Clean Catch (Midstream)  Final Report (07 Apr 2022 11:45):    >100,000 CFU/ml Escherichia coli  Organism: Escherichia coli (07 Apr 2022 11:45)  Organism: Escherichia coli (07 Apr 2022 11:45)    Sensitivities:      -  Amikacin: S <=16      -  Amoxicillin/Clavulanic Acid: S <=8/4      -  Ampicillin: R >16 These ampicillin results predict results for amoxicillin      -  Ampicillin/Sulbactam: I 16/8 Enterobacter, Klebsiella aerogenes, Citrobacter, and Serratia may develop resistance during prolonged therapy (3-4 days)      -  Aztreonam: S <=4      -  Cefazolin: S <=2 (MIC_CL_COM_ENTERIC_CEFAZU) For uncomplicated UTI with K. pneumoniae, E. coli, or P. mirablis: ARCHIE <=16 is sensitive and ARCHIE >=32 is resistant. This also predicts results for oral agents cefaclor, cefdinir, cefpodoxime, cefprozil, cefuroxime axetil, cephalexin and locarbef for uncomplicated UTI. Note that some isolates may be susceptible to these agents while testing resistant to cefazolin.      -  Cefepime: S <=2      -  Cefoxitin: S <=8      -  Ceftriaxone: S <=1 Enterobacter, Klebsiella aerogenes, Citrobacter, and Serratia may develop resistance during prolonged therapy      -  Ciprofloxacin: I 0.5      -  Ertapenem: S <=0.5      -  Gentamicin: S <=2      -  Imipenem: S <=1      -  Levofloxacin: S <=0.5      -  Meropenem: S <=1      -  Nitrofurantoin: I 64 Should not be used to treat pyelonephritis      -  Piperacillin/Tazobactam: S <=8      -  Tigecycline: S <=2      -  Tobramycin: S <=2      -  Trimethoprim/Sulfamethoxazole: R >2/38      Method Type: ARCHIE    < from: Transthoracic Echocardiogram (04.06.22 @ 08:12) >  CONCLUSIONS:  1. Mitral annular calcification, otherwise normal mitral  valve. Mild-moderate mitral regurgitation.  2. Calcified trileaflet aortic valve with decreased  opening. Peak transaortic valve gradient equals 32 mm Hg,  mean transaortic valve gradient equals 16 mm Hg, estimated  aortic valve area equals 1.7 sqcm (by continuity equation),  consistent with mild aortic stenosis. Mild-moderate aortic regurgitation.  3. Moderately dilated left atrium.  LA volume index = 46 cc/m2.  4. Normal left ventricular internal dimensions and wall thicknesses.  5. Normal left ventricular systolic function. No segmental wall motion abnormalities.  6. Mild diastolic dysfunction (Stage I).  7. Normal right ventricular size and function.  < end of copied text >    < from: CT Angio Chest Aorta w/wo IV Cont (04.04.22 @ 21:42) >  IMPRESSION:  No aortic dissection. Stable ectatic ascending aorta/aortic arch. Stable AAA.  Heterogenous thyroid gland, which can be further characterized on a   nonemergent ultrasound as indicated.  Suggest ileal diverticulitis.  Pancreatic ductal dilatation, which can be further characterize on a nonemergent MR/MRCP.  < end of copied text >    ___________________________________________________________________________________  ===============>>  A S S E S S M E N T   A N D   P L A N <<===============  ------------------------------------------------------------------------------------------    · Assessment	  This is a 89F with history as above who presents to the hospital with chest pain/palpitations with CTA showing multiple findings.     Problem/Plan - 1:  ·  Problem: Chest pain.   - Currently patient without pain in her chest   - c/w telemetry monitoring  - TTE as above with valvulopathy, mild chronic DHF   - cardio appreciated     stress test pending    Problem/Plan - 2:  ·  Problem: Acute UTI.   - s/p ceftriaxone in ED, would c/w for now  - UCx as above: sensitive     Problem/Plan - 3:  ·  Problem: Diverticulitis of intestine.   ·  Plan: - Incidentally noted on CTA, patient does not seem to have abdominal issues, abdomen is benign on examination  - Would c/w ceftriaxone but increase dosing to 2g, + flagyl on   - Monitor temp, WBC, BMs  - GI on board      Problem/Plan - 4:  ·  Problem: Imaging abnormalities.       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   - Noted to have a splenic lesion on CT, also noted on MR in March 2022, seems stable overall in size, would recommend outpatient follow up  - Has dilated pancreatic duct on imaging, also noted on the recent MR, was evaluated by Dr. Dudley of GI, diagnosed with pancreatic insufficiency and deemed to not need additional w/u for the duct dilation 2/2 age and comorbidities -> would c/w creon, f/u outpatient with Dr. Dudley in 2-3 months  - AAA stable on imaging -> c/w outpatient follow up for surveillance   - noted to have b/l adrenal thickening, was not noted on recent MR, unclear if due to b/l perinephric stranding or new from recent imaging, likely would need follow up as outpatient for further eval.    Problem/Plan - 5:  ·  Problem: Throat pain.      unclear etiology  d/d eval and MBS noted  check thyroid sono  ENT eval if not improved ..     Problem/Plan - 6:  ·  Problem: Alzheimer disease.   ·  Plan: - On rivastigmine patch, will restart here.    Problem/Plan - 7:  ·  Problem: History of CVA (cerebrovascular accident).   ·  Plan: - On plavix, failed dysphagia screen, restart if able to tolerate diet as per S&S eval.    Problem/Plan - 8:  ·  Problem: Pancreatic insufficiency.   ·  Plan: - On creon, failed dysphagia screen, restart if able to tolerate diet as per S&S eval.    Problem/Plan - 9:  ·  Problem: Abdominal aortic aneurysm (AAA).   ·  Plan: - stable, outpatient follow up.    Problem/Plan - 10:  ·  Problem: Essential hypertension.   ·  Plan; - on several meds, failed dysphagia screen, restart if able to tolerate diet as per S&S eval.    Problem/Plan - 11:  ·  Problem: Need for prophylactic measure.   ·  Plan: DVT ppx - SCDs  Diet - as recomended   Activity - OOB to chair/with assistance  Fall and Aspiration.    --------------------------------------------  Case discussed with pt, team..   Education given on findings and plan of care  ___________________________  H. TERI Mcdonnell.  Pager: [phone]

## 2022-04-07 NOTE — OCCUPATIONAL THERAPY INITIAL EVALUATION ADULT - MD ORDER
Occupational Therapy (OT) to evaluate and treat. Out of Bed to Chair. Out of bed with assistance. Per ALAN Dhillon, pt is okay to participate in OT evaluation and perform activity as tolerated.

## 2022-04-07 NOTE — OCCUPATIONAL THERAPY INITIAL EVALUATION ADULT - GENERAL OBSERVATIONS, REHAB EVAL
Pt. received semisupine in bed. No acute distress. Patient agreed to evaluation from Occupational Therapist. +Heplock, +Tele. Daughter bedside. Pt.'s primary language is [place]uvian. Pt.'s daughter present bedside to translate per pt.'s preference.

## 2022-04-07 NOTE — PHYSICAL THERAPY INITIAL EVALUATION ADULT - PERTINENT HX OF CURRENT PROBLEM, REHAB EVAL
This is a 89F with history of Alzheimer's Dementia, HTN, CVAs on Plavix, HLD, AAA, and Pancreatic Insufficiency who presents to the hospital with SOB and chest pain. Patient unable to fully describe her symptoms, when asked about the chest pain with a  phone she said that she has been having chest pain due to the social situation in [place].

## 2022-04-08 LAB
ANION GAP SERPL CALC-SCNC: 12 MMOL/L — SIGNIFICANT CHANGE UP (ref 7–14)
BUN SERPL-MCNC: 13 MG/DL — SIGNIFICANT CHANGE UP (ref 7–23)
CALCIUM SERPL-MCNC: 8.6 MG/DL — SIGNIFICANT CHANGE UP (ref 8.4–10.5)
CHLORIDE SERPL-SCNC: 103 MMOL/L — SIGNIFICANT CHANGE UP (ref 98–107)
CO2 SERPL-SCNC: 24 MMOL/L — SIGNIFICANT CHANGE UP (ref 22–31)
CREAT SERPL-MCNC: 0.83 MG/DL — SIGNIFICANT CHANGE UP (ref 0.5–1.3)
EGFR: 66 ML/MIN/1.73M2 — SIGNIFICANT CHANGE UP
GLUCOSE SERPL-MCNC: 74 MG/DL — SIGNIFICANT CHANGE UP (ref 70–99)
HCT VFR BLD CALC: 34.9 % — SIGNIFICANT CHANGE UP (ref 34.5–45)
HGB BLD-MCNC: 11.5 G/DL — SIGNIFICANT CHANGE UP (ref 11.5–15.5)
MAGNESIUM SERPL-MCNC: 1.4 MG/DL — LOW (ref 1.6–2.6)
MCHC RBC-ENTMCNC: 30.7 PG — SIGNIFICANT CHANGE UP (ref 27–34)
MCHC RBC-ENTMCNC: 33 GM/DL — SIGNIFICANT CHANGE UP (ref 32–36)
MCV RBC AUTO: 93.3 FL — SIGNIFICANT CHANGE UP (ref 80–100)
NRBC # BLD: 0 /100 WBCS — SIGNIFICANT CHANGE UP
NRBC # FLD: 0 K/UL — SIGNIFICANT CHANGE UP
PHOSPHATE SERPL-MCNC: 2.6 MG/DL — SIGNIFICANT CHANGE UP (ref 2.5–4.5)
PLATELET # BLD AUTO: 171 K/UL — SIGNIFICANT CHANGE UP (ref 150–400)
POTASSIUM SERPL-MCNC: 3.9 MMOL/L — SIGNIFICANT CHANGE UP (ref 3.5–5.3)
POTASSIUM SERPL-SCNC: 3.9 MMOL/L — SIGNIFICANT CHANGE UP (ref 3.5–5.3)
RBC # BLD: 3.74 M/UL — LOW (ref 3.8–5.2)
RBC # FLD: 12.7 % — SIGNIFICANT CHANGE UP (ref 10.3–14.5)
SODIUM SERPL-SCNC: 139 MMOL/L — SIGNIFICANT CHANGE UP (ref 135–145)
WBC # BLD: 7.27 K/UL — SIGNIFICANT CHANGE UP (ref 3.8–10.5)
WBC # FLD AUTO: 7.27 K/UL — SIGNIFICANT CHANGE UP (ref 3.8–10.5)

## 2022-04-08 PROCEDURE — 93016 CV STRESS TEST SUPVJ ONLY: CPT | Mod: GC

## 2022-04-08 PROCEDURE — 78452 HT MUSCLE IMAGE SPECT MULT: CPT | Mod: 26

## 2022-04-08 PROCEDURE — 93018 CV STRESS TEST I&R ONLY: CPT | Mod: GC

## 2022-04-08 RX ORDER — POTASSIUM CHLORIDE 20 MEQ
20 PACKET (EA) ORAL ONCE
Refills: 0 | Status: COMPLETED | OUTPATIENT
Start: 2022-04-08 | End: 2022-04-08

## 2022-04-08 RX ADMIN — Medication 2 CAPSULE(S): at 12:54

## 2022-04-08 RX ADMIN — ATORVASTATIN CALCIUM 20 MILLIGRAM(S): 80 TABLET, FILM COATED ORAL at 22:57

## 2022-04-08 RX ADMIN — Medication 2 CAPSULE(S): at 09:13

## 2022-04-08 RX ADMIN — CEFTRIAXONE 100 MILLIGRAM(S): 500 INJECTION, POWDER, FOR SOLUTION INTRAMUSCULAR; INTRAVENOUS at 17:36

## 2022-04-08 RX ADMIN — Medication 20 MILLIEQUIVALENT(S): at 12:56

## 2022-04-08 RX ADMIN — Medication 100 MILLIGRAM(S): at 22:57

## 2022-04-08 RX ADMIN — Medication 50 MILLIGRAM(S): at 06:25

## 2022-04-08 RX ADMIN — Medication 100 MILLIGRAM(S): at 10:28

## 2022-04-08 RX ADMIN — ZOLPIDEM TARTRATE 5 MILLIGRAM(S): 10 TABLET ORAL at 22:57

## 2022-04-08 RX ADMIN — Medication 2 CAPSULE(S): at 16:43

## 2022-04-08 RX ADMIN — RIVASTIGMINE 1 PATCH: 4.6 PATCH, EXTENDED RELEASE TRANSDERMAL at 06:00

## 2022-04-08 RX ADMIN — PREGABALIN 1000 MICROGRAM(S): 225 CAPSULE ORAL at 10:28

## 2022-04-08 RX ADMIN — BUDESONIDE AND FORMOTEROL FUMARATE DIHYDRATE 2 PUFF(S): 160; 4.5 AEROSOL RESPIRATORY (INHALATION) at 10:27

## 2022-04-08 RX ADMIN — CLOPIDOGREL BISULFATE 75 MILLIGRAM(S): 75 TABLET, FILM COATED ORAL at 10:28

## 2022-04-08 RX ADMIN — Medication 100 MILLIGRAM(S): at 06:25

## 2022-04-08 RX ADMIN — BUDESONIDE AND FORMOTEROL FUMARATE DIHYDRATE 2 PUFF(S): 160; 4.5 AEROSOL RESPIRATORY (INHALATION) at 22:57

## 2022-04-08 RX ADMIN — RIVASTIGMINE 1 PATCH: 4.6 PATCH, EXTENDED RELEASE TRANSDERMAL at 20:14

## 2022-04-08 RX ADMIN — ESCITALOPRAM OXALATE 10 MILLIGRAM(S): 10 TABLET, FILM COATED ORAL at 10:29

## 2022-04-08 RX ADMIN — Medication 1 DROP(S): at 09:13

## 2022-04-08 RX ADMIN — RIVASTIGMINE 1 PATCH: 4.6 PATCH, EXTENDED RELEASE TRANSDERMAL at 07:16

## 2022-04-08 RX ADMIN — Medication 100 MILLIGRAM(S): at 16:41

## 2022-04-08 NOTE — PROGRESS NOTE ADULT - ASSESSMENT
_________________________________________________________________________________________  ========>>  M E D I C A L   A T T E N D I N G    F O L L O W  U P  N O T E  <<=========  -----------------------------------------------------------------------------------------------------    - Patient seen and examined by me earlier today.   - In summary,  HOWARD SCHULZ is a 89y year old woman admitted with CP  - Patient today overall doing ok, comfortable, eating OK.  c/o some  generalized abd pain , no diarrhea, no constipation > encouraged OOB to chair / increase activity ..     ==================>> REVIEW OF SYSTEM <<=================    GEN: no fever, no chills, as above   RESP: no SOB, no cough, no sputum  CVS: no chest pain, no palpitations, no edema  GI: abdominal pain, no nausea  : no dysuria, no frequency  Neuro: no headache, no dizziness  Derm : no itching, no rash    ==================>> PHYSICAL EXAM <<=================    GEN: A&O X 2 , NAD , comfortable, pleasant, calm , resting in bed   HEENT: NCAT, PERRL, MMM, hearing intact  Neck: supple , no JVD appreciated  CVS: S1S2 , regular , No M/R/G appreciated  PULM: CTA B/L,  no W/R/R appreciated  ABD.: soft. mild diffusely tender, non distended,  bowel sounds present  Extrem: intact pulses , no edema   PSYCH : normal mood,  not anxious                             ( note written / Date of service   04-08-22 )    ==================>> MEDICATIONS <<====================    atorvastatin 20 milliGRAM(s) Oral at bedtime  budesonide 160 MICROgram(s)/formoterol 4.5 MICROgram(s) Inhaler 2 Puff(s) Inhalation two times a day  cefTRIAXone   IVPB 2000 milliGRAM(s) IV Intermittent every 24 hours  clopidogrel Tablet 75 milliGRAM(s) Oral daily  cyanocobalamin 1000 MICROGram(s) Oral daily  escitalopram 10 milliGRAM(s) Oral daily  metoprolol succinate ER 50 milliGRAM(s) Oral daily  metroNIDAZOLE  IVPB      metroNIDAZOLE  IVPB 500 milliGRAM(s) IV Intermittent every 8 hours  pancrelipase  (CREON 24,000 Lipase Units) 2 Capsule(s) Oral three times a day with meals  pyridoxine 100 milliGRAM(s) Oral daily  rivastigmine patch  4.6 mG/24 Hr(s) 1 Patch Transdermal every 24 hours  timolol 0.25% Solution 1 Drop(s) Both EYES daily    MEDICATIONS  (PRN):  acetaminophen     Tablet .. 650 milliGRAM(s) Oral every 6 hours PRN Temp greater or equal to 38C (100.4F), Mild Pain (1 - 3)  aluminum hydroxide/magnesium hydroxide/simethicone Suspension 30 milliLiter(s) Oral every 4 hours PRN Dyspepsia  ondansetron Injectable 4 milliGRAM(s) IV Push every 8 hours PRN Nausea and/or Vomiting  zolpidem 5 milliGRAM(s) Oral at bedtime PRN Insomnia    ___________  Active diet:  Diet, Soft and Bite Sized:   DASH/TLC Sodium & Cholesterol Restricted (DASH)  No Dairy  ___________________    ==================>> VITAL SIGNS <<==================   Height (cm): 165.1  Weight (kg): 58.5  BMI (kg/m2): 21.5  Vital Signs Last 24 HrsT(C): 36.2 (04-08-22 @ 14:36)  T(F): 97.2 (04-08-22 @ 14:36), Max: 98.2 (04-08-22 @ 12:00)  HR: 82 (04-08-22 @ 14:36) (75 - 85)  BP: 165/71 (04-08-22 @ 14:36)  RR: 17 (04-08-22 @ 14:36) (17 - 18)  SpO2: 100% (04-08-22 @ 14:36) (97% - 100%)       ==================>> LAB AND IMAGING <<==================                        11.5   7.27  )-----------( 171      ( 08 Apr 2022 08:42 )             34.9        04-08    139  |  103  |  13  ----------------------------<  74  3.9   |  24  |  0.83    Ca    8.6      08 Apr 2022 08:42  Phos  2.6     04-08  Mg     1.40     04-08    ____________________________    M I C R O B I O L O G Y :    Culture - Blood (collected 05 Apr 2022 08:27)  Source: .Blood Blood-Peripheral  Preliminary Report (06 Apr 2022 09:01):    No growth to date.    Culture - Blood (collected 05 Apr 2022 08:27)  Source: .Blood Blood-Peripheral  Preliminary Report (06 Apr 2022 09:01):    No growth to date.    Culture - Urine (collected 04 Apr 2022 20:40)  Source: Clean Catch Clean Catch (Midstream)  Final Report (07 Apr 2022 11:45):    >100,000 CFU/ml Escherichia coli  Organism: Escherichia coli (07 Apr 2022 11:45)  Organism: Escherichia coli (07 Apr 2022 11:45)    Sensitivities:      -  Amikacin: S <=16      -  Amoxicillin/Clavulanic Acid: S <=8/4      -  Ampicillin: R >16 These ampicillin results predict results for amoxicillin      -  Ampicillin/Sulbactam: I 16/8 Enterobacter, Klebsiella aerogenes, Citrobacter, and Serratia may develop resistance during prolonged therapy (3-4 days)      -  Aztreonam: S <=4      -  Cefazolin: S <=2 (MIC_CL_COM_ENTERIC_CEFAZU) For uncomplicated UTI with K. pneumoniae, E. coli, or P. mirablis: ARCHIE <=16 is sensitive and ARCHIE >=32 is resistant. This also predicts results for oral agents cefaclor, cefdinir, cefpodoxime, cefprozil, cefuroxime axetil, cephalexin and locarbef for uncomplicated UTI. Note that some isolates may be susceptible to these agents while testing resistant to cefazolin.      -  Cefepime: S <=2      -  Cefoxitin: S <=8      -  Ceftriaxone: S <=1 Enterobacter, Klebsiella aerogenes, Citrobacter, and Serratia may develop resistance during prolonged therapy      -  Ciprofloxacin: I 0.5      -  Ertapenem: S <=0.5      -  Gentamicin: S <=2      -  Imipenem: S <=1      -  Levofloxacin: S <=0.5      -  Meropenem: S <=1      -  Nitrofurantoin: I 64 Should not be used to treat pyelonephritis      -  Piperacillin/Tazobactam: S <=8      -  Tigecycline: S <=2      -  Tobramycin: S <=2      -  Trimethoprim/Sulfamethoxazole: R >2/38      Method Type: ARCHIE    < from: Nuclear Stress Test-Pharmacologic (Nuclear Stress Test-Pharmacologic .) (04.08.22 @ 15:05) >  IMPRESSIONS:Normal Study  * The left ventricle was normal in size.  * Tracer uptake was homogeneous throughout the left  ventricle.  * Normal study; no evidence for myocardial infarction or  ischemia.  * Post-stress gated wall motion analysis was performed  (LVEF = 67 %;LVEDV = 54 ml.), revealing normal LV  function. The RV function appeared normal.  < end of copied text >    < from: Transthoracic Echocardiogram (04.06.22 @ 08:12) >  CONCLUSIONS:  1. Mitral annular calcification, otherwise normal mitral  valve. Mild-moderate mitral regurgitation.  2. Calcified trileaflet aortic valve with decreased  opening. Peak transaortic valve gradient equals 32 mm Hg,  mean transaortic valve gradient equals 16 mm Hg, estimated  aortic valve area equals 1.7 sqcm (by continuity equation),  consistent with mild aortic stenosis. Mild-moderate aortic regurgitation.  3. Moderately dilated left atrium.  LA volume index = 46 cc/m2.  4. Normal left ventricular internal dimensions and wall thicknesses.  5. Normal left ventricular systolic function. No segmental wall motion abnormalities.  6. Mild diastolic dysfunction (Stage I).  7. Normal right ventricular size and function.  < end of copied text >    < from: CT Angio Chest Aorta w/wo IV Cont (04.04.22 @ 21:42) >  IMPRESSION:  No aortic dissection. Stable ectatic ascending aorta/aortic arch. Stable AAA.  Heterogenous thyroid gland, which can be further characterized on a   nonemergent ultrasound as indicated.  Suggest ileal diverticulitis.  Pancreatic ductal dilatation, which can be further characterize on a nonemergent MR/MRCP.  < end of copied text >    ___________________________________________________________________________________  ===============>>  A S S E S S M E N T   A N D   P L A N <<===============  ------------------------------------------------------------------------------------------    · Assessment	  This is a 89F with history as above who presents to the hospital with chest pain/palpitations with CTA showing multiple findings.     Problem/Plan - 1:  ·  Problem: Chest pain>> resolved .  - TTE as above with valvulopathy, mild chronic DHF   - stress test negative   - cardio f/u    Problem/Plan - 2:  ·  Problem: Acute UTI.   - s/p ceftriaxone in ED, would c/w for now  - UCx as above: sensitive     Problem/Plan - 3:  ·  Problem: Diverticulitis of intestine.   ·  Plan: - Incidentally noted on CTA, patient does not seem to have abdominal issues, abdomen is benign on examination  - Would c/w ceftriaxone but increase dosing to 2g, + flagyl on   - Monitor temp, WBC, BMs  - GI on board      Problem/Plan - 4:  ·  Problem: Imaging abnormalities.       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  > reordered   - Noted to have a splenic lesion on CT, also noted on MR in March 2022, seems stable overall in size, would recommend outpatient follow up  - Has dilated pancreatic duct on imaging, also noted on the recent MR, was evaluated by Dr. Dudley of GI, diagnosed with pancreatic insufficiency and deemed to not need additional w/u for the duct dilation 2/2 age and comorbidities -> would c/w creon, f/u outpatient with Dr. Dudley in 2-3 months  - AAA stable on imaging -> c/w outpatient follow up for surveillance   - noted to have b/l adrenal thickening, was not noted on recent MR, unclear if due to b/l perinephric stranding or new from recent imaging, likely would need follow up as outpatient for further eval.    Problem/Plan - 5:  ·  Problem: Throat pain.      unclear etiology >> seems resolved   S/S eval and MBS noted  check thyroid sono    Problem/Plan - 6:  ·  Problem: Alzheimer disease.   ·  Plan: - On rivastigmine patch, will restart here.    Problem/Plan - 7:  ·  Problem: History of CVA (cerebrovascular accident).   ·  Plan: - On plavix, failed dysphagia screen, restart if able to tolerate diet as per S&S eval.    Problem/Plan - 8:  ·  Problem: Pancreatic insufficiency.   ·  Plan: - On creon, failed dysphagia screen, restart if able to tolerate diet as per S&S eval.    Problem/Plan - 9:  ·  Problem: Abdominal aortic aneurysm (AAA).   ·  Plan: - stable, outpatient follow up.    Problem/Plan - 10:  ·  Problem: Essential hypertension.   ·  Plan; - on several meds, failed dysphagia screen, restart if able to tolerate diet as per S&S eval.    Problem/Plan - 11:  ·  Problem: Need for prophylactic measure.   ·  Plan: DVT ppx - SCDs  Diet - as recomended   Activity - OOB to chair/with assistance  Fall and Aspiration.    DC plan home early next week  --------------------------------------------  Case discussed with pt, team..   Education given on findings and plan of care  ___________________________  H. TERI Mcdnonell.  Pager: [phone]

## 2022-04-09 LAB
ALBUMIN SERPL ELPH-MCNC: 3.1 G/DL — LOW (ref 3.3–5)
ALP SERPL-CCNC: 56 U/L — SIGNIFICANT CHANGE UP (ref 40–120)
ALT FLD-CCNC: 23 U/L — SIGNIFICANT CHANGE UP (ref 4–33)
ANION GAP SERPL CALC-SCNC: 12 MMOL/L — SIGNIFICANT CHANGE UP (ref 7–14)
AST SERPL-CCNC: 34 U/L — HIGH (ref 4–32)
BILIRUB SERPL-MCNC: 0.2 MG/DL — SIGNIFICANT CHANGE UP (ref 0.2–1.2)
BUN SERPL-MCNC: 16 MG/DL — SIGNIFICANT CHANGE UP (ref 7–23)
CALCIUM SERPL-MCNC: 8.1 MG/DL — LOW (ref 8.4–10.5)
CHLORIDE SERPL-SCNC: 102 MMOL/L — SIGNIFICANT CHANGE UP (ref 98–107)
CO2 SERPL-SCNC: 26 MMOL/L — SIGNIFICANT CHANGE UP (ref 22–31)
CREAT SERPL-MCNC: 0.9 MG/DL — SIGNIFICANT CHANGE UP (ref 0.5–1.3)
EGFR: 60 ML/MIN/1.73M2 — SIGNIFICANT CHANGE UP
GLUCOSE BLDC GLUCOMTR-MCNC: 93 MG/DL — SIGNIFICANT CHANGE UP (ref 70–99)
GLUCOSE SERPL-MCNC: 89 MG/DL — SIGNIFICANT CHANGE UP (ref 70–99)
HCT VFR BLD CALC: 36.8 % — SIGNIFICANT CHANGE UP (ref 34.5–45)
HGB BLD-MCNC: 12 G/DL — SIGNIFICANT CHANGE UP (ref 11.5–15.5)
MCHC RBC-ENTMCNC: 30.6 PG — SIGNIFICANT CHANGE UP (ref 27–34)
MCHC RBC-ENTMCNC: 32.6 GM/DL — SIGNIFICANT CHANGE UP (ref 32–36)
MCV RBC AUTO: 93.9 FL — SIGNIFICANT CHANGE UP (ref 80–100)
NRBC # BLD: 0 /100 WBCS — SIGNIFICANT CHANGE UP
NRBC # FLD: 0 K/UL — SIGNIFICANT CHANGE UP
PLATELET # BLD AUTO: 190 K/UL — SIGNIFICANT CHANGE UP (ref 150–400)
POTASSIUM SERPL-MCNC: 3.7 MMOL/L — SIGNIFICANT CHANGE UP (ref 3.5–5.3)
POTASSIUM SERPL-SCNC: 3.7 MMOL/L — SIGNIFICANT CHANGE UP (ref 3.5–5.3)
PROT SERPL-MCNC: 5.9 G/DL — LOW (ref 6–8.3)
RBC # BLD: 3.92 M/UL — SIGNIFICANT CHANGE UP (ref 3.8–5.2)
RBC # FLD: 12.6 % — SIGNIFICANT CHANGE UP (ref 10.3–14.5)
SODIUM SERPL-SCNC: 140 MMOL/L — SIGNIFICANT CHANGE UP (ref 135–145)
WBC # BLD: 7.46 K/UL — SIGNIFICANT CHANGE UP (ref 3.8–10.5)
WBC # FLD AUTO: 7.46 K/UL — SIGNIFICANT CHANGE UP (ref 3.8–10.5)

## 2022-04-09 RX ORDER — AMLODIPINE BESYLATE 2.5 MG/1
2.5 TABLET ORAL DAILY
Refills: 0 | Status: DISCONTINUED | OUTPATIENT
Start: 2022-04-09 | End: 2022-04-11

## 2022-04-09 RX ORDER — POTASSIUM CHLORIDE 20 MEQ
20 PACKET (EA) ORAL ONCE
Refills: 0 | Status: COMPLETED | OUTPATIENT
Start: 2022-04-09 | End: 2022-04-09

## 2022-04-09 RX ADMIN — Medication 100 MILLIGRAM(S): at 06:29

## 2022-04-09 RX ADMIN — ATORVASTATIN CALCIUM 20 MILLIGRAM(S): 80 TABLET, FILM COATED ORAL at 22:01

## 2022-04-09 RX ADMIN — BUDESONIDE AND FORMOTEROL FUMARATE DIHYDRATE 2 PUFF(S): 160; 4.5 AEROSOL RESPIRATORY (INHALATION) at 09:32

## 2022-04-09 RX ADMIN — Medication 2 CAPSULE(S): at 09:31

## 2022-04-09 RX ADMIN — ESCITALOPRAM OXALATE 10 MILLIGRAM(S): 10 TABLET, FILM COATED ORAL at 13:25

## 2022-04-09 RX ADMIN — Medication 2 CAPSULE(S): at 18:00

## 2022-04-09 RX ADMIN — CEFTRIAXONE 100 MILLIGRAM(S): 500 INJECTION, POWDER, FOR SOLUTION INTRAMUSCULAR; INTRAVENOUS at 18:00

## 2022-04-09 RX ADMIN — Medication 1 DROP(S): at 13:26

## 2022-04-09 RX ADMIN — BUDESONIDE AND FORMOTEROL FUMARATE DIHYDRATE 2 PUFF(S): 160; 4.5 AEROSOL RESPIRATORY (INHALATION) at 22:01

## 2022-04-09 RX ADMIN — RIVASTIGMINE 1 PATCH: 4.6 PATCH, EXTENDED RELEASE TRANSDERMAL at 23:17

## 2022-04-09 RX ADMIN — AMLODIPINE BESYLATE 2.5 MILLIGRAM(S): 2.5 TABLET ORAL at 22:05

## 2022-04-09 RX ADMIN — PREGABALIN 1000 MICROGRAM(S): 225 CAPSULE ORAL at 13:25

## 2022-04-09 RX ADMIN — Medication 100 MILLIGRAM(S): at 13:25

## 2022-04-09 RX ADMIN — CLOPIDOGREL BISULFATE 75 MILLIGRAM(S): 75 TABLET, FILM COATED ORAL at 13:25

## 2022-04-09 RX ADMIN — Medication 50 MILLIGRAM(S): at 06:29

## 2022-04-09 RX ADMIN — Medication 20 MILLIEQUIVALENT(S): at 09:32

## 2022-04-09 RX ADMIN — RIVASTIGMINE 1 PATCH: 4.6 PATCH, EXTENDED RELEASE TRANSDERMAL at 07:07

## 2022-04-09 RX ADMIN — Medication 100 MILLIGRAM(S): at 22:06

## 2022-04-09 RX ADMIN — Medication 2 CAPSULE(S): at 13:25

## 2022-04-09 RX ADMIN — RIVASTIGMINE 1 PATCH: 4.6 PATCH, EXTENDED RELEASE TRANSDERMAL at 06:29

## 2022-04-09 NOTE — CHART NOTE - NSCHARTNOTEFT_GEN_A_CORE
Received a call from ultrasound which stated that the ultrasound department does not complete thyroid ultrasounds inpatient.  I inquired about ultrasound for patient complaining of pain and the tech said that they do not do inpatient ultrasounds and recommended considering other form of imaging.  Discussed with attending.

## 2022-04-09 NOTE — PROGRESS NOTE ADULT - ASSESSMENT
_________________________________________________________________________________________  ========>>  M E D I C A L   A T T E N D I N G    F O L L O W  U P  N O T E  <<=========  -----------------------------------------------------------------------------------------------------    - Patient seen and examined by me earlier today.   - In summary,  HOWARD SCHULZ is a 89y year old woman admitted with CP  - Patient today overall doing ok, comfortable, not eating much today: doesn't like the food .  generalized abd pain seems improved , no diarrhea, no nausea > encouraged OOB to chair / increase activity ..     ==================>> REVIEW OF SYSTEM <<=================    GEN: no fever, no chills, as above   RESP: no SOB, no cough, no sputum  CVS: no chest pain, no palpitations, no edema  GI: abdominal pain, no nausea  : no dysuria, no frequency  Neuro: no headache, no dizziness  Derm : no itching, no rash    ==================>> PHYSICAL EXAM <<=================    GEN: A&O X 2 , NAD , comfortable, pleasant, calm , resting in bed   HEENT: NCAT, PERRL, MMM, hearing intact  Neck: supple , no JVD appreciated  CVS: S1S2 , regular , No M/R/G appreciated  PULM: CTA B/L,  no W/R/R appreciated  ABD.: soft. tenderness improved , non distended,  bowel sounds present  Extrem: intact pulses , no edema   PSYCH : normal mood,  not anxious                             ( note written / Date of service   04-09-22 )    ==================>> MEDICATIONS <<====================    atorvastatin 20 milliGRAM(s) Oral at bedtime  budesonide 160 MICROgram(s)/formoterol 4.5 MICROgram(s) Inhaler 2 Puff(s) Inhalation two times a day  cefTRIAXone   IVPB 2000 milliGRAM(s) IV Intermittent every 24 hours  clopidogrel Tablet 75 milliGRAM(s) Oral daily  cyanocobalamin 1000 MICROGram(s) Oral daily  escitalopram 10 milliGRAM(s) Oral daily  metoprolol succinate ER 50 milliGRAM(s) Oral daily  metroNIDAZOLE  IVPB      metroNIDAZOLE  IVPB 500 milliGRAM(s) IV Intermittent every 8 hours  pancrelipase  (CREON 24,000 Lipase Units) 2 Capsule(s) Oral three times a day with meals  pyridoxine 100 milliGRAM(s) Oral daily  rivastigmine patch  4.6 mG/24 Hr(s) 1 Patch Transdermal every 24 hours  timolol 0.25% Solution 1 Drop(s) Both EYES daily    MEDICATIONS  (PRN):  acetaminophen     Tablet .. 650 milliGRAM(s) Oral every 6 hours PRN Temp greater or equal to 38C (100.4F), Mild Pain (1 - 3)  aluminum hydroxide/magnesium hydroxide/simethicone Suspension 30 milliLiter(s) Oral every 4 hours PRN Dyspepsia  ondansetron Injectable 4 milliGRAM(s) IV Push every 8 hours PRN Nausea and/or Vomiting  zolpidem 5 milliGRAM(s) Oral at bedtime PRN Insomnia    ___________  Active diet:  Diet, Soft and Bite Sized:   DASH/TLC Sodium & Cholesterol Restricted (DASH)  No Dairy  ___________________    ==================>> VITAL SIGNS <<==================   Height (cm): 165.1  Weight (kg): 58.5  BMI (kg/m2): 21.5  Vital Signs Last 24 HrsT(C): 36.4 (04-09-22 @ 06:20)  T(F): 97.6 (04-09-22 @ 06:20), Max: 98.2 (04-08-22 @ 12:00)  HR: 67 (04-09-22 @ 06:20) (67 - 82)  BP: 147/67 (04-09-22 @ 06:20)  RR: 17 (04-09-22 @ 06:20) (17 - 18)  SpO2: 100% (04-09-22 @ 06:20) (99% - 100%)      POCT Blood Glucose.: 93 mg/dL (09 Apr 2022 08:40)     ==================>> LAB AND IMAGING <<==================                        12.0   7.46  )-----------( 190      ( 09 Apr 2022 07:34 )             36.8        04-09    140  |  102  |  16  ----------------------------<  89  3.7   |  26  |  0.90    Ca    8.1<L>      09 Apr 2022 07:34  Phos  2.6     04-08  Mg     1.40     04-08    TPro  5.9<L>  /  Alb  3.1<L>  /  TBili  0.2  /  DBili  x   /  AST  34<H>  /  ALT  23  /  AlkPhos  56  04-09    ____________________________    M I C R O B I O L O G Y :    Culture - Blood (collected 05 Apr 2022 08:27)  Source: .Blood Blood-Peripheral  Preliminary Report (06 Apr 2022 09:01):    No growth to date.    Culture - Blood (collected 05 Apr 2022 08:27)  Source: .Blood Blood-Peripheral  Preliminary Report (06 Apr 2022 09:01):    No growth to date.    Culture - Urine (collected 04 Apr 2022 20:40)  Source: Clean Catch Clean Catch (Midstream)  Final Report (07 Apr 2022 11:45):    >100,000 CFU/ml Escherichia coli  Organism: Escherichia coli (07 Apr 2022 11:45)  Organism: Escherichia coli (07 Apr 2022 11:45)    Sensitivities:      -  Amikacin: S <=16      -  Amoxicillin/Clavulanic Acid: S <=8/4      -  Ampicillin: R >16 These ampicillin results predict results for amoxicillin      -  Ampicillin/Sulbactam: I 16/8 Enterobacter, Klebsiella aerogenes, Citrobacter, and Serratia may develop resistance during prolonged therapy (3-4 days)      -  Aztreonam: S <=4      -  Cefazolin: S <=2 (MIC_CL_COM_ENTERIC_CEFAZU) For uncomplicated UTI with K. pneumoniae, E. coli, or P. mirablis: ARCHIE <=16 is sensitive and ARCHIE >=32 is resistant. This also predicts results for oral agents cefaclor, cefdinir, cefpodoxime, cefprozil, cefuroxime axetil, cephalexin and locarbef for uncomplicated UTI. Note that some isolates may be susceptible to these agents while testing resistant to cefazolin.      -  Cefepime: S <=2      -  Cefoxitin: S <=8      -  Ceftriaxone: S <=1 Enterobacter, Klebsiella aerogenes, Citrobacter, and Serratia may develop resistance during prolonged therapy      -  Ciprofloxacin: I 0.5      -  Ertapenem: S <=0.5      -  Gentamicin: S <=2      -  Imipenem: S <=1      -  Levofloxacin: S <=0.5      -  Meropenem: S <=1      -  Nitrofurantoin: I 64 Should not be used to treat pyelonephritis      -  Piperacillin/Tazobactam: S <=8      -  Tigecycline: S <=2      -  Tobramycin: S <=2      -  Trimethoprim/Sulfamethoxazole: R >2/38      Method Type: ARCHIE    < from: Nuclear Stress Test-Pharmacologic (Nuclear Stress Test-Pharmacologic .) (04.08.22 @ 15:05) >  IMPRESSIONS:Normal Study  * The left ventricle was normal in size.  * Tracer uptake was homogeneous throughout the left  ventricle.  * Normal study; no evidence for myocardial infarction or  ischemia.  * Post-stress gated wall motion analysis was performed  (LVEF = 67 %;LVEDV = 54 ml.), revealing normal LV  function. The RV function appeared normal.  < end of copied text >    < from: Transthoracic Echocardiogram (04.06.22 @ 08:12) >  CONCLUSIONS:  1. Mitral annular calcification, otherwise normal mitral  valve. Mild-moderate mitral regurgitation.  2. Calcified trileaflet aortic valve with decreased  opening. Peak transaortic valve gradient equals 32 mm Hg,  mean transaortic valve gradient equals 16 mm Hg, estimated  aortic valve area equals 1.7 sqcm (by continuity equation),  consistent with mild aortic stenosis. Mild-moderate aortic regurgitation.  3. Moderately dilated left atrium.  LA volume index = 46 cc/m2.  4. Normal left ventricular internal dimensions and wall thicknesses.  5. Normal left ventricular systolic function. No segmental wall motion abnormalities.  6. Mild diastolic dysfunction (Stage I).  7. Normal right ventricular size and function.  < end of copied text >    < from: CT Angio Chest Aorta w/wo IV Cont (04.04.22 @ 21:42) >  IMPRESSION:  No aortic dissection. Stable ectatic ascending aorta/aortic arch. Stable AAA.  Heterogenous thyroid gland, which can be further characterized on a   nonemergent ultrasound as indicated.  Suggest ileal diverticulitis.  Pancreatic ductal dilatation, which can be further characterize on a nonemergent MR/MRCP.  < end of copied text >    ___________________________________________________________________________________  ===============>>  A S S E S S M E N T   A N D   P L A N <<===============  ------------------------------------------------------------------------------------------    · Assessment	  This is a 89F with history as above who presents to the hospital with chest pain/palpitations with CTA showing multiple findings.     Problem/Plan - 1:  ·  Problem: Chest pain>> resolved .  - TTE as above with valvulopathy, mild chronic DHF   - stress test negative   - cardio f/u    Problem/Plan - 2:  ·  Problem: Acute UTI.   - s/p ceftriaxone in ED, would c/w for now  - UCx as above: sensitive     Problem/Plan - 3:  ·  Problem: Diverticulitis of intestine.   ·  Plan: - Incidentally noted on CTA, patient does not seem to have abdominal issues, abdomen is benign on examination  - Would c/w ceftriaxone but increase dosing to 2g, + flagyl on       plan to change all abx to Po tomorrow for DC plan home Monday   - Monitor temp, WBC, BMs  - GI on board      Problem/Plan - 4:  ·  Problem: Imaging abnormalities.       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  > reordered   - Noted to have a splenic lesion on CT, also noted on MR in March 2022, seems stable overall in size, would recommend outpatient follow up  - Has dilated pancreatic duct on imaging, also noted on the recent MR, was evaluated by Dr. Dudley of GI, diagnosed with pancreatic insufficiency and deemed to not need additional w/u for the duct dilation 2/2 age and comorbidities -> would c/w creon, f/u outpatient with Dr. Dudley in 2-3 months  - AAA stable on imaging -> c/w outpatient follow up for surveillance   - noted to have b/l adrenal thickening, was not noted on recent MR, unclear if due to b/l perinephric stranding or new from recent imaging, likely would need follow up as outpatient for further eval.    Problem/Plan - 5:  ·  Problem: Throat pain.      unclear etiology >> resolved   check thyroid sono  pt not eating the chopped food > will advance to regular    Problem/Plan - 6:  ·  Problem: Alzheimer disease.   ·  Plan: - On rivastigmine patch, will restart here.    Problem/Plan - 7:  ·  Problem: History of CVA (cerebrovascular accident).   ·  Plan: - On plavix, failed dysphagia screen, restart if able to tolerate diet as per S&S eval.    Problem/Plan - 8:  ·  Problem: Pancreatic insufficiency.   ·  Plan: - On creon, failed dysphagia screen, restart if able to tolerate diet as per S&S eval.    Problem/Plan - 9:  ·  Problem: Abdominal aortic aneurysm (AAA).   ·  Plan: - stable, outpatient follow up.    Problem/Plan - 10:  ·  Problem: Essential hypertension.   ·  Plan; - on several meds, failed dysphagia screen, restart if able to tolerate diet as per S&S eval.    Problem/Plan - 11:  ·  Problem: Need for prophylactic measure.   ·  Plan: DVT ppx - SCDs  Diet - as recomended   Activity - OOB to chair/with assistance  Fall and Aspiration.    DC plan home monday  --------------------------------------------  Case discussed with tessa cristina..   Education given on findings and plan of care  ___________________________  H. TERI Mcdonnell.  Pager: [phone]

## 2022-04-09 NOTE — PROGRESS NOTE ADULT - SUBJECTIVE AND OBJECTIVE BOX
Cardiovascular Disease Progress Note    Overnight events: No acute events overnight.  no cp/sob/palps/dizziness  Otherwise review of systems negative    Objective Findings:  T(C): 36.4 (04-09-22 @ 06:20), Max: 36.8 (04-08-22 @ 12:00)  HR: 67 (04-09-22 @ 06:20) (67 - 82)  BP: 147/67 (04-09-22 @ 06:20) (134/65 - 165/71)  RR: 17 (04-09-22 @ 06:20) (17 - 18)  SpO2: 100% (04-09-22 @ 06:20) (99% - 100%)  Wt(kg): --  Daily     Daily       Physical Exam:  Gen: NAD  HEENT: EOMI  CV: RRR, normal S1 + S2, no m/r/g  Lungs: CTAB  Abd: soft, non-tender  Ext: No edema    Telemetry:    Laboratory Data:                        12.0   7.46  )-----------( 190      ( 09 Apr 2022 07:34 )             36.8     04-09    140  |  102  |  16  ----------------------------<  89  3.7   |  26  |  0.90    Ca    8.1<L>      09 Apr 2022 07:34  Phos  2.6     04-08  Mg     1.40     04-08    TPro  5.9<L>  /  Alb  3.1<L>  /  TBili  0.2  /  DBili  x   /  AST  34<H>  /  ALT  23  /  AlkPhos  56  04-09              Inpatient Medications:  MEDICATIONS  (STANDING):  atorvastatin 20 milliGRAM(s) Oral at bedtime  budesonide 160 MICROgram(s)/formoterol 4.5 MICROgram(s) Inhaler 2 Puff(s) Inhalation two times a day  cefTRIAXone   IVPB 2000 milliGRAM(s) IV Intermittent every 24 hours  clopidogrel Tablet 75 milliGRAM(s) Oral daily  cyanocobalamin 1000 MICROGram(s) Oral daily  escitalopram 10 milliGRAM(s) Oral daily  metoprolol succinate ER 50 milliGRAM(s) Oral daily  metroNIDAZOLE  IVPB      metroNIDAZOLE  IVPB 500 milliGRAM(s) IV Intermittent every 8 hours  pancrelipase  (CREON 24,000 Lipase Units) 2 Capsule(s) Oral three times a day with meals  pyridoxine 100 milliGRAM(s) Oral daily  rivastigmine patch  4.6 mG/24 Hr(s) 1 Patch Transdermal every 24 hours  timolol 0.25% Solution 1 Drop(s) Both EYES daily      Assessment:  chest pain  AAA, stable  TAA, stable  hx of CVA  dementia  nonobs CAD on prior cath  ? ileal diverticulitis  UTI, ? pyelo  HTN    Recs:  cardiac stable  no e/o acs by ecg or biomarkers  2d echo results noted  pharm nst neg for ischemia  cw antiplatelets and antianginals  GI recs appreciated  s/p 2d echo, results reviewed  abx per primary team  will follow            Over 25 minutes spent on total encounter; more than 50% of the visit was spent counseling and/or coordinating care by the attending physician.      Redd Delacruz MD   Cardiovascular Disease  [phone]

## 2022-04-10 LAB
ALBUMIN SERPL ELPH-MCNC: 2.9 G/DL — LOW (ref 3.3–5)
ALP SERPL-CCNC: 59 U/L — SIGNIFICANT CHANGE UP (ref 40–120)
ALT FLD-CCNC: 21 U/L — SIGNIFICANT CHANGE UP (ref 4–33)
ANION GAP SERPL CALC-SCNC: 11 MMOL/L — SIGNIFICANT CHANGE UP (ref 7–14)
AST SERPL-CCNC: 35 U/L — HIGH (ref 4–32)
BILIRUB SERPL-MCNC: <0.2 MG/DL — SIGNIFICANT CHANGE UP (ref 0.2–1.2)
BUN SERPL-MCNC: 18 MG/DL — SIGNIFICANT CHANGE UP (ref 7–23)
CALCIUM SERPL-MCNC: 8.5 MG/DL — SIGNIFICANT CHANGE UP (ref 8.4–10.5)
CHLORIDE SERPL-SCNC: 103 MMOL/L — SIGNIFICANT CHANGE UP (ref 98–107)
CO2 SERPL-SCNC: 25 MMOL/L — SIGNIFICANT CHANGE UP (ref 22–31)
CREAT SERPL-MCNC: 0.93 MG/DL — SIGNIFICANT CHANGE UP (ref 0.5–1.3)
CULTURE RESULTS: SIGNIFICANT CHANGE UP
CULTURE RESULTS: SIGNIFICANT CHANGE UP
EGFR: 57 ML/MIN/1.73M2 — LOW
GLUCOSE SERPL-MCNC: 90 MG/DL — SIGNIFICANT CHANGE UP (ref 70–99)
HCT VFR BLD CALC: 32.3 % — LOW (ref 34.5–45)
HGB BLD-MCNC: 10.4 G/DL — LOW (ref 11.5–15.5)
MCHC RBC-ENTMCNC: 30.1 PG — SIGNIFICANT CHANGE UP (ref 27–34)
MCHC RBC-ENTMCNC: 32.2 GM/DL — SIGNIFICANT CHANGE UP (ref 32–36)
MCV RBC AUTO: 93.4 FL — SIGNIFICANT CHANGE UP (ref 80–100)
NRBC # BLD: 0 /100 WBCS — SIGNIFICANT CHANGE UP
NRBC # FLD: 0 K/UL — SIGNIFICANT CHANGE UP
PLATELET # BLD AUTO: 178 K/UL — SIGNIFICANT CHANGE UP (ref 150–400)
POTASSIUM SERPL-MCNC: 3.8 MMOL/L — SIGNIFICANT CHANGE UP (ref 3.5–5.3)
POTASSIUM SERPL-SCNC: 3.8 MMOL/L — SIGNIFICANT CHANGE UP (ref 3.5–5.3)
PROT SERPL-MCNC: 5.4 G/DL — LOW (ref 6–8.3)
RBC # BLD: 3.46 M/UL — LOW (ref 3.8–5.2)
RBC # FLD: 12.6 % — SIGNIFICANT CHANGE UP (ref 10.3–14.5)
SODIUM SERPL-SCNC: 139 MMOL/L — SIGNIFICANT CHANGE UP (ref 135–145)
SPECIMEN SOURCE: SIGNIFICANT CHANGE UP
SPECIMEN SOURCE: SIGNIFICANT CHANGE UP
WBC # BLD: 6.56 K/UL — SIGNIFICANT CHANGE UP (ref 3.8–10.5)
WBC # FLD AUTO: 6.56 K/UL — SIGNIFICANT CHANGE UP (ref 3.8–10.5)

## 2022-04-10 RX ORDER — ZOLPIDEM TARTRATE 10 MG/1
5 TABLET ORAL ONCE
Refills: 0 | Status: DISCONTINUED | OUTPATIENT
Start: 2022-04-10 | End: 2022-04-10

## 2022-04-10 RX ORDER — LACTOBACILLUS ACIDOPHILUS 100MM CELL
1 CAPSULE ORAL
Refills: 0 | Status: DISCONTINUED | OUTPATIENT
Start: 2022-04-10 | End: 2022-04-11

## 2022-04-10 RX ORDER — METRONIDAZOLE 500 MG
500 TABLET ORAL EVERY 8 HOURS
Refills: 0 | Status: DISCONTINUED | OUTPATIENT
Start: 2022-04-10 | End: 2022-04-11

## 2022-04-10 RX ADMIN — Medication 2 CAPSULE(S): at 13:21

## 2022-04-10 RX ADMIN — ZOLPIDEM TARTRATE 5 MILLIGRAM(S): 10 TABLET ORAL at 01:48

## 2022-04-10 RX ADMIN — Medication 1 TABLET(S): at 18:05

## 2022-04-10 RX ADMIN — RIVASTIGMINE 1 PATCH: 4.6 PATCH, EXTENDED RELEASE TRANSDERMAL at 19:46

## 2022-04-10 RX ADMIN — Medication 500 MILLIGRAM(S): at 13:22

## 2022-04-10 RX ADMIN — Medication 2 CAPSULE(S): at 09:19

## 2022-04-10 RX ADMIN — Medication 1 DROP(S): at 13:21

## 2022-04-10 RX ADMIN — Medication 100 MILLIGRAM(S): at 05:31

## 2022-04-10 RX ADMIN — RIVASTIGMINE 1 PATCH: 4.6 PATCH, EXTENDED RELEASE TRANSDERMAL at 07:34

## 2022-04-10 RX ADMIN — ATORVASTATIN CALCIUM 20 MILLIGRAM(S): 80 TABLET, FILM COATED ORAL at 21:38

## 2022-04-10 RX ADMIN — Medication 100 MILLIGRAM(S): at 09:20

## 2022-04-10 RX ADMIN — ZOLPIDEM TARTRATE 5 MILLIGRAM(S): 10 TABLET ORAL at 22:45

## 2022-04-10 RX ADMIN — PREGABALIN 1000 MICROGRAM(S): 225 CAPSULE ORAL at 09:19

## 2022-04-10 RX ADMIN — BUDESONIDE AND FORMOTEROL FUMARATE DIHYDRATE 2 PUFF(S): 160; 4.5 AEROSOL RESPIRATORY (INHALATION) at 09:20

## 2022-04-10 RX ADMIN — CLOPIDOGREL BISULFATE 75 MILLIGRAM(S): 75 TABLET, FILM COATED ORAL at 09:19

## 2022-04-10 RX ADMIN — ESCITALOPRAM OXALATE 10 MILLIGRAM(S): 10 TABLET, FILM COATED ORAL at 09:19

## 2022-04-10 RX ADMIN — AMLODIPINE BESYLATE 2.5 MILLIGRAM(S): 2.5 TABLET ORAL at 05:29

## 2022-04-10 RX ADMIN — BUDESONIDE AND FORMOTEROL FUMARATE DIHYDRATE 2 PUFF(S): 160; 4.5 AEROSOL RESPIRATORY (INHALATION) at 21:38

## 2022-04-10 RX ADMIN — Medication 50 MILLIGRAM(S): at 05:29

## 2022-04-10 RX ADMIN — Medication 2 CAPSULE(S): at 18:05

## 2022-04-10 RX ADMIN — RIVASTIGMINE 1 PATCH: 4.6 PATCH, EXTENDED RELEASE TRANSDERMAL at 09:18

## 2022-04-10 RX ADMIN — Medication 500 MILLIGRAM(S): at 21:38

## 2022-04-10 RX ADMIN — Medication 1 TABLET(S): at 13:21

## 2022-04-10 NOTE — PROGRESS NOTE ADULT - ASSESSMENT
_________________________________________________________________________________________  ========>>  M E D I C A L   A T T E N D I N G    F O L L O W  U P  N O T E  <<=========  -----------------------------------------------------------------------------------------------------    - Patient seen and examined by me earlier today.   - In summary,  HOWARD SCHULZ is a 89y year old woman admitted with CP  - Patient today overall doing better, comfortable, abd pain and n/v improved, eating a bit better     ==================>> REVIEW OF SYSTEM <<=================    GEN: no fever, no chills, as above   RESP: no SOB, no cough, no sputum  CVS: no chest pain, no palpitations, no edema  GI: abdominal pain, no nausea  : no dysuria, no frequency  Neuro: no headache, no dizziness  Derm : no itching, no rash    ==================>> PHYSICAL EXAM <<=================    GEN: A&O X 2 , NAD , comfortable, pleasant, calm , resting in bed   HEENT: NCAT, PERRL, MMM, hearing intact  Neck: supple , no JVD appreciated  CVS: S1S2 , regular , No M/R/G appreciated  PULM: CTA B/L,  no W/R/R appreciated  ABD.: soft. tenderness improved , non distended,  bowel sounds present  Extrem: intact pulses , no edema   PSYCH : normal mood,  not anxious                             ( note written / Date of service   04-10-22 )    ==================>> MEDICATIONS <<====================    amLODIPine   Tablet 2.5 milliGRAM(s) Oral daily  amoxicillin  500 milliGRAM(s)/clavulanate 1 Tablet(s) Oral every 12 hours  atorvastatin 20 milliGRAM(s) Oral at bedtime  budesonide 160 MICROgram(s)/formoterol 4.5 MICROgram(s) Inhaler 2 Puff(s) Inhalation two times a day  clopidogrel Tablet 75 milliGRAM(s) Oral daily  cyanocobalamin 1000 MICROGram(s) Oral daily  escitalopram 10 milliGRAM(s) Oral daily  lactobacillus acidophilus 1 Tablet(s) Oral three times a day with meals  metoprolol succinate ER 50 milliGRAM(s) Oral daily  metroNIDAZOLE    Tablet 500 milliGRAM(s) Oral every 8 hours  pancrelipase  (CREON 24,000 Lipase Units) 2 Capsule(s) Oral three times a day with meals  pyridoxine 100 milliGRAM(s) Oral daily  rivastigmine patch  4.6 mG/24 Hr(s) 1 Patch Transdermal every 24 hours  timolol 0.25% Solution 1 Drop(s) Both EYES daily    MEDICATIONS  (PRN):  acetaminophen     Tablet .. 650 milliGRAM(s) Oral every 6 hours PRN Temp greater or equal to 38C (100.4F), Mild Pain (1 - 3)  aluminum hydroxide/magnesium hydroxide/simethicone Suspension 30 milliLiter(s) Oral every 4 hours PRN Dyspepsia  ondansetron Injectable 4 milliGRAM(s) IV Push every 8 hours PRN Nausea and/or Vomiting  zolpidem 5 milliGRAM(s) Oral at bedtime PRN Insomnia    ___________  Active diet:  Diet, Regular:   Supplement Feeding Modality:  Oral  Ensure Enlive Cans or Servings Per Day:  2       Frequency:  Two Times a day  ___________________    ==================>> VITAL SIGNS <<==================     Height (cm): 165.1  Weight (kg): 58.5  BMI (kg/m2): 21.5  Vital Signs Last 24 HrsT(C): 36.2 (04-10-22 @ 11:48)  T(F): 97.1 (04-10-22 @ 11:48), Max: 98.3 (04-10-22 @ 05:22)  HR: 66 (04-10-22 @ 11:48) (66 - 85)  BP: 126/63 (04-10-22 @ 11:48)  RR: 18 (04-10-22 @ 11:48) (18 - 18)  SpO2: 95% (04-10-22 @ 11:48) (95% - 97%)       ==================>> LAB AND IMAGING <<==================                        10.4   6.56  )-----------( 178      ( 10 Apr 2022 06:12 )             32.3        04-10    139  |  103  |  18  ----------------------------<  90  3.8   |  25  |  0.93    Ca    8.5      10 Apr 2022 06:12    TPro  5.4<L>  /  Alb  2.9<L>  /  TBili  <0.2  /  DBili  x   /  AST  35<H>  /  ALT  21  /  AlkPhos  59  04-10    ____________________________    M I C R O B I O L O G Y :    Culture - Blood (collected 05 Apr 2022 08:27)  Source: .Blood Blood-Peripheral  Preliminary Report (06 Apr 2022 09:01):    No growth to date.    Culture - Blood (collected 05 Apr 2022 08:27)  Source: .Blood Blood-Peripheral  Preliminary Report (06 Apr 2022 09:01):    No growth to date.    Culture - Urine (collected 04 Apr 2022 20:40)  Source: Clean Catch Clean Catch (Midstream)  Final Report (07 Apr 2022 11:45):    >100,000 CFU/ml Escherichia coli  Organism: Escherichia coli (07 Apr 2022 11:45)  Organism: Escherichia coli (07 Apr 2022 11:45)    Sensitivities:      -  Amikacin: S <=16      -  Amoxicillin/Clavulanic Acid: S <=8/4      -  Ampicillin: R >16 These ampicillin results predict results for amoxicillin      -  Ampicillin/Sulbactam: I 16/8 Enterobacter, Klebsiella aerogenes, Citrobacter, and Serratia may develop resistance during prolonged therapy (3-4 days)      -  Aztreonam: S <=4      -  Cefazolin: S <=2 (MIC_CL_COM_ENTERIC_CEFAZU) For uncomplicated UTI with K. pneumoniae, E. coli, or P. mirablis: ARCHIE <=16 is sensitive and ARCHIE >=32 is resistant. This also predicts results for oral agents cefaclor, cefdinir, cefpodoxime, cefprozil, cefuroxime axetil, cephalexin and locarbef for uncomplicated UTI. Note that some isolates may be susceptible to these agents while testing resistant to cefazolin.      -  Cefepime: S <=2      -  Cefoxitin: S <=8      -  Ceftriaxone: S <=1 Enterobacter, Klebsiella aerogenes, Citrobacter, and Serratia may develop resistance during prolonged therapy      -  Ciprofloxacin: I 0.5      -  Ertapenem: S <=0.5      -  Gentamicin: S <=2      -  Imipenem: S <=1      -  Levofloxacin: S <=0.5      -  Meropenem: S <=1      -  Nitrofurantoin: I 64 Should not be used to treat pyelonephritis      -  Piperacillin/Tazobactam: S <=8      -  Tigecycline: S <=2      -  Tobramycin: S <=2      -  Trimethoprim/Sulfamethoxazole: R >2/38      Method Type: ARCHIE    < from: Nuclear Stress Test-Pharmacologic (Nuclear Stress Test-Pharmacologic .) (04.08.22 @ 15:05) >  IMPRESSIONS:Normal Study  * The left ventricle was normal in size.  * Tracer uptake was homogeneous throughout the left  ventricle.  * Normal study; no evidence for myocardial infarction or  ischemia.  * Post-stress gated wall motion analysis was performed  (LVEF = 67 %;LVEDV = 54 ml.), revealing normal LV  function. The RV function appeared normal.  < end of copied text >    < from: Transthoracic Echocardiogram (04.06.22 @ 08:12) >  CONCLUSIONS:  1. Mitral annular calcification, otherwise normal mitral  valve. Mild-moderate mitral regurgitation.  2. Calcified trileaflet aortic valve with decreased  opening. Peak transaortic valve gradient equals 32 mm Hg,  mean transaortic valve gradient equals 16 mm Hg, estimated  aortic valve area equals 1.7 sqcm (by continuity equation),  consistent with mild aortic stenosis. Mild-moderate aortic regurgitation.  3. Moderately dilated left atrium.  LA volume index = 46 cc/m2.  4. Normal left ventricular internal dimensions and wall thicknesses.  5. Normal left ventricular systolic function. No segmental wall motion abnormalities.  6. Mild diastolic dysfunction (Stage I).  7. Normal right ventricular size and function.  < end of copied text >    < from: CT Angio Chest Aorta w/wo IV Cont (04.04.22 @ 21:42) >  IMPRESSION:  No aortic dissection. Stable ectatic ascending aorta/aortic arch. Stable AAA.  Heterogenous thyroid gland, which can be further characterized on a   nonemergent ultrasound as indicated.  Suggest ileal diverticulitis.  Pancreatic ductal dilatation, which can be further characterize on a nonemergent MR/MRCP.  < end of copied text >    ___________________________________________________________________________________  ===============>>  A S S E S S M E N T   A N D   P L A N <<===============  ------------------------------------------------------------------------------------------    · Assessment	  This is a 89F with history as above who presents to the hospital with chest pain/palpitations with CTA showing multiple findings.     Problem/Plan - 1:  ·  Problem: Chest pain>> resolved .  - TTE as above with valvulopathy, mild chronic DHF   - stress test negative   - cardio f/u    Problem/Plan - 2:  ·  Problem: Acute UTI.   - s/p abx Rx : monitor     Problem/Plan - 3:  ·  Problem: Diverticulitis of intestine.   ·  Plan: - Incidentally noted on CTA, patient does not seem to have abdominal issues, abdomen is benign on examination     changed all abx to Po for DC plan home tomorrow   - Monitor temp, WBC, BMs  - GI on board      Problem/Plan - 4:  ·  Problem: Imaging abnormalities.       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  > reordered >> reportedly not done in the hospital ! >> needs to be done as OP   - Noted to have a splenic lesion on CT, also noted on MR in March 2022, seems stable overall in size, would recommend outpatient follow up  - Has dilated pancreatic duct on imaging, also noted on the recent MR, was evaluated by Dr. Dudley of GI, diagnosed with pancreatic insufficiency and deemed to not need additional w/u for the duct dilation 2/2 age and comorbidities -> would c/w creon, f/u outpatient with Dr. Dudley in 2-3 months  - AAA stable on imaging -> c/w outpatient follow up for surveillance   - noted to have b/l adrenal thickening, was not noted on recent MR, unclear if due to b/l perinephric stranding or new from recent imaging, likely would need follow up as outpatient for further eval.    Problem/Plan - 5:  ·  Problem: Throat pain.      unclear etiology >> resolved   check thyroid sono as above     Problem/Plan - 6:  ·  Problem: Alzheimer disease.   ·  Plan: - On rivastigmine patch, will restart here.    Problem/Plan - 7:  ·  Problem: History of CVA (cerebrovascular accident).   ·  Plan: - On plavix, failed dysphagia screen, restart if able to tolerate diet as per S&S eval.    Problem/Plan - 8:  ·  Problem: Pancreatic insufficiency.   ·  Plan: - On creon, failed dysphagia screen, restart if able to tolerate diet as per S&S eval.    Problem/Plan - 9:  ·  Problem: Abdominal aortic aneurysm (AAA).   ·  Plan: - stable, outpatient follow up.    Problem/Plan - 10:  ·  Problem: Essential hypertension.   ·  Plan; - on several meds, failed dysphagia screen, restart if able to tolerate diet as per S&S eval.    Problem/Plan - 11:  ·  Problem: Need for prophylactic measure.   ·  Plan: DVT ppx - SCDs  Diet - as recomended   Activity - OOB to chair/with assistance  Fall and Aspiration.    DC plan home monday  --------------------------------------------  Case discussed with pt, NP  Education given on findings and plan of care  ___________________________  HMario Mcdonnell D.O.  Pager: [phone]

## 2022-04-11 ENCOUNTER — TRANSCRIPTION ENCOUNTER (OUTPATIENT)
Age: 87
End: 2022-04-11

## 2022-04-11 VITALS
RESPIRATION RATE: 17 BRPM | TEMPERATURE: 98 F | OXYGEN SATURATION: 98 % | HEART RATE: 79 BPM | SYSTOLIC BLOOD PRESSURE: 117 MMHG | DIASTOLIC BLOOD PRESSURE: 66 MMHG

## 2022-04-11 LAB
ALBUMIN SERPL ELPH-MCNC: 3.1 G/DL — LOW (ref 3.3–5)
ALP SERPL-CCNC: 62 U/L — SIGNIFICANT CHANGE UP (ref 40–120)
ALT FLD-CCNC: 22 U/L — SIGNIFICANT CHANGE UP (ref 4–33)
ANION GAP SERPL CALC-SCNC: 12 MMOL/L — SIGNIFICANT CHANGE UP (ref 7–14)
AST SERPL-CCNC: 35 U/L — HIGH (ref 4–32)
BILIRUB SERPL-MCNC: <0.2 MG/DL — SIGNIFICANT CHANGE UP (ref 0.2–1.2)
BUN SERPL-MCNC: 18 MG/DL — SIGNIFICANT CHANGE UP (ref 7–23)
CALCIUM SERPL-MCNC: 8.7 MG/DL — SIGNIFICANT CHANGE UP (ref 8.4–10.5)
CHLORIDE SERPL-SCNC: 104 MMOL/L — SIGNIFICANT CHANGE UP (ref 98–107)
CO2 SERPL-SCNC: 27 MMOL/L — SIGNIFICANT CHANGE UP (ref 22–31)
CREAT SERPL-MCNC: 0.88 MG/DL — SIGNIFICANT CHANGE UP (ref 0.5–1.3)
EGFR: 61 ML/MIN/1.73M2 — SIGNIFICANT CHANGE UP
GLUCOSE SERPL-MCNC: 96 MG/DL — SIGNIFICANT CHANGE UP (ref 70–99)
HCT VFR BLD CALC: 33.4 % — LOW (ref 34.5–45)
HGB BLD-MCNC: 10.7 G/DL — LOW (ref 11.5–15.5)
MCHC RBC-ENTMCNC: 30.5 PG — SIGNIFICANT CHANGE UP (ref 27–34)
MCHC RBC-ENTMCNC: 32 GM/DL — SIGNIFICANT CHANGE UP (ref 32–36)
MCV RBC AUTO: 95.2 FL — SIGNIFICANT CHANGE UP (ref 80–100)
NRBC # BLD: 0 /100 WBCS — SIGNIFICANT CHANGE UP
NRBC # FLD: 0 K/UL — SIGNIFICANT CHANGE UP
PLATELET # BLD AUTO: 182 K/UL — SIGNIFICANT CHANGE UP (ref 150–400)
POTASSIUM SERPL-MCNC: 4.2 MMOL/L — SIGNIFICANT CHANGE UP (ref 3.5–5.3)
POTASSIUM SERPL-SCNC: 4.2 MMOL/L — SIGNIFICANT CHANGE UP (ref 3.5–5.3)
PROT SERPL-MCNC: 5.5 G/DL — LOW (ref 6–8.3)
RBC # BLD: 3.51 M/UL — LOW (ref 3.8–5.2)
RBC # FLD: 12.7 % — SIGNIFICANT CHANGE UP (ref 10.3–14.5)
SODIUM SERPL-SCNC: 143 MMOL/L — SIGNIFICANT CHANGE UP (ref 135–145)
WBC # BLD: 7.38 K/UL — SIGNIFICANT CHANGE UP (ref 3.8–10.5)
WBC # FLD AUTO: 7.38 K/UL — SIGNIFICANT CHANGE UP (ref 3.8–10.5)

## 2022-04-11 RX ORDER — METOPROLOL TARTRATE 50 MG
1 TABLET ORAL
Qty: 0 | Refills: 0 | DISCHARGE

## 2022-04-11 RX ORDER — ATORVASTATIN CALCIUM 80 MG/1
1 TABLET, FILM COATED ORAL
Qty: 0 | Refills: 0 | DISCHARGE
Start: 2022-04-11

## 2022-04-11 RX ORDER — RIVASTIGMINE 4.6 MG/24H
1 PATCH, EXTENDED RELEASE TRANSDERMAL
Qty: 0 | Refills: 0 | DISCHARGE

## 2022-04-11 RX ORDER — ACETAMINOPHEN 500 MG
2 TABLET ORAL
Qty: 0 | Refills: 0 | DISCHARGE
Start: 2022-04-11

## 2022-04-11 RX ORDER — PYRIDOXINE HCL (VITAMIN B6) 100 MG
1 TABLET ORAL
Qty: 0 | Refills: 0 | DISCHARGE

## 2022-04-11 RX ORDER — LISINOPRIL/HYDROCHLOROTHIAZIDE 10-12.5 MG
1 TABLET ORAL
Qty: 0 | Refills: 0 | DISCHARGE

## 2022-04-11 RX ORDER — BUDESONIDE AND FORMOTEROL FUMARATE DIHYDRATE 160; 4.5 UG/1; UG/1
2 AEROSOL RESPIRATORY (INHALATION)
Qty: 0 | Refills: 0 | DISCHARGE
Start: 2022-04-11

## 2022-04-11 RX ORDER — ESCITALOPRAM OXALATE 10 MG/1
1 TABLET, FILM COATED ORAL
Qty: 0 | Refills: 0 | DISCHARGE

## 2022-04-11 RX ORDER — PREGABALIN 225 MG/1
1 CAPSULE ORAL
Qty: 0 | Refills: 0 | DISCHARGE
Start: 2022-04-11

## 2022-04-11 RX ORDER — AMLODIPINE BESYLATE 2.5 MG/1
1 TABLET ORAL
Qty: 0 | Refills: 0 | DISCHARGE
Start: 2022-04-11

## 2022-04-11 RX ORDER — ZOLPIDEM TARTRATE 10 MG/1
1 TABLET ORAL
Qty: 0 | Refills: 0 | DISCHARGE
Start: 2022-04-11

## 2022-04-11 RX ORDER — RIVASTIGMINE 4.6 MG/24H
1 PATCH, EXTENDED RELEASE TRANSDERMAL
Qty: 0 | Refills: 0 | DISCHARGE
Start: 2022-04-11

## 2022-04-11 RX ORDER — PREGABALIN 225 MG/1
1 CAPSULE ORAL
Qty: 0 | Refills: 0 | DISCHARGE

## 2022-04-11 RX ORDER — METRONIDAZOLE 500 MG
1 TABLET ORAL
Qty: 6 | Refills: 0
Start: 2022-04-11 | End: 2022-04-12

## 2022-04-11 RX ORDER — SENNA PLUS 8.6 MG/1
2 TABLET ORAL
Qty: 0 | Refills: 0 | DISCHARGE

## 2022-04-11 RX ORDER — BUDESONIDE AND FORMOTEROL FUMARATE DIHYDRATE 160; 4.5 UG/1; UG/1
2 AEROSOL RESPIRATORY (INHALATION)
Qty: 0 | Refills: 0 | DISCHARGE

## 2022-04-11 RX ORDER — METOPROLOL TARTRATE 50 MG
1 TABLET ORAL
Qty: 0 | Refills: 0 | DISCHARGE
Start: 2022-04-11

## 2022-04-11 RX ORDER — ZOLPIDEM TARTRATE 10 MG/1
1 TABLET ORAL
Qty: 0 | Refills: 0 | DISCHARGE

## 2022-04-11 RX ORDER — ATORVASTATIN CALCIUM 80 MG/1
1 TABLET, FILM COATED ORAL
Qty: 0 | Refills: 0 | DISCHARGE

## 2022-04-11 RX ORDER — LACTOBACILLUS ACIDOPHILUS 100MM CELL
1 CAPSULE ORAL
Qty: 0 | Refills: 0 | DISCHARGE
Start: 2022-04-11

## 2022-04-11 RX ORDER — ESCITALOPRAM OXALATE 10 MG/1
1 TABLET, FILM COATED ORAL
Qty: 0 | Refills: 0 | DISCHARGE
Start: 2022-04-11

## 2022-04-11 RX ORDER — AMLODIPINE BESYLATE 2.5 MG/1
1 TABLET ORAL
Qty: 0 | Refills: 0 | DISCHARGE

## 2022-04-11 RX ORDER — OMEPRAZOLE 10 MG/1
1 CAPSULE, DELAYED RELEASE ORAL
Qty: 0 | Refills: 0 | DISCHARGE

## 2022-04-11 RX ORDER — DICLOFENAC SODIUM 30 MG/G
1 GEL TOPICAL
Qty: 0 | Refills: 0 | DISCHARGE

## 2022-04-11 RX ORDER — LIPASE/PROTEASE/AMYLASE 16-48-48K
2 CAPSULE,DELAYED RELEASE (ENTERIC COATED) ORAL
Qty: 0 | Refills: 0 | DISCHARGE

## 2022-04-11 RX ORDER — ACETAMINOPHEN 500 MG
2 TABLET ORAL
Qty: 0 | Refills: 0 | DISCHARGE

## 2022-04-11 RX ORDER — LIPASE/PROTEASE/AMYLASE 16-48-48K
2 CAPSULE,DELAYED RELEASE (ENTERIC COATED) ORAL
Qty: 0 | Refills: 0 | DISCHARGE
Start: 2022-04-11

## 2022-04-11 RX ORDER — PYRIDOXINE HCL (VITAMIN B6) 100 MG
1 TABLET ORAL
Qty: 0 | Refills: 0 | DISCHARGE
Start: 2022-04-11

## 2022-04-11 RX ADMIN — Medication 50 MILLIGRAM(S): at 05:09

## 2022-04-11 RX ADMIN — Medication 100 MILLIGRAM(S): at 12:46

## 2022-04-11 RX ADMIN — Medication 500 MILLIGRAM(S): at 05:09

## 2022-04-11 RX ADMIN — RIVASTIGMINE 1 PATCH: 4.6 PATCH, EXTENDED RELEASE TRANSDERMAL at 12:04

## 2022-04-11 RX ADMIN — Medication 2 CAPSULE(S): at 09:25

## 2022-04-11 RX ADMIN — ESCITALOPRAM OXALATE 10 MILLIGRAM(S): 10 TABLET, FILM COATED ORAL at 12:46

## 2022-04-11 RX ADMIN — Medication 2 CAPSULE(S): at 12:47

## 2022-04-11 RX ADMIN — PREGABALIN 1000 MICROGRAM(S): 225 CAPSULE ORAL at 12:46

## 2022-04-11 RX ADMIN — BUDESONIDE AND FORMOTEROL FUMARATE DIHYDRATE 2 PUFF(S): 160; 4.5 AEROSOL RESPIRATORY (INHALATION) at 09:26

## 2022-04-11 RX ADMIN — Medication 1 DROP(S): at 12:46

## 2022-04-11 RX ADMIN — Medication 1 TABLET(S): at 05:09

## 2022-04-11 RX ADMIN — CLOPIDOGREL BISULFATE 75 MILLIGRAM(S): 75 TABLET, FILM COATED ORAL at 12:46

## 2022-04-11 RX ADMIN — AMLODIPINE BESYLATE 2.5 MILLIGRAM(S): 2.5 TABLET ORAL at 05:09

## 2022-04-11 RX ADMIN — Medication 500 MILLIGRAM(S): at 12:48

## 2022-04-11 RX ADMIN — RIVASTIGMINE 1 PATCH: 4.6 PATCH, EXTENDED RELEASE TRANSDERMAL at 11:43

## 2022-04-11 RX ADMIN — Medication 1 TABLET(S): at 12:47

## 2022-04-11 RX ADMIN — Medication 1 TABLET(S): at 09:26

## 2022-04-11 NOTE — PROGRESS NOTE ADULT - ASSESSMENT
M E D I C A L   A T T E N D I N G    F O L L O W    U P   N O T E  (04-11-22 )                                     ------------------------------------------------------------------------------------------------    patient evaluated by me, case discussed with team, chart, medications, and physical exam reviewed, labs / tests  and vitals reviewed by me, as bellow.   Patient is stable for discharge today.  Patient to follow up with  PMD. cardio, GI as needed   See discharge document for full note.                             ( note written / Date of service  04-11-22 )    ==================>> MEDICATIONS <<====================    amLODIPine   Tablet 2.5 milliGRAM(s) Oral daily  amoxicillin  500 milliGRAM(s)/clavulanate 1 Tablet(s) Oral every 12 hours  atorvastatin 20 milliGRAM(s) Oral at bedtime  budesonide 160 MICROgram(s)/formoterol 4.5 MICROgram(s) Inhaler 2 Puff(s) Inhalation two times a day  clopidogrel Tablet 75 milliGRAM(s) Oral daily  cyanocobalamin 1000 MICROGram(s) Oral daily  escitalopram 10 milliGRAM(s) Oral daily  lactobacillus acidophilus 1 Tablet(s) Oral three times a day with meals  metoprolol succinate ER 50 milliGRAM(s) Oral daily  metroNIDAZOLE    Tablet 500 milliGRAM(s) Oral every 8 hours  pancrelipase  (CREON 24,000 Lipase Units) 2 Capsule(s) Oral three times a day with meals  pyridoxine 100 milliGRAM(s) Oral daily  rivastigmine patch  4.6 mG/24 Hr(s) 1 Patch Transdermal every 24 hours  timolol 0.25% Solution 1 Drop(s) Both EYES daily    MEDICATIONS  (PRN):  acetaminophen     Tablet .. 650 milliGRAM(s) Oral every 6 hours PRN Temp greater or equal to 38C (100.4F), Mild Pain (1 - 3)  aluminum hydroxide/magnesium hydroxide/simethicone Suspension 30 milliLiter(s) Oral every 4 hours PRN Dyspepsia  ondansetron Injectable 4 milliGRAM(s) IV Push every 8 hours PRN Nausea and/or Vomiting  zolpidem 5 milliGRAM(s) Oral at bedtime PRN Insomnia    ___________  Active diet:  Diet, Regular:   Supplement Feeding Modality:  Oral  Ensure Enlive Cans or Servings Per Day:  2       Frequency:  Two Times a day  ___________________    ==================>> VITAL SIGNS <<==================    T(C): 36.9 (04-11-22 @ 13:23), Max: 36.9 (04-10-22 @ 21:30)  HR: 79 (04-11-22 @ 13:23) (79 - 88)  BP: 117/66 (04-11-22 @ 13:23) (117/66 - 144/76)  BP(mean): --  RR: 17 (04-11-22 @ 13:23) (17 - 18)  SpO2: 98% (04-11-22 @ 13:23) (98% - 98%)     I&O's Summary    10 Apr 2022 07:01  -  11 Apr 2022 07:00  --------------------------------------------------------  IN: 240 mL / OUT: 0 mL / NET: 240 mL    11 Apr 2022 07:01  -  11 Apr 2022 13:46  --------------------------------------------------------  IN: 240 mL / OUT: 0 mL / NET: 240 mL       ==================>> LAB AND IMAGING <<==================                        10.7   7.38  )-----------( 182      ( 11 Apr 2022 07:07 )             33.4        04-11    143  |  104  |  18  ----------------------------<  96  4.2   |  27  |  0.88    Ca    8.7      11 Apr 2022 07:07    TPro  5.5<L>  /  Alb  3.1<L>  /  TBili  <0.2  /  DBili  x   /  AST  35<H>  /  ALT  22  /  AlkPhos  62  04-11     TSH:      0.90   (04-06-22)       ,     0.34   (04-05-22)           WBC count:   7.38 <<== ,  6.56 <<== ,  7.46 <<== ,  7.27 <<== ,  8.45 <<==   Hemoglobin:   10.7 <<==,  10.4 <<==,  12.0 <<==,  11.5 <<==,  11.0 <<==  platelets:  182 <==, 178 <==, 190 <==, 171 <==, 149 <==, 162 <==, 154 <==    Creatinine:  0.88  <<==, 0.93  <<==, 0.90  <<==, 0.83  <<==, 0.94  <<==, 1.06  <<==  Sodium:   143  <==, 139  <==, 140  <==, 139  <==, 141  <==, 138  <==, 135  <==       AST:          35 <== , 35 <== , 34 <== , 23 <== , 28 <==      ALT:        22  <== , 21  <== , 23  <== , 17  <== , 21  <==      AP:        62  <=, 59  <=, 56  <=, 66  <=, 74  <=     Bili:        <0.2  <=, <0.2  <=, 0.2  <=, 0.2  <=, 0.4  <=

## 2022-04-11 NOTE — DISCHARGE NOTE PROVIDER - PROVIDER TOKENS
PROVIDER:[TOKEN:[[id number]],FOLLOWUP:[1 week]],PROVIDER:[TOKEN:[[id number]],FOLLOWUP:[Routine]],PROVIDER:[TOKEN:[[id number]],FOLLOWUP:[1 month]]

## 2022-04-11 NOTE — DISCHARGE NOTE PROVIDER - NSDCMRMEDTOKEN_GEN_ALL_CORE_FT
amLODIPine 5 mg oral tablet: 1 tab(s) orally once a day  atorvastatin 20 mg oral tablet: 1 tab(s) orally once a day  Calcium 600+D oral tablet: 1 tab(s) orally 2 times a day  clopidogrel 75 mg oral tablet: 1 tab(s) orally once a day  Creon 24,000 units oral delayed release capsule: 2 cap(s) orally 3 times a day  diclofenac 1% topical gel: Apply topically to affected area 3 times a day  escitalopram 10 mg oral tablet: 1 tab(s) orally once a day  lisinopril-hydrochlorothiazide 20 mg-12.5 mg oral tablet: 1 tab(s) orally 2 times a day  Lumigan 0.01% ophthalmic solution: 1 drop(s) to each affected eye 2 times a week  Metoprolol Succinate ER 50 mg oral tablet, extended release: 1 tab(s) orally 2 times a day  omeprazole 20 mg oral delayed release capsule: 1 cap(s) orally once a day  Restasis 0.05% ophthalmic emulsion: 1 drop(s) to each affected eye 2 times a day  rivastigmine 4.6 mg/24 hr transdermal film, extended release: 1 patch transdermal once a day  Senna 8.6 mg oral tablet: 2 tab(s) orally once a day  Symbicort 160 mcg-4.5 mcg/inh inhalation aerosol: 2 puff(s) inhaled 2 times a day  Tab-A-Sudhir oral tablet: 1 tab(s) orally once a day  timolol hemihydrate 0.25% ophthalmic solution: 1 drop(s) to each affected eye once a day  Tylenol 500 mg oral tablet: 1-2 tab(s) orally every 4-6 hours, As Needed  Vitamin B12 1000 mcg oral tablet: 1 tab(s) orally once a day  Vitamin B6 100 mg oral tablet: 1 tab(s) orally once a day  zolpidem 5 mg oral tablet: 1 tab(s) orally once a day (at bedtime)   acetaminophen 325 mg oral tablet: 2 tab(s) orally every 6 hours, As needed, Temp greater or equal to 38C (100.4F), Mild Pain (1 - 3)  aluminum hydroxide-magnesium hydroxide 200 mg-200 mg/5 mL oral suspension: 30 milliliter(s) orally every 4 hours, As needed, Dyspepsia  amLODIPine 2.5 mg oral tablet: 1 tab(s) orally once a day  amoxicillin-clavulanate 500 mg-125 mg oral tablet: 1 tab(s) orally every 12 hours  atorvastatin 20 mg oral tablet: 1 tab(s) orally once a day (at bedtime)  budesonide-formoterol 160 mcg-4.5 mcg/inh inhalation aerosol: 2 puff(s) inhaled 2 times a day  Calcium 600+D oral tablet: 1 tab(s) orally 2 times a day  clopidogrel 75 mg oral tablet: 1 tab(s) orally once a day  cyanocobalamin 1000 mcg oral tablet: 1 tab(s) orally once a day  escitalopram 10 mg oral tablet: 1 tab(s) orally once a day  lactobacillus acidophilus oral capsule: 1  orally once a day  Lumigan 0.01% ophthalmic solution: 1 drop(s) to each affected eye 2 times a week  metoprolol succinate 50 mg oral tablet, extended release: 1 tab(s) orally once a day  metroNIDAZOLE 500 mg oral tablet: 1 tab(s) orally every 8 hours  pancrelipase 24,000 units-76,000 units-120,000 units oral delayed release capsule: 2 cap(s) orally 3 times a day (with meals)  pyridoxine 100 mg oral tablet: 1 tab(s) orally once a day  Restasis 0.05% ophthalmic emulsion: 1 drop(s) to each affected eye 2 times a day  rivastigmine 4.6 mg/24 hr transdermal film, extended release: 1 patch transdermal every 24 hours  Tab-A-Sudhir oral tablet: 1 tab(s) orally once a day  timolol hemihydrate 0.25% ophthalmic solution: 1 drop(s) to each affected eye once a day  zolpidem 5 mg oral tablet: 1 tab(s) orally once a day (at bedtime), As needed, Insomnia

## 2022-04-11 NOTE — DISCHARGE NOTE PROVIDER - CARE PROVIDERS DIRECT ADDRESSES
[email or web address]

## 2022-04-11 NOTE — DISCHARGE NOTE NURSING/CASE MANAGEMENT/SOCIAL WORK - NSDCPEFALRISK_GEN_ALL_CORE
For information on Fall & Injury Prevention, visit: [email or web address] OR  [email or web address] OR  https://www.cdc.gov/steadi/patient.html

## 2022-04-11 NOTE — DISCHARGE NOTE NURSING/CASE MANAGEMENT/SOCIAL WORK - PATIENT PORTAL LINK FT
You can access the FollowMyHealth Patient Portal offered by [facility] by registering at the following website: [email or web address]. By joining [facility]’s FollowMyHealth portal, you will also be able to view your health information using other applications (apps) compatible with our system.

## 2022-04-11 NOTE — DISCHARGE NOTE NURSING/CASE MANAGEMENT/SOCIAL WORK - NSDCVIVACCINE_GEN_ALL_CORE_FT
influenza, injectable, quadrivalent, preservative free; 13-Mar-2015 18:44; Umm Mcdaniel (RN); [employer]; [id number]; IntraMuscular; Deltoid Left.; 0.5 milliLiter(s); VIS (VIS Published: 19-Aug-2014, VIS Presented: 13-Mar-2015);   influenza, injectable, quadrivalent, preservative free; 08-Sep-2017 13:53; Hans Grey); [employer]; [id number]; IntraMuscular; Deltoid Right.; 0.5 milliLiter(s); VIS (VIS Published: 07-Aug-2015, VIS Presented: 08-Sep-2017);

## 2022-04-11 NOTE — PROGRESS NOTE ADULT - PROVIDER SPECIALTY LIST ADULT
Internal Medicine
Cardiology
Internal Medicine
Gastroenterology

## 2022-04-11 NOTE — DISCHARGE NOTE PROVIDER - NSDCCPCAREPLAN_GEN_ALL_CORE_FT
PRINCIPAL DISCHARGE DIAGNOSIS  Diagnosis: Diverticulitis  Assessment and Plan of Treatment: complete course of antibiotics at home . Prescriptions sent to your pharmacy   follow up with Dr. Dudley in 2-3 months . Please call for an appointment         SECONDARY DISCHARGE DIAGNOSES  Diagnosis: UTI (urinary tract infection)  Assessment and Plan of Treatment: Always wipe from front to back after using the bathroom. Never wipe twice with the same tissue. Take showers and avoid prolonged baths. Try to empty the bladder at least every 4 hours during the day while awake, even if the need or urge to void is absent.  Do not try to hold your urine until a more convenient time or place.  Drink plenty of water.   - complete course of antibiotics at home    Diagnosis: Enlarged thyroid  Assessment and Plan of Treatment: follow up with your primary doctor . will need prescription for thyroid ultrasound for enlarged thyroid    Diagnosis: AAA (abdominal aortic aneurysm)  Assessment and Plan of Treatment: will need to check a surveillence CT scan to monitor siz e. Please follow up with your primary care doctor to arrange for this . will also need to monitor for adrenal abnormalities

## 2022-04-11 NOTE — DISCHARGE NOTE PROVIDER - NSDCCAREPROVSEEN_GEN_ALL_CORE_FT
Hoorbod Delshadfar  Hoorbod Delshadfar  Hoorbod Delshadfar  Benjamin Valderrama  User ADM  Redd Fish  Ordering Physician  Silvestre Delacruz  Team [facility] Medicine ACP

## 2022-04-11 NOTE — DISCHARGE NOTE PROVIDER - NSDCFUSCHEDAPPT_GEN_ALL_CORE_FT
HOWARD SCHULZ ; 04/13/2022 ; [facility] HOWARD SCHULZ ; 06/20/2022 ; [facility] Gastro [facility]

## 2022-04-11 NOTE — DISCHARGE NOTE PROVIDER - HOSPITAL COURSE
This is a 89F with history as above who presents to the hospital with chest pain/palpitations with CTA showing multiple findings.     Problem/Plan - 1:  ·  Problem: Chest pain>> resolved .  - TTE as above with valvulopathy, mild chronic DHF   - stress test negative   follow up with cardiology as outpt     Problem/Plan - 2:  ·  Problem: Acute UTI.   - s/p abx Rx : monitor     Problem/Plan - 3:   Diverticulitis of intestine.    Incidentally noted on CTA, patient does not seem to have abdominal issues, abdomen is benign on examination     changed all abx to Po. Stable for discharge to home with home PT   - Monitor temp, WBC, BMs  - GI-   -Continue with Creon 24,000 2 tab w/ meals  -No further work up for pancreatic duct dilation. This is known and has been worked up by Dr. Dudley.   -No role for endoscopic evaluation at this time  -Will need close outpatient follow up with Dr. Dudley    Outpt follow up with Dr. Dudley     Problem/Plan - 4:  : Imaging abnormalities.       - noted to have an heterogenously enlarge thyroid gland            TSH WNL             need US thyroid > This exam is currently not performed as an inpatient . Recommend outpt follow up for enlarged thyroid. Recommend thyroid ultrasound .     - Noted to have a splenic lesion on CT, also noted on MR in March 2022, seems stable overall in size, would recommend outpatient follow up    - Has dilated pancreatic duct on imaging, also noted on the recent MR, was evaluated by Dr. Dudley of GI, diagnosed with pancreatic insufficiency and deemed to not need additional w/u for the duct dilation 2/2 age and comorbidities -> would c/w creon, f/u outpatient with Dr. Dudley in 2-3 months    - AAA stable on imaging -> c/w outpatient follow up for surveillance     - noted to have b/l adrenal thickening, was not noted on recent MR, unclear if due to b/l perinephric stranding or new from recent imaging, likely would need follow up as outpatient for further eval.    Problem/Plan - 5:  ·  Problem: Throat pain.      unclear etiology >> resolved   check thyroid sono as oupt     Problem/Plan - 6:  ·  Problem: Alzheimer disease.   ·  Plan: - On rivastigmine patch, will restart here.    Problem/Plan - 7:  ·  Problem: History of CVA (cerebrovascular accident).   ·  Plan: - On plavix,     Problem/Plan - 8:  ·  Problem: Pancreatic insufficiency.   ·  Plan: - On creon, failed dysphagia screen, restart if able to tolerate diet as per S&S eval.    Problem/Plan - 9:  ·  Problem: Abdominal aortic aneurysm (AAA).   ·  Plan: - stable, outpatient follow up.    Problem/Plan - 10:  ·  Problem: Essential hypertension.   continue meds     Problem/Plan - 11:  ·  Problem: Need for prophylactic measure.   ·  Plan: DVT ppx - SCDs  Diet - as recomended   Activity - OOB to chair/with assistance     89F with history of Alzheimer's Dementia, HTN, CVAs on Plavix, HLD, AAA, and Pancreatic Insufficiency who presents to the hospital with SOB and chest pain. Patient unable to fully describe her symptoms, when asked about the chest pain with a  phone she said that she has been having chest pain due to the social situation in her home country. As per Grandson, patient has had a history of palpitations occasionally and believes that the patient had another episode of palpitations on the afternoon of 4/4/22 and was therefore brought to the hospital. Patient otherwise is c/o some throat pain but the grandson was not aware of this. He did state that the patient has been feeling unwell recently and might have had a fever but does not know of any other specific complaints for the patient. Attempted to reach patient's daughter for collateral but there was no pickup, left VM for call back.     On arrival to the ED, her vitals were T 98.9 -> 100.2 (rectal), P 66, /60, RR 18, o2 sat 98% RA. Her lab work showed mild leukocytosis, mildly positive UA. She had a CTA Chest/Abd for dissection eval which was negative but did show several incidental findings (heterogenous and enlarged thyroid, 1.3x1.3cm hypodense structure in spleen, dilated pancreatic duct, b/l adrenal gland thickening, b/l perinephric stranding, ileal diverticlutis, stable AAA, and a healing L anterior rib fracture). Patient was given LR 1L, CTX 1G, ofirmev 1g, and ambien 1tab x1. She was admitted to medicine on telemetry.      (05 Apr 2022 02:58)    Summery of hospital course:  Patient was seen and evaluated and followed by multiple specialists throughout the stay and treated medically with improvement.  Patient was otherwise stable and had no other complications.  Patient was discharged to home in stable condition to follow up with primary doctor as outpatient for follow up and further care.

## 2022-04-13 ENCOUNTER — NON-APPOINTMENT (OUTPATIENT)
Age: 87
End: 2022-04-13

## 2022-04-13 ENCOUNTER — APPOINTMENT (OUTPATIENT)
Dept: GASTROENTEROLOGY | Facility: CLINIC | Age: 87
End: 2022-04-13
Payer: MEDICARE

## 2022-04-13 VITALS
HEIGHT: 62 IN | BODY MASS INDEX: 25.58 KG/M2 | OXYGEN SATURATION: 93 % | SYSTOLIC BLOOD PRESSURE: 132 MMHG | DIASTOLIC BLOOD PRESSURE: 77 MMHG | WEIGHT: 139 LBS | TEMPERATURE: 97.7 F | HEART RATE: 69 BPM

## 2022-04-13 DIAGNOSIS — K57.12 DIVERTICULITIS OF SMALL INTESTINE W/OUT PERFORATION OR ABSCESS W/OUT BLEEDING: ICD-10-CM

## 2022-04-13 PROCEDURE — 99214 OFFICE O/P EST MOD 30 MIN: CPT

## 2022-04-13 NOTE — HISTORY OF PRESENT ILLNESS
[Heartburn] : stable heartburn [Nausea] : denies nausea [Vomiting] : denies vomiting [Constipation] : denies constipation [Yellow Skin Or Eyes (Jaundice)] : denies jaundice [Abdominal Pain] : denies abdominal pain [Abdominal Swelling] : denies abdominal swelling [_________] : Performed [unfilled] [Diarrhea] : diarrhea worsened [Wt Loss ___ Lbs] : recent [unfilled] ~Upound(s) weight loss [de-identified] : This is a 89 year old female with HTN, HLD, CVAs now on Plavix, s/p right VATS and wedge resection RUL with 2018 for organizing pneumonia, who presents for post hospitalization for ileal diverticulitis, and exocrine pancreatic insufficiency.\par \par The patient was admitted to [facility] on 4/5/22 for chest pain. She had a CTA C/A/P showing ileal diverticulitis, and was treated with a course of antibiotics which she finished yesterday. She had negative stool infectious workup in February and had low elastase, and was started on Creon. MRI of abdomen was done show pancreatic diffuse atrophy and pancreatic ductal dilation, but given her advanced age additional workup was not pursued. Since she has started the Creon, she was still have 3-4 bouts of loose stools. Since the admission and being on antibiotics for the ileal diverticulitis, she's going non-stop. She denies blood in the stool. She has no fevers now. She report appetite - she has no trouble eating/swallowing, but just "no appetite." No nausea/vomiting. She initially was admitted for chest pain, and still has some abdominal discomfort now but she attributes it to the diarrhea. She takes a long time to eat - she will take up to 1 hour to eat her meals and she takes the Creon two tabs with start of the meals. She continues to lose weight, and lost 6 lb since the admission.\par \par 4/11/22: WBC 7.3, Hgb 10.7, Hct 33.4, \par Na 143, K 4.2, Cl 104, bicarb 27, BUN 18, Cr 0.88, glucose 96, Ca 8.7, T protein 5.5, Albumin 3.1, T bili <0.2, ALP 62, AST 35, ALT 22 [de-identified] : TTE 4/6/22:\par CONCLUSIONS:\par 1. Mitral annular calcification, otherwise normal mitral\par valve. Mild-moderate mitral regurgitation.\par 2. Calcified trileaflet aortic valve with decreased\par opening. Peak transaortic valve gradient equals 32 mm Hg,\par mean transaortic valve gradient equals 16 mm Hg, estimated\par aortic valve area equals 1.7 sqcm (by continuity equation),\par consistent with mild aortic stenosis. Mild-moderate aortic\par regurgitation.\par 3. Moderately dilated left atrium.  LA volume index = 46\par cc/m2.\par 4. Normal left ventricular internal dimensions and wall\par thicknesses.\par 5. Normal left ventricular systolic function. No segmental\par wall motion abnormalities.\par 6. Mild diastolic dysfunction (Stage I).\par 7. Normal right ventricular size and function.\par \par \par \par Stress test 4/8/22:\par IMPRESSIONS:Normal Study\par * The left ventricle was normal in size.\par * Tracer uptake was homogeneous throughout the left\par ventricle.\par * Normal study; no evidence for myocardial infarction or\par ischemia.\par * Post-stress gated wall motion analysis was performed\par (LVEF = 67 %;LVEDV = 54 ml.), revealing normal LV\par function. The RV function appeared normal. [de-identified] : CHEST:\par \par LUNGS AND LARGE AIRWAYS: Patent central airways. Right upper lobe wedge \par resection with atelectasis/scarring in the right lung. Bibasilar \par atelectasis. Stable scattered nodules in the left lung.\par PLEURA: No pleural effusion.\par VESSELS: Atherosclerosis. No thoracic aortic intramural hematoma or \par dissection. Stable ectatic ascending aorta and aortic arch.\par HEART: Stable heart size. No pericardial effusion. Aortic valve and \par mitral annular calcification.\par MEDIASTINUM AND BELKYS: Stable small follicles, for example, 1.5 x 0.9 cm \par subcarinal lymph node.\par CHEST WALL AND LOWER NECK: Heterogeneously enlarged thyroid gland.\par \par ABDOMEN AND PELVIS:\par \par LIVER: Calcified granuloma.\par BILE DUCTS: Normal caliber.\par GALLBLADDER: No significant gallbladder wall edema.\par SPLEEN: Degraded by artifact. A 1.3 x 1.3 cm hypodense structure, as \par noted on prior MR.\par PANCREAS: Dilated duct (up to 0.6 cm).\par \par ADRENALS: Nonspecific left > right adrenal thickening.\par KIDNEYS/URETERS: Nonspecific bilateral perinephric stranding without \par hydroureteronephrosis. Again noted, left renal cyst.\par BLADDER: Within normal limits.\par REPRODUCTIVE ORGANS: Anteverted uterus.\par \par BOWEL: No bowel obstruction. The appendix not visualized. Diverticulosis \par of the colon and distal small bowel. Focal area of ileal wall thickening \par with peridiverticular inflammatory change. Nonspecific fluid-filled, \par small and large bowel loops.\par PERITONEUM: No free air or drainable fluid collection.\par VESSELS: Atherosclerosis with resultant proximal narrowing. Stable AAA \par (3.0 x 3.2 cm in transaxial diameter).\par RETROPERITONEUM/LYMPH NODES: No lymphadenopathy.\par ABDOMINAL WALL: Postsurgical changes along the anterior abdominal and \par pelvic wall soft tissue. Left buttock injection granuloma.\par BONES: Degenerative changes/anterior longitudinal ligament ossification \par of the spine. Stable grade 1 anterolisthesis of L3 on L4 and L4 on L5. \par Again noted, chronic-appearing bilateral rib deformity. Healing/subacute \par left fifth anterior rib fracture.\par \par IMPRESSION:\par \par No aortic dissection. Stable ectatic ascending aorta/aortic arch. Stable \par AAA.\par \par Heterogenous thyroid gland, which can be further characterized on a \par nonemergent ultrasound as indicated.\par \par Suggest ileal diverticulitis.\par \par Pancreatic ductal dilatation, which can be further characterize on a \par nonemergent MR/MRCP.\par \par Additional findings as described.

## 2022-04-13 NOTE — REASON FOR VISIT
[Post Hospitalization] : a post hospitalization visit [Family Member] : family member [Source: ______] : History obtained from [unfilled] [FreeTextEntry1] : ileal diverticulitis, exocrine pancreatitis insufficiency

## 2022-04-13 NOTE — ASSESSMENT
[FreeTextEntry1] : Impression:\par 1. Diarrhea with weight loss- not improved with Creon, worse with antibiotics - likely a combination of diarrhea induced diarrhea, along with exocrine pancreas insufficiency\par 2. Continued weight loss - ?due to uncontrolled pancreatic insufficiency, all stool testing negative, no other imaging findings on pan-CT inpatient\par 3. Dyspepsia/GERD\par 4. ?Ileal diverticulitis, now s/p antibiotics; differential includes IBD\par \par Plan:\par -Given poor appetite, start mirtazapine as appetite stimulant at 7.5mg qhs\par -Will increase in 1 week to 15mg if no improvement\par -Asked daughter to give patient ensure 1 bottle with each meal\par -As patient takes a long time to eat, asked patient to take 1 pill of Creon with first bite, then 2nd pill 30 minutes into her meal\par -In one week she will reach out to me; if no improvement, will increase Creon to max dose\par -It's unclear if this ileal "diverticulitis" is real; repeat stool studies and calprotectin if no improvement in 1 week along with infectious workup\par -Given her advanced age, would try to avoid endoscopic evaluation, but if her diarrhea and weight loss continues despite maximal therapy we will have to consider this

## 2022-04-13 NOTE — REVIEW OF SYSTEMS
[Negative] : Heme/Lymph [As Noted in HPI] : as noted in HPI [Recent Weight Loss (___ Lbs)] : recent [unfilled] ~Ulb weight loss [Fever] : no fever [Chills] : no chills

## 2022-04-26 ENCOUNTER — NON-APPOINTMENT (OUTPATIENT)
Age: 87
End: 2022-04-26

## 2022-05-31 ENCOUNTER — INPATIENT (INPATIENT)
Facility: HOSPITAL | Age: 87
LOS: 6 days | Discharge: HOME | End: 2022-06-07
Attending: INTERNAL MEDICINE | Admitting: INTERNAL MEDICINE
Payer: MEDICARE

## 2022-05-31 VITALS
WEIGHT: 175.05 LBS | OXYGEN SATURATION: 95 % | HEART RATE: 110 BPM | TEMPERATURE: 98 F | SYSTOLIC BLOOD PRESSURE: 124 MMHG | HEIGHT: 65 IN | DIASTOLIC BLOOD PRESSURE: 88 MMHG | RESPIRATION RATE: 22 BRPM

## 2022-05-31 DIAGNOSIS — Z98.890 OTHER SPECIFIED POSTPROCEDURAL STATES: Chronic | ICD-10-CM

## 2022-05-31 DIAGNOSIS — Z90.49 ACQUIRED ABSENCE OF OTHER SPECIFIED PARTS OF DIGESTIVE TRACT: Chronic | ICD-10-CM

## 2022-05-31 DIAGNOSIS — Z98.49 CATARACT EXTRACTION STATUS, UNSPECIFIED EYE: Chronic | ICD-10-CM

## 2022-05-31 LAB
ALBUMIN SERPL ELPH-MCNC: 3 G/DL — LOW (ref 3.3–5)
ALP SERPL-CCNC: 267 U/L — HIGH (ref 40–120)
ALT FLD-CCNC: 188 U/L — HIGH (ref 12–78)
ANION GAP SERPL CALC-SCNC: 6 MMOL/L — SIGNIFICANT CHANGE UP (ref 5–17)
APTT BLD: 27.4 SEC — LOW (ref 27.5–35.5)
AST SERPL-CCNC: 158 U/L — HIGH (ref 15–37)
BASOPHILS # BLD AUTO: 0.04 K/UL — SIGNIFICANT CHANGE UP (ref 0–0.2)
BASOPHILS NFR BLD AUTO: 0.5 % — SIGNIFICANT CHANGE UP (ref 0–2)
BILIRUB SERPL-MCNC: 0.3 MG/DL — SIGNIFICANT CHANGE UP (ref 0.2–1.2)
BUN SERPL-MCNC: 22 MG/DL — SIGNIFICANT CHANGE UP (ref 7–23)
CALCIUM SERPL-MCNC: 8.8 MG/DL — SIGNIFICANT CHANGE UP (ref 8.5–10.1)
CHLORIDE SERPL-SCNC: 112 MMOL/L — HIGH (ref 96–108)
CO2 SERPL-SCNC: 25 MMOL/L — SIGNIFICANT CHANGE UP (ref 22–31)
CREAT SERPL-MCNC: 1 MG/DL — SIGNIFICANT CHANGE UP (ref 0.5–1.3)
D DIMER BLD IA.RAPID-MCNC: 1018 NG/ML DDU — HIGH
EGFR: 53 ML/MIN/1.73M2 — LOW
EOSINOPHIL # BLD AUTO: 0.06 K/UL — SIGNIFICANT CHANGE UP (ref 0–0.5)
EOSINOPHIL NFR BLD AUTO: 0.8 % — SIGNIFICANT CHANGE UP (ref 0–6)
FLUAV AG NPH QL: SIGNIFICANT CHANGE UP
FLUBV AG NPH QL: SIGNIFICANT CHANGE UP
GLUCOSE SERPL-MCNC: 103 MG/DL — HIGH (ref 70–99)
HCT VFR BLD CALC: 35.4 % — SIGNIFICANT CHANGE UP (ref 34.5–45)
HGB BLD-MCNC: 11.2 G/DL — LOW (ref 11.5–15.5)
IMM GRANULOCYTES NFR BLD AUTO: 0.3 % — SIGNIFICANT CHANGE UP (ref 0–1.5)
INR BLD: 1.08 RATIO — SIGNIFICANT CHANGE UP (ref 0.88–1.16)
LACTATE SERPL-SCNC: 0.5 MMOL/L — LOW (ref 0.7–2)
LYMPHOCYTES # BLD AUTO: 1.2 K/UL — SIGNIFICANT CHANGE UP (ref 1–3.3)
LYMPHOCYTES # BLD AUTO: 16.3 % — SIGNIFICANT CHANGE UP (ref 13–44)
MCHC RBC-ENTMCNC: 30.3 PG — SIGNIFICANT CHANGE UP (ref 27–34)
MCHC RBC-ENTMCNC: 31.6 G/DL — LOW (ref 32–36)
MCV RBC AUTO: 95.7 FL — SIGNIFICANT CHANGE UP (ref 80–100)
MONOCYTES # BLD AUTO: 0.48 K/UL — SIGNIFICANT CHANGE UP (ref 0–0.9)
MONOCYTES NFR BLD AUTO: 6.5 % — SIGNIFICANT CHANGE UP (ref 2–14)
NEUTROPHILS # BLD AUTO: 5.56 K/UL — SIGNIFICANT CHANGE UP (ref 1.8–7.4)
NEUTROPHILS NFR BLD AUTO: 75.6 % — SIGNIFICANT CHANGE UP (ref 43–77)
NRBC # BLD: 0 /100 WBCS — SIGNIFICANT CHANGE UP (ref 0–0)
NT-PROBNP SERPL-SCNC: 5706 PG/ML — HIGH (ref 0–450)
PLATELET # BLD AUTO: 181 K/UL — SIGNIFICANT CHANGE UP (ref 150–400)
POTASSIUM SERPL-MCNC: 4.3 MMOL/L — SIGNIFICANT CHANGE UP (ref 3.5–5.3)
POTASSIUM SERPL-SCNC: 4.3 MMOL/L — SIGNIFICANT CHANGE UP (ref 3.5–5.3)
PROT SERPL-MCNC: 6.7 GM/DL — SIGNIFICANT CHANGE UP (ref 6–8.3)
PROTHROM AB SERPL-ACNC: 13 SEC — SIGNIFICANT CHANGE UP (ref 10.5–13.4)
RBC # BLD: 3.7 M/UL — LOW (ref 3.8–5.2)
RBC # FLD: 13.5 % — SIGNIFICANT CHANGE UP (ref 10.3–14.5)
SARS-COV-2 RNA SPEC QL NAA+PROBE: SIGNIFICANT CHANGE UP
SODIUM SERPL-SCNC: 143 MMOL/L — SIGNIFICANT CHANGE UP (ref 135–145)
TROPONIN I, HIGH SENSITIVITY RESULT: 12.5 NG/L — SIGNIFICANT CHANGE UP
WBC # BLD: 7.36 K/UL — SIGNIFICANT CHANGE UP (ref 3.8–10.5)
WBC # FLD AUTO: 7.36 K/UL — SIGNIFICANT CHANGE UP (ref 3.8–10.5)

## 2022-05-31 PROCEDURE — 70450 CT HEAD/BRAIN W/O DYE: CPT | Mod: 26,MA

## 2022-05-31 PROCEDURE — 71275 CT ANGIOGRAPHY CHEST: CPT | Mod: 26,MA

## 2022-05-31 PROCEDURE — 93010 ELECTROCARDIOGRAM REPORT: CPT

## 2022-05-31 PROCEDURE — 71045 X-RAY EXAM CHEST 1 VIEW: CPT | Mod: 26,59

## 2022-05-31 PROCEDURE — 74177 CT ABD & PELVIS W/CONTRAST: CPT | Mod: 26,MA

## 2022-05-31 PROCEDURE — 99223 1ST HOSP IP/OBS HIGH 75: CPT

## 2022-05-31 PROCEDURE — 99285 EMERGENCY DEPT VISIT HI MDM: CPT

## 2022-05-31 RX ORDER — CEFTRIAXONE 500 MG/1
1000 INJECTION, POWDER, FOR SOLUTION INTRAMUSCULAR; INTRAVENOUS ONCE
Refills: 0 | Status: COMPLETED | OUTPATIENT
Start: 2022-05-31 | End: 2022-05-31

## 2022-05-31 RX ORDER — AZITHROMYCIN 500 MG/1
500 TABLET, FILM COATED ORAL ONCE
Refills: 0 | Status: COMPLETED | OUTPATIENT
Start: 2022-05-31 | End: 2022-05-31

## 2022-05-31 RX ORDER — FUROSEMIDE 40 MG
40 TABLET ORAL ONCE
Refills: 0 | Status: COMPLETED | OUTPATIENT
Start: 2022-05-31 | End: 2022-05-31

## 2022-05-31 RX ORDER — MIRTAZAPINE 7.5 MG/1
7.5 TABLET, FILM COATED ORAL
Qty: 90 | Refills: 2 | Status: ACTIVE | COMMUNITY
Start: 2022-04-13 | End: 1900-01-01

## 2022-05-31 RX ORDER — OMEPRAZOLE 20 MG/1
20 CAPSULE, DELAYED RELEASE ORAL DAILY
Qty: 90 | Refills: 2 | Status: ACTIVE | COMMUNITY
Start: 2021-04-16 | End: 1900-01-01

## 2022-05-31 RX ADMIN — Medication 40 MILLIGRAM(S): at 23:56

## 2022-05-31 RX ADMIN — AZITHROMYCIN 500 MILLIGRAM(S): 500 TABLET, FILM COATED ORAL at 23:56

## 2022-05-31 RX ADMIN — Medication 100 MILLIGRAM(S): at 23:56

## 2022-05-31 RX ADMIN — CEFTRIAXONE 100 MILLIGRAM(S): 500 INJECTION, POWDER, FOR SOLUTION INTRAMUSCULAR; INTRAVENOUS at 23:56

## 2022-05-31 NOTE — ED ADULT NURSE NOTE - CHIEF COMPLAINT QUOTE
Polish Speaking, EMS reports RA Sat 90%, Given Oxygen 6 liters with 98% H/O CVA, HTN on blood thinners

## 2022-05-31 NOTE — ED PROVIDER NOTE - OBJECTIVE STATEMENT
89 yo female w/PMH of Alzheimer Disease, HTN, CVA (difficulty with swallowing), HLD, Asthma, previous recent admission x1 month ago for CP workup and UTI, presents to the ED c/o SOB going on for the past few days. Denies any fever or chills. Pt is vaccinated for Covid in her hometown [place], [place]. Pt is c/o SOB at rest and on exertion. Denies any sick contacts but pt is concerned for symptoms coming on due to Covid. Pt has slight congestion to the nose. Also noted slight swelling of bilateral UE and LE.

## 2022-05-31 NOTE — ED ADULT NURSE NOTE - OBJECTIVE STATEMENT
Pt alert and appears anxious, pt having difficulty speaking with hoarse voice, airway appears patent. As per EMS, pt was having SOB at home with O2 sat at 90% on RA. EMS states they placed pt on 5L nc for transport to ED. As per EMS, pt on Eliquis for a fib. Pt reports SOB, fatigue, feeling of heaviness in head, and generalized muscular pain. Pain score 8/10. B/l ankle swelling noted, pt states the right leg swelling is usually greater than the left leg. Pt states she uses cane for ambulation but also needs assistance when walking as she is unsteady on feet. Hx CVA, HTN

## 2022-05-31 NOTE — ED PROVIDER NOTE - CARE PLAN
Principal Discharge DX:	Pneumonia, aspiration  Secondary Diagnosis:	Fluid overload  Secondary Diagnosis:	Hypoxia   1

## 2022-05-31 NOTE — ED ADULT TRIAGE NOTE - CHIEF COMPLAINT QUOTE
[place] Speaking, EMS reports RA Sat 90%, Given Oxygen 6 liters with 98% H/O CVA, HTN on blood thinners

## 2022-05-31 NOTE — ED PROVIDER NOTE - PROGRESS NOTE DETAILS
bhaskar flores: increased to 6L NC for hypoxia, 88% RA. CTa negative for PE but possible asp pna vs pulm edema. No hx of chf or asthma/copd. Will treat with clindamycin/ceft/azithromycin for pna. Tx with lasix for fluid overload with elevated BNP and negative trop. EKG normal. otherwise, patient afebrile well appearing, speaking full sentences. To be admitted. Spoke w/ daughter about plan to admit.

## 2022-05-31 NOTE — ED PROVIDER NOTE - CLINICAL SUMMARY MEDICAL DECISION MAKING FREE TEXT BOX
89F PMHx as above presenting with SOB, hypoxia. Found to have aspiration pna vs fluid overload on CTA chest. Was complaining of generalized abdominal pain, ct ap negative. hemodynamically stable on 6L NC 95%. Speaking full sentences. No respiratory distress.   - treated w/ clindamycin/ceft/azithro for asp pna?  - lasix for fluid overload?  - possible new onset chf ? ekg normal, trop negative but bnp elevated.   - admit

## 2022-06-01 DIAGNOSIS — J69.0 PNEUMONITIS DUE TO INHALATION OF FOOD AND VOMIT: ICD-10-CM

## 2022-06-01 DIAGNOSIS — J45.998 OTHER ASTHMA: ICD-10-CM

## 2022-06-01 DIAGNOSIS — I10 ESSENTIAL (PRIMARY) HYPERTENSION: ICD-10-CM

## 2022-06-01 DIAGNOSIS — Z86.73 PERSONAL HISTORY OF TRANSIENT ISCHEMIC ATTACK (TIA), AND CEREBRAL INFARCTION WITHOUT RESIDUAL DEFICITS: ICD-10-CM

## 2022-06-01 DIAGNOSIS — I50.9 HEART FAILURE, UNSPECIFIED: ICD-10-CM

## 2022-06-01 LAB
APPEARANCE UR: CLEAR — SIGNIFICANT CHANGE UP
BACTERIA # UR AUTO: ABNORMAL
BILIRUB UR-MCNC: NEGATIVE — SIGNIFICANT CHANGE UP
COLOR SPEC: YELLOW — SIGNIFICANT CHANGE UP
DIFF PNL FLD: NEGATIVE — SIGNIFICANT CHANGE UP
EPI CELLS # UR: SIGNIFICANT CHANGE UP
GLUCOSE UR QL: NEGATIVE MG/DL — SIGNIFICANT CHANGE UP
KETONES UR-MCNC: NEGATIVE — SIGNIFICANT CHANGE UP
LEUKOCYTE ESTERASE UR-ACNC: NEGATIVE — SIGNIFICANT CHANGE UP
NITRITE UR-MCNC: NEGATIVE — SIGNIFICANT CHANGE UP
PH UR: 6 — SIGNIFICANT CHANGE UP (ref 5–8)
PROCALCITONIN SERPL-MCNC: 0.03 NG/ML — SIGNIFICANT CHANGE UP (ref 0.02–0.1)
PROT UR-MCNC: 15 MG/DL
RBC CASTS # UR COMP ASSIST: SIGNIFICANT CHANGE UP /HPF (ref 0–4)
SP GR SPEC: 1.01 — SIGNIFICANT CHANGE UP (ref 1.01–1.02)
UROBILINOGEN FLD QL: NEGATIVE MG/DL — SIGNIFICANT CHANGE UP
WBC UR QL: SIGNIFICANT CHANGE UP

## 2022-06-01 RX ORDER — ATORVASTATIN CALCIUM 80 MG/1
20 TABLET, FILM COATED ORAL AT BEDTIME
Refills: 0 | Status: DISCONTINUED | OUTPATIENT
Start: 2022-06-01 | End: 2022-06-07

## 2022-06-01 RX ORDER — ACETAMINOPHEN 500 MG
650 TABLET ORAL EVERY 6 HOURS
Refills: 0 | Status: DISCONTINUED | OUTPATIENT
Start: 2022-06-01 | End: 2022-06-07

## 2022-06-01 RX ORDER — CEFTRIAXONE 500 MG/1
1000 INJECTION, POWDER, FOR SOLUTION INTRAMUSCULAR; INTRAVENOUS EVERY 24 HOURS
Refills: 0 | Status: DISCONTINUED | OUTPATIENT
Start: 2022-06-02 | End: 2022-06-01

## 2022-06-01 RX ORDER — FUROSEMIDE 40 MG
40 TABLET ORAL DAILY
Refills: 0 | Status: DISCONTINUED | OUTPATIENT
Start: 2022-06-01 | End: 2022-06-02

## 2022-06-01 RX ORDER — ALBUTEROL 90 UG/1
2.5 AEROSOL, METERED ORAL EVERY 6 HOURS
Refills: 0 | Status: DISCONTINUED | OUTPATIENT
Start: 2022-06-01 | End: 2022-06-03

## 2022-06-01 RX ORDER — AZITHROMYCIN 500 MG/1
500 TABLET, FILM COATED ORAL EVERY 24 HOURS
Refills: 0 | Status: DISCONTINUED | OUTPATIENT
Start: 2022-06-02 | End: 2022-06-01

## 2022-06-01 RX ORDER — SIMETHICONE 80 MG/1
80 TABLET, CHEWABLE ORAL ONCE
Refills: 0 | Status: COMPLETED | OUTPATIENT
Start: 2022-06-01 | End: 2022-06-01

## 2022-06-01 RX ORDER — LIPASE/PROTEASE/AMYLASE 16-48-48K
1 CAPSULE,DELAYED RELEASE (ENTERIC COATED) ORAL
Refills: 0 | Status: DISCONTINUED | OUTPATIENT
Start: 2022-06-01 | End: 2022-06-02

## 2022-06-01 RX ORDER — ONDANSETRON 8 MG/1
4 TABLET, FILM COATED ORAL EVERY 8 HOURS
Refills: 0 | Status: DISCONTINUED | OUTPATIENT
Start: 2022-06-01 | End: 2022-06-07

## 2022-06-01 RX ORDER — ESCITALOPRAM OXALATE 10 MG/1
10 TABLET, FILM COATED ORAL DAILY
Refills: 0 | Status: DISCONTINUED | OUTPATIENT
Start: 2022-06-01 | End: 2022-06-07

## 2022-06-01 RX ORDER — LANOLIN ALCOHOL/MO/W.PET/CERES
3 CREAM (GRAM) TOPICAL AT BEDTIME
Refills: 0 | Status: DISCONTINUED | OUTPATIENT
Start: 2022-06-01 | End: 2022-06-07

## 2022-06-01 RX ORDER — AZITHROMYCIN 500 MG/1
TABLET, FILM COATED ORAL
Refills: 0 | Status: DISCONTINUED | OUTPATIENT
Start: 2022-06-01 | End: 2022-06-01

## 2022-06-01 RX ORDER — TIMOLOL 0.5 %
1 DROPS OPHTHALMIC (EYE) DAILY
Refills: 0 | Status: DISCONTINUED | OUTPATIENT
Start: 2022-06-01 | End: 2022-06-07

## 2022-06-01 RX ORDER — ZOLPIDEM TARTRATE 10 MG/1
5 TABLET ORAL AT BEDTIME
Refills: 0 | Status: DISCONTINUED | OUTPATIENT
Start: 2022-06-01 | End: 2022-06-07

## 2022-06-01 RX ORDER — LIPASE/PROTEASE/AMYLASE 16-48-48K
2 CAPSULE,DELAYED RELEASE (ENTERIC COATED) ORAL
Refills: 0 | Status: DISCONTINUED | OUTPATIENT
Start: 2022-06-01 | End: 2022-06-03

## 2022-06-01 RX ORDER — BUDESONIDE AND FORMOTEROL FUMARATE DIHYDRATE 160; 4.5 UG/1; UG/1
2 AEROSOL RESPIRATORY (INHALATION)
Refills: 0 | Status: DISCONTINUED | OUTPATIENT
Start: 2022-06-01 | End: 2022-06-07

## 2022-06-01 RX ORDER — METOPROLOL TARTRATE 50 MG
50 TABLET ORAL DAILY
Refills: 0 | Status: ACTIVE | OUTPATIENT
Start: 2022-06-01 | End: 2023-04-30

## 2022-06-01 RX ORDER — SENNA PLUS 8.6 MG/1
2 TABLET ORAL AT BEDTIME
Refills: 0 | Status: DISCONTINUED | OUTPATIENT
Start: 2022-06-01 | End: 2022-06-07

## 2022-06-01 RX ORDER — AMLODIPINE BESYLATE 2.5 MG/1
2.5 TABLET ORAL DAILY
Refills: 0 | Status: ACTIVE | OUTPATIENT
Start: 2022-06-01 | End: 2023-04-30

## 2022-06-01 RX ORDER — CLOPIDOGREL BISULFATE 75 MG/1
75 TABLET, FILM COATED ORAL DAILY
Refills: 0 | Status: DISCONTINUED | OUTPATIENT
Start: 2022-06-01 | End: 2022-06-06

## 2022-06-01 RX ADMIN — CLOPIDOGREL BISULFATE 75 MILLIGRAM(S): 75 TABLET, FILM COATED ORAL at 11:38

## 2022-06-01 RX ADMIN — ZOLPIDEM TARTRATE 5 MILLIGRAM(S): 10 TABLET ORAL at 23:26

## 2022-06-01 RX ADMIN — SIMETHICONE 80 MILLIGRAM(S): 80 TABLET, CHEWABLE ORAL at 15:14

## 2022-06-01 RX ADMIN — ATORVASTATIN CALCIUM 20 MILLIGRAM(S): 80 TABLET, FILM COATED ORAL at 21:34

## 2022-06-01 RX ADMIN — Medication 40 MILLIGRAM(S): at 05:59

## 2022-06-01 RX ADMIN — ALBUTEROL 2.5 MILLIGRAM(S): 90 AEROSOL, METERED ORAL at 12:36

## 2022-06-01 RX ADMIN — Medication 50 MILLIGRAM(S): at 05:56

## 2022-06-01 RX ADMIN — BUDESONIDE AND FORMOTEROL FUMARATE DIHYDRATE 2 PUFF(S): 160; 4.5 AEROSOL RESPIRATORY (INHALATION) at 17:41

## 2022-06-01 RX ADMIN — Medication 2 CAPSULE(S): at 11:38

## 2022-06-01 RX ADMIN — Medication 2 CAPSULE(S): at 08:34

## 2022-06-01 RX ADMIN — Medication 2 CAPSULE(S): at 17:42

## 2022-06-01 RX ADMIN — BUDESONIDE AND FORMOTEROL FUMARATE DIHYDRATE 2 PUFF(S): 160; 4.5 AEROSOL RESPIRATORY (INHALATION) at 10:12

## 2022-06-01 RX ADMIN — ALBUTEROL 2.5 MILLIGRAM(S): 90 AEROSOL, METERED ORAL at 17:40

## 2022-06-01 RX ADMIN — Medication 1 DROP(S): at 12:26

## 2022-06-01 RX ADMIN — ALBUTEROL 2.5 MILLIGRAM(S): 90 AEROSOL, METERED ORAL at 06:03

## 2022-06-01 RX ADMIN — ESCITALOPRAM OXALATE 10 MILLIGRAM(S): 10 TABLET, FILM COATED ORAL at 18:08

## 2022-06-01 RX ADMIN — SENNA PLUS 2 TABLET(S): 8.6 TABLET ORAL at 21:34

## 2022-06-01 NOTE — H&P ADULT - PROBLEM SELECTOR PLAN 2
- Imaging with signs concerning for Aspiration PNA  - Pt does endorse difficulty swallowing  - Will tx as Aspiration PNA with azithromycin + ceftriaxone  - Monitor response.  - NPO for speech and swallow eval  - FU Culture and labs

## 2022-06-01 NOTE — H&P ADULT - NSHPLABSRESULTS_GEN_ALL_CORE
=======================================================  Labs:                        11.2   7.36  )-----------( 181      ( 31 May 2022 18:24 )             35.4     05-31    143  |  112<H>  |  22  ----------------------------<  103<H>  4.3   |  25  |  1.00    Ca    8.8      31 May 2022 18:24    TPro  6.7  /  Alb  3.0<L>  /  TBili  0.3  /  DBili  x   /  AST  158<H>  /  ALT  188<H>  /  AlkPhos  267<H>  05-31      Creatinine, Serum: 1.00 mg/dL (05-31-22 @ 18:24)      D-Dimer Assay, Quantitative: 1018 ng/mL DDU (05-31-22 @ 18:24)        WBC Count: 7.36 K/uL (05-31-22 @ 18:24)    SARS-CoV-2 Result: NotDetec (05-31-22 @ 18:24)      Alkaline Phosphatase, Serum: 267 U/L (05-31-22 @ 18:24)  Alanine Aminotransferase (ALT/SGPT): 188 U/L (05-31-22 @ 18:24)  Aspartate Aminotransferase (AST/SGOT): 158 U/L (05-31-22 @ 18:24)  Bilirubin Total, Serum: 0.3 mg/dL (05-31-22 @ 18:24)      ======  CTA IMPRESSION:  1. No pulmonary embolism.  2. Patchy groundglass airspace opacities in the left upper lobe which   could be infectious in etiology. Pulmonary edema could also be considered.  3. Small bilateral pleural effusions.  4. Debris in the upper trachea raising concern for aspiration.  5. No bowel obstruction or inflammation.

## 2022-06-01 NOTE — SWALLOW BEDSIDE ASSESSMENT ADULT - SWALLOW EVAL: DIAGNOSIS
oropharyngeal phases of swallow marked by slightly increased mastication time with decreased bolus manipulation/formation with solid causing intermittent mild residue along the tongue- cleared with cue, suspect delay in swallow trigger with adequate hyolaryngeal elevation to palpation. no clinical signs of laryngeal penetration or aspiration, noted burping.

## 2022-06-01 NOTE — CHART NOTE - NSCHARTNOTEFT_GEN_A_CORE
seen and examined  c/w current management as outline  passed swallow test  no signs of pna at the moment will hold off abx     Vital Signs Last 24 Hrs  T(C): 36.3 (01 Jun 2022 10:29), Max: 37.1 (01 Jun 2022 02:51)  T(F): 97.3 (01 Jun 2022 10:29), Max: 98.7 (01 Jun 2022 02:51)  HR: 110 (01 Jun 2022 12:00) (60 - 111)  BP: 103/71 (01 Jun 2022 10:29) (101/65 - 124/88)  BP(mean): --  RR: 17 (01 Jun 2022 10:29) (17 - 22)  SpO2: 91% (01 Jun 2022 12:00) (91% - 99%)                            11.2   7.36  )-----------( 181      ( 31 May 2022 18:24 )             35.4     05-31    143  |  112<H>  |  22  ----------------------------<  103<H>  4.3   |  25  |  1.00    Ca    8.8      31 May 2022 18:24    TPro  6.7  /  Alb  3.0<L>  /  TBili  0.3  /  DBili  x   /  AST  158<H>  /  ALT  188<H>  /  AlkPhos  267<H>  05-31    PT/INR - ( 31 May 2022 18:24 )   PT: 13.0 sec;   INR: 1.08 ratio         PTT - ( 31 May 2022 18:24 )  PTT:27.4 sec

## 2022-06-01 NOTE — H&P ADULT - HISTORY OF PRESENT ILLNESS
89 yo female w/PMH of Alzheimer Disease, HTN, CVA (difficulty with swallowing), HLD, Asthma, Afib previous recent admission x1 month ago for CP workup and UTI, presents to the ED c/o SOB going on for the past few days. Denies any fever or chills. Pt is c/o SOB at rest and on exertion. Denies any sick contacts but pt is concerned for symptoms coming on due to Covid though she is vaccinated. Pt complains of  congestion to the nose. Also noted slight swelling of bilateral UE and LE. As per EMS, pt was having SOB at home with O2 sat at 90% on RA. Pt speaks with a hoarse voice and has a hx of partial right middle and lower lobectomy based on imaging.    In the ED pt vitals with hypoxia to 89 90? ( not documented ) , Labs wbc 7.36 , hgb 11.2, DDImer 1018, Elevated LFTs, troponin negative. ProBNP 5700. Imaging with b/l pleural effusions and concerns for aspiration PNA.

## 2022-06-01 NOTE — H&P ADULT - NSHPPHYSICALEXAM_GEN_ALL_CORE
VITALS:   T(C): 37.1 (06-01-22 @ 02:51), Max: 37.1 (06-01-22 @ 02:51)  HR: 100 (06-01-22 @ 02:51) (100 - 110)  BP: 110/65 (06-01-22 @ 02:51) (101/65 - 124/88)  RR: 18 (06-01-22 @ 02:51) (18 - 22)  SpO2: 95% (06-01-22 @ 02:51) (95% - 96%)    GENERAL: NAD, Sitting up in bed anxious  HEAD:  Atraumatic, Normocephalic  EYES: PERRLA, conjunctiva and sclera clear  ENT: Moist mucous membranes  NECK: Supple, No JVD  CHEST/LUNG: decrease air entry b/l more in lower lung fields. No wheezing. mild crackles.   HEART: s1s2   ABDOMEN: Soft, nontender, nondistended  EXTREMITIES:  +1 edema LE  NERVOUS SYSTEM:  A&Ox2, movign all extremities   SKIN: No rashes or lesions

## 2022-06-01 NOTE — H&P ADULT - PROBLEM SELECTOR PLAN 5
- Decrease air entry b/l but no wheezing   - Pt noted to be on symbicort. will restart  - Albuterol q6h

## 2022-06-01 NOTE — SWALLOW BEDSIDE ASSESSMENT ADULT - COMMENTS
noted in chart review previous MBS 4/2022 results as follows 1. Functional oral stage given Thin Liquids, Puree, and Regular Solids marked by adequate bolus containment, adequate bolus manipulation, prolonged mastication for Regular Solids secondary to missing lower dentition and adequate anterior-posterior transport. There was one episode of premature loss over the lateral surface of the epiglottis given Thin Liquids secondary to reduced tongue to palate seal. Adequate oral clearance noted post primary swallow.   2. Functional pharyngeal stage given Thin Liquids, Puree, and Regular Solids marked by adequate tongue base retraction, adequate epiglottic deflection, adequate initiation of the pharyngeal swallow trigger, adequate laryngeal elevation, adequate pharyngeal constriction and adequate laryngeal vestibule closure. There was one episode of Trace Laryngeal Penetration during the swallow given Thin Liquids with retrieval and adequate airway protection maintained. There was No Aspiration before, during or after the swallow given Thin Liquids, Puree and Regular Solids.    CThead no contrast 5/31/2022IMPRESSION:No acute intracranial hemorrhage, territorial infarct or mass effect.    CTchest w/ IV contrast 5/31/2022IMPRESSION:1. No pulmonary embolism.  2. Patchy groundglass airspace opacities in the left upper lobe which could be infectious in etiology. Pulmonary edema could also be considered.  3. Small bilateral pleural effusions.  4. Debris in the upper trachea raising concern for aspiration.  5. No bowel obstruction or inflammation. noted in chart review previous MBS 4/2022 results as follows 1. Functional oral stage given Thin Liquids, Puree, and Regular Solids marked by adequate bolus containment, adequate bolus manipulation, prolonged mastication for Regular Solids secondary to missing lower dentition and adequate anterior-posterior transport. There was one episode of premature loss over the lateral surface of the epiglottis given Thin Liquids secondary to reduced tongue to palate seal. Adequate oral clearance noted post primary swallow.   2. Functional pharyngeal stage given Thin Liquids, Puree, and Regular Solids marked by adequate tongue base retraction, adequate epiglottic deflection, adequate initiation of the pharyngeal swallow trigger, adequate laryngeal elevation, adequate pharyngeal constriction and adequate laryngeal vestibule closure. There was one episode of Trace Laryngeal Penetration during the swallow given Thin Liquids with retrieval and adequate airway protection maintained. There was No Aspiration before, during or after the swallow given Thin Liquids, Puree and Regular Solids.  SLP Recommended po diet soft and bite size with thin liquid    CThead no contrast 5/31/2022IMPRESSION:No acute intracranial hemorrhage, territorial infarct or mass effect.    CTchest w/ IV contrast 5/31/2022IMPRESSION:1. No pulmonary embolism.  2. Patchy groundglass airspace opacities in the left upper lobe which could be infectious in etiology. Pulmonary edema could also be considered.  3. Small bilateral pleural effusions.  4. Debris in the upper trachea raising concern for aspiration.  5. No bowel obstruction or inflammation.

## 2022-06-01 NOTE — H&P ADULT - NSHPREVIEWOFSYSTEMS_GEN_ALL_CORE
REVIEW OF SYSTEMS:    CONSTITUTIONAL: No fevers or chills  EYES/ENT: No visual changes;  No vertigo or throat pain   NECK: No pain or stiffness  RESPIRATORY: No cough or wheezing, +  shortness of breath  CARDIOVASCULAR: No chest pain or palpitations  GASTROINTESTINAL: No abdominal pain. No nausea, vomiting,  diarrhea or constipation.   GENITOURINARY: No dysuria,  NEUROLOGICAL: No numbness or weakness beyond baseline  MSK b/l LE swelling noted  SKIN: No itching, rashes

## 2022-06-01 NOTE — SWALLOW BEDSIDE ASSESSMENT ADULT - SLP GENERAL OBSERVATIONS
pt seen bedside pt seen bedside, 3lNC in place alert and oriented to self, place with choice. pt responded to questions for assessment, noted pleasantly confused/disoriented, she verbalized wants and was able to follow one step directions with good accuracy. speech intelligibility fair to good depending on length and complexity of utterance 2/2 strained strangled vocal quality/ glottal beebe, decreased breath support and coordination with phonation. pt seen bedside, 3lNC in place alert and oriented to self, place with choice. pt responded to questions for assessment, noted pleasantly confused/disoriented, she verbalized wants and was able to follow one step directions with good accuracy. speech intelligibility fair to good depending on length and complexity of utterance 2/2 strained strangled vocal quality/ glottal beebe, decreased breath support and coordination with phonation. pt reported chewing on one side 2/2 loose tooth..

## 2022-06-01 NOTE — H&P ADULT - PROBLEM SELECTOR PLAN 1
- Pt last echo in 4/2022 with only mild diastolic dysfunction  - ProBNP 5700 today no previous for comparison  - Imaging with small b/l plueral effusions.   - Will tx as CHF exacerbation with IV lasix 40 QD  - Repeat ECHO to r/o any recent event

## 2022-06-01 NOTE — H&P ADULT - ASSESSMENT
89 yo female w/PMH of Alzheimer Disease, HTN, CVA (difficulty with swallowing), HLD, Asthma, Afib here for SOB , CHF vs Aspiration PNA.

## 2022-06-02 LAB
ALBUMIN SERPL ELPH-MCNC: 2.5 G/DL — LOW (ref 3.3–5)
ALP SERPL-CCNC: 155 U/L — HIGH (ref 40–120)
ALT FLD-CCNC: 93 U/L — HIGH (ref 12–78)
ANION GAP SERPL CALC-SCNC: 8 MMOL/L — SIGNIFICANT CHANGE UP (ref 5–17)
AST SERPL-CCNC: 53 U/L — HIGH (ref 15–37)
BASOPHILS # BLD AUTO: 0.03 K/UL — SIGNIFICANT CHANGE UP (ref 0–0.2)
BASOPHILS NFR BLD AUTO: 0.4 % — SIGNIFICANT CHANGE UP (ref 0–2)
BILIRUB SERPL-MCNC: 0.3 MG/DL — SIGNIFICANT CHANGE UP (ref 0.2–1.2)
BUN SERPL-MCNC: 30 MG/DL — HIGH (ref 7–23)
CALCIUM SERPL-MCNC: 8.1 MG/DL — LOW (ref 8.5–10.1)
CHLORIDE SERPL-SCNC: 107 MMOL/L — SIGNIFICANT CHANGE UP (ref 96–108)
CO2 SERPL-SCNC: 26 MMOL/L — SIGNIFICANT CHANGE UP (ref 22–31)
CREAT SERPL-MCNC: 1.4 MG/DL — HIGH (ref 0.5–1.3)
EGFR: 35 ML/MIN/1.73M2 — LOW
EOSINOPHIL # BLD AUTO: 0.15 K/UL — SIGNIFICANT CHANGE UP (ref 0–0.5)
EOSINOPHIL NFR BLD AUTO: 2 % — SIGNIFICANT CHANGE UP (ref 0–6)
GLUCOSE SERPL-MCNC: 95 MG/DL — SIGNIFICANT CHANGE UP (ref 70–99)
GRAM STN FLD: SIGNIFICANT CHANGE UP
HCT VFR BLD CALC: 28.6 % — LOW (ref 34.5–45)
HGB BLD-MCNC: 9.1 G/DL — LOW (ref 11.5–15.5)
IMM GRANULOCYTES NFR BLD AUTO: 0.4 % — SIGNIFICANT CHANGE UP (ref 0–1.5)
LEGIONELLA AG UR QL: NEGATIVE — SIGNIFICANT CHANGE UP
LYMPHOCYTES # BLD AUTO: 1.75 K/UL — SIGNIFICANT CHANGE UP (ref 1–3.3)
LYMPHOCYTES # BLD AUTO: 23.1 % — SIGNIFICANT CHANGE UP (ref 13–44)
MCHC RBC-ENTMCNC: 30.5 PG — SIGNIFICANT CHANGE UP (ref 27–34)
MCHC RBC-ENTMCNC: 31.8 G/DL — LOW (ref 32–36)
MCV RBC AUTO: 96 FL — SIGNIFICANT CHANGE UP (ref 80–100)
MONOCYTES # BLD AUTO: 0.58 K/UL — SIGNIFICANT CHANGE UP (ref 0–0.9)
MONOCYTES NFR BLD AUTO: 7.7 % — SIGNIFICANT CHANGE UP (ref 2–14)
NEUTROPHILS # BLD AUTO: 5.03 K/UL — SIGNIFICANT CHANGE UP (ref 1.8–7.4)
NEUTROPHILS NFR BLD AUTO: 66.4 % — SIGNIFICANT CHANGE UP (ref 43–77)
NRBC # BLD: 0 /100 WBCS — SIGNIFICANT CHANGE UP (ref 0–0)
PLATELET # BLD AUTO: 144 K/UL — LOW (ref 150–400)
POTASSIUM SERPL-MCNC: 3.2 MMOL/L — LOW (ref 3.5–5.3)
POTASSIUM SERPL-SCNC: 3.2 MMOL/L — LOW (ref 3.5–5.3)
PROT SERPL-MCNC: 5.7 GM/DL — LOW (ref 6–8.3)
RBC # BLD: 2.98 M/UL — LOW (ref 3.8–5.2)
RBC # FLD: 13.3 % — SIGNIFICANT CHANGE UP (ref 10.3–14.5)
S PNEUM AG UR QL: NEGATIVE — SIGNIFICANT CHANGE UP
SODIUM SERPL-SCNC: 141 MMOL/L — SIGNIFICANT CHANGE UP (ref 135–145)
SPECIMEN SOURCE: SIGNIFICANT CHANGE UP
WBC # BLD: 7.57 K/UL — SIGNIFICANT CHANGE UP (ref 3.8–10.5)
WBC # FLD AUTO: 7.57 K/UL — SIGNIFICANT CHANGE UP (ref 3.8–10.5)

## 2022-06-02 PROCEDURE — 99233 SBSQ HOSP IP/OBS HIGH 50: CPT

## 2022-06-02 PROCEDURE — 99223 1ST HOSP IP/OBS HIGH 75: CPT

## 2022-06-02 PROCEDURE — 93010 ELECTROCARDIOGRAM REPORT: CPT

## 2022-06-02 RX ORDER — DIGOXIN 250 MCG
250 TABLET ORAL EVERY 6 HOURS
Refills: 0 | Status: COMPLETED | OUTPATIENT
Start: 2022-06-02 | End: 2022-06-03

## 2022-06-02 RX ORDER — APIXABAN 2.5 MG/1
2.5 TABLET, FILM COATED ORAL EVERY 12 HOURS
Refills: 0 | Status: DISCONTINUED | OUTPATIENT
Start: 2022-06-02 | End: 2022-06-07

## 2022-06-02 RX ORDER — SPIRONOLACTONE 25 MG/1
25 TABLET, FILM COATED ORAL DAILY
Refills: 0 | Status: DISCONTINUED | OUTPATIENT
Start: 2022-06-02 | End: 2022-06-06

## 2022-06-02 RX ORDER — DIGOXIN 250 MCG
250 TABLET ORAL ONCE
Refills: 0 | Status: COMPLETED | OUTPATIENT
Start: 2022-06-02 | End: 2022-06-02

## 2022-06-02 RX ORDER — SPIRONOLACTONE 25 MG/1
25 TABLET, FILM COATED ORAL ONCE
Refills: 0 | Status: COMPLETED | OUTPATIENT
Start: 2022-06-02 | End: 2022-06-02

## 2022-06-02 RX ORDER — POTASSIUM CHLORIDE 20 MEQ
40 PACKET (EA) ORAL EVERY 4 HOURS
Refills: 0 | Status: COMPLETED | OUTPATIENT
Start: 2022-06-02 | End: 2022-06-02

## 2022-06-02 RX ORDER — METOPROLOL TARTRATE 50 MG
25 TABLET ORAL DAILY
Refills: 0 | Status: DISCONTINUED | OUTPATIENT
Start: 2022-06-03 | End: 2022-06-07

## 2022-06-02 RX ADMIN — BUDESONIDE AND FORMOTEROL FUMARATE DIHYDRATE 2 PUFF(S): 160; 4.5 AEROSOL RESPIRATORY (INHALATION) at 06:11

## 2022-06-02 RX ADMIN — Medication 1 DROP(S): at 16:12

## 2022-06-02 RX ADMIN — APIXABAN 2.5 MILLIGRAM(S): 2.5 TABLET, FILM COATED ORAL at 18:52

## 2022-06-02 RX ADMIN — Medication 40 MILLIEQUIVALENT(S): at 14:04

## 2022-06-02 RX ADMIN — ALBUTEROL 2.5 MILLIGRAM(S): 90 AEROSOL, METERED ORAL at 00:34

## 2022-06-02 RX ADMIN — SPIRONOLACTONE 25 MILLIGRAM(S): 25 TABLET, FILM COATED ORAL at 14:04

## 2022-06-02 RX ADMIN — ATORVASTATIN CALCIUM 20 MILLIGRAM(S): 80 TABLET, FILM COATED ORAL at 22:08

## 2022-06-02 RX ADMIN — ESCITALOPRAM OXALATE 10 MILLIGRAM(S): 10 TABLET, FILM COATED ORAL at 16:34

## 2022-06-02 RX ADMIN — BUDESONIDE AND FORMOTEROL FUMARATE DIHYDRATE 2 PUFF(S): 160; 4.5 AEROSOL RESPIRATORY (INHALATION) at 18:17

## 2022-06-02 RX ADMIN — ALBUTEROL 2.5 MILLIGRAM(S): 90 AEROSOL, METERED ORAL at 05:02

## 2022-06-02 RX ADMIN — Medication 2 CAPSULE(S): at 09:31

## 2022-06-02 RX ADMIN — ALBUTEROL 2.5 MILLIGRAM(S): 90 AEROSOL, METERED ORAL at 17:12

## 2022-06-02 RX ADMIN — Medication 250 MICROGRAM(S): at 18:17

## 2022-06-02 RX ADMIN — Medication 3 MILLIGRAM(S): at 22:08

## 2022-06-02 RX ADMIN — Medication 250 MICROGRAM(S): at 13:13

## 2022-06-02 RX ADMIN — ALBUTEROL 2.5 MILLIGRAM(S): 90 AEROSOL, METERED ORAL at 11:25

## 2022-06-02 RX ADMIN — Medication 40 MILLIEQUIVALENT(S): at 11:02

## 2022-06-02 RX ADMIN — Medication 2 CAPSULE(S): at 18:15

## 2022-06-02 NOTE — PROGRESS NOTE ADULT - SUBJECTIVE AND OBJECTIVE BOX
Patient is a 89y old  Female who presents with a chief complaint of CHF vs PNA (2022 11:30)    INTERVAL HPI/OVERNIGHT EVENTS: no events     MEDICATIONS  (STANDING):  ALBUTerol    0.083% 2.5 milliGRAM(s) Nebulizer every 6 hours  apixaban 2.5 milliGRAM(s) Oral every 12 hours  atorvastatin 20 milliGRAM(s) Oral at bedtime  budesonide 160 MICROgram(s)/formoterol 4.5 MICROgram(s) Inhaler 2 Puff(s) Inhalation two times a day  clopidogrel Tablet 75 milliGRAM(s) Oral daily  digoxin  Injectable 250 MICROGram(s) IV Push every 6 hours  escitalopram 10 milliGRAM(s) Oral daily  pancrelipase Oral Capsule (CREON 24,000 Lipase Units) - Peds 2 Capsule(s) Oral three times a day with meals  potassium chloride   Powder 40 milliEquivalent(s) Oral every 4 hours  senna 2 Tablet(s) Oral at bedtime  spironolactone 25 milliGRAM(s) Oral once  spironolactone 25 milliGRAM(s) Oral daily  timolol 0.25% Solution 1 Drop(s) Both EYES daily    MEDICATIONS  (PRN):  acetaminophen     Tablet .. 650 milliGRAM(s) Oral every 6 hours PRN Temp greater or equal to 38C (100.4F), Mild Pain (1 - 3)  aluminum hydroxide/magnesium hydroxide/simethicone Suspension 30 milliLiter(s) Oral every 4 hours PRN Dyspepsia  melatonin 3 milliGRAM(s) Oral at bedtime PRN Insomnia  ondansetron Injectable 4 milliGRAM(s) IV Push every 8 hours PRN Nausea and/or Vomiting  zolpidem 5 milliGRAM(s) Oral at bedtime PRN Insomnia    Allergies    Benadryl (Other)    Intolerances      REVIEW OF SYSTEMS:  All other systems reviewed and are negative    Vital Signs Last 24 Hrs  T(C): 36.3 (2022 10:25), Max: 36.7 (2022 23:56)  T(F): 97.4 (2022 10:25), Max: 98.1 (2022 23:56)  HR: 88 (2022 11:32) (70 - 106)  BP: 103/63 (2022 10:25) (89/51 - 112/72)  BP(mean): --  RR: 18 (2022 10:25) (18 - 18)  SpO2: 92% (2022 11:32) (92% - 99%)  Daily     Daily Weight in k.5 (2022 04:41)  I&O's Summary    CAPILLARY BLOOD GLUCOSE        PHYSICAL EXAM:  GENERAL: NAD,    HEAD:  Atraumatic, Normocephalic  EYES: EOMI, PERRLA, conjunctiva and sclera clear  ENMT: No tonsillar erythema, exudates, or enlargement; Moist mucous membranes, Good dentition, No lesions  NECK: Supple, No JVD, Normal thyroid  NERVOUS SYSTEM:  Alert & Oriented X3, Good concentration; Motor Strength 5/5 B/L upper and lower extremities; DTRs 2+ intact and symmetric  CHEST/LUNG: Clear to percussion bilaterally; No rales, rhonchi, wheezing, or rubs  HEART: Regular rate and rhythm; No murmurs, rubs, or gallops  ABDOMEN: Soft, Nontender, Nondistended; Bowel sounds present  EXTREMITIES:  2+ Peripheral Pulses, No clubbing, cyanosis, or edema  LYMPH: No lymphadenopathy noted  SKIN: No rashes or lesions    Labs                          9.1    7.57  )-----------( 144      ( 2022 07:47 )             28.6         141  |  107  |  30<H>  ----------------------------<  95  3.2<L>   |  26  |  1.40<H>    Ca    8.1<L>      2022 07:47    TPro  5.7<L>  /  Alb  2.5<L>  /  TBili  0.3  /  DBili  x   /  AST  53<H>  /  ALT  93<H>  /  AlkPhos  155<H>      PT/INR - ( 31 May 2022 18:24 )   PT: 13.0 sec;   INR: 1.08 ratio         PTT - ( 31 May 2022 18:24 )  PTT:27.4 sec      Urinalysis Basic - ( 2022 21:55 )    Color: Yellow / Appearance: Clear / S.010 / pH: x  Gluc: x / Ketone: Negative  / Bili: Negative / Urobili: Negative mg/dL   Blood: x / Protein: 15 mg/dL / Nitrite: Negative   Leuk Esterase: Negative / RBC: 0-2 /HPF / WBC 3-5   Sq Epi: x / Non Sq Epi: Occasional / Bacteria: Many        Culture - Sputum (collected 2022 09:05)  Source: .Sputum Sputum  Gram Stain (2022 10:53):    No polymorphonuclear leukocytes seen per low power field    Moderate Squamous epithelial cells seen per low power field    Moderate Gram positive cocci in pairs seen per oil power field    Culture - Blood (collected 2022 09:11)  Source: .Blood Blood-Peripheral  Preliminary Report (2022 10:01):    No growth to date.    Culture - Blood (collected 2022 09:11)  Source: .Blood Blood-Peripheral  Preliminary Report (2022 10:01):    No growth to date.                DVT prophylaxis: > Lovenox 40mg SQ daily  > Heparin   > SCD's

## 2022-06-02 NOTE — CONSULT NOTE ADULT - ASSESSMENT
?new Afib with RVR, relative hypotension  volume overload, with acute HFpEF likely due to paroxysmal afib as she has been experiencing palpitations as an outpt  LORENZO - ?DEJUAN  likely congestive hepatopathy      needs rate control, however also needs diuretics  -favor starting digoxin 250mcg x 1 now, then 250 mcg x 3 q6h, then 125mcg PO daily  -replete K aggressively to 4.5  -monitor renal function closely  -cont metoprolol, increase as BP may tolerate  -start spironolactone 25mg once daily unless LORENZO worsens  -20mg IV lasix BID increase to net negative as needed. pt with fair amount of orthopnea and is on supplemental O2.  -[id number] (age +2, HTN, female, atherosclerosis/AAA, CHF) - AC is indicated. however pt with progressive anemia of undetermined etiology and frequent falls. will defer AC for now (tried calling dtr, no answer)      Robert Marcus MD, FACC  , [facility] [employer] at [facility]  [address], [place] [address]  office: [phone]  Preferred method of contact: [employer] Teams. Further contact info under "Provider Info" tab    new Afib with RVR, relative hypotension  volume overload, with acute HFpEF likely due to paroxysmal afib as she has been experiencing palpitations as an outpt  LORENZO - ?DEJUAN  likely congestive hepatopathy      needs rate control, however also needs diuretics  -start digoxin 250mcg x 1 now, then 250 mcg x 3 q6h, then 125mcg PO daily  -replete K aggressively to 4.5  -monitor renal function closely  -cont metoprolol, increase as BP may tolerate  -start spironolactone 25mg once daily unless LORENZO worsens  -20mg IV lasix BID increase to net negative as needed. pt with fair amount of orthopnea and is on supplemental O2.  -[id number] (age +2, HTN, female, atherosclerosis/AAA, CHF, CVA) - AC is indicated. however pt with anemia of undetermined etiology and NOT frequent falls (as per dtr). after shared decision making with the pt, the pt's daughter, will trial AC for now with hep gtt      Robert Marcus MD, FACC  , [facility] [employer] [facility] at [facility]  [address] [place] [address], [place] [address]  office: [phone]  Preferred method of contact: [employer] Teams. Further contact info under "Provider Info" tab    new Afib with RVR, relative hypotension  volume overload, with acute HFpEF likely due to paroxysmal afib as she has been experiencing palpitations as an outpt  LORENZO - ?DEJUAN  likely congestive hepatopathy      needs rate control, however also needs diuretics  -start digoxin 250mcg x 1 now, then 250 mcg x 3 q6h, then 125mcg PO daily  -replete K aggressively to 4.5  -monitor renal function closely  -cont metoprolol, increase as BP may tolerate  -start spironolactone 25mg once daily unless LORENZO worsens  -20mg IV lasix BID increase to net negative as needed. pt with fair amount of orthopnea and is on supplemental O2.  -[id number] (age +2, HTN, female, atherosclerosis/AAA, CHF, CVA) - AC is indicated. after shared decision making with the pt, the pt's daughter, will trial AC for now with apixaban 2.5mg BID, continue clopidogrel for hx of CVA.      Robert Marcus MD, FACC  , [facility] at [facility]  [address] [place] [address], [facility] [address]  office: [phone]  Preferred method of contact: Microsoft Teams. Further contact info under "Provider Info" tab    new Afib with RVR, relative hypotension  volume overload, with acute HFpEF likely due to paroxysmal afib as she has been experiencing palpitations as an outpt  LORENZO - ?DEJUAN  likely congestive hepatopathy      needs rate control, however also needs diuretics  -start digoxin 250mcg x 1 now, then 250 mcg x 3 q6h, then 125mcg PO daily  -replete K aggressively to 4.5  -monitor renal function closely  -cont metoprolol, increase as BP may tolerate  -start spironolactone 25mg once daily unless LORENZO worsens  -20mg IV lasix BID increase to net negative as needed. pt with fair amount of orthopnea and is on supplemental O2.  -[id number] (age +2, HTN, female, atherosclerosis/AAA, CHF, CVA) - AC is indicated. after shared decision making with the pt, the pt's daughter, and outpt cardiologist Dr. Delacruz, will trial AC for now with apixaban 2.5mg BID, continue clopidogrel for hx of CVA.      Robert Marcus MD, [facility]  , [facility] at [facility]  [address] [place] [address], [place] [address]  office: [phone]  Preferred method of contact: Microsoft Teams. Further contact info under "Provider Info" tab

## 2022-06-02 NOTE — PHYSICAL THERAPY INITIAL EVALUATION ADULT - GENERAL OBSERVATIONS, REHAB EVAL
pt encountered in bed supine, NAD aaox3 able to follow v.c,+ cardiac monitor,  number [id number], [place] used.

## 2022-06-02 NOTE — PROGRESS NOTE ADULT - ASSESSMENT
89 yo female w/PMH of Alzheimer Disease, HTN, CVA (difficulty with swallowing), HLD, Asthma, Afib here for SOB , CHF vs Aspiration PNA.        Acute on chronic diastolic CHF.   ·  Plan: - Pt last echo in 4/2022 with only mild diastolic dysfunction  -cardiology on board   -c/w diuresis    replete hypokalemia       Afib with RVR w Hypotension  - cardio consult appreciated  -started on digoxin  -eliquis  -bb    LORENZO  -likely 2/2 diuresis  -monitor cr         Pneumonia,  ruled out       History of CVA (cerebrovascular accident). C/w Plavix statin       Hypertension c/w home meds       Asthma, mild. c/w inhalers       Pancreatic Insufficiency  on Creon

## 2022-06-02 NOTE — CONSULT NOTE ADULT - SUBJECTIVE AND OBJECTIVE BOX
Cardiology Initial Consult    [facility] - Cardiology at [place]  [address] [place] [address], [place] [address]  Office: [phone]  Fax: [phone]    CHIEF COMPLAINT:      HISTORY OF PRESENT ILLNESS:  Primary cardiologist:  89F HTN, HLD, Alzheimer's, CVA, Afib, AAA, hx of R partial lobectomy    presented with SOB, hypoxia, and extremity swelling  b/l pleff  LORENZO, anemia    Allergies  Benadryl (Other)    Intolerances  	  MEDICATIONS:  amLODIPine   Tablet 2.5 milliGRAM(s) Oral daily  clopidogrel Tablet 75 milliGRAM(s) Oral daily  metoprolol succinate ER 50 milliGRAM(s) Oral daily  ALBUTerol    0.083% 2.5 milliGRAM(s) Nebulizer every 6 hours  budesonide 160 MICROgram(s)/formoterol 4.5 MICROgram(s) Inhaler 2 Puff(s) Inhalation two times a day  acetaminophen     Tablet .. 650 milliGRAM(s) Oral every 6 hours PRN  escitalopram 10 milliGRAM(s) Oral daily  melatonin 3 milliGRAM(s) Oral at bedtime PRN  ondansetron Injectable 4 milliGRAM(s) IV Push every 8 hours PRN  zolpidem 5 milliGRAM(s) Oral at bedtime PRN  aluminum hydroxide/magnesium hydroxide/simethicone Suspension 30 milliLiter(s) Oral every 4 hours PRN  pancrelipase Oral Capsule (CREON 24,000 Lipase Units) - Peds 2 Capsule(s) Oral three times a day with meals  senna 2 Tablet(s) Oral at bedtime  atorvastatin 20 milliGRAM(s) Oral at bedtime  potassium chloride   Powder 40 milliEquivalent(s) Oral every 4 hours  timolol 0.25% Solution 1 Drop(s) Both EYES daily    PAST MEDICAL & SURGICAL HISTORY:  Benign essential hypertension  Dyslipidemia  CVA (cerebral infarction)  Alzheimer disease  Hypertension  Asthma, mild  Other nonspecific abnormal finding of lung field  Anxiety  History of bilateral breast reduction surgery  History of appendectomy  History of cosmetic plastic surgery  face lift  History of abdominoplasty  H/O surgical biopsy  right lung; 05/2018  History of cataract extraction  bilateral, with lens implants  H/O varicose vein ligation and stripping  both legs  H/O umbilical hernia repair  Status post coronary angiogram  2017    FAMILY HISTORY:      SOCIAL HISTORY:    [ ] Non-smoker  [ ] Smoker  [ ] Alcohol    Review of Systems:  Constitutional: [ ] Fever [ ] Chills [ ] Fatigue [ ] Weight change   HEENT: [ ] Blurred vision [ ] Eye pain [ ] Headache [ ] Runny nose [ ] Sore throat   Respiratory: [ ] Cough [ ] Wheezing [ ] Shortness of breath  Cardiovascular: [ ] Chest Pain [ ] Palpitations [ ] PARK [ ] PND [ ] Orthopnea  Gastrointestinal: [ ] Abdominal Pain [ ] Diarrhea [ ] Constipation [ ] Hemorrhoids [ ] Nausea [ ] Vomiting  Genitourinary: [ ] Nocturia [ ] Dysuria [ ] Incontinence  Extremities: [ ] Swelling [ ] Joint Pain  Neurologic: [ ] Focal deficit [ ] Paresthesias [ ] Syncope  Skin: [ ] Rash [ ] Ecchymoses [ ] Wounds [ ] Lesions  Psychiatry: [ ] Depression [ ] Suicidal/Homicidal ideation [ ] Anxiety [ ] Sleep disturbances  [ ] 10 point review of systems is otherwise negative except as mentioned above            [ ]Unable to obtain    PHYSICAL EXAM:  T(C): 36.3 (06-02-22 @ 09:26), Max: 36.7 (06-01-22 @ 23:56)  HR: 86 (06-02-22 @ 09:26) (70 - 111)  BP: 97/63 (06-02-22 @ 09:26) (89/51 - 112/72)  RR: 18 (06-02-22 @ 09:26) (18 - 18)  SpO2: 99% (06-02-22 @ 09:26) (91% - 99%)  Wt(kg): --  I&O's Summary      Appearance: No acute distress  HEENT:   Normal oral mucosa, PERRL  Cardiovascular: Normal S1 S2, no elevated JVP, no murmurs, no edema  Respiratory: Lungs clear to auscultation	, good air movement  Psychiatry: A & O x 3, Mood & affect appropriate  Gastrointestinal:  soft nt nd normoactive bs	  Skin: No rashes, no ecchymoses, no cyanosis	  Neurologic: grossly non-focal  Extremities: Normal range of motion, no clubbing, cyanosis or edema  Vascular: Peripheral pulses palpable bilaterally    LABS:	 	  CBC Full  -  ( 02 Jun 2022 07:47 )  WBC Count : 7.57 K/uL  Hemoglobin : 9.1 g/dL  Hematocrit : 28.6 %  Platelet Count - Automated : 144 K/uL    06-02  141  |  107  |  30<H>  ----------------------------<  95  3.2<L>   |  26  |  1.40<H>    05-31  143  |  112<H>  |  22  ----------------------------<  103<H>  4.3   |  25  |  1.00    Ca    8.1<L>      02 Jun 2022 07:47  Ca    8.8      31 May 2022 18:24    TPro  5.7<L>  /  Alb  2.5<L>  /  TBili  0.3  /  DBili  x   /  AST  53<H>  /  ALT  93<H>  /  AlkPhos  155<H>  06-02  TPro  6.7  /  Alb  3.0<L>  /  TBili  0.3  /  DBili  x   /  AST  158<H>  /  ALT  188<H>  /  AlkPhos  267<H>  05-31      proBNP: Serum Pro-Brain Natriuretic Peptide: 5706 pg/mL (05-31 @ 18:24)    Lipid Profile:   HgA1c:   TSH:     CARDIAC MARKERS:  Troponin I, High Sensitivity Result: 12.5 ng/L (05-31-22 @ 18:24)      TELEMETRY: 	    ECG:  	  RADIOLOGY:  OTHER: 	    PREVIOUS DIAGNOSTIC TESTING:    [x] Echocardiogram: < from: Transthoracic Echocardiogram (04.06.22 @ 08:12) >  CONCLUSIONS:  1. Mitral annular calcification, otherwise normal mitral  valve. Mild-moderate mitral regurgitation.  2. Calcified trileaflet aortic valve with decreased  opening. Peak transaortic valve gradient equals 32 mm Hg,  mean transaortic valve gradient equals 16 mm Hg, estimated  aortic valve area equals 1.7 sqcm (by continuity equation),  consistent with mild aortic stenosis. Mild-moderate aortic  regurgitation.  3. Moderately dilated left atrium.  LA volume index = 46  cc/m2.  4. Normal left ventricular internal dimensions and wall  thicknesses.  5. Normal left ventricular systolic function. No segmental  wall motion abnormalities.  6. Mild diastolic dysfunction (Stage I).  7. Normal right ventricular size and function.    < end of copied text >    [x] Catheterization: < from: Cardiac Cath Lab - Adult (03.13.15 @ 12:23) >  CORONARY VESSELS: The coronary circulation is right dominant.  LM:   --  LM: Normal.  LAD:   --  Proximal LAD: There was a discrete 30 % stenosis.  --  Distal LAD: Angiography showed minor luminal irregularities with no  flow limiting lesions.  CX:   --  Circumflex: Normal.  RCA:   --  RCA: Angiography showed minor luminal irregularities with no  flow limiting lesions.    < end of copied text >    [x] Stress Test:  	< from: Nuclear Stress Test-Pharmacologic (Nuclear Stress Test-Pharmacologic .) (04.08.22 @ 15:05) >  IMPRESSIONS:Normal Study  * The left ventricle was normal in size.  * Tracer uptake was homogeneous throughout the left  ventricle.  * Normal study; no evidence for myocardial infarction or  ischemia.  * Post-stress gated wall motion analysis was performed  (LVEF = 67 %;LVEDV = 54 ml.), revealing normal LV  function. The RV function appeared normal.  < end of copied text >   Cardiology Initial Consult    [facility] - Cardiology at [place]  [address] [place] [address], [place] [address]  Office: [phone]  Fax: [phone]    CHIEF COMPLAINT:  swelling, abdominal bloating    HISTORY OF PRESENT ILLNESS:   # [id number]  Primary cardiologist: Dr. Delacruz? Previously seen by Dr. Dallin Bella    89F HTN, HLD, Alzheimer's, CVA, Afib, AAA, hx of R partial lobectomy  presented with SOB, hypoxia, and extremity swelling  found to have b/l pleff, LORENZO, anemia  elevated transaminases that improved with one dose of IV diuretics    She has an ongoing hx of diarrhea which she is seeing Dr. Dudley and is on pancrelipase.    She reports a hx of falls and gain instability that have also been documented in the outpt notes.    Allergies  Benadryl (Other)    Intolerances  	  MEDICATIONS:  amLODIPine   Tablet 2.5 milliGRAM(s) Oral daily  clopidogrel Tablet 75 milliGRAM(s) Oral daily  metoprolol succinate ER 50 milliGRAM(s) Oral daily  ALBUTerol    0.083% 2.5 milliGRAM(s) Nebulizer every 6 hours  budesonide 160 MICROgram(s)/formoterol 4.5 MICROgram(s) Inhaler 2 Puff(s) Inhalation two times a day  acetaminophen     Tablet .. 650 milliGRAM(s) Oral every 6 hours PRN  escitalopram 10 milliGRAM(s) Oral daily  melatonin 3 milliGRAM(s) Oral at bedtime PRN  ondansetron Injectable 4 milliGRAM(s) IV Push every 8 hours PRN  zolpidem 5 milliGRAM(s) Oral at bedtime PRN  aluminum hydroxide/magnesium hydroxide/simethicone Suspension 30 milliLiter(s) Oral every 4 hours PRN  pancrelipase Oral Capsule (CREON 24,000 Lipase Units) - Peds 2 Capsule(s) Oral three times a day with meals  senna 2 Tablet(s) Oral at bedtime  atorvastatin 20 milliGRAM(s) Oral at bedtime  potassium chloride   Powder 40 milliEquivalent(s) Oral every 4 hours  timolol 0.25% Solution 1 Drop(s) Both EYES daily    PAST MEDICAL & SURGICAL HISTORY:  Benign essential hypertension  Dyslipidemia  CVA (cerebral infarction)  Alzheimer disease  Hypertension  Asthma, mild  Other nonspecific abnormal finding of lung field  Anxiety  History of bilateral breast reduction surgery  History of appendectomy  History of cosmetic plastic surgery  face lift  History of abdominoplasty  H/O surgical biopsy  right lung; 05/2018  History of cataract extraction  bilateral, with lens implants  H/O varicose vein ligation and stripping  both legs  H/O umbilical hernia repair  Status post coronary angiogram  2017    FAMILY HISTORY:      SOCIAL HISTORY:    [x] Non-smoker  [ ] Smoker  [ ] Alcohol    Review of Systems:  Constitutional: [ ] Fever [ ] Chills [ x] Fatigue [ ] Weight change   HEENT: [ ] Blurred vision [ ] Eye pain [- ] Headache [ ] Runny nose [ ] Sore throat   Respiratory: [ ] Cough [ ] Wheezing [ x] Shortness of breath  Cardiovascular: [- ] Chest Pain [ -] Palpitations [ x] PARK [ ] PND [x ] Orthopnea  Gastrointestinal: [ ] Abdominal Pain [ x] Diarrhea [ ] Constipation [ ] Hemorrhoids [ ] Nausea [x ] Vomiting  Genitourinary: [ ] Nocturia [- ] Dysuria [ ] Incontinence  Extremities: [ x] Swelling [ ] Joint Pain  Neurologic: [ ] Focal deficit [ ] Paresthesias [- ] Syncope  Skin: [ ] Rash [ ] Ecchymoses [ ] Wounds [ ] Lesions  Psychiatry: [- ] Depression [ ] Suicidal/Homicidal ideation [ ] Anxiety [ ] Sleep disturbances  [x ] 10 point review of systems is otherwise negative except as mentioned above            [ ]Unable to obtain    PHYSICAL EXAM:  T(C): 36.3 (06-02-22 @ 09:26), Max: 36.7 (06-01-22 @ 23:56)  HR: 86 (06-02-22 @ 09:26) (70 - 111)  BP: 97/63 (06-02-22 @ 09:26) (89/51 - 112/72)  RR: 18 (06-02-22 @ 09:26) (18 - 18)  SpO2: 99% (06-02-22 @ 09:26) (91% - 99%)  Wt(kg): --  I&O's Summary    Appearance: no acute distress  HEENT:   mmm  Cardiovascular: Normal S1 S2, tachy, irr irr, elevated JVP, soft systolic murmurs RUSB, 1+ LE edema  Respiratory: crackles at the bases  Psychiatry:Mood & affect appropriate  Gastrointestinal:  soft   Skin: no cyanosis	  Neurologic: grossly non-focal    LABS:	 	  CBC Full  -  ( 02 Jun 2022 07:47 )  WBC Count : 7.57 K/uL  Hemoglobin : 9.1 g/dL  Hematocrit : 28.6 %  Platelet Count - Automated : 144 K/uL    06-02  141  |  107  |  30<H>  ----------------------------<  95  3.2<L>   |  26  |  1.40<H>    05-31  143  |  112<H>  |  22  ----------------------------<  103<H>  4.3   |  25  |  1.00    Ca    8.1<L>      02 Jun 2022 07:47  Ca    8.8      31 May 2022 18:24    TPro  5.7<L>  /  Alb  2.5<L>  /  TBili  0.3  /  DBili  x   /  AST  53<H>  /  ALT  93<H>  /  AlkPhos  155<H>  06-02  TPro  6.7  /  Alb  3.0<L>  /  TBili  0.3  /  DBili  x   /  AST  158<H>  /  ALT  188<H>  /  AlkPhos  267<H>  05-31    proBNP: Serum Pro-Brain Natriuretic Peptide: 5706 pg/mL (05-31 @ 18:24)    Lipid Profile:   HgA1c:   TSH:     CARDIAC MARKERS:  Troponin I, High Sensitivity Result: 12.5 ng/L (05-31-22 @ 18:24)    TELEMETRY: 	  Afib RVR  ECG:  	  RADIOLOGY:  OTHER: 	    PREVIOUS DIAGNOSTIC TESTING:    [x] Echocardiogram: < from: Transthoracic Echocardiogram (04.06.22 @ 08:12) >  CONCLUSIONS:  1. Mitral annular calcification, otherwise normal mitral  valve. Mild-moderate mitral regurgitation.  2. Calcified trileaflet aortic valve with decreased  opening. Peak transaortic valve gradient equals 32 mm Hg,  mean transaortic valve gradient equals 16 mm Hg, estimated  aortic valve area equals 1.7 sqcm (by continuity equation),  consistent with mild aortic stenosis. Mild-moderate aortic  regurgitation.  3. Moderately dilated left atrium.  LA volume index = 46  cc/m2.  4. Normal left ventricular internal dimensions and wall  thicknesses.  5. Normal left ventricular systolic function. No segmental  wall motion abnormalities.  6. Mild diastolic dysfunction (Stage I).  7. Normal right ventricular size and function.  < end of copied text >    [x] Catheterization: < from: Cardiac Cath Lab - Adult (03.13.15 @ 12:23) >  CORONARY VESSELS: The coronary circulation is right dominant.  LM:   --  LM: Normal.  LAD:   --  Proximal LAD: There was a discrete 30 % stenosis.  --  Distal LAD: Angiography showed minor luminal irregularities with no  flow limiting lesions.  CX:   --  Circumflex: Normal.  RCA:   --  RCA: Angiography showed minor luminal irregularities with no  flow limiting lesions.  < end of copied text >    [x] Stress Test:  	< from: Nuclear Stress Test-Pharmacologic (Nuclear Stress Test-Pharmacologic .) (04.08.22 @ 15:05) >  IMPRESSIONS:Normal Study  * The left ventricle was normal in size.  * Tracer uptake was homogeneous throughout the left  ventricle.  * Normal study; no evidence for myocardial infarction or  ischemia.  * Post-stress gated wall motion analysis was performed  (LVEF = 67 %;LVEDV = 54 ml.), revealing normal LV  function. The RV function appeared normal.  < end of copied text >   Cardiology Initial Consult    [facility] - Cardiology at [place]  [address] [place] [address], [place] [address]  Office: [phone]  Fax: [phone]    CHIEF COMPLAINT:  swelling, abdominal bloating    HISTORY OF PRESENT ILLNESS:   # [id number]  Primary cardiologist: Dr. Redd Delacruz    89F HTN, HLD, Alzheimer's, CVA, Afib, AAA, hx of R partial lobectomy  presented with SOB, hypoxia, and extremity swelling  found to have b/l pleff, LORENZO, anemia  elevated transaminases that improved with one dose of IV diuretics    She has an ongoing hx of diarrhea which she is seeing Dr. Dudley and is on pancrelipase.    She reports a hx of falls and gain instability that have also been documented in the outpt notes.    Allergies  Benadryl (Other)    Intolerances  	  MEDICATIONS:  amLODIPine   Tablet 2.5 milliGRAM(s) Oral daily  clopidogrel Tablet 75 milliGRAM(s) Oral daily  metoprolol succinate ER 50 milliGRAM(s) Oral daily  ALBUTerol    0.083% 2.5 milliGRAM(s) Nebulizer every 6 hours  budesonide 160 MICROgram(s)/formoterol 4.5 MICROgram(s) Inhaler 2 Puff(s) Inhalation two times a day  acetaminophen     Tablet .. 650 milliGRAM(s) Oral every 6 hours PRN  escitalopram 10 milliGRAM(s) Oral daily  melatonin 3 milliGRAM(s) Oral at bedtime PRN  ondansetron Injectable 4 milliGRAM(s) IV Push every 8 hours PRN  zolpidem 5 milliGRAM(s) Oral at bedtime PRN  aluminum hydroxide/magnesium hydroxide/simethicone Suspension 30 milliLiter(s) Oral every 4 hours PRN  pancrelipase Oral Capsule (CREON 24,000 Lipase Units) - Peds 2 Capsule(s) Oral three times a day with meals  senna 2 Tablet(s) Oral at bedtime  atorvastatin 20 milliGRAM(s) Oral at bedtime  potassium chloride   Powder 40 milliEquivalent(s) Oral every 4 hours  timolol 0.25% Solution 1 Drop(s) Both EYES daily    PAST MEDICAL & SURGICAL HISTORY:  Benign essential hypertension  Dyslipidemia  CVA (cerebral infarction)  Alzheimer disease  Hypertension  Asthma, mild  Other nonspecific abnormal finding of lung field  Anxiety  History of bilateral breast reduction surgery  History of appendectomy  History of cosmetic plastic surgery  face lift  History of abdominoplasty  H/O surgical biopsy  right lung; 05/2018  History of cataract extraction  bilateral, with lens implants  H/O varicose vein ligation and stripping  both legs  H/O umbilical hernia repair  Status post coronary angiogram  2017    FAMILY HISTORY:      SOCIAL HISTORY:    [x] Non-smoker  [ ] Smoker  [ ] Alcohol    Review of Systems:  Constitutional: [ ] Fever [ ] Chills [ x] Fatigue [ ] Weight change   HEENT: [ ] Blurred vision [ ] Eye pain [- ] Headache [ ] Runny nose [ ] Sore throat   Respiratory: [ ] Cough [ ] Wheezing [ x] Shortness of breath  Cardiovascular: [- ] Chest Pain [ -] Palpitations [ x] PARK [ ] PND [x ] Orthopnea  Gastrointestinal: [ ] Abdominal Pain [ x] Diarrhea [ ] Constipation [ ] Hemorrhoids [ ] Nausea [x ] Vomiting  Genitourinary: [ ] Nocturia [- ] Dysuria [ ] Incontinence  Extremities: [ x] Swelling [ ] Joint Pain  Neurologic: [ ] Focal deficit [ ] Paresthesias [- ] Syncope  Skin: [ ] Rash [ ] Ecchymoses [ ] Wounds [ ] Lesions  Psychiatry: [- ] Depression [ ] Suicidal/Homicidal ideation [ ] Anxiety [ ] Sleep disturbances  [x ] 10 point review of systems is otherwise negative except as mentioned above            [ ]Unable to obtain    PHYSICAL EXAM:  T(C): 36.3 (06-02-22 @ 09:26), Max: 36.7 (06-01-22 @ 23:56)  HR: 86 (06-02-22 @ 09:26) (70 - 111)  BP: 97/63 (06-02-22 @ 09:26) (89/51 - 112/72)  RR: 18 (06-02-22 @ 09:26) (18 - 18)  SpO2: 99% (06-02-22 @ 09:26) (91% - 99%)  Wt(kg): --  I&O's Summary    Appearance: no acute distress  HEENT:   mmm  Cardiovascular: Normal S1 S2, tachy, irr irr, elevated JVP, soft systolic murmurs RUSB, 1+ LE edema  Respiratory: crackles at the bases  Psychiatry:Mood & affect appropriate  Gastrointestinal:  soft   Skin: no cyanosis	  Neurologic: grossly non-focal    LABS:	 	  CBC Full  -  ( 02 Jun 2022 07:47 )  WBC Count : 7.57 K/uL  Hemoglobin : 9.1 g/dL  Hematocrit : 28.6 %  Platelet Count - Automated : 144 K/uL    06-02  141  |  107  |  30<H>  ----------------------------<  95  3.2<L>   |  26  |  1.40<H>    05-31  143  |  112<H>  |  22  ----------------------------<  103<H>  4.3   |  25  |  1.00    Ca    8.1<L>      02 Jun 2022 07:47  Ca    8.8      31 May 2022 18:24    TPro  5.7<L>  /  Alb  2.5<L>  /  TBili  0.3  /  DBili  x   /  AST  53<H>  /  ALT  93<H>  /  AlkPhos  155<H>  06-02  TPro  6.7  /  Alb  3.0<L>  /  TBili  0.3  /  DBili  x   /  AST  158<H>  /  ALT  188<H>  /  AlkPhos  267<H>  05-31    proBNP: Serum Pro-Brain Natriuretic Peptide: 5706 pg/mL (05-31 @ 18:24)    Lipid Profile:   HgA1c:   TSH:     CARDIAC MARKERS:  Troponin I, High Sensitivity Result: 12.5 ng/L (05-31-22 @ 18:24)    TELEMETRY: 	  Afib RVR  ECG:  	  RADIOLOGY:  OTHER: 	    PREVIOUS DIAGNOSTIC TESTING:    [x] Echocardiogram: < from: Transthoracic Echocardiogram (04.06.22 @ 08:12) >  CONCLUSIONS:  1. Mitral annular calcification, otherwise normal mitral  valve. Mild-moderate mitral regurgitation.  2. Calcified trileaflet aortic valve with decreased  opening. Peak transaortic valve gradient equals 32 mm Hg,  mean transaortic valve gradient equals 16 mm Hg, estimated  aortic valve area equals 1.7 sqcm (by continuity equation),  consistent with mild aortic stenosis. Mild-moderate aortic  regurgitation.  3. Moderately dilated left atrium.  LA volume index = 46  cc/m2.  4. Normal left ventricular internal dimensions and wall  thicknesses.  5. Normal left ventricular systolic function. No segmental  wall motion abnormalities.  6. Mild diastolic dysfunction (Stage I).  7. Normal right ventricular size and function.  < end of copied text >    [x] Catheterization: < from: Cardiac Cath Lab - Adult (03.13.15 @ 12:23) >  CORONARY VESSELS: The coronary circulation is right dominant.  LM:   --  LM: Normal.  LAD:   --  Proximal LAD: There was a discrete 30 % stenosis.  --  Distal LAD: Angiography showed minor luminal irregularities with no  flow limiting lesions.  CX:   --  Circumflex: Normal.  RCA:   --  RCA: Angiography showed minor luminal irregularities with no  flow limiting lesions.  < end of copied text >    [x] Stress Test:  	< from: Nuclear Stress Test-Pharmacologic (Nuclear Stress Test-Pharmacologic .) (04.08.22 @ 15:05) >  IMPRESSIONS:Normal Study  * The left ventricle was normal in size.  * Tracer uptake was homogeneous throughout the left  ventricle.  * Normal study; no evidence for myocardial infarction or  ischemia.  * Post-stress gated wall motion analysis was performed  (LVEF = 67 %;LVEDV = 54 ml.), revealing normal LV  function. The RV function appeared normal.  < end of copied text >

## 2022-06-03 LAB
ALBUMIN SERPL ELPH-MCNC: 2.6 G/DL — LOW (ref 3.3–5)
ALP SERPL-CCNC: 145 U/L — HIGH (ref 40–120)
ALT FLD-CCNC: 84 U/L — HIGH (ref 12–78)
ANION GAP SERPL CALC-SCNC: 8 MMOL/L — SIGNIFICANT CHANGE UP (ref 5–17)
AST SERPL-CCNC: 44 U/L — HIGH (ref 15–37)
BILIRUB SERPL-MCNC: 0.4 MG/DL — SIGNIFICANT CHANGE UP (ref 0.2–1.2)
BUN SERPL-MCNC: 26 MG/DL — HIGH (ref 7–23)
CALCIUM SERPL-MCNC: 8.2 MG/DL — LOW (ref 8.5–10.1)
CHLORIDE SERPL-SCNC: 107 MMOL/L — SIGNIFICANT CHANGE UP (ref 96–108)
CO2 SERPL-SCNC: 23 MMOL/L — SIGNIFICANT CHANGE UP (ref 22–31)
CREAT SERPL-MCNC: 1.22 MG/DL — SIGNIFICANT CHANGE UP (ref 0.5–1.3)
EGFR: 41 ML/MIN/1.73M2 — LOW
GLUCOSE SERPL-MCNC: 102 MG/DL — HIGH (ref 70–99)
HCT VFR BLD CALC: 30.5 % — LOW (ref 34.5–45)
HGB BLD-MCNC: 9.7 G/DL — LOW (ref 11.5–15.5)
MCHC RBC-ENTMCNC: 30.3 PG — SIGNIFICANT CHANGE UP (ref 27–34)
MCHC RBC-ENTMCNC: 31.8 G/DL — LOW (ref 32–36)
MCV RBC AUTO: 95.3 FL — SIGNIFICANT CHANGE UP (ref 80–100)
NRBC # BLD: 0 /100 WBCS — SIGNIFICANT CHANGE UP (ref 0–0)
PLATELET # BLD AUTO: 159 K/UL — SIGNIFICANT CHANGE UP (ref 150–400)
POTASSIUM SERPL-MCNC: 4.1 MMOL/L — SIGNIFICANT CHANGE UP (ref 3.5–5.3)
POTASSIUM SERPL-SCNC: 4.1 MMOL/L — SIGNIFICANT CHANGE UP (ref 3.5–5.3)
PROT SERPL-MCNC: 5.9 GM/DL — LOW (ref 6–8.3)
RBC # BLD: 3.2 M/UL — LOW (ref 3.8–5.2)
RBC # FLD: 13.1 % — SIGNIFICANT CHANGE UP (ref 10.3–14.5)
SODIUM SERPL-SCNC: 138 MMOL/L — SIGNIFICANT CHANGE UP (ref 135–145)
WBC # BLD: 7.01 K/UL — SIGNIFICANT CHANGE UP (ref 3.8–10.5)
WBC # FLD AUTO: 7.01 K/UL — SIGNIFICANT CHANGE UP (ref 3.8–10.5)

## 2022-06-03 PROCEDURE — 99232 SBSQ HOSP IP/OBS MODERATE 35: CPT

## 2022-06-03 PROCEDURE — 99233 SBSQ HOSP IP/OBS HIGH 50: CPT

## 2022-06-03 RX ORDER — ALBUTEROL 90 UG/1
2 AEROSOL, METERED ORAL EVERY 6 HOURS
Refills: 0 | Status: COMPLETED | OUTPATIENT
Start: 2022-06-03 | End: 2022-06-06

## 2022-06-03 RX ORDER — DIGOXIN 250 MCG
125 TABLET ORAL DAILY
Refills: 0 | Status: DISCONTINUED | OUTPATIENT
Start: 2022-06-03 | End: 2022-06-07

## 2022-06-03 RX ORDER — ALBUTEROL 90 UG/1
1 AEROSOL, METERED ORAL EVERY 4 HOURS
Refills: 0 | Status: DISCONTINUED | OUTPATIENT
Start: 2022-06-03 | End: 2022-06-07

## 2022-06-03 RX ORDER — LIPASE/PROTEASE/AMYLASE 16-48-48K
1 CAPSULE,DELAYED RELEASE (ENTERIC COATED) ORAL
Refills: 0 | Status: DISCONTINUED | OUTPATIENT
Start: 2022-06-03 | End: 2022-06-07

## 2022-06-03 RX ORDER — FUROSEMIDE 40 MG
20 TABLET ORAL
Refills: 0 | Status: DISCONTINUED | OUTPATIENT
Start: 2022-06-03 | End: 2022-06-06

## 2022-06-03 RX ORDER — LIPASE/PROTEASE/AMYLASE 16-48-48K
2 CAPSULE,DELAYED RELEASE (ENTERIC COATED) ORAL
Refills: 0 | Status: DISCONTINUED | OUTPATIENT
Start: 2022-06-03 | End: 2022-06-03

## 2022-06-03 RX ORDER — LIPASE/PROTEASE/AMYLASE 16-48-48K
2 CAPSULE,DELAYED RELEASE (ENTERIC COATED) ORAL
Refills: 0 | Status: DISCONTINUED | OUTPATIENT
Start: 2022-06-03 | End: 2022-06-07

## 2022-06-03 RX ORDER — ALBUTEROL 90 UG/1
1 AEROSOL, METERED ORAL EVERY 4 HOURS
Refills: 0 | Status: DISCONTINUED | OUTPATIENT
Start: 2022-06-03 | End: 2022-06-03

## 2022-06-03 RX ADMIN — APIXABAN 2.5 MILLIGRAM(S): 2.5 TABLET, FILM COATED ORAL at 17:38

## 2022-06-03 RX ADMIN — Medication 650 MILLIGRAM(S): at 21:22

## 2022-06-03 RX ADMIN — ALBUTEROL 2.5 MILLIGRAM(S): 90 AEROSOL, METERED ORAL at 00:16

## 2022-06-03 RX ADMIN — ALBUTEROL 2 PUFF(S): 90 AEROSOL, METERED ORAL at 15:30

## 2022-06-03 RX ADMIN — Medication 250 MICROGRAM(S): at 01:59

## 2022-06-03 RX ADMIN — CLOPIDOGREL BISULFATE 75 MILLIGRAM(S): 75 TABLET, FILM COATED ORAL at 11:09

## 2022-06-03 RX ADMIN — Medication 250 MICROGRAM(S): at 06:08

## 2022-06-03 RX ADMIN — ESCITALOPRAM OXALATE 10 MILLIGRAM(S): 10 TABLET, FILM COATED ORAL at 11:09

## 2022-06-03 RX ADMIN — Medication 1 CAPSULE(S): at 11:10

## 2022-06-03 RX ADMIN — BUDESONIDE AND FORMOTEROL FUMARATE DIHYDRATE 2 PUFF(S): 160; 4.5 AEROSOL RESPIRATORY (INHALATION) at 17:36

## 2022-06-03 RX ADMIN — ALBUTEROL 2 PUFF(S): 90 AEROSOL, METERED ORAL at 17:36

## 2022-06-03 RX ADMIN — Medication 2 CAPSULE(S): at 11:11

## 2022-06-03 RX ADMIN — Medication 20 MILLIGRAM(S): at 15:29

## 2022-06-03 RX ADMIN — Medication 125 MICROGRAM(S): at 17:39

## 2022-06-03 RX ADMIN — Medication 650 MILLIGRAM(S): at 21:59

## 2022-06-03 RX ADMIN — APIXABAN 2.5 MILLIGRAM(S): 2.5 TABLET, FILM COATED ORAL at 06:08

## 2022-06-03 RX ADMIN — Medication 100 MILLIGRAM(S): at 17:38

## 2022-06-03 RX ADMIN — Medication 2 CAPSULE(S): at 17:36

## 2022-06-03 RX ADMIN — Medication 3 MILLIGRAM(S): at 21:22

## 2022-06-03 RX ADMIN — Medication 1 CAPSULE(S): at 17:36

## 2022-06-03 RX ADMIN — SPIRONOLACTONE 25 MILLIGRAM(S): 25 TABLET, FILM COATED ORAL at 06:08

## 2022-06-03 RX ADMIN — BUDESONIDE AND FORMOTEROL FUMARATE DIHYDRATE 2 PUFF(S): 160; 4.5 AEROSOL RESPIRATORY (INHALATION) at 06:07

## 2022-06-03 RX ADMIN — ATORVASTATIN CALCIUM 20 MILLIGRAM(S): 80 TABLET, FILM COATED ORAL at 21:22

## 2022-06-03 RX ADMIN — Medication 1 DROP(S): at 11:09

## 2022-06-03 RX ADMIN — Medication 25 MILLIGRAM(S): at 11:09

## 2022-06-03 NOTE — PROGRESS NOTE ADULT - ASSESSMENT
New Afib with RVR, relative hypotension  volume overload, with acute HFpEF likely due to paroxysmal afib as she has been experiencing palpitations as an outpt  LORENZO - ?DEJUAN  likely congestive hepatopathy  needs rate control, however also needs diuretics    -Digoxin load completed; start digoxin 125mcg PO daily  -replete K aggressively to 4.5  -monitor renal function closely  -cont metoprolol, increase as BP may tolerate  -cont spironolactone 25mg once daily unless LORENZO worsens  -20mg IV lasix BID increase to net negative as needed. pt with fair amount of orthopnea and is on supplemental O2.  -[id number] (age +2, HTN, female, atherosclerosis/AAA, CHF, CVA) - AC is indicated.  After  shared decision making with the pt, the pt's daughter, and outpt cardiologist Dr. Delacruz, will trial AC for now with apixaban 2.5mg BID.  -continue clopidogrel for hx of CVA.

## 2022-06-03 NOTE — PROGRESS NOTE ADULT - ASSESSMENT
89 yo female w/PMH of Alzheimer Disease, HTN, CVA (difficulty with swallowing), HLD, Asthma, Afib here for SOB , CHF vs Aspiration PNA.        Acute on chronic diastolic CHF.   ·  Plan: - Pt last echo in 4/2022 with only mild diastolic dysfunction  -cardiology on board   -c/w diuresis    replete hypokalemia       Afib with RVR w Hypotension  - cardio consult appreciated  -started on digoxin, hr improved   -eliquis  -bb    LORENZO  -likely 2/2 diuresis  -monitor cr         Pneumonia,  ruled out       History of CVA (cerebrovascular accident). C/w Plavix statin       Hypertension c/w home meds       Asthma, mild. c/w inhalers       Pancreatic Insufficiency  on Creon

## 2022-06-03 NOTE — PROGRESS NOTE ADULT - SUBJECTIVE AND OBJECTIVE BOX
Patient is a 89y old  Female who presents with a chief complaint of CHF vs PNA (02 Jun 2022 13:22)    PAST MEDICAL & SURGICAL HISTORY:  Benign essential hypertension      Dyslipidemia      CVA (cerebral infarction)      Alzheimer disease      Hypertension      Asthma, mild      Other nonspecific abnormal finding of lung field      Anxiety      History of bilateral breast reduction surgery      History of appendectomy      History of cosmetic plastic surgery  face lift      History of abdominoplasty      H/O surgical biopsy  right lung; 05/2018      History of cataract extraction  bilateral, with lens implants      H/O varicose vein ligation and stripping  both legs      H/O umbilical hernia repair      Status post coronary angiogram  2017      INTERVAL HISTORY: No acute distress noted.  	  MEDICATIONS:  MEDICATIONS  (STANDING):  apixaban 2.5 milliGRAM(s) Oral every 12 hours  atorvastatin 20 milliGRAM(s) Oral at bedtime  budesonide 160 MICROgram(s)/formoterol 4.5 MICROgram(s) Inhaler 2 Puff(s) Inhalation two times a day  clopidogrel Tablet 75 milliGRAM(s) Oral daily  digoxin     Tablet 125 MICROGram(s) Oral daily  escitalopram 10 milliGRAM(s) Oral daily  metoprolol succinate ER 25 milliGRAM(s) Oral daily  pancrelipase  (CREON  6,000 Lipase Units) 2 Capsule(s) Oral three times a day with meals  pancrelipase  (CREON 36,000 Lipase Units) 1 Capsule(s) Oral three times a day with meals  senna 2 Tablet(s) Oral at bedtime  spironolactone 25 milliGRAM(s) Oral daily  timolol 0.25% Solution 1 Drop(s) Both EYES daily    MEDICATIONS  (PRN):  acetaminophen     Tablet .. 650 milliGRAM(s) Oral every 6 hours PRN Temp greater or equal to 38C (100.4F), Mild Pain (1 - 3)  ALBUTerol    90 MICROgram(s) HFA Inhaler 1 Puff(s) Inhalation every 4 hours PRN Bronchospasm  aluminum hydroxide/magnesium hydroxide/simethicone Suspension 30 milliLiter(s) Oral every 4 hours PRN Dyspepsia  melatonin 3 milliGRAM(s) Oral at bedtime PRN Insomnia  ondansetron Injectable 4 milliGRAM(s) IV Push every 8 hours PRN Nausea and/or Vomiting  zolpidem 5 milliGRAM(s) Oral at bedtime PRN Insomnia      Vitals:  T(F): 98.2 (06-03-22 @ 10:32), Max: 98.3 (06-03-22 @ 00:13)  HR: 77 (06-03-22 @ 10:32) (72 - 107)  BP: 111/73 (06-03-22 @ 10:32) (88/61 - 115/74)  RR: 18 (06-03-22 @ 10:32) (18 - 18)  SpO2: 95% (06-03-22 @ 10:32) (93% - 96%)  Wt(kg): --69.5 kg    06-02 @ 07:01  -  06-03 @ 07:00  --------------------------------------------------------  IN:    Oral Fluid: 717 mL  Total IN: 717 mL    OUT:    Stool (mL): 1 mL    Voided (mL): 1 mL  Total OUT: 2 mL    Total NET: 715 mL    Weight (kg): 69.4 (06-01 @ 02:51)  BMI (kg/m2): 26.2 (06-01 @ 02:51)      PHYSICAL EXAM:  Neuro: Awake, responsive  CV: S1 S2 RRR  Lungs: CTABL  GI: Soft, BS +, ND, NT  Extremities:  edema    TELEMETRY:  Afib  	     RADIOLOGY: < from: CT Angio Chest PE Protocol w/ IV Cont (05.31.22 @ 21:06) >  FINDINGS:  CHEST:  LUNGS AND LARGE AIRWAYS: Debris in the upper trachea. Patchy groundglass   airspace opacities in the left upper lobe. No pulmonary nodules or   masses. Partial right upper and right middle lobectomy. Bibasilar   compressive subsegmental atelectasis.  PLEURA: Small bilateral pleural effusions, right greater than left.  VESSELS: No pulmonary embolism.  HEART: Heart size is enlarged. No pericardial effusion.  MEDIASTINUM AND BELKYS: No lymphadenopathy. Mediastinal surgical clip.  CHEST WALL AND LOWER NECK: Enlargement of the thyroid with multiple   nodules due to a multinodular goiter.    ABDOMEN AND PELVIS:  LIVER: Subcentimeter hypodense lesions which are small for accurate   characterization.  BILE DUCTS: Normal caliber.  GALLBLADDER: Within normal limits.  SPLEEN: Indeterminate 1.5 cm low-attenuation lesion, not significantly   changed.  PANCREAS: Redemonstration of diffuse main pancreatic duct dilatation to 4   mm.  ADRENALS: Within normal limits.  KIDNEYS/URETERS: Left renal cysts. Bilateral renal subcentimeter   hypodense lesions which are too small for accurate characterization. No   hydronephrosis.    BLADDER: Within normal limits.  REPRODUCTIVE ORGANS: Uterus and adnexa within normal limits.    BOWEL: No bowel obstruction or inflammation. Colonic diverticulosis   without diverticulitis. Appendix is not visualized. No evidence of   inflammation in the pericecal region.  PERITONEUM: No ascites.  VESSELS: Infrarenal abdominal aortic aneurysm measuring up to 3.4 cm, not   significantly changed.  RETROPERITONEUM/LYMPH NODES: No lymphadenopathy.  ABDOMINAL WALL: Small fat-containing left inguinal hernia. Generalized   anasarca.  BONES: Degenerative changes of the spine and pubic symphysis.    IMPRESSION:  1. No pulmonary embolism.  2. Patchy groundglass airspace opacities in the left upper lobe which   could be infectious in etiology. Pulmonary edema could also be considered.  3. Smallbilateral pleural effusions.  4. Debris in the upper trachea raising concern for aspiration.  5. No bowel obstruction or inflammation.    < end of copied text >      DIAGNOSTIC TESTING:    [x ] Echocardiogram: < from: Transthoracic Echocardiogram (04.06.22 @ 08:12) >  ------------------------------------------------------------------------  CONCLUSIONS:  1. Mitral annular calcification, otherwise normal mitral  valve. Mild-moderate mitral regurgitation.  2. Calcified trileaflet aortic valve with decreased  opening. Peak transaortic valve gradient equals 32 mm Hg,  mean transaortic valve gradient equals 16 mm Hg, estimated  aortic valve area equals 1.7 sqcm (by continuity equation),  consistent with mild aortic stenosis. Mild-moderate aortic  regurgitation.  3. Moderately dilated left atrium.  LA volume index = 46  cc/m2.  4. Normal left ventricular internal dimensions and wall  thicknesses.  5. Normal left ventricular systolic function. No segmental  wall motion abnormalities.  6. Mild diastolic dysfunction (Stage I).  7. Normal right ventricular size and function.    < end of copied text >    [x ]  Catheterization: < from: Cardiac Cath Lab - Adult (03.13.15 @ 12:23) >  CORONARY VESSELS: The coronary circulation is right dominant.  LM:   --  LM: Normal.  LAD:   --  Proximal LAD: There was a discrete 30 % stenosis.  --  Distal LAD: Angiography showed minor luminal irregularities with no  flow limiting lesions.  CX:   --  Circumflex: Normal.  RCA:   --  RCA: Angiography showed minor luminal irregularities with no  flow limiting lesions.    < end of copied text >    [x ] Stress Test:     < from: Nuclear Stress Test-Pharmacologic (Nuclear Stress Test-Pharmacologic .) (04.08.22 @ 15:05) >  IMPRESSIONS:Normal Study  * The left ventricle was normal in size.  * Tracer uptake was homogeneous throughout the left  ventricle.  * Normal study; no evidence for myocardial infarction or  ischemia.  * Post-stress gated wall motion analysis was performed  (LVEF = 67 %;LVEDV = 54 ml.), revealing normal LV  function. The RV function appeared normal.    < end of copied text >      LABS:	 	    CARDIAC MARKERS:      03 Jun 2022 06:40    138    |  107    |  26     ----------------------------<  102    4.1     |  23     |  1.22   02 Jun 2022 07:47    141    |  107    |  30     ----------------------------<  95     3.2     |  26     |  1.40   31 May 2022 18:24    143    |  112    |  22     ----------------------------<  103    4.3     |  25     |  1.00     Ca    8.2        03 Jun 2022 06:40    TPro  5.9    /  Alb  2.6    /  TBili  0.4    /  DBili  x      /  AST  44     /  ALT  84     /  AlkPhos  145    03 Jun 2022 06:40                          9.7    7.01  )-----------( 159      ( 03 Jun 2022 06:40 )             30.5 ,                       9.1    7.57  )-----------( 144      ( 02 Jun 2022 07:47 )             28.6 ,                       11.2   7.36  )-----------( 181      ( 31 May 2022 18:24 )             35.4   proBNP: Serum Pro-Brain Natriuretic Peptide: 5706 pg/mL (05-31 @ 18:24)    Lipid Profile:   HgA1c:   TSH:       INR: 1.08 ratio (05-31 @ 18:24)           Patient is a 89y old  Female who presents with a chief complaint of CHF vs PNA (02 Jun 2022 13:22)    PAST MEDICAL & SURGICAL HISTORY:  Benign essential hypertension      Dyslipidemia      CVA (cerebral infarction)      Alzheimer disease      Hypertension      Asthma, mild      Other nonspecific abnormal finding of lung field      Anxiety      History of bilateral breast reduction surgery      History of appendectomy      History of cosmetic plastic surgery  face lift      History of abdominoplasty      H/O surgical biopsy  right lung; 05/2018      History of cataract extraction  bilateral, with lens implants      H/O varicose vein ligation and stripping  both legs      H/O umbilical hernia repair      Status post coronary angiogram  2017      INTERVAL HISTORY: No acute distress noted.  from Language line #[id number]  	  MEDICATIONS:  MEDICATIONS  (STANDING):  apixaban 2.5 milliGRAM(s) Oral every 12 hours  atorvastatin 20 milliGRAM(s) Oral at bedtime  budesonide 160 MICROgram(s)/formoterol 4.5 MICROgram(s) Inhaler 2 Puff(s) Inhalation two times a day  clopidogrel Tablet 75 milliGRAM(s) Oral daily  digoxin     Tablet 125 MICROGram(s) Oral daily  escitalopram 10 milliGRAM(s) Oral daily  metoprolol succinate ER 25 milliGRAM(s) Oral daily  pancrelipase  (CREON  6,000 Lipase Units) 2 Capsule(s) Oral three times a day with meals  pancrelipase  (CREON 36,000 Lipase Units) 1 Capsule(s) Oral three times a day with meals  senna 2 Tablet(s) Oral at bedtime  spironolactone 25 milliGRAM(s) Oral daily  timolol 0.25% Solution 1 Drop(s) Both EYES daily    MEDICATIONS  (PRN):  acetaminophen     Tablet .. 650 milliGRAM(s) Oral every 6 hours PRN Temp greater or equal to 38C (100.4F), Mild Pain (1 - 3)  ALBUTerol    90 MICROgram(s) HFA Inhaler 1 Puff(s) Inhalation every 4 hours PRN Bronchospasm  aluminum hydroxide/magnesium hydroxide/simethicone Suspension 30 milliLiter(s) Oral every 4 hours PRN Dyspepsia  melatonin 3 milliGRAM(s) Oral at bedtime PRN Insomnia  ondansetron Injectable 4 milliGRAM(s) IV Push every 8 hours PRN Nausea and/or Vomiting  zolpidem 5 milliGRAM(s) Oral at bedtime PRN Insomnia      Vitals:  T(F): 98.2 (06-03-22 @ 10:32), Max: 98.3 (06-03-22 @ 00:13)  HR: 77 (06-03-22 @ 10:32) (72 - 107)  BP: 111/73 (06-03-22 @ 10:32) (88/61 - 115/74)  RR: 18 (06-03-22 @ 10:32) (18 - 18)  SpO2: 95% (06-03-22 @ 10:32) (93% - 96%)  Wt(kg): --69.5 kg    06-02 @ 07:01  -  06-03 @ 07:00  --------------------------------------------------------  IN:    Oral Fluid: 717 mL  Total IN: 717 mL    OUT:    Stool (mL): 1 mL    Voided (mL): 1 mL  Total OUT: 2 mL    Total NET: 715 mL    Weight (kg): 69.4 (06-01 @ 02:51)  BMI (kg/m2): 26.2 (06-01 @ 02:51)      PHYSICAL EXAM:  Neuro: Awake, responsive  CV: S1 S2 RRR  Lungs: CTABL  GI: Soft, BS +, ND, NT  Extremities:  edema    TELEMETRY:  Afib  	     RADIOLOGY: < from: CT Angio Chest PE Protocol w/ IV Cont (05.31.22 @ 21:06) >  FINDINGS:  CHEST:  LUNGS AND LARGE AIRWAYS: Debris in the upper trachea. Patchy groundglass   airspace opacities in the left upper lobe. No pulmonary nodules or   masses. Partial right upper and right middle lobectomy. Bibasilar   compressive subsegmental atelectasis.  PLEURA: Small bilateral pleural effusions, right greater than left.  VESSELS: No pulmonary embolism.  HEART: Heart size is enlarged. No pericardial effusion.  MEDIASTINUM AND BELKYS: No lymphadenopathy. Mediastinal surgical clip.  CHEST WALL AND LOWER NECK: Enlargement of the thyroid with multiple   nodules due to a multinodular goiter.    ABDOMEN AND PELVIS:  LIVER: Subcentimeter hypodense lesions which are small for accurate   characterization.  BILE DUCTS: Normal caliber.  GALLBLADDER: Within normal limits.  SPLEEN: Indeterminate 1.5 cm low-attenuation lesion, not significantly   changed.  PANCREAS: Redemonstration of diffuse main pancreatic duct dilatation to 4   mm.  ADRENALS: Within normal limits.  KIDNEYS/URETERS: Left renal cysts. Bilateral renal subcentimeter   hypodense lesions which are too small for accurate characterization. No   hydronephrosis.    BLADDER: Within normal limits.  REPRODUCTIVE ORGANS: Uterus and adnexa within normal limits.    BOWEL: No bowel obstruction or inflammation. Colonic diverticulosis   without diverticulitis. Appendix is not visualized. No evidence of   inflammation in the pericecal region.  PERITONEUM: No ascites.  VESSELS: Infrarenal abdominal aortic aneurysm measuring up to 3.4 cm, not   significantly changed.  RETROPERITONEUM/LYMPH NODES: No lymphadenopathy.  ABDOMINAL WALL: Small fat-containing left inguinal hernia. Generalized   anasarca.  BONES: Degenerative changes of the spine and pubic symphysis.    IMPRESSION:  1. No pulmonary embolism.  2. Patchy groundglass airspace opacities in the left upper lobe which   could be infectious in etiology. Pulmonary edema could also be considered.  3. Smallbilateral pleural effusions.  4. Debris in the upper trachea raising concern for aspiration.  5. No bowel obstruction or inflammation.    < end of copied text >      DIAGNOSTIC TESTING:    [x ] Echocardiogram: < from: Transthoracic Echocardiogram (04.06.22 @ 08:12) >  ------------------------------------------------------------------------  CONCLUSIONS:  1. Mitral annular calcification, otherwise normal mitral  valve. Mild-moderate mitral regurgitation.  2. Calcified trileaflet aortic valve with decreased  opening. Peak transaortic valve gradient equals 32 mm Hg,  mean transaortic valve gradient equals 16 mm Hg, estimated  aortic valve area equals 1.7 sqcm (by continuity equation),  consistent with mild aortic stenosis. Mild-moderate aortic  regurgitation.  3. Moderately dilated left atrium.  LA volume index = 46  cc/m2.  4. Normal left ventricular internal dimensions and wall  thicknesses.  5. Normal left ventricular systolic function. No segmental  wall motion abnormalities.  6. Mild diastolic dysfunction (Stage I).  7. Normal right ventricular size and function.    < end of copied text >    [x ]  Catheterization: < from: Cardiac Cath Lab - Adult (03.13.15 @ 12:23) >  CORONARY VESSELS: The coronary circulation is right dominant.  LM:   --  LM: Normal.  LAD:   --  Proximal LAD: There was a discrete 30 % stenosis.  --  Distal LAD: Angiography showed minor luminal irregularities with no  flow limiting lesions.  CX:   --  Circumflex: Normal.  RCA:   --  RCA: Angiography showed minor luminal irregularities with no  flow limiting lesions.    < end of copied text >    [x ] Stress Test:     < from: Nuclear Stress Test-Pharmacologic (Nuclear Stress Test-Pharmacologic .) (04.08.22 @ 15:05) >  IMPRESSIONS:Normal Study  * The left ventricle was normal in size.  * Tracer uptake was homogeneous throughout the left  ventricle.  * Normal study; no evidence for myocardial infarction or  ischemia.  * Post-stress gated wall motion analysis was performed  (LVEF = 67 %;LVEDV = 54 ml.), revealing normal LV  function. The RV function appeared normal.    < end of copied text >      LABS:	 	    CARDIAC MARKERS:      03 Jun 2022 06:40    138    |  107    |  26     ----------------------------<  102    4.1     |  23     |  1.22   02 Jun 2022 07:47    141    |  107    |  30     ----------------------------<  95     3.2     |  26     |  1.40   31 May 2022 18:24    143    |  112    |  22     ----------------------------<  103    4.3     |  25     |  1.00     Ca    8.2        03 Jun 2022 06:40    TPro  5.9    /  Alb  2.6    /  TBili  0.4    /  DBili  x      /  AST  44     /  ALT  84     /  AlkPhos  145    03 Jun 2022 06:40                          9.7    7.01  )-----------( 159      ( 03 Jun 2022 06:40 )             30.5 ,                       9.1    7.57  )-----------( 144      ( 02 Jun 2022 07:47 )             28.6 ,                       11.2   7.36  )-----------( 181      ( 31 May 2022 18:24 )             35.4   proBNP: Serum Pro-Brain Natriuretic Peptide: 5706 pg/mL (05-31 @ 18:24)    Lipid Profile:   HgA1c:   TSH:       INR: 1.08 ratio (05-31 @ 18:24)

## 2022-06-03 NOTE — PROGRESS NOTE ADULT - SUBJECTIVE AND OBJECTIVE BOX
Patient is a 89y old  Female who presents with a chief complaint of CHF vs PNA (2022 12:53)    INTERVAL HPI/OVERNIGHT EVENTS: no events     MEDICATIONS  (STANDING):  ALBUTerol    90 MICROgram(s) HFA Inhaler 2 Puff(s) Inhalation every 6 hours  apixaban 2.5 milliGRAM(s) Oral every 12 hours  atorvastatin 20 milliGRAM(s) Oral at bedtime  budesonide 160 MICROgram(s)/formoterol 4.5 MICROgram(s) Inhaler 2 Puff(s) Inhalation two times a day  clopidogrel Tablet 75 milliGRAM(s) Oral daily  digoxin     Tablet 125 MICROGram(s) Oral daily  escitalopram 10 milliGRAM(s) Oral daily  furosemide   Injectable 20 milliGRAM(s) IV Push two times a day  guaiFENesin Oral Liquid (Sugar-Free) 100 milliGRAM(s) Oral every 6 hours  metoprolol succinate ER 25 milliGRAM(s) Oral daily  pancrelipase  (CREON  6,000 Lipase Units) 2 Capsule(s) Oral three times a day with meals  pancrelipase  (CREON 36,000 Lipase Units) 1 Capsule(s) Oral three times a day with meals  senna 2 Tablet(s) Oral at bedtime  spironolactone 25 milliGRAM(s) Oral daily  timolol 0.25% Solution 1 Drop(s) Both EYES daily    MEDICATIONS  (PRN):  acetaminophen     Tablet .. 650 milliGRAM(s) Oral every 6 hours PRN Temp greater or equal to 38C (100.4F), Mild Pain (1 - 3)  ALBUTerol    90 MICROgram(s) HFA Inhaler 1 Puff(s) Inhalation every 4 hours PRN Bronchospasm  aluminum hydroxide/magnesium hydroxide/simethicone Suspension 30 milliLiter(s) Oral every 4 hours PRN Dyspepsia  melatonin 3 milliGRAM(s) Oral at bedtime PRN Insomnia  ondansetron Injectable 4 milliGRAM(s) IV Push every 8 hours PRN Nausea and/or Vomiting  zolpidem 5 milliGRAM(s) Oral at bedtime PRN Insomnia    Allergies    Benadryl (Other)    Intolerances      REVIEW OF SYSTEMS:  All other systems reviewed and are negative    Vital Signs Last 24 Hrs  T(C): 36.8 (2022 10:32), Max: 36.8 (2022 00:13)  T(F): 98.2 (2022 10:32), Max: 98.3 (2022 00:13)  HR: 77 (2022 10:32) (72 - 107)  BP: 111/73 (2022 10:32) (88/61 - 115/74)  BP(mean): --  RR: 18 (2022 10:32) (18 - 18)  SpO2: 95% (2022 10:32) (93% - 96%)  Daily     Daily   I&O's Summary    2022 07:01  -  2022 07:00  --------------------------------------------------------  IN: 717 mL / OUT: 2 mL / NET: 715 mL    2022 07:01  -  2022 14:11  --------------------------------------------------------  IN: 236 mL / OUT: 0 mL / NET: 236 mL      CAPILLARY BLOOD GLUCOSE        PHYSICAL EXAM:  GENERAL: NAD,    HEAD:  Atraumatic, Normocephalic  EYES: EOMI, PERRLA, conjunctiva and sclera clear  ENMT: No tonsillar erythema, exudates, or enlargement; Moist mucous membranes, Good dentition, No lesions  NECK: Supple, No JVD, Normal thyroid  NERVOUS SYSTEM:  Alert & Oriented X3, Good concentration; Motor Strength 5/5 B/L upper and lower extremities; DTRs 2+ intact and symmetric  CHEST/LUNG: Clear to percussion bilaterally; No rales, rhonchi, wheezing, or rubs  HEART: Regular rate and rhythm; No murmurs, rubs, or gallops  ABDOMEN: Soft, Nontender, Nondistended; Bowel sounds present  EXTREMITIES:  2+ Peripheral Pulses, No clubbing, cyanosis, or edema  LYMPH: No lymphadenopathy noted  SKIN: No rashes or lesions    Labs                          9.7    7.01  )-----------( 159      ( 2022 06:40 )             30.5     06-03    138  |  107  |  26<H>  ----------------------------<  102<H>  4.1   |  23  |  1.22    Ca    8.2<L>      2022 06:40    TPro  5.9<L>  /  Alb  2.6<L>  /  TBili  0.4  /  DBili  x   /  AST  44<H>  /  ALT  84<H>  /  AlkPhos  145<H>  06-03          Urinalysis Basic - ( 2022 21:55 )    Color: Yellow / Appearance: Clear / S.010 / pH: x  Gluc: x / Ketone: Negative  / Bili: Negative / Urobili: Negative mg/dL   Blood: x / Protein: 15 mg/dL / Nitrite: Negative   Leuk Esterase: Negative / RBC: 0-2 /HPF / WBC 3-5   Sq Epi: x / Non Sq Epi: Occasional / Bacteria: Many        Culture - Sputum (collected 2022 09:05)  Source: .Sputum Sputum  Gram Stain (prelim) (2022 07:53):    No polymorphonuclear leukocytes seen per low power field    Moderate Squamous epithelial cells seen per low power field    Moderate Gram positive cocci in pairs seen per oil power field  Preliminary Report (2022 07:53):    Normal Respiratory Madelyn present    Culture - Blood (collected 2022 09:11)  Source: .Blood Blood-Peripheral  Preliminary Report (2022 10:01):    No growth to date.    Culture - Blood (collected 2022 09:11)  Source: .Blood Blood-Peripheral  Preliminary Report (2022 10:01):    No growth to date.                DVT prophylaxis: > Lovenox 40mg SQ daily  > Heparin   > SCD's

## 2022-06-04 LAB
ALBUMIN SERPL ELPH-MCNC: 2.8 G/DL — LOW (ref 3.3–5)
ALP SERPL-CCNC: 146 U/L — HIGH (ref 40–120)
ALT FLD-CCNC: 74 U/L — SIGNIFICANT CHANGE UP (ref 12–78)
ANION GAP SERPL CALC-SCNC: 8 MMOL/L — SIGNIFICANT CHANGE UP (ref 5–17)
AST SERPL-CCNC: 34 U/L — SIGNIFICANT CHANGE UP (ref 15–37)
BILIRUB SERPL-MCNC: 0.5 MG/DL — SIGNIFICANT CHANGE UP (ref 0.2–1.2)
BUN SERPL-MCNC: 24 MG/DL — HIGH (ref 7–23)
CALCIUM SERPL-MCNC: 8.7 MG/DL — SIGNIFICANT CHANGE UP (ref 8.5–10.1)
CHLORIDE SERPL-SCNC: 105 MMOL/L — SIGNIFICANT CHANGE UP (ref 96–108)
CO2 SERPL-SCNC: 27 MMOL/L — SIGNIFICANT CHANGE UP (ref 22–31)
CREAT SERPL-MCNC: 1.05 MG/DL — SIGNIFICANT CHANGE UP (ref 0.5–1.3)
CULTURE RESULTS: SIGNIFICANT CHANGE UP
EGFR: 50 ML/MIN/1.73M2 — LOW
GLUCOSE SERPL-MCNC: 89 MG/DL — SIGNIFICANT CHANGE UP (ref 70–99)
GRAM STN FLD: SIGNIFICANT CHANGE UP
HCT VFR BLD CALC: 35.2 % — SIGNIFICANT CHANGE UP (ref 34.5–45)
HGB BLD-MCNC: 11.4 G/DL — LOW (ref 11.5–15.5)
MCHC RBC-ENTMCNC: 30.6 PG — SIGNIFICANT CHANGE UP (ref 27–34)
MCHC RBC-ENTMCNC: 32.4 G/DL — SIGNIFICANT CHANGE UP (ref 32–36)
MCV RBC AUTO: 94.4 FL — SIGNIFICANT CHANGE UP (ref 80–100)
NRBC # BLD: 0 /100 WBCS — SIGNIFICANT CHANGE UP (ref 0–0)
PLATELET # BLD AUTO: 179 K/UL — SIGNIFICANT CHANGE UP (ref 150–400)
POTASSIUM SERPL-MCNC: 4.9 MMOL/L — SIGNIFICANT CHANGE UP (ref 3.5–5.3)
POTASSIUM SERPL-SCNC: 4.9 MMOL/L — SIGNIFICANT CHANGE UP (ref 3.5–5.3)
PROT SERPL-MCNC: 6.6 GM/DL — SIGNIFICANT CHANGE UP (ref 6–8.3)
RBC # BLD: 3.73 M/UL — LOW (ref 3.8–5.2)
RBC # FLD: 13.1 % — SIGNIFICANT CHANGE UP (ref 10.3–14.5)
SODIUM SERPL-SCNC: 140 MMOL/L — SIGNIFICANT CHANGE UP (ref 135–145)
SPECIMEN SOURCE: SIGNIFICANT CHANGE UP
WBC # BLD: 6.5 K/UL — SIGNIFICANT CHANGE UP (ref 3.8–10.5)
WBC # FLD AUTO: 6.5 K/UL — SIGNIFICANT CHANGE UP (ref 3.8–10.5)

## 2022-06-04 PROCEDURE — 99233 SBSQ HOSP IP/OBS HIGH 50: CPT

## 2022-06-04 RX ADMIN — Medication 2 CAPSULE(S): at 16:52

## 2022-06-04 RX ADMIN — Medication 125 MICROGRAM(S): at 05:19

## 2022-06-04 RX ADMIN — SENNA PLUS 2 TABLET(S): 8.6 TABLET ORAL at 21:27

## 2022-06-04 RX ADMIN — Medication 20 MILLIGRAM(S): at 05:18

## 2022-06-04 RX ADMIN — ALBUTEROL 2 PUFF(S): 90 AEROSOL, METERED ORAL at 13:12

## 2022-06-04 RX ADMIN — APIXABAN 2.5 MILLIGRAM(S): 2.5 TABLET, FILM COATED ORAL at 16:53

## 2022-06-04 RX ADMIN — ESCITALOPRAM OXALATE 10 MILLIGRAM(S): 10 TABLET, FILM COATED ORAL at 13:15

## 2022-06-04 RX ADMIN — Medication 1 DROP(S): at 13:13

## 2022-06-04 RX ADMIN — Medication 2 CAPSULE(S): at 08:52

## 2022-06-04 RX ADMIN — ALBUTEROL 2 PUFF(S): 90 AEROSOL, METERED ORAL at 23:45

## 2022-06-04 RX ADMIN — Medication 100 MILLIGRAM(S): at 05:18

## 2022-06-04 RX ADMIN — Medication 1 CAPSULE(S): at 08:53

## 2022-06-04 RX ADMIN — Medication 2 CAPSULE(S): at 13:15

## 2022-06-04 RX ADMIN — BUDESONIDE AND FORMOTEROL FUMARATE DIHYDRATE 2 PUFF(S): 160; 4.5 AEROSOL RESPIRATORY (INHALATION) at 06:40

## 2022-06-04 RX ADMIN — Medication 1 CAPSULE(S): at 13:12

## 2022-06-04 RX ADMIN — CLOPIDOGREL BISULFATE 75 MILLIGRAM(S): 75 TABLET, FILM COATED ORAL at 13:15

## 2022-06-04 RX ADMIN — Medication 1 CAPSULE(S): at 16:52

## 2022-06-04 RX ADMIN — ALBUTEROL 2 PUFF(S): 90 AEROSOL, METERED ORAL at 16:53

## 2022-06-04 RX ADMIN — ATORVASTATIN CALCIUM 20 MILLIGRAM(S): 80 TABLET, FILM COATED ORAL at 21:27

## 2022-06-04 RX ADMIN — Medication 20 MILLIGRAM(S): at 13:32

## 2022-06-04 RX ADMIN — APIXABAN 2.5 MILLIGRAM(S): 2.5 TABLET, FILM COATED ORAL at 05:18

## 2022-06-04 RX ADMIN — Medication 25 MILLIGRAM(S): at 05:18

## 2022-06-04 RX ADMIN — SPIRONOLACTONE 25 MILLIGRAM(S): 25 TABLET, FILM COATED ORAL at 05:18

## 2022-06-04 RX ADMIN — BUDESONIDE AND FORMOTEROL FUMARATE DIHYDRATE 2 PUFF(S): 160; 4.5 AEROSOL RESPIRATORY (INHALATION) at 16:54

## 2022-06-04 RX ADMIN — Medication 3 MILLIGRAM(S): at 22:06

## 2022-06-04 NOTE — PROGRESS NOTE ADULT - SUBJECTIVE AND OBJECTIVE BOX
Patient is a 89y old  Female who presents with a chief complaint of CHF vs PNA (02 Jun 2022 13:22)    PAST MEDICAL & SURGICAL HISTORY:  Benign essential hypertension      Dyslipidemia      CVA (cerebral infarction)      Alzheimer disease      Hypertension      Asthma, mild      Other nonspecific abnormal finding of lung field      Anxiety      History of bilateral breast reduction surgery      History of appendectomy      History of cosmetic plastic surgery  face lift      History of abdominoplasty      H/O surgical biopsy  right lung; 05/2018      History of cataract extraction  bilateral, with lens implants      H/O varicose vein ligation and stripping  both legs      H/O umbilical hernia repair      Status post coronary angiogram  2017      INTERVAL HISTORY: No acute distress noted. No o/n events  	  MEDICATIONS  (STANDING):  ALBUTerol    90 MICROgram(s) HFA Inhaler 2 Puff(s) Inhalation every 6 hours  apixaban 2.5 milliGRAM(s) Oral every 12 hours  atorvastatin 20 milliGRAM(s) Oral at bedtime  budesonide 160 MICROgram(s)/formoterol 4.5 MICROgram(s) Inhaler 2 Puff(s) Inhalation two times a day  clopidogrel Tablet 75 milliGRAM(s) Oral daily  digoxin     Tablet 125 MICROGram(s) Oral daily  escitalopram 10 milliGRAM(s) Oral daily  furosemide   Injectable 20 milliGRAM(s) IV Push two times a day  guaiFENesin Oral Liquid (Sugar-Free) 100 milliGRAM(s) Oral every 6 hours  metoprolol succinate ER 25 milliGRAM(s) Oral daily  pancrelipase  (CREON  6,000 Lipase Units) 2 Capsule(s) Oral three times a day with meals  pancrelipase  (CREON 36,000 Lipase Units) 1 Capsule(s) Oral three times a day with meals  senna 2 Tablet(s) Oral at bedtime  spironolactone 25 milliGRAM(s) Oral daily  timolol 0.25% Solution 1 Drop(s) Both EYES daily    MEDICATIONS  (PRN):  acetaminophen     Tablet .. 650 milliGRAM(s) Oral every 6 hours PRN Temp greater or equal to 38C (100.4F), Mild Pain (1 - 3)  ALBUTerol    90 MICROgram(s) HFA Inhaler 1 Puff(s) Inhalation every 4 hours PRN Bronchospasm  aluminum hydroxide/magnesium hydroxide/simethicone Suspension 30 milliLiter(s) Oral every 4 hours PRN Dyspepsia  melatonin 3 milliGRAM(s) Oral at bedtime PRN Insomnia  ondansetron Injectable 4 milliGRAM(s) IV Push every 8 hours PRN Nausea and/or Vomiting  zolpidem 5 milliGRAM(s) Oral at bedtime PRN Insomnia        Vitals:  Vital Signs Last 24 Hrs  T(C): 37.1 (04 Jun 2022 10:21), Max: 37.1 (04 Jun 2022 10:21)  T(F): 98.7 (04 Jun 2022 10:21), Max: 98.7 (04 Jun 2022 10:21)  HR: 72 (04 Jun 2022 10:21) (69 - 79)  BP: 128/88 (04 Jun 2022 10:21) (114/66 - 128/88)  RR: 18 (04 Jun 2022 10:21) (17 - 18)  SpO2: 94% (04 Jun 2022 10:21) (94% - 97%)      PHYSICAL EXAM:  Neuro: Awake, responsive  CV: S1 S2 RRR  Lungs: CTABL  GI: Soft, BS +, ND, NT  Extremities:  edema    TELEMETRY:  Afib  	     RADIOLOGY: < from: CT Angio Chest PE Protocol w/ IV Cont (05.31.22 @ 21:06) >  FINDINGS:  CHEST:  LUNGS AND LARGE AIRWAYS: Debris in the upper trachea. Patchy groundglass   airspace opacities in the left upper lobe. No pulmonary nodules or   masses. Partial right upper and right middle lobectomy. Bibasilar   compressive subsegmental atelectasis.  PLEURA: Small bilateral pleural effusions, right greater than left.  VESSELS: No pulmonary embolism.  HEART: Heart size is enlarged. No pericardial effusion.  MEDIASTINUM AND BELKYS: No lymphadenopathy. Mediastinal surgical clip.  CHEST WALL AND LOWER NECK: Enlargement of the thyroid with multiple   nodules due to a multinodular goiter.    ABDOMEN AND PELVIS:  LIVER: Subcentimeter hypodense lesions which are small for accurate   characterization.  BILE DUCTS: Normal caliber.  GALLBLADDER: Within normal limits.  SPLEEN: Indeterminate 1.5 cm low-attenuation lesion, not significantly   changed.  PANCREAS: Redemonstration of diffuse main pancreatic duct dilatation to 4   mm.  ADRENALS: Within normal limits.  KIDNEYS/URETERS: Left renal cysts. Bilateral renal subcentimeter   hypodense lesions which are too small for accurate characterization. No   hydronephrosis.    BLADDER: Within normal limits.  REPRODUCTIVE ORGANS: Uterus and adnexa within normal limits.    BOWEL: No bowel obstruction or inflammation. Colonic diverticulosis   without diverticulitis. Appendix is not visualized. No evidence of   inflammation in the pericecal region.  PERITONEUM: No ascites.  VESSELS: Infrarenal abdominal aortic aneurysm measuring up to 3.4 cm, not   significantly changed.  RETROPERITONEUM/LYMPH NODES: No lymphadenopathy.  ABDOMINAL WALL: Small fat-containing left inguinal hernia. Generalized   anasarca.  BONES: Degenerative changes of the spine and pubic symphysis.    IMPRESSION:  1. No pulmonary embolism.  2. Patchy groundglass airspace opacities in the left upper lobe which   could be infectious in etiology. Pulmonary edema could also be considered.  3. Smallbilateral pleural effusions.  4. Debris in the upper trachea raising concern for aspiration.  5. No bowel obstruction or inflammation.    < end of copied text >      DIAGNOSTIC TESTING:    [x ] Echocardiogram: < from: Transthoracic Echocardiogram (04.06.22 @ 08:12) >  ------------------------------------------------------------------------  CONCLUSIONS:  1. Mitral annular calcification, otherwise normal mitral  valve. Mild-moderate mitral regurgitation.  2. Calcified trileaflet aortic valve with decreased  opening. Peak transaortic valve gradient equals 32 mm Hg,  mean transaortic valve gradient equals 16 mm Hg, estimated  aortic valve area equals 1.7 sqcm (by continuity equation),  consistent with mild aortic stenosis. Mild-moderate aortic  regurgitation.  3. Moderately dilated left atrium.  LA volume index = 46  cc/m2.  4. Normal left ventricular internal dimensions and wall  thicknesses.  5. Normal left ventricular systolic function. No segmental  wall motion abnormalities.  6. Mild diastolic dysfunction (Stage I).  7. Normal right ventricular size and function.    < end of copied text >    [x ]  Catheterization: < from: Cardiac Cath Lab - Adult (03.13.15 @ 12:23) >  CORONARY VESSELS: The coronary circulation is right dominant.  LM:   --  LM: Normal.  LAD:   --  Proximal LAD: There was a discrete 30 % stenosis.  --  Distal LAD: Angiography showed minor luminal irregularities with no  flow limiting lesions.  CX:   --  Circumflex: Normal.  RCA:   --  RCA: Angiography showed minor luminal irregularities with no  flow limiting lesions.    < end of copied text >    [x ] Stress Test:     < from: Nuclear Stress Test-Pharmacologic (Nuclear Stress Test-Pharmacologic .) (04.08.22 @ 15:05) >  IMPRESSIONS:Normal Study  * The left ventricle was normal in size.  * Tracer uptake was homogeneous throughout the left  ventricle.  * Normal study; no evidence for myocardial infarction or  ischemia.  * Post-stress gated wall motion analysis was performed  (LVEF = 67 %;LVEDV = 54 ml.), revealing normal LV  function. The RV function appeared normal.    < end of copied text >      LABS:	 	                        11.4   6.50  )-----------( 179      ( 04 Jun 2022 07:38 )             35.2     06-04    140  |  105  |  24<H>  ----------------------------<  89  4.9   |  27  |  1.05    Ca    8.7      04 Jun 2022 07:38    TPro  6.6  /  Alb  2.8<L>  /  TBili  0.5  /  DBili  x   /  AST  34  /  ALT  74  /  AlkPhos  146<H>  06-04

## 2022-06-04 NOTE — PROGRESS NOTE ADULT - ASSESSMENT
New Afib with RVR, relative hypotension  volume overload, with acute HFpEF likely due to paroxysmal afib as she has been experiencing palpitations as an outpt  LORENZO - ?DEJUAN  likely congestive hepatopathy  Diuresis  Rate control    -Digoxin load completed; started on digoxin 125mcg PO daily... HRs much improved  -replete K aggressively to 4.5  -monitor renal function closely  -cont metoprolol, increase as BP may tolerate  -cont spironolactone 25mg once daily unless LORENZO worsens  -20mg IV lasix BID increase to net negative as needed. pt with fair amount of orthopnea and is on supplemental O2.  -[id number] (age +2, HTN, female, atherosclerosis/AAA, CHF, CVA) - AC is indicated.  After  shared decision making with the pt, the pt's daughter, and outpt cardiologist Dr. Delacruz, will trial AC for now with apixaban 2.5mg BID.  -continue clopidogrel for hx of CVA.

## 2022-06-04 NOTE — PROGRESS NOTE ADULT - SUBJECTIVE AND OBJECTIVE BOX
Patient is a 89y old  Female who presents with a chief complaint of CHF vs PNA (2022 14:11)    INTERVAL HPI/OVERNIGHT EVENTS: no events     MEDICATIONS  (STANDING):  ALBUTerol    90 MICROgram(s) HFA Inhaler 2 Puff(s) Inhalation every 6 hours  apixaban 2.5 milliGRAM(s) Oral every 12 hours  atorvastatin 20 milliGRAM(s) Oral at bedtime  budesonide 160 MICROgram(s)/formoterol 4.5 MICROgram(s) Inhaler 2 Puff(s) Inhalation two times a day  clopidogrel Tablet 75 milliGRAM(s) Oral daily  digoxin     Tablet 125 MICROGram(s) Oral daily  escitalopram 10 milliGRAM(s) Oral daily  furosemide   Injectable 20 milliGRAM(s) IV Push two times a day  guaiFENesin Oral Liquid (Sugar-Free) 100 milliGRAM(s) Oral every 6 hours  metoprolol succinate ER 25 milliGRAM(s) Oral daily  pancrelipase  (CREON  6,000 Lipase Units) 2 Capsule(s) Oral three times a day with meals  pancrelipase  (CREON 36,000 Lipase Units) 1 Capsule(s) Oral three times a day with meals  senna 2 Tablet(s) Oral at bedtime  spironolactone 25 milliGRAM(s) Oral daily  timolol 0.25% Solution 1 Drop(s) Both EYES daily    MEDICATIONS  (PRN):  acetaminophen     Tablet .. 650 milliGRAM(s) Oral every 6 hours PRN Temp greater or equal to 38C (100.4F), Mild Pain (1 - 3)  ALBUTerol    90 MICROgram(s) HFA Inhaler 1 Puff(s) Inhalation every 4 hours PRN Bronchospasm  aluminum hydroxide/magnesium hydroxide/simethicone Suspension 30 milliLiter(s) Oral every 4 hours PRN Dyspepsia  melatonin 3 milliGRAM(s) Oral at bedtime PRN Insomnia  ondansetron Injectable 4 milliGRAM(s) IV Push every 8 hours PRN Nausea and/or Vomiting  zolpidem 5 milliGRAM(s) Oral at bedtime PRN Insomnia    Allergies    Benadryl (Other)    Intolerances      REVIEW OF SYSTEMS:  All other systems reviewed and are negative    Vital Signs Last 24 Hrs  T(C): 37.1 (2022 10:21), Max: 37.1 (2022 10:21)  T(F): 98.7 (2022 10:21), Max: 98.7 (2022 10:21)  HR: 72 (2022 10:21) (69 - 79)  BP: 128/88 (2022 10:21) (114/66 - 128/88)  BP(mean): --  RR: 18 (2022 10:21) (17 - 18)  SpO2: 94% (2022 10:21) (94% - 97%)  Daily     Daily Weight in k.4 (2022 04:38)  I&O's Summary    2022 07:01  -  2022 07:00  --------------------------------------------------------  IN: 236 mL / OUT: 0 mL / NET: 236 mL      CAPILLARY BLOOD GLUCOSE        PHYSICAL EXAM:  GENERAL: NAD,    HEAD:  Atraumatic, Normocephalic  EYES: EOMI, PERRLA, conjunctiva and sclera clear  ENMT: No tonsillar erythema, exudates, or enlargement; Moist mucous membranes, Good dentition, No lesions  NECK: Supple, No JVD, Normal thyroid  NERVOUS SYSTEM:  Alert & Oriented X3, Good concentration; Motor Strength 5/5 B/L upper and lower extremities; DTRs 2+ intact and symmetric  CHEST/LUNG: Clear to percussion bilaterally; No rales, rhonchi, wheezing, or rubs  HEART: Regular rate and rhythm; No murmurs, rubs, or gallops  ABDOMEN: Soft, Nontender, Nondistended; Bowel sounds present  EXTREMITIES:  2+ Peripheral Pulses, No clubbing, cyanosis, or edema  LYMPH: No lymphadenopathy noted  SKIN: No rashes or lesions    Labs                          11.4   6.50  )-----------( 179      ( 2022 07:38 )             35.2     06-04    140  |  105  |  24<H>  ----------------------------<  89  4.9   |  27  |  1.05    Ca    8.7      2022 07:38    TPro  6.6  /  Alb  2.8<L>  /  TBili  0.5  /  DBili  x   /  AST  34  /  ALT  74  /  AlkPhos  146<H>  06-04              Culture - Sputum (collected 2022 09:05)  Source: .Sputum Sputum  Gram Stain (prelim) (2022 07:53):    No polymorphonuclear leukocytes seen per low power field    Moderate Squamous epithelial cells seen per low power field    Moderate Gram positive cocci in pairs seen per oil power field  Preliminary Report (2022 07:53):    Normal Respiratory Madelyn present                DVT prophylaxis: > Lovenox 40mg SQ daily  > Heparin   > SCD's

## 2022-06-05 PROCEDURE — 99233 SBSQ HOSP IP/OBS HIGH 50: CPT

## 2022-06-05 RX ADMIN — BUDESONIDE AND FORMOTEROL FUMARATE DIHYDRATE 2 PUFF(S): 160; 4.5 AEROSOL RESPIRATORY (INHALATION) at 06:35

## 2022-06-05 RX ADMIN — ESCITALOPRAM OXALATE 10 MILLIGRAM(S): 10 TABLET, FILM COATED ORAL at 12:00

## 2022-06-05 RX ADMIN — ALBUTEROL 2 PUFF(S): 90 AEROSOL, METERED ORAL at 17:16

## 2022-06-05 RX ADMIN — Medication 20 MILLIGRAM(S): at 14:08

## 2022-06-05 RX ADMIN — Medication 2 CAPSULE(S): at 14:08

## 2022-06-05 RX ADMIN — Medication 1 DROP(S): at 12:00

## 2022-06-05 RX ADMIN — ALBUTEROL 2 PUFF(S): 90 AEROSOL, METERED ORAL at 23:00

## 2022-06-05 RX ADMIN — Medication 1 CAPSULE(S): at 14:08

## 2022-06-05 RX ADMIN — ALBUTEROL 2 PUFF(S): 90 AEROSOL, METERED ORAL at 12:00

## 2022-06-05 RX ADMIN — ATORVASTATIN CALCIUM 20 MILLIGRAM(S): 80 TABLET, FILM COATED ORAL at 22:04

## 2022-06-05 RX ADMIN — Medication 25 MILLIGRAM(S): at 06:37

## 2022-06-05 RX ADMIN — Medication 2 CAPSULE(S): at 17:16

## 2022-06-05 RX ADMIN — Medication 125 MICROGRAM(S): at 06:36

## 2022-06-05 RX ADMIN — SPIRONOLACTONE 25 MILLIGRAM(S): 25 TABLET, FILM COATED ORAL at 06:36

## 2022-06-05 RX ADMIN — BUDESONIDE AND FORMOTEROL FUMARATE DIHYDRATE 2 PUFF(S): 160; 4.5 AEROSOL RESPIRATORY (INHALATION) at 17:17

## 2022-06-05 RX ADMIN — Medication 1 CAPSULE(S): at 17:16

## 2022-06-05 RX ADMIN — APIXABAN 2.5 MILLIGRAM(S): 2.5 TABLET, FILM COATED ORAL at 06:36

## 2022-06-05 RX ADMIN — ALBUTEROL 2 PUFF(S): 90 AEROSOL, METERED ORAL at 06:36

## 2022-06-05 RX ADMIN — Medication 1 CAPSULE(S): at 11:26

## 2022-06-05 RX ADMIN — Medication 2 CAPSULE(S): at 11:26

## 2022-06-05 RX ADMIN — Medication 650 MILLIGRAM(S): at 22:56

## 2022-06-05 RX ADMIN — Medication 20 MILLIGRAM(S): at 06:36

## 2022-06-05 RX ADMIN — Medication 3 MILLIGRAM(S): at 22:04

## 2022-06-05 RX ADMIN — APIXABAN 2.5 MILLIGRAM(S): 2.5 TABLET, FILM COATED ORAL at 17:16

## 2022-06-05 RX ADMIN — Medication 650 MILLIGRAM(S): at 22:04

## 2022-06-05 RX ADMIN — CLOPIDOGREL BISULFATE 75 MILLIGRAM(S): 75 TABLET, FILM COATED ORAL at 12:00

## 2022-06-05 NOTE — PROGRESS NOTE ADULT - ASSESSMENT
89 yo female w/PMH of Alzheimer Disease, HTN, CVA (difficulty with swallowing), HLD, Asthma, Afib here for SOB , CHF vs Aspiration PNA.        Acute on chronic diastolic CHF.   ·  Plan: - Pt last echo in 4/2022 with only mild diastolic dysfunction  -cardiology on board   -c/w diuresis  likely switch to PO today  replete hypokalemia       Will need o2 sat on ambulation    Afib with RVR w Hypotension  - cardio consult appreciated  -started on digoxin, hr improved   -eliquis  -bb    LORENZO  -likely 2/2 diuresis  -monitor cr         Pneumonia,  ruled out       History of CVA (cerebrovascular accident). C/w Plavix statin       Hypertension c/w home meds       Asthma, mild. c/w inhalers       Pancreatic Insufficiency  on Creon

## 2022-06-05 NOTE — PROGRESS NOTE ADULT - SUBJECTIVE AND OBJECTIVE BOX
Patient is a 89y old  Female who presents with a chief complaint of CHF vs PNA (02 Jun 2022 13:22)    PAST MEDICAL & SURGICAL HISTORY:  Benign essential hypertension      Dyslipidemia      CVA (cerebral infarction)      Alzheimer disease      Hypertension      Asthma, mild      Other nonspecific abnormal finding of lung field      Anxiety      History of bilateral breast reduction surgery      History of appendectomy      History of cosmetic plastic surgery  face lift      History of abdominoplasty      H/O surgical biopsy  right lung; 05/2018      History of cataract extraction  bilateral, with lens implants      H/O varicose vein ligation and stripping  both legs      H/O umbilical hernia repair      Status post coronary angiogram  2017      INTERVAL HISTORY: No acute distress noted. No o/n events  	  MEDICATIONS  (STANDING):  ALBUTerol    90 MICROgram(s) HFA Inhaler 2 Puff(s) Inhalation every 6 hours  apixaban 2.5 milliGRAM(s) Oral every 12 hours  atorvastatin 20 milliGRAM(s) Oral at bedtime  budesonide 160 MICROgram(s)/formoterol 4.5 MICROgram(s) Inhaler 2 Puff(s) Inhalation two times a day  clopidogrel Tablet 75 milliGRAM(s) Oral daily  digoxin     Tablet 125 MICROGram(s) Oral daily  escitalopram 10 milliGRAM(s) Oral daily  furosemide   Injectable 20 milliGRAM(s) IV Push two times a day  metoprolol succinate ER 25 milliGRAM(s) Oral daily  pancrelipase  (CREON  6,000 Lipase Units) 2 Capsule(s) Oral three times a day with meals  pancrelipase  (CREON 36,000 Lipase Units) 1 Capsule(s) Oral three times a day with meals  senna 2 Tablet(s) Oral at bedtime  spironolactone 25 milliGRAM(s) Oral daily  timolol 0.25% Solution 1 Drop(s) Both EYES daily    MEDICATIONS  (PRN):  acetaminophen     Tablet .. 650 milliGRAM(s) Oral every 6 hours PRN Temp greater or equal to 38C (100.4F), Mild Pain (1 - 3)  ALBUTerol    90 MICROgram(s) HFA Inhaler 1 Puff(s) Inhalation every 4 hours PRN Bronchospasm  aluminum hydroxide/magnesium hydroxide/simethicone Suspension 30 milliLiter(s) Oral every 4 hours PRN Dyspepsia  melatonin 3 milliGRAM(s) Oral at bedtime PRN Insomnia  ondansetron Injectable 4 milliGRAM(s) IV Push every 8 hours PRN Nausea and/or Vomiting  zolpidem 5 milliGRAM(s) Oral at bedtime PRN Insomnia      Vital Signs Last 24 Hrs  T(C): 36.3 (05 Jun 2022 10:51), Max: 37.1 (05 Jun 2022 05:09)  T(F): 97.4 (05 Jun 2022 10:51), Max: 98.8 (05 Jun 2022 05:09)  HR: 78 (05 Jun 2022 10:51) (76 - 98)  BP: 111/71 (05 Jun 2022 10:51) (101/66 - 115/77)  RR: 17 (05 Jun 2022 10:51) (17 - 18)  SpO2: 97% (05 Jun 2022 10:51) (94% - 97%)    PHYSICAL EXAM:  Neuro: Awake, responsive  CV: S1 S2 RRR  Lungs: CTABL  GI: Soft, BS +, ND, NT  Extremities:  edema    TELEMETRY:  Afib  	     RADIOLOGY: < from: CT Angio Chest PE Protocol w/ IV Cont (05.31.22 @ 21:06) >  FINDINGS:  CHEST:  LUNGS AND LARGE AIRWAYS: Debris in the upper trachea. Patchy groundglass   airspace opacities in the left upper lobe. No pulmonary nodules or   masses. Partial right upper and right middle lobectomy. Bibasilar   compressive subsegmental atelectasis.  PLEURA: Small bilateral pleural effusions, right greater than left.  VESSELS: No pulmonary embolism.  HEART: Heart size is enlarged. No pericardial effusion.  MEDIASTINUM AND BELKYS: No lymphadenopathy. Mediastinal surgical clip.  CHEST WALL AND LOWER NECK: Enlargement of the thyroid with multiple   nodules due to a multinodular goiter.    ABDOMEN AND PELVIS:  LIVER: Subcentimeter hypodense lesions which are small for accurate   characterization.  BILE DUCTS: Normal caliber.  GALLBLADDER: Within normal limits.  SPLEEN: Indeterminate 1.5 cm low-attenuation lesion, not significantly   changed.  PANCREAS: Redemonstration of diffuse main pancreatic duct dilatation to 4   mm.  ADRENALS: Within normal limits.  KIDNEYS/URETERS: Left renal cysts. Bilateral renal subcentimeter   hypodense lesions which are too small for accurate characterization. No   hydronephrosis.    BLADDER: Within normal limits.  REPRODUCTIVE ORGANS: Uterus and adnexa within normal limits.    BOWEL: No bowel obstruction or inflammation. Colonic diverticulosis   without diverticulitis. Appendix is not visualized. No evidence of   inflammation in the pericecal region.  PERITONEUM: No ascites.  VESSELS: Infrarenal abdominal aortic aneurysm measuring up to 3.4 cm, not   significantly changed.  RETROPERITONEUM/LYMPH NODES: No lymphadenopathy.  ABDOMINAL WALL: Small fat-containing left inguinal hernia. Generalized   anasarca.  BONES: Degenerative changes of the spine and pubic symphysis.    IMPRESSION:  1. No pulmonary embolism.  2. Patchy groundglass airspace opacities in the left upper lobe which   could be infectious in etiology. Pulmonary edema could also be considered.  3. Smallbilateral pleural effusions.  4. Debris in the upper trachea raising concern for aspiration.  5. No bowel obstruction or inflammation.    < end of copied text >      DIAGNOSTIC TESTING:    [x ] Echocardiogram: < from: Transthoracic Echocardiogram (04.06.22 @ 08:12) >  ------------------------------------------------------------------------  CONCLUSIONS:  1. Mitral annular calcification, otherwise normal mitral  valve. Mild-moderate mitral regurgitation.  2. Calcified trileaflet aortic valve with decreased  opening. Peak transaortic valve gradient equals 32 mm Hg,  mean transaortic valve gradient equals 16 mm Hg, estimated  aortic valve area equals 1.7 sqcm (by continuity equation),  consistent with mild aortic stenosis. Mild-moderate aortic  regurgitation.  3. Moderately dilated left atrium.  LA volume index = 46  cc/m2.  4. Normal left ventricular internal dimensions and wall  thicknesses.  5. Normal left ventricular systolic function. No segmental  wall motion abnormalities.  6. Mild diastolic dysfunction (Stage I).  7. Normal right ventricular size and function.    < end of copied text >    [x ]  Catheterization: < from: Cardiac Cath Lab - Adult (03.13.15 @ 12:23) >  CORONARY VESSELS: The coronary circulation is right dominant.  LM:   --  LM: Normal.  LAD:   --  Proximal LAD: There was a discrete 30 % stenosis.  --  Distal LAD: Angiography showed minor luminal irregularities with no  flow limiting lesions.  CX:   --  Circumflex: Normal.  RCA:   --  RCA: Angiography showed minor luminal irregularities with no  flow limiting lesions.    < end of copied text >    [x ] Stress Test:     < from: Nuclear Stress Test-Pharmacologic (Nuclear Stress Test-Pharmacologic .) (04.08.22 @ 15:05) >  IMPRESSIONS:Normal Study  * The left ventricle was normal in size.  * Tracer uptake was homogeneous throughout the left  ventricle.  * Normal study; no evidence for myocardial infarction or  ischemia.  * Post-stress gated wall motion analysis was performed  (LVEF = 67 %;LVEDV = 54 ml.), revealing normal LV  function. The RV function appeared normal.    < end of copied text >      LABS:	 	                                   11.4   6.50  )-----------( 179      ( 04 Jun 2022 07:38 )             35.2     06-04    140  |  105  |  24<H>  ----------------------------<  89  4.9   |  27  |  1.05    Ca    8.7      04 Jun 2022 07:38    TPro  6.6  /  Alb  2.8<L>  /  TBili  0.5  /  DBili  x   /  AST  34  /  ALT  74  /  AlkPhos  146<H>  06-04

## 2022-06-05 NOTE — PROGRESS NOTE ADULT - SUBJECTIVE AND OBJECTIVE BOX
Patient is a 89y old  Female who presents with a chief complaint of CHF vs PNA (2022 12:02)    INTERVAL HPI/OVERNIGHT EVENTS:    MEDICATIONS  (STANDING):  ALBUTerol    90 MICROgram(s) HFA Inhaler 2 Puff(s) Inhalation every 6 hours  apixaban 2.5 milliGRAM(s) Oral every 12 hours  atorvastatin 20 milliGRAM(s) Oral at bedtime  budesonide 160 MICROgram(s)/formoterol 4.5 MICROgram(s) Inhaler 2 Puff(s) Inhalation two times a day  clopidogrel Tablet 75 milliGRAM(s) Oral daily  digoxin     Tablet 125 MICROGram(s) Oral daily  escitalopram 10 milliGRAM(s) Oral daily  furosemide   Injectable 20 milliGRAM(s) IV Push two times a day  metoprolol succinate ER 25 milliGRAM(s) Oral daily  pancrelipase  (CREON  6,000 Lipase Units) 2 Capsule(s) Oral three times a day with meals  pancrelipase  (CREON 36,000 Lipase Units) 1 Capsule(s) Oral three times a day with meals  senna 2 Tablet(s) Oral at bedtime  spironolactone 25 milliGRAM(s) Oral daily  timolol 0.25% Solution 1 Drop(s) Both EYES daily    MEDICATIONS  (PRN):  acetaminophen     Tablet .. 650 milliGRAM(s) Oral every 6 hours PRN Temp greater or equal to 38C (100.4F), Mild Pain (1 - 3)  ALBUTerol    90 MICROgram(s) HFA Inhaler 1 Puff(s) Inhalation every 4 hours PRN Bronchospasm  aluminum hydroxide/magnesium hydroxide/simethicone Suspension 30 milliLiter(s) Oral every 4 hours PRN Dyspepsia  melatonin 3 milliGRAM(s) Oral at bedtime PRN Insomnia  ondansetron Injectable 4 milliGRAM(s) IV Push every 8 hours PRN Nausea and/or Vomiting  zolpidem 5 milliGRAM(s) Oral at bedtime PRN Insomnia    Allergies    Benadryl (Other)    Intolerances      REVIEW OF SYSTEMS:  All other systems reviewed and are negative    Vital Signs Last 24 Hrs  T(C): 36.3 (2022 10:51), Max: 37.1 (2022 05:09)  T(F): 97.4 (2022 10:51), Max: 98.8 (2022 05:09)  HR: 78 (2022 10:51) (76 - 98)  BP: 111/71 (2022 10:51) (101/66 - 115/77)  BP(mean): --  RR: 17 (2022 10:51) (17 - 18)  SpO2: 97% (2022 10:51) (94% - 97%)  Daily     Daily Weight in k.9 (2022 05:09)  I&O's Summary    2022 07:01  -  2022 07:00  --------------------------------------------------------  IN: 480 mL / OUT: 4 mL / NET: 476 mL    2022 07:01  -  2022 11:20  --------------------------------------------------------  IN: 240 mL / OUT: 0 mL / NET: 240 mL      CAPILLARY BLOOD GLUCOSE        PHYSICAL EXAM:  GENERAL: NAD,    HEAD:  Atraumatic, Normocephalic  EYES: EOMI, PERRLA, conjunctiva and sclera clear  ENMT: No tonsillar erythema, exudates, or enlargement; Moist mucous membranes, Good dentition, No lesions  NECK: Supple, No JVD, Normal thyroid  NERVOUS SYSTEM:  Alert & Oriented X3, Good concentration; Motor Strength 5/5 B/L upper and lower extremities; DTRs 2+ intact and symmetric  CHEST/LUNG: Clear to percussion bilaterally; No rales, rhonchi, wheezing, or rubs  HEART: Regular rate and rhythm; No murmurs, rubs, or gallops  ABDOMEN: Soft, Nontender, Nondistended; Bowel sounds present  EXTREMITIES:  2+ Peripheral Pulses, No clubbing, cyanosis, or edema  LYMPH: No lymphadenopathy noted  SKIN: No rashes or lesions    Labs                          11.4   6.50  )-----------( 179      ( 2022 07:38 )             35.2     06-04    140  |  105  |  24<H>  ----------------------------<  89  4.9   |  27  |  1.05    Ca    8.7      2022 07:38    TPro  6.6  /  Alb  2.8<L>  /  TBili  0.5  /  DBili  x   /  AST  34  /  ALT  74  /  AlkPhos  146<H>                          DVT prophylaxis: > Lovenox 40mg SQ daily  > Heparin   > SCD's

## 2022-06-05 NOTE — PROGRESS NOTE ADULT - ASSESSMENT
New Afib with RVR, relative hypotension  volume overload, with acute HFpEF likely due to paroxysmal afib as she has been experiencing palpitations as an outpt  LORENZO - ?DEJUAN  likely congestive hepatopathy  Diuresis  Rate control    -Digoxin load completed; started on digoxin 125mcg PO daily... HRs much improved and stable  -replete K aggressively to 4.5  -monitor renal function closely  -cont metoprolol, increase as BP may tolerate  -cont spironolactone 25mg once daily unless LORENZO worsens  -20mg IV lasix BID increase to net negative as needed. pt with fair amount of orthopnea and is on supplemental O2.  -[id number] (age +2, HTN, female, atherosclerosis/AAA, CHF, CVA) - AC is indicated.  After  shared decision making with the pt, the pt's daughter, and outpt cardiologist Dr. Delacruz, will trial AC for now with apixaban 2.5mg BID.  -continue clopidogrel for hx of CVA.

## 2022-06-06 LAB
ANION GAP SERPL CALC-SCNC: 9 MMOL/L — SIGNIFICANT CHANGE UP (ref 5–17)
BUN SERPL-MCNC: 43 MG/DL — HIGH (ref 7–23)
CALCIUM SERPL-MCNC: 8.3 MG/DL — LOW (ref 8.5–10.1)
CHLORIDE SERPL-SCNC: 100 MMOL/L — SIGNIFICANT CHANGE UP (ref 96–108)
CO2 SERPL-SCNC: 27 MMOL/L — SIGNIFICANT CHANGE UP (ref 22–31)
CREAT SERPL-MCNC: 1.66 MG/DL — HIGH (ref 0.5–1.3)
CULTURE RESULTS: SIGNIFICANT CHANGE UP
CULTURE RESULTS: SIGNIFICANT CHANGE UP
DIGOXIN SERPL-MCNC: 1.67 NG/ML — SIGNIFICANT CHANGE UP (ref 0.8–2)
EGFR: 29 ML/MIN/1.73M2 — LOW
GLUCOSE SERPL-MCNC: 100 MG/DL — HIGH (ref 70–99)
MAGNESIUM SERPL-MCNC: 1.4 MG/DL — LOW (ref 1.6–2.6)
PHOSPHATE SERPL-MCNC: 5.5 MG/DL — HIGH (ref 2.5–4.5)
POTASSIUM SERPL-MCNC: 3.7 MMOL/L — SIGNIFICANT CHANGE UP (ref 3.5–5.3)
POTASSIUM SERPL-SCNC: 3.7 MMOL/L — SIGNIFICANT CHANGE UP (ref 3.5–5.3)
SODIUM SERPL-SCNC: 136 MMOL/L — SIGNIFICANT CHANGE UP (ref 135–145)
SPECIMEN SOURCE: SIGNIFICANT CHANGE UP
SPECIMEN SOURCE: SIGNIFICANT CHANGE UP
TSH SERPL-MCNC: 1.13 UU/ML — SIGNIFICANT CHANGE UP (ref 0.36–3.74)

## 2022-06-06 PROCEDURE — 99233 SBSQ HOSP IP/OBS HIGH 50: CPT

## 2022-06-06 PROCEDURE — 99232 SBSQ HOSP IP/OBS MODERATE 35: CPT

## 2022-06-06 RX ORDER — MAGNESIUM SULFATE 500 MG/ML
2 VIAL (ML) INJECTION ONCE
Refills: 0 | Status: COMPLETED | OUTPATIENT
Start: 2022-06-06 | End: 2022-06-06

## 2022-06-06 RX ORDER — FUROSEMIDE 40 MG
40 TABLET ORAL DAILY
Refills: 0 | Status: DISCONTINUED | OUTPATIENT
Start: 2022-06-07 | End: 2022-06-07

## 2022-06-06 RX ADMIN — ESCITALOPRAM OXALATE 10 MILLIGRAM(S): 10 TABLET, FILM COATED ORAL at 11:43

## 2022-06-06 RX ADMIN — Medication 2 CAPSULE(S): at 18:14

## 2022-06-06 RX ADMIN — Medication 125 MICROGRAM(S): at 05:48

## 2022-06-06 RX ADMIN — ALBUTEROL 2 PUFF(S): 90 AEROSOL, METERED ORAL at 11:44

## 2022-06-06 RX ADMIN — ATORVASTATIN CALCIUM 20 MILLIGRAM(S): 80 TABLET, FILM COATED ORAL at 22:21

## 2022-06-06 RX ADMIN — Medication 1 DROP(S): at 11:44

## 2022-06-06 RX ADMIN — Medication 1 CAPSULE(S): at 18:13

## 2022-06-06 RX ADMIN — Medication 1 CAPSULE(S): at 11:43

## 2022-06-06 RX ADMIN — BUDESONIDE AND FORMOTEROL FUMARATE DIHYDRATE 2 PUFF(S): 160; 4.5 AEROSOL RESPIRATORY (INHALATION) at 18:15

## 2022-06-06 RX ADMIN — BUDESONIDE AND FORMOTEROL FUMARATE DIHYDRATE 2 PUFF(S): 160; 4.5 AEROSOL RESPIRATORY (INHALATION) at 05:54

## 2022-06-06 RX ADMIN — Medication 1 CAPSULE(S): at 08:06

## 2022-06-06 RX ADMIN — Medication 2 CAPSULE(S): at 08:06

## 2022-06-06 RX ADMIN — APIXABAN 2.5 MILLIGRAM(S): 2.5 TABLET, FILM COATED ORAL at 05:49

## 2022-06-06 RX ADMIN — SENNA PLUS 2 TABLET(S): 8.6 TABLET ORAL at 22:21

## 2022-06-06 RX ADMIN — Medication 2 CAPSULE(S): at 11:43

## 2022-06-06 RX ADMIN — SPIRONOLACTONE 25 MILLIGRAM(S): 25 TABLET, FILM COATED ORAL at 05:48

## 2022-06-06 RX ADMIN — Medication 20 MILLIGRAM(S): at 05:48

## 2022-06-06 RX ADMIN — Medication 150 GRAM(S): at 15:14

## 2022-06-06 RX ADMIN — APIXABAN 2.5 MILLIGRAM(S): 2.5 TABLET, FILM COATED ORAL at 18:12

## 2022-06-06 RX ADMIN — ALBUTEROL 2 PUFF(S): 90 AEROSOL, METERED ORAL at 05:53

## 2022-06-06 RX ADMIN — CLOPIDOGREL BISULFATE 75 MILLIGRAM(S): 75 TABLET, FILM COATED ORAL at 11:42

## 2022-06-06 RX ADMIN — Medication 25 MILLIGRAM(S): at 05:49

## 2022-06-06 NOTE — PROGRESS NOTE ADULT - SUBJECTIVE AND OBJECTIVE BOX
Patient is a 89y old  Female who presents with a chief complaint of CHF vs PNA (2022 11:42)    INTERVAL HPI/OVERNIGHT EVENTS: no events     MEDICATIONS  (STANDING):  apixaban 2.5 milliGRAM(s) Oral every 12 hours  atorvastatin 20 milliGRAM(s) Oral at bedtime  budesonide 160 MICROgram(s)/formoterol 4.5 MICROgram(s) Inhaler 2 Puff(s) Inhalation two times a day  clopidogrel Tablet 75 milliGRAM(s) Oral daily  digoxin     Tablet 125 MICROGram(s) Oral daily  escitalopram 10 milliGRAM(s) Oral daily  metoprolol succinate ER 25 milliGRAM(s) Oral daily  pancrelipase  (CREON  6,000 Lipase Units) 2 Capsule(s) Oral three times a day with meals  pancrelipase  (CREON 36,000 Lipase Units) 1 Capsule(s) Oral three times a day with meals  senna 2 Tablet(s) Oral at bedtime  spironolactone 25 milliGRAM(s) Oral daily  timolol 0.25% Solution 1 Drop(s) Both EYES daily    MEDICATIONS  (PRN):  acetaminophen     Tablet .. 650 milliGRAM(s) Oral every 6 hours PRN Temp greater or equal to 38C (100.4F), Mild Pain (1 - 3)  ALBUTerol    90 MICROgram(s) HFA Inhaler 1 Puff(s) Inhalation every 4 hours PRN Bronchospasm  aluminum hydroxide/magnesium hydroxide/simethicone Suspension 30 milliLiter(s) Oral every 4 hours PRN Dyspepsia  melatonin 3 milliGRAM(s) Oral at bedtime PRN Insomnia  ondansetron Injectable 4 milliGRAM(s) IV Push every 8 hours PRN Nausea and/or Vomiting  zolpidem 5 milliGRAM(s) Oral at bedtime PRN Insomnia    Allergies    Benadryl (Other)    Intolerances      REVIEW OF SYSTEMS:  All other systems reviewed and are negative    Vital Signs Last 24 Hrs  T(C): 36.5 (2022 10:22), Max: 36.5 (2022 15:45)  T(F): 97.7 (2022 10:22), Max: 97.7 (2022 15:45)  HR: 77 (2022 10:22) (76 - 85)  BP: 103/66 (2022 10:22) (100/60 - 103/71)  BP(mean): --  RR: 17 (2022 10:22) (17 - 18)  SpO2: 97% (2022 10:22) (94% - 97%)  Daily     Daily Weight in k.4 (2022 05:07)  I&O's Summary    2022 07:01  -  2022 07:00  --------------------------------------------------------  IN: 360 mL / OUT: 0 mL / NET: 360 mL    2022 07:01  -  2022 14:25  --------------------------------------------------------  IN: 480 mL / OUT: 3 mL / NET: 477 mL      CAPILLARY BLOOD GLUCOSE        PHYSICAL EXAM:  GENERAL: NAD,    HEAD:  Atraumatic, Normocephalic  EYES: EOMI, PERRLA, conjunctiva and sclera clear  ENMT: No tonsillar erythema, exudates, or enlargement; Moist mucous membranes, Good dentition, No lesions  NECK: Supple, No JVD, Normal thyroid  NERVOUS SYSTEM:  Alert & Oriented X3, Good concentration; Motor Strength 5/5 B/L upper and lower extremities; DTRs 2+ intact and symmetric  CHEST/LUNG: Clear to percussion bilaterally; No rales, rhonchi, wheezing, or rubs  HEART: Regular rate and rhythm; No murmurs, rubs, or gallops  ABDOMEN: Soft, Nontender, Nondistended; Bowel sounds present  EXTREMITIES:  2+ Peripheral Pulses, No clubbing, cyanosis, or edema  LYMPH: No lymphadenopathy noted  SKIN: No rashes or lesions    Labs          136  |  100  |  43<H>  ----------------------------<  100<H>  3.7   |  27  |  1.66<H>    Ca    8.3<L>      2022 12:34  Phos  5.5     -  Mg     1.4                               DVT prophylaxis: > Lovenox 40mg SQ daily  > Heparin   > SCD's

## 2022-06-06 NOTE — PROGRESS NOTE ADULT - PROVIDER SPECIALTY LIST ADULT
Cardiology
Hospitalist
Cardiology
Hospitalist
Hospitalist

## 2022-06-06 NOTE — PROGRESS NOTE ADULT - SUBJECTIVE AND OBJECTIVE BOX
Patient is a 89y old  Female who presents with a chief complaint of sob (05 Jun 2022 13:05)    PAST MEDICAL & SURGICAL HISTORY:    Dyslipidemia    CVA (cerebral infarction)    Alzheimer disease    Hypertension    Asthma, mild    Anxiety    Pancreatic Insufficiency    History of bilateral breast reduction surgery    History of appendectomy    History of cosmetic plastic surgery  face lift    History of abdominoplasty    H/O surgical biopsy  right lung; 05/2018    History of cataract extraction  bilateral, with lens implants    H/O varicose vein ligation and stripping  both legs    H/O umbilical hernia repair    Status post coronary angiogram  2015    INTERVAL HISTORY: in no distress, denies any chest pain or sob at present   	  MEDICATIONS:  MEDICATIONS  (STANDING):  ALBUTerol    90 MICROgram(s) HFA Inhaler 2 Puff(s) Inhalation every 6 hours  apixaban 2.5 milliGRAM(s) Oral every 12 hours  atorvastatin 20 milliGRAM(s) Oral at bedtime  budesonide 160 MICROgram(s)/formoterol 4.5 MICROgram(s) Inhaler 2 Puff(s) Inhalation two times a day  clopidogrel Tablet 75 milliGRAM(s) Oral daily  digoxin     Tablet 125 MICROGram(s) Oral daily  escitalopram 10 milliGRAM(s) Oral daily  furosemide   Injectable 20 milliGRAM(s) IV Push two times a day  metoprolol succinate ER 25 milliGRAM(s) Oral daily  pancrelipase  (CREON  6,000 Lipase Units) 2 Capsule(s) Oral three times a day with meals  pancrelipase  (CREON 36,000 Lipase Units) 1 Capsule(s) Oral three times a day with meals  senna 2 Tablet(s) Oral at bedtime  spironolactone 25 milliGRAM(s) Oral daily  timolol 0.25% Solution 1 Drop(s) Both EYES daily    MEDICATIONS  (PRN):  acetaminophen     Tablet .. 650 milliGRAM(s) Oral every 6 hours PRN Temp greater or equal to 38C (100.4F), Mild Pain (1 - 3)  ALBUTerol    90 MICROgram(s) HFA Inhaler 1 Puff(s) Inhalation every 4 hours PRN Bronchospasm  aluminum hydroxide/magnesium hydroxide/simethicone Suspension 30 milliLiter(s) Oral every 4 hours PRN Dyspepsia  melatonin 3 milliGRAM(s) Oral at bedtime PRN Insomnia  ondansetron Injectable 4 milliGRAM(s) IV Push every 8 hours PRN Nausea and/or Vomiting  zolpidem 5 milliGRAM(s) Oral at bedtime PRN Insomnia    Vitals:  T(F): 97.7 (06-06-22 @ 10:22), Max: 97.7 (06-05-22 @ 15:45)  HR: 77 (06-06-22 @ 10:22) (76 - 85)  BP: 103/66 (06-06-22 @ 10:22) (100/60 - 103/71)  RR: 17 (06-06-22 @ 10:22) (17 - 18)  SpO2: 97% (06-06-22 @ 10:22) (94% - 97%)    06-05 @ 07:01  -  06-06 @ 07:00  --------------------------------------------------------  IN:    Oral Fluid: 360 mL  Total IN: 360 mL    OUT:  Total OUT: 0 mL    Total NET: 360 mL    PHYSICAL EXAM:  Neuro: Awake, responsive  CV: S1 S2 irreg irregular   Lungs: diminished to bases  GI: Soft, BS +, ND, NT  Extremities: Trace LE edema    TELEMETRY: atrial fibrillation     RADIOLOGY: < from: CT Abdomen and Pelvis w/ IV Cont (05.31.22 @ 21:08) >  1. No pulmonary embolism.  2. Patchy groundglass airspace opacities in the left upper lobe which   could be infectious in etiology. Pulmonary edema could also be considered.  3. Small bilateral pleural effusions.  4. Debris in the upper trachea raising concern for aspiration.  5. No bowel obstruction or inflammation.    < end of copied text >    DIAGNOSTIC TESTING:    [x ] Echocardiogram: < from: Transthoracic Echocardiogram (04.06.22 @ 08:12) >  Ejection Fraction (Peter Rule): 61 %  ------------------------------------------------------------------------  OBSERVATIONS:  Mitral Valve: Mitral annular calcification, otherwise  normal mitral valve. Mild-moderate mitral regurgitation.  Aortic Root: Normal aortic root.  Aortic Valve: Calcified trileaflet aortic valve with  decreased opening. Peak transaortic valve gradient equals  32 mm Hg, mean transaortic valve gradient equals 16 mm Hg,  estimated aortic valve area equals 1.7 sqcm (by continuity  equation), consistent with mild aortic stenosis.  Mild-moderate aortic regurgitation.  Left Atrium: Moderately dilated left atrium.  LA volume  index = 46 cc/m2.  Left Ventricle: Normal left ventricular systolic function.  No segmental wall motion abnormalities. Normal left  ventricular internal dimensions and wall thicknesses. Mild  diastolic dysfunction (Stage I).  Right Heart: Normal right atrium. Normal right ventricular  size and function. Normal tricuspid valve.  Mild tricuspid  regurgitation. Normal pulmonic valve.  Pericardium/Pleura Normal pericardium with no pericardial  effusion.  ------------------------------------------------------------------------  CONCLUSIONS:  1. Mitral annular calcification, otherwise normal mitral  valve. Mild-moderate mitral regurgitation.  2. Calcified trileaflet aortic valve with decreased  opening. Peak transaortic valve gradient equals 32 mm Hg,  mean transaortic valve gradient equals 16 mm Hg, estimated  aortic valve area equals 1.7 sqcm (by continuity equation),  consistent with mild aortic stenosis. Mild-moderate aortic  regurgitation.  3. Moderately dilated left atrium.  LA volume index = 46  cc/m2.  4. Normal left ventricular internal dimensions and wall  thicknesses.  5. Normal left ventricular systolic function. No segmental  wall motion abnormalities.  6. Mild diastolic dysfunction (Stage I).  7. Normal right ventricular size and function.    < end of copied text >    [x ] Cardiac Catheterization: < from: Cardiac Cath Lab - Adult (03.13.15 @ 12:23) >  CORONARY VESSELS: The coronary circulation is right dominant.  LM:   --  LM: Normal.  LAD:   --  Proximal LAD: There was a discrete 30 % stenosis.  --  Distal LAD: Angiography showed minor luminal irregularities with no  flow limiting lesions.  CX:   --  Circumflex: Normal.  RCA:   --  RCA: Angiography showed minor luminal irregularities with no  flow limiting lesions.    < end of copied text >    [x ] Stress Test:  < from: Nuclear Stress Test-Pharmacologic (Nuclear Stress Test-Pharmacologic .) (04.08.22 @ 15:05) >  Review of raw data shows: The study is of good technical  quality.  The left ventricle was normal in size. Normal myocardial  perfusion scan, with no evidence of infarction or inducible  ischemia.  ------------------------------------------------------------------------  GATED ANALYSIS:  Post-stress gated wall motion analysis was performed (LVEF  = 67 %;LVEDV = 54 ml.), revealing normal LV function. The  RV function appeared normal.  ------------------------------------------------------------------------  IMPRESSIONS: Normal Study  * The left ventricle was normal in size.  * Tracer uptake was homogeneous throughout the left  ventricle.  * Normal study; no evidence for myocardial infarction or  ischemia.  * Post-stress gated wall motion analysis was performed  (LVEF = 67 %;LVEDV = 54 ml.), revealing normal LV  function. The RV function appeared normal.    < end of copied text >    LABS:	 	    04 Jun 2022 07:38    140    |  105    |  24     ----------------------------<  89     4.9     |  27     |  1.05                         11.4   6.50  )-----------( 179      ( 04 Jun 2022 07:38 )             35.2                Patient is a 89y old  Female who presents with a chief complaint of sob (05 Jun 2022 13:05)    PAST MEDICAL & SURGICAL HISTORY:    Dyslipidemia    CVA (cerebral infarction)    Alzheimer disease    Hypertension    Asthma, mild    Anxiety    Pancreatic Insufficiency    History of bilateral breast reduction surgery    History of appendectomy    History of cosmetic plastic surgery  face lift    History of abdominoplasty    H/O surgical biopsy  right lung; 05/2018    History of cataract extraction  bilateral, with lens implants    H/O varicose vein ligation and stripping  both legs    H/O umbilical hernia repair    Status post coronary angiogram  2015    INTERVAL HISTORY: in no distress, denies any chest pain or sob at present   	  MEDICATIONS:  MEDICATIONS  (STANDING):  ALBUTerol    90 MICROgram(s) HFA Inhaler 2 Puff(s) Inhalation every 6 hours  apixaban 2.5 milliGRAM(s) Oral every 12 hours  atorvastatin 20 milliGRAM(s) Oral at bedtime  budesonide 160 MICROgram(s)/formoterol 4.5 MICROgram(s) Inhaler 2 Puff(s) Inhalation two times a day  digoxin     Tablet 125 MICROGram(s) Oral daily  escitalopram 10 milliGRAM(s) Oral daily  furosemide   Injectable 20 milliGRAM(s) IV Push two times a day  metoprolol succinate ER 25 milliGRAM(s) Oral daily  pancrelipase  (CREON  6,000 Lipase Units) 2 Capsule(s) Oral three times a day with meals  pancrelipase  (CREON 36,000 Lipase Units) 1 Capsule(s) Oral three times a day with meals  senna 2 Tablet(s) Oral at bedtime  spironolactone 25 milliGRAM(s) Oral daily  timolol 0.25% Solution 1 Drop(s) Both EYES daily    MEDICATIONS  (PRN):  acetaminophen     Tablet .. 650 milliGRAM(s) Oral every 6 hours PRN Temp greater or equal to 38C (100.4F), Mild Pain (1 - 3)  ALBUTerol    90 MICROgram(s) HFA Inhaler 1 Puff(s) Inhalation every 4 hours PRN Bronchospasm  aluminum hydroxide/magnesium hydroxide/simethicone Suspension 30 milliLiter(s) Oral every 4 hours PRN Dyspepsia  melatonin 3 milliGRAM(s) Oral at bedtime PRN Insomnia  ondansetron Injectable 4 milliGRAM(s) IV Push every 8 hours PRN Nausea and/or Vomiting  zolpidem 5 milliGRAM(s) Oral at bedtime PRN Insomnia    Vitals:  T(F): 97.7 (06-06-22 @ 10:22), Max: 97.7 (06-05-22 @ 15:45)  HR: 77 (06-06-22 @ 10:22) (76 - 85)  BP: 103/66 (06-06-22 @ 10:22) (100/60 - 103/71)  RR: 17 (06-06-22 @ 10:22) (17 - 18)  SpO2: 97% (06-06-22 @ 10:22) (94% - 97%)    06-05 @ 07:01  -  06-06 @ 07:00  --------------------------------------------------------  IN:    Oral Fluid: 360 mL  Total IN: 360 mL    OUT:  Total OUT: 0 mL    Total NET: 360 mL    PHYSICAL EXAM:  Neuro: Awake, responsive  CV: S1 S2 irreg irregular   Lungs: diminished to bases  GI: Soft, BS +, ND, NT  Extremities: Trace LE edema    TELEMETRY: atrial fibrillation     RADIOLOGY: < from: CT Abdomen and Pelvis w/ IV Cont (05.31.22 @ 21:08) >  1. No pulmonary embolism.  2. Patchy groundglass airspace opacities in the left upper lobe which   could be infectious in etiology. Pulmonary edema could also be considered.  3. Small bilateral pleural effusions.  4. Debris in the upper trachea raising concern for aspiration.  5. No bowel obstruction or inflammation.    < end of copied text >    DIAGNOSTIC TESTING:    [x ] Echocardiogram: < from: Transthoracic Echocardiogram (04.06.22 @ 08:12) >  Ejection Fraction (Peter Rule): 61 %  ------------------------------------------------------------------------  OBSERVATIONS:  Mitral Valve: Mitral annular calcification, otherwise  normal mitral valve. Mild-moderate mitral regurgitation.  Aortic Root: Normal aortic root.  Aortic Valve: Calcified trileaflet aortic valve with  decreased opening. Peak transaortic valve gradient equals  32 mm Hg, mean transaortic valve gradient equals 16 mm Hg,  estimated aortic valve area equals 1.7 sqcm (by continuity  equation), consistent with mild aortic stenosis.  Mild-moderate aortic regurgitation.  Left Atrium: Moderately dilated left atrium.  LA volume  index = 46 cc/m2.  Left Ventricle: Normal left ventricular systolic function.  No segmental wall motion abnormalities. Normal left  ventricular internal dimensions and wall thicknesses. Mild  diastolic dysfunction (Stage I).  Right Heart: Normal right atrium. Normal right ventricular  size and function. Normal tricuspid valve.  Mild tricuspid  regurgitation. Normal pulmonic valve.  Pericardium/Pleura Normal pericardium with no pericardial  effusion.  ------------------------------------------------------------------------  CONCLUSIONS:  1. Mitral annular calcification, otherwise normal mitral  valve. Mild-moderate mitral regurgitation.  2. Calcified trileaflet aortic valve with decreased  opening. Peak transaortic valve gradient equals 32 mm Hg,  mean transaortic valve gradient equals 16 mm Hg, estimated  aortic valve area equals 1.7 sqcm (by continuity equation),  consistent with mild aortic stenosis. Mild-moderate aortic  regurgitation.  3. Moderately dilated left atrium.  LA volume index = 46  cc/m2.  4. Normal left ventricular internal dimensions and wall  thicknesses.  5. Normal left ventricular systolic function. No segmental  wall motion abnormalities.  6. Mild diastolic dysfunction (Stage I).  7. Normal right ventricular size and function.    < end of copied text >    [x ] Cardiac Catheterization: < from: Cardiac Cath Lab - Adult (03.13.15 @ 12:23) >  CORONARY VESSELS: The coronary circulation is right dominant.  LM:   --  LM: Normal.  LAD:   --  Proximal LAD: There was a discrete 30 % stenosis.  --  Distal LAD: Angiography showed minor luminal irregularities with no  flow limiting lesions.  CX:   --  Circumflex: Normal.  RCA:   --  RCA: Angiography showed minor luminal irregularities with no  flow limiting lesions.    < end of copied text >    [x ] Stress Test:  < from: Nuclear Stress Test-Pharmacologic (Nuclear Stress Test-Pharmacologic .) (04.08.22 @ 15:05) >  Review of raw data shows: The study is of good technical  quality.  The left ventricle was normal in size. Normal myocardial  perfusion scan, with no evidence of infarction or inducible  ischemia.  ------------------------------------------------------------------------  GATED ANALYSIS:  Post-stress gated wall motion analysis was performed (LVEF  = 67 %;LVEDV = 54 ml.), revealing normal LV function. The  RV function appeared normal.  ------------------------------------------------------------------------  IMPRESSIONS: Normal Study  * The left ventricle was normal in size.  * Tracer uptake was homogeneous throughout the left  ventricle.  * Normal study; no evidence for myocardial infarction or  ischemia.  * Post-stress gated wall motion analysis was performed  (LVEF = 67 %;LVEDV = 54 ml.), revealing normal LV  function. The RV function appeared normal.    < end of copied text >    LABS:	 	    04 Jun 2022 07:38    140    |  105    |  24     ----------------------------<  89     4.9     |  27     |  1.05                         11.4   6.50  )-----------( 179      ( 04 Jun 2022 07:38 )             35.2

## 2022-06-06 NOTE — PROGRESS NOTE ADULT - NS ATTEND AMEND GEN_ALL_CORE FT
afib with hypotension    rates better controlled on digoxin 125mcg daily  cont spironolactone for now  SCr also improving  continue IV diuresis through today and will reassess for transition to PO
Will hold clopidogrel for now (given for her CVA, which may have been embolic) as pt is currently on Eliquis and can be reevaluated by her cardiologist as outpatient .  Segue to oral diuretics in AM.

## 2022-06-06 NOTE — PROGRESS NOTE ADULT - ASSESSMENT
89F HTN, HLD, Alzheimer's, CVA, AAA, Pancreatic Insufficiency, hx of R partial lobectomy presented with SOB, hypoxia, and extremity swelling  found to have b/l pleff, LORENZO, anemia  BNP 5700, elevated transaminases that improved with diuretics    New Afib with RVR, relative hypotension  volume overload, with acute HFpEF likely due to paroxysmal afib as she has been experiencing palpitations as an outpt  LORENZO - resolved  likely congestive hepatopathy, LFTs improved    -Rate control: s/p Digoxin loading, currently on digoxin 125mcg PO daily, HRs much improved and stable  -replete K aggressively to 4.5  -cont Toprol 25 for now. BP on soft side  -Diuresis: currently on 20mg IV Lasix BID, off supplemental O2, orthopnea improved, would consider switching Lasix to oral 40 po daily   -cont spironolactone 25mg once daily unless LORENZO worsens  -monitor renal function and electrolytes closely  -[id number] (age +2, HTN, female, atherosclerosis/AAA, CHF, CVA) - AC is indicated.  After  shared decision making with the pt, the pt's daughter, and outpt cardiologist Dr. Delacruz, will trial AC for now with apixaban 2.5mg BID.  -stress test from this April with no ischemia or infarction, nonobs CAD on prior cath  -continue clopidogrel for hx of CVA.  -Activity as tolerated, ambulate and assess for dyspnea and need for supplemental o2   89F HTN, HLD, Alzheimer's, CVA, AAA, Pancreatic Insufficiency, hx of R partial lobectomy presented with SOB, hypoxia, and extremity swelling  found to have b/l pleff, LORENZO, anemia  BNP 5700, elevated transaminases that improved with diuretics    New Afib with RVR, relative hypotension  volume overload, with acute HFpEF likely due to paroxysmal afib as she has been experiencing palpitations as an outpt  LORENZO - resolved  likely congestive hepatopathy, LFTs improved    -Rate control: s/p Digoxin loading, currently on digoxin 125mcg PO daily, HRs much improved and stable  -replete K aggressively to 4.5  -cont Toprol 25 for now. BP on soft side  -Diuresis: currently on 20mg IV Lasix BID, off supplemental O2, orthopnea improved, would consider switching Lasix to oral 40 po daily   -cont spironolactone 25mg once daily unless LORENZO worsens  -monitor renal function and electrolytes closely  -[id number] 8 (age +2, HTN, female, atherosclerosis/AAA, CHF, CVA) - AC is indicated.  After  shared decision making with the pt, the pt's daughter, and outpt cardiologist Dr. Delacruz, will trial AC for now with apixaban 2.5mg BID.  -stress test from this April with no ischemia or infarction, nonobs CAD on prior cath 2015  -consider holding clopidogrel for now as pt is currently on Eliquis and can be reevaluated by her cardiologist as outpatient   -Activity as tolerated, ambulate and assess for dyspnea and need for supplemental o2   89F HTN, HLD, Alzheimer's, CVA, AAA, Pancreatic Insufficiency, hx of R partial lobectomy presented with SOB, hypoxia, and extremity swelling  found to have b/l pleff, LORENZO, anemia  BNP 5700, elevated transaminases that improved with diuretics    New Afib with RVR, relative hypotension  volume overload, with acute HFpEF likely due to paroxysmal afib as she has been experiencing palpitations as an outpt  LORENZO - resolved  likely congestive hepatopathy, LFTs improved    -Rate control: s/p Digoxin loading, currently on digoxin 125mcg PO daily, HRs much improved and stable  -replete K aggressively to 4.5  -cont Toprol 25 for now. BP on soft side  -Diuresis: currently on 20mg IV Lasix BID, orthopnea improved, would consider switching Lasix to oral now   -cont spironolactone 25mg once daily unless LORENZO worsens  -monitor renal function and electrolytes closely  -[id number] 8 (age +2, HTN, female, atherosclerosis/AAA, CHF, CVA) - AC is indicated.  After  shared decision making with the pt, the pt's daughter, and outpt cardiologist Dr. Delacruz, will trial AC for now with apixaban 2.5mg BID.  -stress test from this April with no ischemia or infarction, nonobs CAD on prior cath 2015  -consider holding clopidogrel for now as pt is currently on Eliquis and can be reevaluated by her cardiologist as outpatient   -Activity as tolerated, ambulate and assess for dyspnea and need for supplemental o2   89F HTN, HLD, Alzheimer's, CVA, AAA, Pancreatic Insufficiency, hx of R partial lobectomy presented with SOB, hypoxia, and extremity swelling  found to have b/l pleff, LORENZO, anemia  BNP 5700, elevated transaminases that improved with diuretics    New Afib with RVR, relative hypotension  volume overload, with acute HFpEF likely due to paroxysmal afib as she has been experiencing palpitations as an outpt  LORENZO - resolved  likely congestive hepatopathy, LFTs improved    -Rate control: s/p Digoxin loading, currently on digoxin 125mcg PO daily, HRs much improved and stable  -replete K aggressively to 4.5  -cont Toprol 25 for now. BP on soft side  -Diuresis: currently on 20mg IV Lasix BID, orthopnea improved, would consider switching Lasix to oral now   -cont spironolactone 25mg once daily unless LORENZO worsens  -monitor renal function and electrolytes closely  -[id number] 8 (age +2, HTN, female, atherosclerosis/AAA, CHF, CVA) - AC is indicated.  After  shared decision making with the pt, the pt's daughter, and outpt cardiologist Dr. Delacruz, will trial AC for now with apixaban 2.5mg BID.  -stress test from this April with no ischemia or infarction, nonobs CAD on prior cath 2015  -Activity as tolerated, ambulate and assess for dyspnea and need for supplemental o2

## 2022-06-06 NOTE — PROGRESS NOTE ADULT - ASSESSMENT
89 yo female w/PMH of Alzheimer Disease, HTN, CVA (difficulty with swallowing), HLD, Asthma, Afib here for SOB , CHF vs Aspiration PNA.        Acute on chronic diastolic CHF.   ·  Plan: - Pt last echo in 4/2022 with only mild diastolic dysfunction  -cardiology on board   -c/w diuresis    PO today  replete hypokalemia       hypoxia on ambulation, will need home o2     Afib with RVR w Hypotension  - cardio consult appreciated  -started on digoxin, hr improved   -eliquis  -bb    LORENZO  -likely 2/2 diuresis  -monitor cr         Pneumonia,  ruled out       History of CVA (cerebrovascular accident). C/w Plavix statin       Hypertension c/w home meds       Asthma, mild. c/w inhalers       Pancreatic Insufficiency  on Creon

## 2022-06-07 ENCOUNTER — TRANSCRIPTION ENCOUNTER (OUTPATIENT)
Age: 87
End: 2022-06-07

## 2022-06-07 VITALS
SYSTOLIC BLOOD PRESSURE: 100 MMHG | RESPIRATION RATE: 20 BRPM | DIASTOLIC BLOOD PRESSURE: 64 MMHG | TEMPERATURE: 97 F | OXYGEN SATURATION: 99 % | HEART RATE: 79 BPM

## 2022-06-07 LAB
ANION GAP SERPL CALC-SCNC: 8 MMOL/L — SIGNIFICANT CHANGE UP (ref 5–17)
BUN SERPL-MCNC: 49 MG/DL — HIGH (ref 7–23)
CALCIUM SERPL-MCNC: 8.6 MG/DL — SIGNIFICANT CHANGE UP (ref 8.5–10.1)
CHLORIDE SERPL-SCNC: 102 MMOL/L — SIGNIFICANT CHANGE UP (ref 96–108)
CO2 SERPL-SCNC: 27 MMOL/L — SIGNIFICANT CHANGE UP (ref 22–31)
CREAT SERPL-MCNC: 1.52 MG/DL — HIGH (ref 0.5–1.3)
EGFR: 32 ML/MIN/1.73M2 — LOW
GLUCOSE SERPL-MCNC: 93 MG/DL — SIGNIFICANT CHANGE UP (ref 70–99)
MAGNESIUM SERPL-MCNC: 1.9 MG/DL — SIGNIFICANT CHANGE UP (ref 1.6–2.6)
POTASSIUM SERPL-MCNC: 4.4 MMOL/L — SIGNIFICANT CHANGE UP (ref 3.5–5.3)
POTASSIUM SERPL-SCNC: 4.4 MMOL/L — SIGNIFICANT CHANGE UP (ref 3.5–5.3)
SODIUM SERPL-SCNC: 137 MMOL/L — SIGNIFICANT CHANGE UP (ref 135–145)

## 2022-06-07 PROCEDURE — 99239 HOSP IP/OBS DSCHRG MGMT >30: CPT

## 2022-06-07 RX ORDER — METOPROLOL TARTRATE 50 MG
1 TABLET ORAL
Qty: 14 | Refills: 0
Start: 2022-06-07 | End: 2022-06-20

## 2022-06-07 RX ORDER — FUROSEMIDE 40 MG
1 TABLET ORAL
Qty: 30 | Refills: 0
Start: 2022-06-07 | End: 2022-07-06

## 2022-06-07 RX ORDER — APIXABAN 2.5 MG/1
1 TABLET, FILM COATED ORAL
Qty: 60 | Refills: 0
Start: 2022-06-07 | End: 2022-07-06

## 2022-06-07 RX ORDER — CLOPIDOGREL BISULFATE 75 MG/1
1 TABLET, FILM COATED ORAL
Qty: 0 | Refills: 0 | DISCHARGE

## 2022-06-07 RX ORDER — METOPROLOL TARTRATE 50 MG
1 TABLET ORAL
Qty: 0 | Refills: 0 | DISCHARGE
Start: 2022-06-07

## 2022-06-07 RX ORDER — DIGOXIN 250 MCG
1 TABLET ORAL
Qty: 30 | Refills: 0
Start: 2022-06-07 | End: 2022-07-06

## 2022-06-07 RX ADMIN — Medication 1 DROP(S): at 11:55

## 2022-06-07 RX ADMIN — BUDESONIDE AND FORMOTEROL FUMARATE DIHYDRATE 2 PUFF(S): 160; 4.5 AEROSOL RESPIRATORY (INHALATION) at 07:21

## 2022-06-07 RX ADMIN — APIXABAN 2.5 MILLIGRAM(S): 2.5 TABLET, FILM COATED ORAL at 07:21

## 2022-06-07 RX ADMIN — Medication 40 MILLIGRAM(S): at 07:19

## 2022-06-07 RX ADMIN — Medication 25 MILLIGRAM(S): at 07:21

## 2022-06-07 RX ADMIN — Medication 1 CAPSULE(S): at 08:37

## 2022-06-07 RX ADMIN — Medication 1 CAPSULE(S): at 11:54

## 2022-06-07 RX ADMIN — Medication 2 CAPSULE(S): at 08:37

## 2022-06-07 RX ADMIN — Medication 2 CAPSULE(S): at 11:55

## 2022-06-07 RX ADMIN — ESCITALOPRAM OXALATE 10 MILLIGRAM(S): 10 TABLET, FILM COATED ORAL at 11:55

## 2022-06-07 RX ADMIN — Medication 125 MICROGRAM(S): at 07:20

## 2022-06-07 NOTE — DISCHARGE NOTE NURSING/CASE MANAGEMENT/SOCIAL WORK - NSDCVIVACCINE_GEN_ALL_CORE_FT
influenza, injectable, quadrivalent, preservative free; 13-Mar-2015 18:44; Umm Mcdaniel (RN); [employer]; [id number]; IntraMuscular; Deltoid Left.; 0.5 milliLiter(s); VIS (VIS Published: 19-Aug-2014, VIS Presented: 13-Mar-2015);   influenza, injectable, quadrivalent, preservative free; 08-Sep-2017 13:53; Hans Grey); [employer]; [id number]; IntraMuscular; Deltoid Right.; 0.5 milliLiter(s); VIS (VIS Published: 07-Aug-2015, VIS Presented: 08-Sep-2017);

## 2022-06-07 NOTE — DISCHARGE NOTE PROVIDER - NSDCCPCAREPLAN_GEN_ALL_CORE_FT
PRINCIPAL DISCHARGE DIAGNOSIS  Diagnosis: Fluid overload  Assessment and Plan of Treatment: continue with all prescribed medications  Follow up with your cardiologist and primary care doctor  Repeat blood work in 5-7days to monitor your kidney function      SECONDARY DISCHARGE DIAGNOSES  Diagnosis: Hypoxia  Assessment and Plan of Treatment:     Diagnosis: Fluid overload  Assessment and Plan of Treatment:

## 2022-06-07 NOTE — DISCHARGE NOTE PROVIDER - HOSPITAL COURSE
89 yo female w/PMH of Alzheimer Disease, HTN, CVA (difficulty with swallowing), HLD, Asthma, Afib here for SOB , CHF vs Aspiration PNA.        Acute on chronic diastolic CHF.   ·  Plan: - Pt last echo in 4/2022 with only mild diastolic dysfunction  -cardiology on board   -c/w diuresis    PO today  replete hypokalemia     acute hypoxic respiratory failure 2/2 chf / rapid afib   hypoxia on ambulation, will need home o2     Afib with RVR w Hypotension  - cardio consult appreciated  -started on digoxin, hr improved   -eliquis  -bb    LORENZO  -likely 2/2 diuresis  -monitor cr , stable will continue with lasix, pt to f/u with pcp to monitor cr        Pneumonia,  ruled out       History of CVA (cerebrovascular accident). C/w  statin, plavix stopped by cardiology, as eliquis was started.        Hypertension c/w home meds       Asthma, mild. c/w inhalers       Pancreatic Insufficiency  on Creon         pt seen and examined 45 min spent on dc planning     Lab test review, Radiology Review, Vitals review, Consultant review and discussion, Physical examination, IDR, Assessment and plan; Plan discussion with patient and family

## 2022-06-07 NOTE — DISCHARGE NOTE PROVIDER - NSDCFUSCHEDAPPT_GEN_ALL_CORE_FT
Jerrod Dudley  [facility]  Gastro [address] [facility]  Scheduled Appointment: 06/20/2022

## 2022-06-07 NOTE — DISCHARGE NOTE NURSING/CASE MANAGEMENT/SOCIAL WORK - PATIENT PORTAL LINK FT
You can access the FollowMyHealth Patient Portal offered by [facility] by registering at the following website: [email or web address]. By joining [employer]’s FollowMyHealth portal, you will also be able to view your health information using other applications (apps) compatible with our system.

## 2022-06-07 NOTE — DISCHARGE NOTE PROVIDER - CARE PROVIDER_API CALL
Redd Delacruz)  Internal Medicine  [id number] [address]  Phone: [phone]  Fax: [phone]  Follow Up Time:

## 2022-06-07 NOTE — DISCHARGE NOTE PROVIDER - NSDCMRMEDTOKEN_GEN_ALL_CORE_FT
acetaminophen 325 mg oral tablet: 2 tab(s) orally every 6 hours, As needed, Temp greater or equal to 38C (100.4F), Mild Pain (1 - 3)  apixaban 2.5 mg oral tablet: 1 tab(s) orally every 12 hours  atorvastatin 20 mg oral tablet: 1 tab(s) orally once a day (at bedtime)  budesonide-formoterol 160 mcg-4.5 mcg/inh inhalation aerosol: 2 puff(s) inhaled 2 times a day  Calcium 600+D oral tablet: 1 tab(s) orally 2 times a day  cyanocobalamin 1000 mcg oral tablet: 1 tab(s) orally once a day  digoxin 125 mcg (0.125 mg) oral tablet: 1 tab(s) orally once a day  escitalopram 10 mg oral tablet: 1 tab(s) orally once a day  furosemide 40 mg oral tablet: 1 tab(s) orally once a day  Lumigan 0.01% ophthalmic solution: 1 drop(s) to each affected eye 2 times a week  metoprolol succinate 25 mg oral tablet, extended release: 1 tab(s) orally once a day  pancrelipase 24,000 units-76,000 units-120,000 units oral delayed release capsule: 2 cap(s) orally 3 times a day (with meals)  pyridoxine 100 mg oral tablet: 1 tab(s) orally once a day  Restasis 0.05% ophthalmic emulsion: 1 drop(s) to each affected eye 2 times a day  rivastigmine 4.6 mg/24 hr transdermal film, extended release: 1 patch transdermal every 24 hours  Tab-A-Sudhir oral tablet: 1 tab(s) orally once a day  timolol hemihydrate 0.25% ophthalmic solution: 1 drop(s) to each affected eye once a day  zolpidem 5 mg oral tablet: 1 tab(s) orally once a day (at bedtime), As needed, Insomnia

## 2022-06-07 NOTE — DISCHARGE NOTE NURSING/CASE MANAGEMENT/SOCIAL WORK - NSDCPETBCESMAN_GEN_ALL_CORE
How Severe Is Your Skin Lesion?: moderate Has Your Skin Lesion Been Treated?: not been treated Is This A New Presentation, Or A Follow-Up?: Growth Additional History: Referred by her pediatrician Dr Rios If you are a smoker, it is important for your health to stop smoking. Please be aware that second hand smoke is also harmful.

## 2022-06-08 ENCOUNTER — INPATIENT (INPATIENT)
Facility: HOSPITAL | Age: 87
LOS: 5 days | Discharge: HOME HEALTH | DRG: 641 | End: 2022-06-14
Attending: GENERAL PRACTICE | Admitting: GENERAL PRACTICE
Payer: COMMERCIAL

## 2022-06-08 VITALS
SYSTOLIC BLOOD PRESSURE: 89 MMHG | WEIGHT: 130.07 LBS | RESPIRATION RATE: 20 BRPM | OXYGEN SATURATION: 97 % | DIASTOLIC BLOOD PRESSURE: 60 MMHG | HEIGHT: 64 IN | HEART RATE: 71 BPM | TEMPERATURE: 98 F

## 2022-06-08 DIAGNOSIS — Z98.890 OTHER SPECIFIED POSTPROCEDURAL STATES: Chronic | ICD-10-CM

## 2022-06-08 DIAGNOSIS — K86.89 OTHER SPECIFIED DISEASES OF PANCREAS: ICD-10-CM

## 2022-06-08 DIAGNOSIS — F03.90 UNSPECIFIED DEMENTIA WITHOUT BEHAVIORAL DISTURBANCE: ICD-10-CM

## 2022-06-08 DIAGNOSIS — J45.909 UNSPECIFIED ASTHMA, UNCOMPLICATED: ICD-10-CM

## 2022-06-08 DIAGNOSIS — I95.9 HYPOTENSION, UNSPECIFIED: ICD-10-CM

## 2022-06-08 DIAGNOSIS — I50.9 HEART FAILURE, UNSPECIFIED: ICD-10-CM

## 2022-06-08 DIAGNOSIS — Z90.49 ACQUIRED ABSENCE OF OTHER SPECIFIED PARTS OF DIGESTIVE TRACT: Chronic | ICD-10-CM

## 2022-06-08 DIAGNOSIS — I48.20 CHRONIC ATRIAL FIBRILLATION, UNSPECIFIED: ICD-10-CM

## 2022-06-08 DIAGNOSIS — Z98.49 CATARACT EXTRACTION STATUS, UNSPECIFIED EYE: Chronic | ICD-10-CM

## 2022-06-08 DIAGNOSIS — N39.0 URINARY TRACT INFECTION, SITE NOT SPECIFIED: ICD-10-CM

## 2022-06-08 LAB
ALBUMIN SERPL ELPH-MCNC: 3.3 G/DL — SIGNIFICANT CHANGE UP (ref 3.3–5)
ALBUMIN SERPL ELPH-MCNC: 4 G/DL — SIGNIFICANT CHANGE UP (ref 3.3–5)
ALP SERPL-CCNC: 116 U/L — SIGNIFICANT CHANGE UP (ref 40–120)
ALP SERPL-CCNC: 136 U/L — HIGH (ref 40–120)
ALT FLD-CCNC: 31 U/L — SIGNIFICANT CHANGE UP (ref 10–45)
ALT FLD-CCNC: 36 U/L — SIGNIFICANT CHANGE UP (ref 10–45)
ANION GAP SERPL CALC-SCNC: 13 MMOL/L — SIGNIFICANT CHANGE UP (ref 5–17)
ANION GAP SERPL CALC-SCNC: 21 MMOL/L — HIGH (ref 5–17)
APPEARANCE UR: CLEAR — SIGNIFICANT CHANGE UP
APTT BLD: 19.4 SEC — LOW (ref 27.5–35.5)
AST SERPL-CCNC: 34 U/L — SIGNIFICANT CHANGE UP (ref 10–40)
AST SERPL-CCNC: 38 U/L — SIGNIFICANT CHANGE UP (ref 10–40)
BACTERIA # UR AUTO: NEGATIVE — SIGNIFICANT CHANGE UP
BASE EXCESS BLDV CALC-SCNC: -0.2 MMOL/L — SIGNIFICANT CHANGE UP (ref -2–2)
BASE EXCESS BLDV CALC-SCNC: -2.9 MMOL/L — LOW (ref -2–2)
BASOPHILS # BLD AUTO: 0.02 K/UL — SIGNIFICANT CHANGE UP (ref 0–0.2)
BASOPHILS NFR BLD AUTO: 0.3 % — SIGNIFICANT CHANGE UP (ref 0–2)
BILIRUB SERPL-MCNC: 0.4 MG/DL — SIGNIFICANT CHANGE UP (ref 0.2–1.2)
BILIRUB SERPL-MCNC: 0.5 MG/DL — SIGNIFICANT CHANGE UP (ref 0.2–1.2)
BILIRUB UR-MCNC: NEGATIVE — SIGNIFICANT CHANGE UP
BLOOD GAS VENOUS - CREATININE: SIGNIFICANT CHANGE UP MG/DL (ref 0.5–1.3)
BUN SERPL-MCNC: 58 MG/DL — HIGH (ref 7–23)
BUN SERPL-MCNC: 59 MG/DL — HIGH (ref 7–23)
CA-I SERPL-SCNC: 1.15 MMOL/L — SIGNIFICANT CHANGE UP (ref 1.15–1.33)
CA-I SERPL-SCNC: 1.16 MMOL/L — SIGNIFICANT CHANGE UP (ref 1.15–1.33)
CALCIUM SERPL-MCNC: 8.7 MG/DL — SIGNIFICANT CHANGE UP (ref 8.4–10.5)
CALCIUM SERPL-MCNC: 9.3 MG/DL — SIGNIFICANT CHANGE UP (ref 8.4–10.5)
CHLORIDE BLDV-SCNC: 100 MMOL/L — SIGNIFICANT CHANGE UP (ref 96–108)
CHLORIDE BLDV-SCNC: 98 MMOL/L — SIGNIFICANT CHANGE UP (ref 96–108)
CHLORIDE SERPL-SCNC: 101 MMOL/L — SIGNIFICANT CHANGE UP (ref 96–108)
CHLORIDE SERPL-SCNC: 98 MMOL/L — SIGNIFICANT CHANGE UP (ref 96–108)
CO2 BLDV-SCNC: 26 MMOL/L — SIGNIFICANT CHANGE UP (ref 22–26)
CO2 BLDV-SCNC: 28 MMOL/L — HIGH (ref 22–26)
CO2 SERPL-SCNC: 20 MMOL/L — LOW (ref 22–31)
CO2 SERPL-SCNC: 21 MMOL/L — LOW (ref 22–31)
COLOR SPEC: YELLOW — SIGNIFICANT CHANGE UP
CREAT SERPL-MCNC: 1.53 MG/DL — HIGH (ref 0.5–1.3)
CREAT SERPL-MCNC: 1.7 MG/DL — HIGH (ref 0.5–1.3)
DIFF PNL FLD: NEGATIVE — SIGNIFICANT CHANGE UP
DIGOXIN SERPL-MCNC: 1.7 NG/ML — SIGNIFICANT CHANGE UP (ref 0.8–2)
EGFR: 28 ML/MIN/1.73M2 — LOW
EGFR: 32 ML/MIN/1.73M2 — LOW
EOSINOPHIL # BLD AUTO: 0.04 K/UL — SIGNIFICANT CHANGE UP (ref 0–0.5)
EOSINOPHIL NFR BLD AUTO: 0.5 % — SIGNIFICANT CHANGE UP (ref 0–6)
EPI CELLS # UR: 1 /HPF — SIGNIFICANT CHANGE UP
GAS PNL BLDV: 129 MMOL/L — LOW (ref 136–145)
GAS PNL BLDV: 131 MMOL/L — LOW (ref 136–145)
GAS PNL BLDV: SIGNIFICANT CHANGE UP
GLUCOSE BLDV-MCNC: 113 MG/DL — HIGH (ref 70–99)
GLUCOSE BLDV-MCNC: 88 MG/DL — SIGNIFICANT CHANGE UP (ref 70–99)
GLUCOSE SERPL-MCNC: 113 MG/DL — HIGH (ref 70–99)
GLUCOSE SERPL-MCNC: 87 MG/DL — SIGNIFICANT CHANGE UP (ref 70–99)
GLUCOSE UR QL: NEGATIVE — SIGNIFICANT CHANGE UP
HCO3 BLDV-SCNC: 24 MMOL/L — SIGNIFICANT CHANGE UP (ref 22–29)
HCO3 BLDV-SCNC: 27 MMOL/L — SIGNIFICANT CHANGE UP (ref 22–29)
HCT VFR BLD CALC: 38.1 % — SIGNIFICANT CHANGE UP (ref 34.5–45)
HCT VFR BLDA CALC: 34 % — LOW (ref 34.5–46.5)
HCT VFR BLDA CALC: 34 % — LOW (ref 34.5–46.5)
HGB BLD CALC-MCNC: 11.3 G/DL — LOW (ref 11.7–16.1)
HGB BLD CALC-MCNC: 11.4 G/DL — LOW (ref 11.7–16.1)
HGB BLD-MCNC: 12 G/DL — SIGNIFICANT CHANGE UP (ref 11.5–15.5)
HYALINE CASTS # UR AUTO: 4 /LPF — HIGH (ref 0–2)
IMM GRANULOCYTES NFR BLD AUTO: 0.3 % — SIGNIFICANT CHANGE UP (ref 0–1.5)
INR BLD: 1.58 RATIO — HIGH (ref 0.88–1.16)
KETONES UR-MCNC: NEGATIVE — SIGNIFICANT CHANGE UP
LACTATE BLDV-MCNC: 0.9 MMOL/L — SIGNIFICANT CHANGE UP (ref 0.7–2)
LACTATE BLDV-MCNC: 1.7 MMOL/L — SIGNIFICANT CHANGE UP (ref 0.7–2)
LEUKOCYTE ESTERASE UR-ACNC: ABNORMAL
LYMPHOCYTES # BLD AUTO: 1.08 K/UL — SIGNIFICANT CHANGE UP (ref 1–3.3)
LYMPHOCYTES # BLD AUTO: 13.6 % — SIGNIFICANT CHANGE UP (ref 13–44)
MCHC RBC-ENTMCNC: 30.2 PG — SIGNIFICANT CHANGE UP (ref 27–34)
MCHC RBC-ENTMCNC: 31.5 GM/DL — LOW (ref 32–36)
MCV RBC AUTO: 96 FL — SIGNIFICANT CHANGE UP (ref 80–100)
MONOCYTES # BLD AUTO: 0.67 K/UL — SIGNIFICANT CHANGE UP (ref 0–0.9)
MONOCYTES NFR BLD AUTO: 8.4 % — SIGNIFICANT CHANGE UP (ref 2–14)
NEUTROPHILS # BLD AUTO: 6.14 K/UL — SIGNIFICANT CHANGE UP (ref 1.8–7.4)
NEUTROPHILS NFR BLD AUTO: 76.9 % — SIGNIFICANT CHANGE UP (ref 43–77)
NITRITE UR-MCNC: NEGATIVE — SIGNIFICANT CHANGE UP
NRBC # BLD: 0 /100 WBCS — SIGNIFICANT CHANGE UP (ref 0–0)
NT-PROBNP SERPL-SCNC: 2764 PG/ML — HIGH (ref 0–300)
PCO2 BLDV: 50 MMHG — HIGH (ref 39–42)
PCO2 BLDV: 53 MMHG — HIGH (ref 39–42)
PH BLDV: 7.29 — LOW (ref 7.32–7.43)
PH BLDV: 7.31 — LOW (ref 7.32–7.43)
PH UR: 5.5 — SIGNIFICANT CHANGE UP (ref 5–8)
PLATELET # BLD AUTO: 209 K/UL — SIGNIFICANT CHANGE UP (ref 150–400)
PO2 BLDV: 22 MMHG — LOW (ref 25–45)
PO2 BLDV: 42 MMHG — SIGNIFICANT CHANGE UP (ref 25–45)
POTASSIUM BLDV-SCNC: 5.3 MMOL/L — HIGH (ref 3.5–5.1)
POTASSIUM BLDV-SCNC: 5.4 MMOL/L — HIGH (ref 3.5–5.1)
POTASSIUM SERPL-MCNC: 4.8 MMOL/L — SIGNIFICANT CHANGE UP (ref 3.5–5.3)
POTASSIUM SERPL-MCNC: 5.4 MMOL/L — HIGH (ref 3.5–5.3)
POTASSIUM SERPL-SCNC: 4.8 MMOL/L — SIGNIFICANT CHANGE UP (ref 3.5–5.3)
POTASSIUM SERPL-SCNC: 5.4 MMOL/L — HIGH (ref 3.5–5.3)
PROT SERPL-MCNC: 6.8 G/DL — SIGNIFICANT CHANGE UP (ref 6–8.3)
PROT SERPL-MCNC: 7.9 G/DL — SIGNIFICANT CHANGE UP (ref 6–8.3)
PROT UR-MCNC: NEGATIVE — SIGNIFICANT CHANGE UP
PROTHROM AB SERPL-ACNC: 18.3 SEC — HIGH (ref 10.5–13.4)
RBC # BLD: 3.97 M/UL — SIGNIFICANT CHANGE UP (ref 3.8–5.2)
RBC # FLD: 12.6 % — SIGNIFICANT CHANGE UP (ref 10.3–14.5)
RBC CASTS # UR COMP ASSIST: 1 /HPF — SIGNIFICANT CHANGE UP (ref 0–4)
SAO2 % BLDV: 25.5 % — LOW (ref 67–88)
SAO2 % BLDV: 70.1 % — SIGNIFICANT CHANGE UP (ref 67–88)
SARS-COV-2 RNA SPEC QL NAA+PROBE: SIGNIFICANT CHANGE UP
SODIUM SERPL-SCNC: 135 MMOL/L — SIGNIFICANT CHANGE UP (ref 135–145)
SODIUM SERPL-SCNC: 139 MMOL/L — SIGNIFICANT CHANGE UP (ref 135–145)
SP GR SPEC: 1.01 — SIGNIFICANT CHANGE UP (ref 1.01–1.02)
TROPONIN T, HIGH SENSITIVITY RESULT: 30 NG/L — SIGNIFICANT CHANGE UP (ref 0–51)
UROBILINOGEN FLD QL: NEGATIVE — SIGNIFICANT CHANGE UP
WBC # BLD: 7.97 K/UL — SIGNIFICANT CHANGE UP (ref 3.8–10.5)
WBC # FLD AUTO: 7.97 K/UL — SIGNIFICANT CHANGE UP (ref 3.8–10.5)
WBC UR QL: 6 /HPF — HIGH (ref 0–5)

## 2022-06-08 PROCEDURE — 74176 CT ABD & PELVIS W/O CONTRAST: CPT | Mod: 26,MA

## 2022-06-08 PROCEDURE — 99223 1ST HOSP IP/OBS HIGH 75: CPT

## 2022-06-08 PROCEDURE — 71045 X-RAY EXAM CHEST 1 VIEW: CPT | Mod: 26

## 2022-06-08 PROCEDURE — 99285 EMERGENCY DEPT VISIT HI MDM: CPT

## 2022-06-08 PROCEDURE — 93308 TTE F-UP OR LMTD: CPT | Mod: 26

## 2022-06-08 RX ORDER — ESCITALOPRAM OXALATE 10 MG/1
10 TABLET, FILM COATED ORAL DAILY
Refills: 0 | Status: DISCONTINUED | OUTPATIENT
Start: 2022-06-08 | End: 2022-06-14

## 2022-06-08 RX ORDER — LATANOPROST 0.05 MG/ML
1 SOLUTION/ DROPS OPHTHALMIC; TOPICAL AT BEDTIME
Refills: 0 | Status: DISCONTINUED | OUTPATIENT
Start: 2022-06-08 | End: 2022-06-14

## 2022-06-08 RX ORDER — PYRIDOXINE HCL (VITAMIN B6) 100 MG
100 TABLET ORAL DAILY
Refills: 0 | Status: DISCONTINUED | OUTPATIENT
Start: 2022-06-08 | End: 2022-06-14

## 2022-06-08 RX ORDER — CEFTRIAXONE 500 MG/1
1000 INJECTION, POWDER, FOR SOLUTION INTRAMUSCULAR; INTRAVENOUS EVERY 24 HOURS
Refills: 0 | Status: COMPLETED | OUTPATIENT
Start: 2022-06-09 | End: 2022-06-10

## 2022-06-08 RX ORDER — ATORVASTATIN CALCIUM 80 MG/1
20 TABLET, FILM COATED ORAL AT BEDTIME
Refills: 0 | Status: DISCONTINUED | OUTPATIENT
Start: 2022-06-08 | End: 2022-06-14

## 2022-06-08 RX ORDER — ZOLPIDEM TARTRATE 10 MG/1
5 TABLET ORAL AT BEDTIME
Refills: 0 | Status: DISCONTINUED | OUTPATIENT
Start: 2022-06-08 | End: 2022-06-14

## 2022-06-08 RX ORDER — DIGOXIN 250 MCG
125 TABLET ORAL DAILY
Refills: 0 | Status: DISCONTINUED | OUTPATIENT
Start: 2022-06-08 | End: 2022-06-09

## 2022-06-08 RX ORDER — SODIUM CHLORIDE 9 MG/ML
500 INJECTION INTRAMUSCULAR; INTRAVENOUS; SUBCUTANEOUS ONCE
Refills: 0 | Status: COMPLETED | OUTPATIENT
Start: 2022-06-08 | End: 2022-06-08

## 2022-06-08 RX ORDER — PREGABALIN 225 MG/1
1000 CAPSULE ORAL DAILY
Refills: 0 | Status: DISCONTINUED | OUTPATIENT
Start: 2022-06-08 | End: 2022-06-14

## 2022-06-08 RX ORDER — RIVASTIGMINE 4.6 MG/24H
1 PATCH, EXTENDED RELEASE TRANSDERMAL EVERY 24 HOURS
Refills: 0 | Status: DISCONTINUED | OUTPATIENT
Start: 2022-06-08 | End: 2022-06-14

## 2022-06-08 RX ORDER — BUDESONIDE AND FORMOTEROL FUMARATE DIHYDRATE 160; 4.5 UG/1; UG/1
2 AEROSOL RESPIRATORY (INHALATION)
Refills: 0 | Status: DISCONTINUED | OUTPATIENT
Start: 2022-06-08 | End: 2022-06-14

## 2022-06-08 RX ORDER — APIXABAN 2.5 MG/1
2.5 TABLET, FILM COATED ORAL EVERY 12 HOURS
Refills: 0 | Status: DISCONTINUED | OUTPATIENT
Start: 2022-06-08 | End: 2022-06-14

## 2022-06-08 RX ORDER — TIMOLOL 0.5 %
1 DROPS OPHTHALMIC (EYE) DAILY
Refills: 0 | Status: DISCONTINUED | OUTPATIENT
Start: 2022-06-08 | End: 2022-06-14

## 2022-06-08 RX ORDER — LIPASE/PROTEASE/AMYLASE 16-48-48K
1 CAPSULE,DELAYED RELEASE (ENTERIC COATED) ORAL
Refills: 0 | Status: DISCONTINUED | OUTPATIENT
Start: 2022-06-08 | End: 2022-06-14

## 2022-06-08 RX ORDER — LANOLIN ALCOHOL/MO/W.PET/CERES
3 CREAM (GRAM) TOPICAL AT BEDTIME
Refills: 0 | Status: DISCONTINUED | OUTPATIENT
Start: 2022-06-08 | End: 2022-06-14

## 2022-06-08 RX ORDER — ACETAMINOPHEN 500 MG
650 TABLET ORAL EVERY 6 HOURS
Refills: 0 | Status: DISCONTINUED | OUTPATIENT
Start: 2022-06-08 | End: 2022-06-14

## 2022-06-08 RX ORDER — CEFTRIAXONE 500 MG/1
1000 INJECTION, POWDER, FOR SOLUTION INTRAMUSCULAR; INTRAVENOUS ONCE
Refills: 0 | Status: COMPLETED | OUTPATIENT
Start: 2022-06-08 | End: 2022-06-08

## 2022-06-08 RX ORDER — SODIUM CHLORIDE 9 MG/ML
250 INJECTION INTRAMUSCULAR; INTRAVENOUS; SUBCUTANEOUS ONCE
Refills: 0 | Status: COMPLETED | OUTPATIENT
Start: 2022-06-08 | End: 2022-06-08

## 2022-06-08 RX ADMIN — CEFTRIAXONE 100 MILLIGRAM(S): 500 INJECTION, POWDER, FOR SOLUTION INTRAMUSCULAR; INTRAVENOUS at 18:31

## 2022-06-08 RX ADMIN — SODIUM CHLORIDE 500 MILLILITER(S): 9 INJECTION INTRAMUSCULAR; INTRAVENOUS; SUBCUTANEOUS at 17:15

## 2022-06-08 RX ADMIN — SODIUM CHLORIDE 500 MILLILITER(S): 9 INJECTION INTRAMUSCULAR; INTRAVENOUS; SUBCUTANEOUS at 19:24

## 2022-06-08 RX ADMIN — SODIUM CHLORIDE 500 MILLILITER(S): 9 INJECTION INTRAMUSCULAR; INTRAVENOUS; SUBCUTANEOUS at 16:16

## 2022-06-08 RX ADMIN — SODIUM CHLORIDE 500 MILLILITER(S): 9 INJECTION INTRAMUSCULAR; INTRAVENOUS; SUBCUTANEOUS at 23:25

## 2022-06-08 NOTE — ED ADULT NURSE NOTE - OBJECTIVE STATEMENT
89y female presents to the Ed c/o hypotension. Pt is Burmese speaking, translation done by daughter at bedside. Pt is A&O x2 (time and place). Pt reports recent visit to [facility] ED due to Afib and CHF. Pt reports going to follow up with PCP and was found hypotensive 80 systolic. Pt also reports RLQ abdominal, dizziness and chest pressure. Denies N/v, SOB. Upon assessment, respirations even and unlabored, abdomen diffusely tender, stage one pressure ulcer noted on sacrum. Pt on cardiac monitor. safety and comfort maintained.

## 2022-06-08 NOTE — ED CLERICAL - NS ED CLERK NOTE PRE-ARRIVAL INFORMATION; ADDITIONAL PRE-ARRIVAL INFORMATION
This patient is enrolled in the [facility] readmission reduction program and has active care navigation. This patient can be followed up by the care navigation team within 24 hours. To arrange close follow-up or to obtain additional clinical information about this patient, please call the contact number above.

## 2022-06-08 NOTE — H&P ADULT - NSHPREVIEWOFSYSTEMS_GEN_ALL_CORE
CONSTITUTIONAL: +weakness, no fevers or chills  EYES/ENT: No visual changes;  No dysphagia  NECK: No pain or stiffness  RESPIRATORY: No cough, wheezing, hemoptysis; No new  shortness of breath  CARDIOVASCULAR: No chest pain or palpitations; No lower extremity edema  EXTREMITIES: no le edema, cyanosis, clubbing  GASTROINTESTINAL: + abdominal  pain. No nausea,  vomiting, or hematemesis; + diarrhea  no constipation. No melena or hematochezia.  BACK: No back pain  GENITOURINARY: No dysuria, frequency or hematuria  NEUROLOGICAL: No numbness or weakness  MSK: no joint swelling or pain  SKIN: No itching, burning, rashes, or lesions   PSYCH: no agitation  All other review of systems is negative unless indicated above.

## 2022-06-08 NOTE — ED ADULT NURSE REASSESSMENT NOTE - NS ED NURSE REASSESS COMMENT FT1
RN contacted NP Chambers [id number] to inform of hypotension. NP Chambers present at bedside for pt eval. Pt noted to be sitting upright, alert and mentating well.  ID [id number] used. Pt denying dizziness and lightheadedness while sitting at rest. Per NP, BP of 90 systolic and 50 diastolic are acceptable parameters for pt at this time.

## 2022-06-08 NOTE — H&P ADULT - NSHPPHYSICALEXAM_GEN_ALL_CORE
Vital Signs Last 24 Hrs  T(C): 36.3 (08 Jun 2022 19:15), Max: 37.4 (08 Jun 2022 15:36)  T(F): 97.4 (08 Jun 2022 19:15), Max: 99.3 (08 Jun 2022 15:36)  HR: 69 (08 Jun 2022 20:36) (65 - 77)  BP: 99/62 (08 Jun 2022 20:36) (81/44 - 102/58)  BP(mean): 67 (08 Jun 2022 20:36) (54 - 68)  RR: 20 (08 Jun 2022 20:36) (19 - 21)  SpO2: 100% (08 Jun 2022 20:36) (94% - 100%) Vital Signs Last 24 Hrs  T(C): 36.3 (08 Jun 2022 19:15), Max: 37.4 (08 Jun 2022 15:36)  T(F): 97.4 (08 Jun 2022 19:15), Max: 99.3 (08 Jun 2022 15:36)  HR: 69 (08 Jun 2022 20:36) (65 - 77)  BP: 99/62 (08 Jun 2022 20:36) (81/44 - 102/58)  BP(mean): 67 (08 Jun 2022 20:36) (54 - 68)  RR: 20 (08 Jun 2022 20:36) (19 - 21)  SpO2: 100% (08 Jun 2022 20:36) (94% - 100%)    GENERAL: No acute distress, well-developed  HEAD:  Atraumatic, Normocephalic  ENT: EOMI, PERRLA, conjunctiva and sclera clear,  moist mucosa no pharyngeal erythema or exudates   NECK: supple , no JVD   CHEST/LUNG: Clear to auscultation bilaterally; No wheeze, equal breath sounds bilaterally   BACK: No spinal tenderness,  No CVA tenderness   HEART: Regular rate and rhythm; No murmurs, rubs, or gallops  ABDOMEN: Soft, Nontender, Nondistended; Bowel sounds present  EXTREMITIES:  No clubbing, cyanosis, or edema  MSK: No joint swelling or effusions, ROM intact   PSYCH: Normal behavior/affect  NEUROLOGY: AAOx2, non-focal, cranial nerves intact  SKIN: Normal color, No rashes or lesions

## 2022-06-08 NOTE — H&P ADULT - PROBLEM SELECTOR PLAN 7
- continue  rivastigmine patch   - zolpidem hs prn   - aspiration precautions   - easy to chew diet, thin liquids

## 2022-06-08 NOTE — ED ADULT TRIAGE NOTE - CHIEF COMPLAINT QUOTE
hypotension at MD's office today. recently admitted at [facility] for hypoxia and fluid overload, d/c'd yesterday

## 2022-06-08 NOTE — ED PROVIDER NOTE - RAPID ASSESSMENT
89 F w/ PMHx of A-fib and CHF on Metoprolol sent by physician's office c/o lightheadedness and weakness. Pt was admitted to [place] for fluid overload and hypoxia and discharged yesterday prescribed 2L oxygen. Denies SOB and CP.     **Pt seen in the waiting room via teletriage by Sirena Tovar, documentation completed by Darius Ramos. Pt to be sent to main ED for further evaluation - all orders placed to be followed by MD in the main ED**  Scribe Statement: Blanche BUNN Cole (scribe), attest that this documentation has been prepared under the direction and in the presence of Sirena Tovar 89 F w/ PMHx of A-fib and CHF on Metoprolol and eliquis sent by physician's office c/o lightheadedness and weakness with low blood pressure. Pt was admitted to [facility] for fluid overload and hypoxia and discharged yesterday prescribed 2L oxygen. was using at home but did not bring with her. Denies SOB and CP. MDs Reilly Patel and Mario Hogan    **Pt seen in the waiting room via teletriage by Sirena Tovar, documentation completed by Everardo -Darius Mancia. Pt to be sent to main ED for further evaluation - all orders placed to be followed by MD in the main ED**    Sirena Tovar PA-C: The scribe's documentation has been prepared under my direction and personally reviewed by me in its entirety. I confirm that the note above accurately reflects history obtained.   Patient was rapidly assessed via telemedicine encounter; a limited history was obtained. The patient will be seen and further examined and worked up in the main ED and their care will be completed by the main ED team. Receiving team will follow up on labs, analgesia, any clinical imaging, and perform reassessment and disposition of the patient as clinically indicated. All decisions regarding the progression of care will be made at their discretion.   Scribe Statement: I, Darius Mancia ([email or web address]), attest that this documentation has been prepared under the direction and in the presence of Sirena Tovar

## 2022-06-08 NOTE — H&P ADULT - HISTORY OF PRESENT ILLNESS
Patient is a 89 year old  female w/PMH of Alzheimer Disease, HTN, CVA (difficulty with swallowing), HLD, Asthma, Afib , recent admission to [facility] ( 5/31/22-6/7/22)  for SOB thought to be aspiration pna vs HF ,course c/b afib wrvr started on digoxin and treated with lasix, discharged home on oxygen 2L NC  ; patient now presents for hypotension and lightheadedness at post discharge follow up visit with PCP on day of admission.    Patient is a 89 year old  female w/PMH of Alzheimer Disease, HTN, CVA (difficulty with swallowing), HLD, Asthma, Afib , recent admission to [facility] ( 5/31/22-6/7/22)  for SOB thought to be aspiration pna vs HF ,course c/b afib wrvr started on digoxin and treated with lasix, discharged home on oxygen 2L NC  ; patient now presents for hypotension and lightheadedness at post discharge follow up visit with PCP on day of admission. per daughter, patient was more fatigued and lightheaded on day of admission. Patient  complained of lower abdominal pain as well as diarrhea consistent with known pancreatic insufficiency. no black stools or blood in stool.   No fever or chills.

## 2022-06-08 NOTE — H&P ADULT - NSHPLABSRESULTS_GEN_ALL_CORE
Labs personally reviewed:                          12.0   7.97  )-----------( 209      ( 2022 16:53 )             38.1         135  |  101  |  58<H>  ----------------------------<  87  5.4<H>   |  21<L>  |  1.53<H>    Ca    8.7      2022 18:53  Mg     1.7         TPro  6.8  /  Alb  3.3  /  TBili  0.4  /  DBili  x   /  AST  34  /  ALT  31  /  AlkPhos  116          LIVER FUNCTIONS - ( 2022 18:53 )  Alb: 3.3 g/dL / Pro: 6.8 g/dL / ALK PHOS: 116 U/L / ALT: 31 U/L / AST: 34 U/L / GGT: x           PT/INR - ( 2022 18:10 )   PT: 18.3 sec;   INR: 1.58 ratio         PTT - ( 2022 18:10 )  PTT:19.4 sec  Urinalysis Basic - ( 2022 17:30 )    Color: Yellow / Appearance: Clear / S.011 / pH: x  Gluc: x / Ketone: Negative  / Bili: Negative / Urobili: Negative   Blood: x / Protein: Negative / Nitrite: Negative   Leuk Esterase: Moderate / RBC: 1 /hpf / WBC 6 /HPF   Sq Epi: x / Non Sq Epi: 1 /hpf / Bacteria: Negative      CAPILLARY BLOOD GLUCOSE          Imaging:  CXR personally reviewed: no focal opacity    EKG personally reviewed: Labs personally reviewed:                          12.0   7.97  )-----------( 209      ( 2022 16:53 )             38.1         135  |  101  |  58<H>  ----------------------------<  87  5.4<H>   |  21<L>  |  1.53<H>    Ca    8.7      2022 18:53  Mg     1.7         TPro  6.8  /  Alb  3.3  /  TBili  0.4  /  DBili  x   /  AST  34  /  ALT  31  /  AlkPhos  116  06        LIVER FUNCTIONS - ( 2022 18:53 )  Alb: 3.3 g/dL / Pro: 6.8 g/dL / ALK PHOS: 116 U/L / ALT: 31 U/L / AST: 34 U/L / GGT: x           PT/INR - ( 2022 18:10 )   PT: 18.3 sec;   INR: 1.58 ratio         PTT - ( 2022 18:10 )  PTT:19.4 sec  Urinalysis Basic - ( 2022 17:30 )    Color: Yellow / Appearance: Clear / S.011 / pH: x  Gluc: x / Ketone: Negative  / Bili: Negative / Urobili: Negative   Blood: x / Protein: Negative / Nitrite: Negative   Leuk Esterase: Moderate / RBC: 1 /hpf / WBC 6 /HPF   Sq Epi: x / Non Sq Epi: 1 /hpf / Bacteria: Negative      CAPILLARY BLOOD GLUCOSE          Imaging:  CXR personally reviewed: no focal opacification  CT abd pelvis- pending results   Cardiac tracing reviewed

## 2022-06-08 NOTE — ED PROVIDER NOTE - ATTENDING CONTRIBUTION TO CARE
Attending MD Kenya Zapata:  I personally have seen and examined this patient.  Resident note reviewed and agree on plan of care and except where noted.  See HPI, PE, and MDM for details.

## 2022-06-08 NOTE — H&P ADULT - PROBLEM SELECTOR PLAN 1
UA weakly positive , cxr clear afebrile , no leukocytosis , H/H stable compared to discharge labs , suspect hypovolemia from diuresis , s/p 500cc IV fluid bolus x 3 , with improvement in BP   - hold diuretic   - Hold metoprolol   - monitor BP   - Monitor H/H , expect mild decrease with IV hydration  - f/u CT abdomen pelvis

## 2022-06-08 NOTE — ED PROVIDER NOTE - CLINICAL SUMMARY MEDICAL DECISION MAKING FREE TEXT BOX
Attending Kenya Zapata: 89 yo female h/o recent admission for afib with RVR on diuresis presenting with lightheadedness and dizziness. found to be hypotensive. on exam pt clinically appears dehydrated. ivc with respiratory variation on pocus. pt given IV hydration slowly with improvement. afebrile in the ed. pt is reporting loose stools as well. less likely cdiff. pt with known infra renal AAA. pocus performed showing no sig enalrgement. blood work showed mild oma. no sig anemia and pt denies any black or bloody stools. suspect hypotension likely from over diuresis. will continue slow hydration and liikely admit

## 2022-06-08 NOTE — ED PROVIDER NOTE - PROGRESS NOTE DETAILS
Attending Kenya Zapata: bp improving with IVF. Attending Kenya Zapata: blood work showed mild oma, pt feeling better after IV hydration. will continue to closely monitor BP. suspect likely from diuresis. neuro exam unremarkable.

## 2022-06-08 NOTE — H&P ADULT - ASSESSMENT
89F w/PMH of Alzheimer Disease, HTN, CVA (difficulty with swallowing), HLD, Asthma, Afib , recent admission to   for SOB c/b afib wrvr started on digoxin and treated with lasix, discharged home on oxygen 2L NC  ; p/w hypotension x 1 day

## 2022-06-08 NOTE — H&P ADULT - PROBLEM SELECTOR PLAN 3
- continue Eliquis   - holding metoprolol in setting of hypotension , can resume once clinically improved   - continue digoxin

## 2022-06-08 NOTE — ED ADULT NURSE NOTE - CHIEF COMPLAINT QUOTE
Airway patent, Nasal mucosa clear. Mouth with normal mucosa. hypotension at MD's office today. recently admitted at [facility] for hypoxia and fluid overload, d/c'd yesterday

## 2022-06-08 NOTE — ED PROVIDER NOTE - PHYSICAL EXAMINATION
Attending Kenya Zapata: Gen: NAD, heent: atrauamtic, dry mucous membranes, op pink,, neck; nttp, no nuchal rigidity, chest: nttp, no crepitus, cv: rrr,  lungs: ctab, abd: soft, nontender, nondistended, no peritoneal signs, +BS, no guarding, ext: wwp, neg homans, skin: no rash, neuro: awake and alert, following commands, speech clear, sensation and strength intact, no focal deficits

## 2022-06-08 NOTE — ED PROVIDER NOTE - OBJECTIVE STATEMENT
Attending Kenya Zapata: 89 yo female h/o afib, CHF, was recently admitted for afib with rvr with recent medication changes, decreased metroprolol from 50 to 25, started on digoxin and lasix presenting with abdominal pain, chest discomfort, weakness and lightheadedness. pt states was feeling weak in the hospital. today went to follow up with her cardiologist and found to be hypotensive and sent ot the ed. per daughter has also cmplained of diarrhea, and weakness. has h/o pancreatic insuffiencey and is on creon. no black stools. pain located in middle of abdomen.  pt request her daugther translat for her

## 2022-06-08 NOTE — ED ADULT NURSE NOTE - FINAL NURSING ELECTRONIC SIGNATURE
Is This A New Presentation, Or A Follow-Up?: Skin Lesions How Severe Is Your Skin Lesion?: moderate Have Your Skin Lesions Been Treated?: not been treated 09-Jun-2022 00:52

## 2022-06-08 NOTE — ED ADULT NURSE REASSESSMENT NOTE - NS ED NURSE REASSESS COMMENT FT1
Report received from ALAN Matthews. Pt noted to be laying in stretcher, BP MAP of 62 other VSS. Pt denies dizziness, lightheadedness, SOB, chest pain, weakness at this time. Plan of care discussed with pt and family at bedside and verbalizes understanding. Safety and comfort measures maintained.

## 2022-06-09 ENCOUNTER — NON-APPOINTMENT (OUTPATIENT)
Age: 87
End: 2022-06-09

## 2022-06-09 LAB
ALBUMIN SERPL ELPH-MCNC: 3 G/DL — LOW (ref 3.3–5)
ALBUMIN SERPL ELPH-MCNC: 3.6 G/DL — SIGNIFICANT CHANGE UP (ref 3.3–5)
ALP SERPL-CCNC: 106 U/L — SIGNIFICANT CHANGE UP (ref 40–120)
ALP SERPL-CCNC: 120 U/L — SIGNIFICANT CHANGE UP (ref 40–120)
ALT FLD-CCNC: 29 U/L — SIGNIFICANT CHANGE UP (ref 10–45)
ALT FLD-CCNC: 36 U/L — SIGNIFICANT CHANGE UP (ref 10–45)
ANION GAP SERPL CALC-SCNC: 11 MMOL/L — SIGNIFICANT CHANGE UP (ref 5–17)
ANION GAP SERPL CALC-SCNC: 15 MMOL/L — SIGNIFICANT CHANGE UP (ref 5–17)
AST SERPL-CCNC: 27 U/L — SIGNIFICANT CHANGE UP (ref 10–40)
AST SERPL-CCNC: 44 U/L — HIGH (ref 10–40)
BASE EXCESS BLDV CALC-SCNC: -2 MMOL/L — SIGNIFICANT CHANGE UP (ref -2–2)
BASOPHILS # BLD AUTO: 0.02 K/UL — SIGNIFICANT CHANGE UP (ref 0–0.2)
BASOPHILS NFR BLD AUTO: 0.3 % — SIGNIFICANT CHANGE UP (ref 0–2)
BILIRUB SERPL-MCNC: 0.2 MG/DL — SIGNIFICANT CHANGE UP (ref 0.2–1.2)
BILIRUB SERPL-MCNC: 0.3 MG/DL — SIGNIFICANT CHANGE UP (ref 0.2–1.2)
BLOOD GAS VENOUS - CREATININE: SIGNIFICANT CHANGE UP MG/DL (ref 0.5–1.3)
BUN SERPL-MCNC: 47 MG/DL — HIGH (ref 7–23)
BUN SERPL-MCNC: 54 MG/DL — HIGH (ref 7–23)
CA-I SERPL-SCNC: 1.18 MMOL/L — SIGNIFICANT CHANGE UP (ref 1.15–1.33)
CALCIUM SERPL-MCNC: 8.4 MG/DL — SIGNIFICANT CHANGE UP (ref 8.4–10.5)
CALCIUM SERPL-MCNC: 8.7 MG/DL — SIGNIFICANT CHANGE UP (ref 8.4–10.5)
CHLORIDE BLDV-SCNC: 101 MMOL/L — SIGNIFICANT CHANGE UP (ref 96–108)
CHLORIDE SERPL-SCNC: 101 MMOL/L — SIGNIFICANT CHANGE UP (ref 96–108)
CHLORIDE SERPL-SCNC: 104 MMOL/L — SIGNIFICANT CHANGE UP (ref 96–108)
CO2 BLDV-SCNC: 27 MMOL/L — HIGH (ref 22–26)
CO2 SERPL-SCNC: 21 MMOL/L — LOW (ref 22–31)
CO2 SERPL-SCNC: 21 MMOL/L — LOW (ref 22–31)
CREAT SERPL-MCNC: 1.1 MG/DL — SIGNIFICANT CHANGE UP (ref 0.5–1.3)
CREAT SERPL-MCNC: 1.47 MG/DL — HIGH (ref 0.5–1.3)
CULTURE RESULTS: SIGNIFICANT CHANGE UP
DIGOXIN SERPL-MCNC: 1 NG/ML — SIGNIFICANT CHANGE UP (ref 0.8–2)
EGFR: 33 ML/MIN/1.73M2 — LOW
EGFR: 47 ML/MIN/1.73M2 — LOW
EOSINOPHIL # BLD AUTO: 0.16 K/UL — SIGNIFICANT CHANGE UP (ref 0–0.5)
EOSINOPHIL NFR BLD AUTO: 2.4 % — SIGNIFICANT CHANGE UP (ref 0–6)
GAS PNL BLDV: 132 MMOL/L — LOW (ref 136–145)
GAS PNL BLDV: SIGNIFICANT CHANGE UP
GAS PNL BLDV: SIGNIFICANT CHANGE UP
GLUCOSE BLDV-MCNC: 123 MG/DL — HIGH (ref 70–99)
GLUCOSE SERPL-MCNC: 105 MG/DL — HIGH (ref 70–99)
GLUCOSE SERPL-MCNC: 133 MG/DL — HIGH (ref 70–99)
HCO3 BLDV-SCNC: 25 MMOL/L — SIGNIFICANT CHANGE UP (ref 22–29)
HCT VFR BLD CALC: 31.6 % — LOW (ref 34.5–45)
HCT VFR BLD CALC: 38.2 % — SIGNIFICANT CHANGE UP (ref 34.5–45)
HCT VFR BLDA CALC: 36 % — SIGNIFICANT CHANGE UP (ref 34.5–46.5)
HGB BLD CALC-MCNC: 12 G/DL — SIGNIFICANT CHANGE UP (ref 11.7–16.1)
HGB BLD-MCNC: 10 G/DL — LOW (ref 11.5–15.5)
HGB BLD-MCNC: 12 G/DL — SIGNIFICANT CHANGE UP (ref 11.5–15.5)
IMM GRANULOCYTES NFR BLD AUTO: 0.3 % — SIGNIFICANT CHANGE UP (ref 0–1.5)
LACTATE BLDV-MCNC: 0.8 MMOL/L — SIGNIFICANT CHANGE UP (ref 0.7–2)
LACTATE SERPL-SCNC: 1.2 MMOL/L — SIGNIFICANT CHANGE UP (ref 0.7–2)
LYMPHOCYTES # BLD AUTO: 1.1 K/UL — SIGNIFICANT CHANGE UP (ref 1–3.3)
LYMPHOCYTES # BLD AUTO: 16.2 % — SIGNIFICANT CHANGE UP (ref 13–44)
MCHC RBC-ENTMCNC: 30.1 PG — SIGNIFICANT CHANGE UP (ref 27–34)
MCHC RBC-ENTMCNC: 30.3 PG — SIGNIFICANT CHANGE UP (ref 27–34)
MCHC RBC-ENTMCNC: 31.4 GM/DL — LOW (ref 32–36)
MCHC RBC-ENTMCNC: 31.6 GM/DL — LOW (ref 32–36)
MCV RBC AUTO: 95.2 FL — SIGNIFICANT CHANGE UP (ref 80–100)
MCV RBC AUTO: 96.5 FL — SIGNIFICANT CHANGE UP (ref 80–100)
MONOCYTES # BLD AUTO: 0.54 K/UL — SIGNIFICANT CHANGE UP (ref 0–0.9)
MONOCYTES NFR BLD AUTO: 8 % — SIGNIFICANT CHANGE UP (ref 2–14)
NEUTROPHILS # BLD AUTO: 4.94 K/UL — SIGNIFICANT CHANGE UP (ref 1.8–7.4)
NEUTROPHILS NFR BLD AUTO: 72.8 % — SIGNIFICANT CHANGE UP (ref 43–77)
NRBC # BLD: 0 /100 WBCS — SIGNIFICANT CHANGE UP (ref 0–0)
NRBC # BLD: 0 /100 WBCS — SIGNIFICANT CHANGE UP (ref 0–0)
PCO2 BLDV: 54 MMHG — HIGH (ref 39–42)
PH BLDV: 7.28 — LOW (ref 7.32–7.43)
PLATELET # BLD AUTO: 156 K/UL — SIGNIFICANT CHANGE UP (ref 150–400)
PLATELET # BLD AUTO: 168 K/UL — SIGNIFICANT CHANGE UP (ref 150–400)
PO2 BLDV: 31 MMHG — SIGNIFICANT CHANGE UP (ref 25–45)
POTASSIUM BLDV-SCNC: 3.7 MMOL/L — SIGNIFICANT CHANGE UP (ref 3.5–5.1)
POTASSIUM SERPL-MCNC: 3.7 MMOL/L — SIGNIFICANT CHANGE UP (ref 3.5–5.3)
POTASSIUM SERPL-MCNC: 4.3 MMOL/L — SIGNIFICANT CHANGE UP (ref 3.5–5.3)
POTASSIUM SERPL-SCNC: 3.7 MMOL/L — SIGNIFICANT CHANGE UP (ref 3.5–5.3)
POTASSIUM SERPL-SCNC: 4.3 MMOL/L — SIGNIFICANT CHANGE UP (ref 3.5–5.3)
PROT SERPL-MCNC: 6.1 G/DL — SIGNIFICANT CHANGE UP (ref 6–8.3)
PROT SERPL-MCNC: 7.1 G/DL — SIGNIFICANT CHANGE UP (ref 6–8.3)
RBC # BLD: 3.32 M/UL — LOW (ref 3.8–5.2)
RBC # BLD: 3.96 M/UL — SIGNIFICANT CHANGE UP (ref 3.8–5.2)
RBC # FLD: 12.5 % — SIGNIFICANT CHANGE UP (ref 10.3–14.5)
RBC # FLD: 12.5 % — SIGNIFICANT CHANGE UP (ref 10.3–14.5)
SAO2 % BLDV: 43.8 % — LOW (ref 67–88)
SODIUM SERPL-SCNC: 136 MMOL/L — SIGNIFICANT CHANGE UP (ref 135–145)
SODIUM SERPL-SCNC: 137 MMOL/L — SIGNIFICANT CHANGE UP (ref 135–145)
SPECIMEN SOURCE: SIGNIFICANT CHANGE UP
WBC # BLD: 6.78 K/UL — SIGNIFICANT CHANGE UP (ref 3.8–10.5)
WBC # BLD: 8.47 K/UL — SIGNIFICANT CHANGE UP (ref 3.8–10.5)
WBC # FLD AUTO: 6.78 K/UL — SIGNIFICANT CHANGE UP (ref 3.8–10.5)
WBC # FLD AUTO: 8.47 K/UL — SIGNIFICANT CHANGE UP (ref 3.8–10.5)

## 2022-06-09 PROCEDURE — 71045 X-RAY EXAM CHEST 1 VIEW: CPT | Mod: 26

## 2022-06-09 RX ORDER — SODIUM CHLORIDE 9 MG/ML
500 INJECTION INTRAMUSCULAR; INTRAVENOUS; SUBCUTANEOUS ONCE
Refills: 0 | Status: COMPLETED | OUTPATIENT
Start: 2022-06-09 | End: 2022-06-09

## 2022-06-09 RX ORDER — SODIUM CHLORIDE 9 MG/ML
250 INJECTION INTRAMUSCULAR; INTRAVENOUS; SUBCUTANEOUS ONCE
Refills: 0 | Status: COMPLETED | OUTPATIENT
Start: 2022-06-09 | End: 2022-06-09

## 2022-06-09 RX ORDER — SODIUM CHLORIDE 9 MG/ML
1000 INJECTION INTRAMUSCULAR; INTRAVENOUS; SUBCUTANEOUS
Refills: 0 | Status: DISCONTINUED | OUTPATIENT
Start: 2022-06-09 | End: 2022-06-11

## 2022-06-09 RX ADMIN — Medication 1 DROP(S): at 18:35

## 2022-06-09 RX ADMIN — Medication 1 CAPSULE(S): at 08:42

## 2022-06-09 RX ADMIN — LATANOPROST 1 DROP(S): 0.05 SOLUTION/ DROPS OPHTHALMIC; TOPICAL at 21:23

## 2022-06-09 RX ADMIN — APIXABAN 2.5 MILLIGRAM(S): 2.5 TABLET, FILM COATED ORAL at 06:19

## 2022-06-09 RX ADMIN — SODIUM CHLORIDE 250 MILLILITER(S): 9 INJECTION INTRAMUSCULAR; INTRAVENOUS; SUBCUTANEOUS at 08:39

## 2022-06-09 RX ADMIN — RIVASTIGMINE 1 PATCH: 4.6 PATCH, EXTENDED RELEASE TRANSDERMAL at 07:00

## 2022-06-09 RX ADMIN — ATORVASTATIN CALCIUM 20 MILLIGRAM(S): 80 TABLET, FILM COATED ORAL at 21:23

## 2022-06-09 RX ADMIN — BUDESONIDE AND FORMOTEROL FUMARATE DIHYDRATE 2 PUFF(S): 160; 4.5 AEROSOL RESPIRATORY (INHALATION) at 17:19

## 2022-06-09 RX ADMIN — Medication 1 CAPSULE(S): at 12:00

## 2022-06-09 RX ADMIN — ZOLPIDEM TARTRATE 5 MILLIGRAM(S): 10 TABLET ORAL at 21:23

## 2022-06-09 RX ADMIN — Medication 100 MILLIGRAM(S): at 17:19

## 2022-06-09 RX ADMIN — SODIUM CHLORIDE 500 MILLILITER(S): 9 INJECTION INTRAMUSCULAR; INTRAVENOUS; SUBCUTANEOUS at 10:39

## 2022-06-09 RX ADMIN — CEFTRIAXONE 100 MILLIGRAM(S): 500 INJECTION, POWDER, FOR SOLUTION INTRAMUSCULAR; INTRAVENOUS at 17:19

## 2022-06-09 RX ADMIN — BUDESONIDE AND FORMOTEROL FUMARATE DIHYDRATE 2 PUFF(S): 160; 4.5 AEROSOL RESPIRATORY (INHALATION) at 06:20

## 2022-06-09 RX ADMIN — Medication 1 TABLET(S): at 12:00

## 2022-06-09 RX ADMIN — ESCITALOPRAM OXALATE 10 MILLIGRAM(S): 10 TABLET, FILM COATED ORAL at 12:00

## 2022-06-09 RX ADMIN — Medication 1 CAPSULE(S): at 17:18

## 2022-06-09 RX ADMIN — PREGABALIN 1000 MICROGRAM(S): 225 CAPSULE ORAL at 12:01

## 2022-06-09 RX ADMIN — APIXABAN 2.5 MILLIGRAM(S): 2.5 TABLET, FILM COATED ORAL at 17:19

## 2022-06-09 RX ADMIN — SODIUM CHLORIDE 60 MILLILITER(S): 9 INJECTION INTRAMUSCULAR; INTRAVENOUS; SUBCUTANEOUS at 12:00

## 2022-06-09 RX ADMIN — RIVASTIGMINE 1 PATCH: 4.6 PATCH, EXTENDED RELEASE TRANSDERMAL at 06:20

## 2022-06-09 NOTE — PROGRESS NOTE ADULT - ASSESSMENT
_________________________________________________________________________________________  ========>>  M E D I C A L   A T T E N D I N G    F O L L O W  U P  N O T E  <<=========  -----------------------------------------------------------------------------------------------------    - Patient seen and examined by me earlier today.   - In summary,  HOWARD SCHULZ is a 89y year old woman admitted with weakness  - Patient today overall doing ok, comfortable, eating fairly, feels very tired..     ==================>> REVIEW OF SYSTEM <<=================    GEN: no fever, no chills, no pain, tired  RESP: no SOB, no cough, no sputum  CVS: no chest pain, no palpitations, no edema  GI: no abdominal pain, no nausea  : no dysuria, no frequency  Neuro: no headache, no dizziness at rest   Derm : no itching, no rash    ==================>> PHYSICAL EXAM <<=================    GEN: A&O X 3 , NAD , comfortable, pleasant, calm  in bed   HEENT: NCAT, PERRL, MMM, hearing intact  Neck: supple , no JVD appreciated  CVS: S1S2 , regular , No M/R/G appreciated  PULM: CTA B/L,  no W/R/R appreciated  ABD.: soft. non tender, non distended,  bowel sounds present  Extrem: intact pulses , no edema   PSYCH : normal mood,  not anxious                            ( Note Written / Date of service :  22 )    ==================>> MEDICATIONS <<====================    MEDICATIONS  (STANDING):  apixaban 2.5 milliGRAM(s) Oral every 12 hours  atorvastatin 20 milliGRAM(s) Oral at bedtime  budesonide 160 MICROgram(s)/formoterol 4.5 MICROgram(s) Inhaler 2 Puff(s) Inhalation two times a day  calcium carbonate 1250 mG  + Vitamin D (OsCal 500 + D) 1 Tablet(s) Oral daily  cefTRIAXone   IVPB 1000 milliGRAM(s) IV Intermittent every 24 hours  cyanocobalamin 1000 MICROGram(s) Oral daily  escitalopram 10 milliGRAM(s) Oral daily  latanoprost 0.005% Ophthalmic Solution 1 Drop(s) Both EYES at bedtime  multivitamin 1 Tablet(s) Oral daily  pancrelipase  (CREON 36,000 Lipase Units) 1 Capsule(s) Oral three times a day with meals  pyridoxine 100 milliGRAM(s) Oral daily  rivastigmine patch  4.6 mG/24 Hr(s) 1 Patch Transdermal every 24 hours  sodium chloride 0.9%. 1000 milliLiter(s) (60 mL/Hr) IV Continuous <Continuous>  timolol 0.25% Solution 1 Drop(s) Both EYES daily    MEDICATIONS  (PRN):  acetaminophen     Tablet .. 650 milliGRAM(s) Oral every 6 hours PRN Temp greater or equal to 38C (100.4F), Mild Pain (1 - 3)  melatonin 3 milliGRAM(s) Oral at bedtime PRN Insomnia  zolpidem 5 milliGRAM(s) Oral at bedtime PRN Insomnia    ___________  Active diet:  Diet, Regular:   DASH/TLC Sodium & Cholesterol Restricted (DASH)  Soft and Bite Sized (SOFTBTSZ)  ___________________    ==================>> VITAL SIGNS <<==================    T(C): 36.3 (22 @ 11:57), Max: 37.4 (22 @ 15:36)  HR: 65 (22 @ 11:57) (43 - 88)  BP: 105/57 (22 @ 11:57) (81/44 - 107/63)  BP(mean): 55 (22 @ 00:10)  RR: 18 (22 @ 11:57) (14 - 21)  SpO2: 96% (22 @ 11:57) (94% - 100%)     I&O's Summary    2022 07:01  -  2022 07:00  --------------------------------------------------------  IN: 0 mL / OUT: 500 mL / NET: -500 mL    2022 07:01  -  2022 12:57  --------------------------------------------------------  IN: 200 mL / OUT: 250 mL / NET: -50 mL       ==================>> LAB AND IMAGING <<==================                        12.0   8.47  )-----------( 168      ( 2022 00:26 )             38.2        06-09    137  |  101  |  54<H>  ----------------------------<  105<H>  3.7   |  21<L>  |  1.47<H>    Ca    8.7      2022 00:26  Mg     1.7     06-08    TPro  7.1  /  Alb  3.6  /  TBili  0.3  /  DBili  x   /  AST  27  /  ALT  29  /  AlkPhos  120  -    PT/INR - ( 2022 18:10 )   PT: 18.3 sec;   INR: 1.58 ratio    PTT - ( 2022 18:10 )  PTT:19.4 sec               Urinalysis Basic - ( 2022 17:30 )  Color: Yellow / Appearance: Clear / S.011 / pH: x  Gluc: x / Ketone: Negative  / Bili: Negative / Urobili: Negative   Blood: x / Protein: Negative / Nitrite: Negative   Leuk Esterase: Moderate / RBC: 1 /hpf / WBC 6 /HPF   Sq Epi: x / Non Sq Epi: 1 /hpf / Bacteria: Negative    TSH:      1.130   (22)       ,     0.90   (22)           < from: CT Abdomen and Pelvis No Cont (22 @ 18:16) >  IMPRESSION:  Diverticulosis without evidence of diverticulitis. No acute finding on   this noncontrast study to explain patient's symptomatology.  < end of copied text >    ___________________________________________________________________________________  ===============>>  A S S E S S M E N T   A N D   P L A N <<===============  ------------------------------------------------------------------------------------------    · Assessment	  89F w/PMH of Alzheimer Disease, HTN, CVA (difficulty with swallowing), HLD, Asthma, Afib , recent admission to   for SOB c/b afib wrvr started on digoxin and treated with lasix, discharged home on oxygen 2L NC  ; p/w hypotension x 1 day      Problem/Plan - 1:  ·  Problem: Hypotension.   ·  Plan: UA weakly positive , cxr clear afebrile , no leukocytosis , H/H stable compared to discharge labs , suspect hypovolemia from diuresis , s/p 500cc IV fluid bolus x 3 , with improvement in BP   - hold diuretic   - monitor BP given episode of hypotension as noted..   - cardio following, appreciated     Problem/Plan - 2:  ·  Problem: UTI (urinary tract infection).   ·  Plan: - continue ceftriaxone   - f/u urine cultures.    Problem/Plan - 3:  ·  Problem: Chronic atrial fibrillation.   ·  Plan: - continue Eliquis   - holding metoprolol in setting of hypotension , can resume once clinically improved   - continue digoxin.    Problem/Plan - 4:  ·  Problem: Pancreatic insufficiency.   ·  Plan: - continue pancrelipase.    Problem/Plan - 5:  ·  Problem: CHF (congestive heart failure).   ·  Plan: bnp decreased compared to recent , appears dry on exam , s/p IV fluids   - hold lasix   - strict ins and outs.    Problem/Plan - 6:  ·  Problem: Asthma.   ·  Plan: - continue Symbicort  - continue supplemental o2 as needed.    Problem/Plan - 7:  ·  Problem: Dementia.   ·  Plan: - continue  rivastigmine patch   - zolpidem hs prn   - aspiration precautions   - easy to chew diet, thin liquids.    -GI/DVT Prophylaxis per protocol.    --------------------------------------------  Case discussed with pt, team.., cardio   Education given on findings and plan of care  ___________________________  H. TERI Mcdonnell.  Pager: [phone]

## 2022-06-09 NOTE — RAPID RESPONSE TEAM SUMMARY - NSSITUATIONBACKGROUNDRRT_GEN_ALL_CORE
89F w/PMH of Alzheimer Disease, HTN, CVA (difficulty with swallowing), HLD, Asthma, Afib , recent admission to [facility]  for SOB c/b afib wrvr started on digoxin and treated with lasix, discharged home on oxygen 2L NC  ; p/w hypotension x 1 day      RRT called for hypotension SBP 50's    Patient seen and examined at bedside. AAOx3, appears comfortable, asymptomatic.    VITAL SIGNS:  BP 86/49, HR 67, RR 20, SpO2 95%      Labs and meds reviewed in EMR.    ASSESSMENT: Hypotension likely 2/2 overdiuresis. SBP of 50's prior to RRT was likely not an accurate reading. BP repeated multiple times during the RRT w/ SBP 90-100s.  No further treatment administered.     PLAN:  - Hold diuretics and BP meds  - Continue to monitor    Plan discussed with primary team.    Walter Starr MD  PGY3, Internal Medicine Resident  [id number] (NS) / [id number] (KANE)

## 2022-06-09 NOTE — ED ADULT NURSE REASSESSMENT NOTE - NS ED NURSE REASSESS COMMENT FT1
Rapid response activated at this time for hypotension. Pt noted to be alert, sitting upright in stretcher, other VSS. 250ml NS administered. Per admitting team, it is okay for pt to go up to inpatient room. Also per admitting team, RN is to contact for BP systolic less than 80 or MAP less than 50.

## 2022-06-10 LAB
ANION GAP SERPL CALC-SCNC: 13 MMOL/L — SIGNIFICANT CHANGE UP (ref 5–17)
BUN SERPL-MCNC: 40 MG/DL — HIGH (ref 7–23)
CALCIUM SERPL-MCNC: 9 MG/DL — SIGNIFICANT CHANGE UP (ref 8.4–10.5)
CHLORIDE SERPL-SCNC: 106 MMOL/L — SIGNIFICANT CHANGE UP (ref 96–108)
CO2 SERPL-SCNC: 20 MMOL/L — LOW (ref 22–31)
CREAT SERPL-MCNC: 1.07 MG/DL — SIGNIFICANT CHANGE UP (ref 0.5–1.3)
EGFR: 48 ML/MIN/1.73M2 — LOW
GLUCOSE SERPL-MCNC: 73 MG/DL — SIGNIFICANT CHANGE UP (ref 70–99)
HCT VFR BLD CALC: 33.2 % — LOW (ref 34.5–45)
HGB BLD-MCNC: 10.4 G/DL — LOW (ref 11.5–15.5)
MCHC RBC-ENTMCNC: 30.2 PG — SIGNIFICANT CHANGE UP (ref 27–34)
MCHC RBC-ENTMCNC: 31.3 GM/DL — LOW (ref 32–36)
MCV RBC AUTO: 96.5 FL — SIGNIFICANT CHANGE UP (ref 80–100)
NRBC # BLD: 0 /100 WBCS — SIGNIFICANT CHANGE UP (ref 0–0)
PLATELET # BLD AUTO: 165 K/UL — SIGNIFICANT CHANGE UP (ref 150–400)
POTASSIUM SERPL-MCNC: 3.8 MMOL/L — SIGNIFICANT CHANGE UP (ref 3.5–5.3)
POTASSIUM SERPL-SCNC: 3.8 MMOL/L — SIGNIFICANT CHANGE UP (ref 3.5–5.3)
RBC # BLD: 3.44 M/UL — LOW (ref 3.8–5.2)
RBC # FLD: 12.6 % — SIGNIFICANT CHANGE UP (ref 10.3–14.5)
SODIUM SERPL-SCNC: 139 MMOL/L — SIGNIFICANT CHANGE UP (ref 135–145)
WBC # BLD: 7.31 K/UL — SIGNIFICANT CHANGE UP (ref 3.8–10.5)
WBC # FLD AUTO: 7.31 K/UL — SIGNIFICANT CHANGE UP (ref 3.8–10.5)

## 2022-06-10 PROCEDURE — 99222 1ST HOSP IP/OBS MODERATE 55: CPT | Mod: GC

## 2022-06-10 RX ORDER — METOPROLOL TARTRATE 50 MG
12.5 TABLET ORAL
Refills: 0 | Status: DISCONTINUED | OUTPATIENT
Start: 2022-06-10 | End: 2022-06-11

## 2022-06-10 RX ADMIN — Medication 1 CAPSULE(S): at 17:42

## 2022-06-10 RX ADMIN — ZOLPIDEM TARTRATE 5 MILLIGRAM(S): 10 TABLET ORAL at 21:52

## 2022-06-10 RX ADMIN — RIVASTIGMINE 1 PATCH: 4.6 PATCH, EXTENDED RELEASE TRANSDERMAL at 06:06

## 2022-06-10 RX ADMIN — RIVASTIGMINE 1 PATCH: 4.6 PATCH, EXTENDED RELEASE TRANSDERMAL at 06:20

## 2022-06-10 RX ADMIN — BUDESONIDE AND FORMOTEROL FUMARATE DIHYDRATE 2 PUFF(S): 160; 4.5 AEROSOL RESPIRATORY (INHALATION) at 17:43

## 2022-06-10 RX ADMIN — RIVASTIGMINE 1 PATCH: 4.6 PATCH, EXTENDED RELEASE TRANSDERMAL at 07:07

## 2022-06-10 RX ADMIN — PREGABALIN 1000 MICROGRAM(S): 225 CAPSULE ORAL at 12:12

## 2022-06-10 RX ADMIN — CEFTRIAXONE 100 MILLIGRAM(S): 500 INJECTION, POWDER, FOR SOLUTION INTRAMUSCULAR; INTRAVENOUS at 17:42

## 2022-06-10 RX ADMIN — Medication 1 CAPSULE(S): at 12:11

## 2022-06-10 RX ADMIN — Medication 1 CAPSULE(S): at 08:01

## 2022-06-10 RX ADMIN — Medication 1 TABLET(S): at 12:11

## 2022-06-10 RX ADMIN — ATORVASTATIN CALCIUM 20 MILLIGRAM(S): 80 TABLET, FILM COATED ORAL at 21:52

## 2022-06-10 RX ADMIN — Medication 1 TABLET(S): at 12:12

## 2022-06-10 RX ADMIN — APIXABAN 2.5 MILLIGRAM(S): 2.5 TABLET, FILM COATED ORAL at 17:43

## 2022-06-10 RX ADMIN — LATANOPROST 1 DROP(S): 0.05 SOLUTION/ DROPS OPHTHALMIC; TOPICAL at 21:53

## 2022-06-10 RX ADMIN — Medication 1 DROP(S): at 12:11

## 2022-06-10 RX ADMIN — Medication 100 MILLIGRAM(S): at 12:12

## 2022-06-10 RX ADMIN — ESCITALOPRAM OXALATE 10 MILLIGRAM(S): 10 TABLET, FILM COATED ORAL at 12:11

## 2022-06-10 RX ADMIN — APIXABAN 2.5 MILLIGRAM(S): 2.5 TABLET, FILM COATED ORAL at 06:07

## 2022-06-10 RX ADMIN — RIVASTIGMINE 1 PATCH: 4.6 PATCH, EXTENDED RELEASE TRANSDERMAL at 20:24

## 2022-06-10 RX ADMIN — BUDESONIDE AND FORMOTEROL FUMARATE DIHYDRATE 2 PUFF(S): 160; 4.5 AEROSOL RESPIRATORY (INHALATION) at 06:07

## 2022-06-10 RX ADMIN — Medication 12.5 MILLIGRAM(S): at 17:42

## 2022-06-10 NOTE — PHYSICAL THERAPY INITIAL EVALUATION ADULT - PRECAUTIONS/LIMITATIONS, REHAB EVAL
6/8 Chest Xray clear lungs, CT abd and pelvis Diverticulosis without evidence of diverticulitis. No acute finding on this noncontrast study to explain patient's symptomatology, POCUS ED no pericardial effusion, 6/9 Chest Xray clear lungs 6/8 Chest Xray clear lungs, CT abd and pelvis Diverticulosis without evidence of diverticulitis. No acute finding on this noncontrast study to explain patient's symptomatology, POCUS ED no pericardial effusion, 6/9 Chest Xray clear lungs/cardiac precautions/fall precautions

## 2022-06-10 NOTE — PHYSICAL THERAPY INITIAL EVALUATION ADULT - STRENGTHENING, PT EVAL
GOAL: Patient will demonstrate a 1/3 increase in strength where deficient to assist with performing functional mobility and ADLs.

## 2022-06-10 NOTE — PHYSICAL THERAPY INITIAL EVALUATION ADULT - ADDITIONAL COMMENTS
Pt lives with daughter in a 2nd floor walk up apartment, no steps to enter, uses the cane for amb occasionally as she sometimes loses her balance.

## 2022-06-10 NOTE — PHYSICAL THERAPY INITIAL EVALUATION ADULT - PERTINENT HX OF CURRENT PROBLEM, REHAB EVAL
89 y/o F w/PMH of Alzheimer Disease, HTN, CVA (difficulty with swallowing), HLD, Asthma, Afib , recent admission to [facility] ( 5/31/22-6/7/22)  for SOB thought to be aspiration pna vs HF course c/b afib wrvr started on digoxin and treated with lasix, discharged home on oxygen 2L NC  ; Hopsital course; p/w hypotension and lightheadedness the day after discharge from [facility],

## 2022-06-10 NOTE — CONSULT NOTE ADULT - ASSESSMENT
#Pancreatic insufficiency with known atrophy of pancreas on MRI from 3/22, improvement in diarrhea on Creon (2 tab with meals).    #Pancreatic duct dilation, no mass on MRI      Recommendations:  -Continue with Creon 24,000 2 tab w/ meals  -No further work up for pancreatic duct dilation. This is known and has been worked up by Dr. Dudley.   -   #Pancreatic insufficiency with known atrophy of pancreas on MRI from 3/22, improvement in diarrhea on Creon (2 tab with meals).    #Pancreatic duct dilation, no mass on MRI      Recommendations:  -Continue with Creon 24,000 2 tab w/ meals  -No further work up for pancreatic duct dilation. This is known and has been worked up by Dr. Dudley.   -Will continue to monitor diarrhea output and obtain info from family, currently seems at baseline. IF there is any worsening, reasonable to assess for microscopic colitis    GI will continue to follow    Recommendations preliminary until signed by attending.     Antione Cuevas MD  Gastroenterology/Hepatology Fellow  1st option: [phone] (text or call), ONLY available from 7:00 am to 5:00 pm.   **Contact on-call GI fellow via answering service ([phone]) from 5pm-7am AND on weekends/holidays**  2nd option: Available via [employer] Teams  3rd option: Pager: [phone]

## 2022-06-10 NOTE — PROGRESS NOTE ADULT - ASSESSMENT
( Note written / Date of service 06-10-22 )    ==================>> MEDICATIONS <<====================    apixaban 2.5 milliGRAM(s) Oral every 12 hours  atorvastatin 20 milliGRAM(s) Oral at bedtime  budesonide 160 MICROgram(s)/formoterol 4.5 MICROgram(s) Inhaler 2 Puff(s) Inhalation two times a day  calcium carbonate 1250 mG  + Vitamin D (OsCal 500 + D) 1 Tablet(s) Oral daily  cefTRIAXone   IVPB 1000 milliGRAM(s) IV Intermittent every 24 hours  cyanocobalamin 1000 MICROGram(s) Oral daily  escitalopram 10 milliGRAM(s) Oral daily  latanoprost 0.005% Ophthalmic Solution 1 Drop(s) Both EYES at bedtime  metoprolol tartrate 12.5 milliGRAM(s) Oral two times a day  multivitamin 1 Tablet(s) Oral daily  pancrelipase  (CREON 36,000 Lipase Units) 1 Capsule(s) Oral three times a day with meals  pyridoxine 100 milliGRAM(s) Oral daily  rivastigmine patch  4.6 mG/24 Hr(s) 1 Patch Transdermal every 24 hours  sodium chloride 0.9%. 1000 milliLiter(s) IV Continuous <Continuous>  timolol 0.25% Solution 1 Drop(s) Both EYES daily    MEDICATIONS  (PRN):  acetaminophen     Tablet .. 650 milliGRAM(s) Oral every 6 hours PRN Temp greater or equal to 38C (100.4F), Mild Pain (1 - 3)  melatonin 3 milliGRAM(s) Oral at bedtime PRN Insomnia  zolpidem 5 milliGRAM(s) Oral at bedtime PRN Insomnia    ___________  Active diet:  Diet, Regular:   DASH/TLC Sodium & Cholesterol Restricted (DASH)  Soft and Bite Sized (SOFTBTSZ)  No Dairy  ___________________    ==================>> VITAL SIGNS <<==================  Height (cm): 162.6  Weight (kg): 59  BMI (kg/m2): 22.3  Vital Signs Last 24 HrsT(C): 36.4 (06-10-22 @ 11:11)  T(F): 97.5 (06-10-22 @ 11:11), Max: 97.8 (06-09-22 @ 20:37)  HR: 66 (06-10-22 @ 15:58) (66 - 100)  BP: 119/71 (06-10-22 @ 15:58)  RR: 18 (06-10-22 @ 11:11) (17 - 18)  SpO2: 97% (06-10-22 @ 15:58) (94% - 97%)      CAPILLARY BLOOD GLUCOSE         ==================>> LAB AND IMAGING <<==================                        10.4   7.31  )-----------( 165      ( 10 Waldemar 2022 07:27 )             33.2        06-10    139  |  106  |  40<H>  ----------------------------<  73  3.8   |  20<L>  |  1.07    Ca    9.0      10 Waldemar 2022 07:14  Mg     1.7     06-08    TPro  6.1  /  Alb  3.0<L>  /  TBili  0.2  /  DBili  x   /  AST  44<H>  /  ALT  36  /  AlkPhos  106  06-09    WBC count:   7.31 <<== ,  6.78 <<== ,  8.47 <<== ,  7.97 <<==   Hemoglobin:   10.4 <<==,  10.0 <<==,  12.0 <<==,  12.0 <<==  platelets:  165 <==, 156 <==, 168 <==, 209 <==    Creatinine:  1.07  <<==, 1.10  <<==, 1.47  <<==, 1.53  <<==, 1.70  <<==, 1.52  <<==  Sodium:   139  <==, 136  <==, 137  <==, 135  <==, 139  <==, 137  <==, 136  <==       AST:          44 <== , 27 <== , 34 <== , 38 <== , 34 <==      ALT:        36  <== , 29  <== , 31  <== , 36  <== , 74  <==      AP:        106  <=, 120  <=, 116  <=, 136  <=, 146  <=     Bili:        0.2  <=, 0.3  <=, 0.4  <=, 0.5  <=, 0.5  <=    ____________________________    M I C R O B I O L O G Y :    Culture - Urine (collected 08 Jun 2022 17:30)  Source: Clean Catch Clean Catch (Midstream)  Final Report (09 Jun 2022 21:26):    <10,000 CFU/mL Normal Urogenital Madelyn    Culture - Sputum (collected 02 Jun 2022 09:05)  Source: .Sputum Sputum  Gram Stain (04 Jun 2022 11:44):    No polymorphonuclear leukocytes seen per low power field    Moderate Squamous epithelial cells seen per low power field    Moderate Gram positive cocci in pairs seen per oil power field  Final Report (04 Jun 2022 11:44):    Normal Respiratory Madeyln present    Culture - Blood (collected 01 Jun 2022 09:11)  Source: .Blood Blood-Peripheral  Final Report (06 Jun 2022 10:00):    No Growth Final    Culture - Blood (collected 01 Jun 2022 09:11)  Source: .Blood Blood-Peripheral  Final Report (06 Jun 2022 10:00):    No Growth Final        COVID-19 PCR: NotDetec (06-08-22 @ 16:52)     _________________________________________________________________________________________  ========>>  M E D I C A L   A T T E N D I N G    F O L L O W  U P  N O T E  <<=========  -----------------------------------------------------------------------------------------------------    - Patient seen and examined by me earlier today.   - In summary,  HOWARD SCHULZ is a 89y year old woman admitted with weakness  - Patient today overall doing ok, comfortable, eating ok, feels overall much better, walked with PT..     ==================>> REVIEW OF SYSTEM <<=================    GEN: no fever, no chills, no pain, tired  RESP: no SOB, no cough, no sputum  CVS: no chest pain, no palpitations, no edema  GI: no abdominal pain, no nausea  : no dysuria, no frequency  Neuro: no headache, no dizziness   Derm : no itching, no rash    ==================>> PHYSICAL EXAM <<=================    GEN: A&O X 3 , NAD , comfortable, pleasant, calm  in chair   HEENT: NCAT, PERRL, MMM, hearing intact  Neck: supple , no JVD appreciated  CVS: S1S2 , regular , No M/R/G appreciated  PULM: CTA B/L,  no W/R/R appreciated  ABD.: soft. non tender, non distended,  bowel sounds present  Extrem: intact pulses , no edema   PSYCH : normal mood,  not anxious                             ( Note written / Date of service 06-10-22 )    ==================>> MEDICATIONS <<====================    apixaban 2.5 milliGRAM(s) Oral every 12 hours  atorvastatin 20 milliGRAM(s) Oral at bedtime  budesonide 160 MICROgram(s)/formoterol 4.5 MICROgram(s) Inhaler 2 Puff(s) Inhalation two times a day  calcium carbonate 1250 mG  + Vitamin D (OsCal 500 + D) 1 Tablet(s) Oral daily  cefTRIAXone   IVPB 1000 milliGRAM(s) IV Intermittent every 24 hours  cyanocobalamin 1000 MICROGram(s) Oral daily  escitalopram 10 milliGRAM(s) Oral daily  latanoprost 0.005% Ophthalmic Solution 1 Drop(s) Both EYES at bedtime  metoprolol tartrate 12.5 milliGRAM(s) Oral two times a day  multivitamin 1 Tablet(s) Oral daily  pancrelipase  (CREON 36,000 Lipase Units) 1 Capsule(s) Oral three times a day with meals  pyridoxine 100 milliGRAM(s) Oral daily  rivastigmine patch  4.6 mG/24 Hr(s) 1 Patch Transdermal every 24 hours  sodium chloride 0.9%. 1000 milliLiter(s) IV Continuous <Continuous>  timolol 0.25% Solution 1 Drop(s) Both EYES daily    MEDICATIONS  (PRN):  acetaminophen     Tablet .. 650 milliGRAM(s) Oral every 6 hours PRN Temp greater or equal to 38C (100.4F), Mild Pain (1 - 3)  melatonin 3 milliGRAM(s) Oral at bedtime PRN Insomnia  zolpidem 5 milliGRAM(s) Oral at bedtime PRN Insomnia    ___________  Active diet:  Diet, Regular:   DASH/TLC Sodium & Cholesterol Restricted (DASH)  Soft and Bite Sized (SOFTBTSZ)  No Dairy  ___________________    ==================>> VITAL SIGNS <<==================    Height (cm): 162.6  Weight (kg): 59  BMI (kg/m2): 22.3  Vital Signs Last 24 HrsT(C): 36.4 (06-10-22 @ 11:11)  T(F): 97.5 (06-10-22 @ 11:11), Max: 97.8 (06-09-22 @ 20:37)  HR: 66 (06-10-22 @ 15:58) (66 - 100)  BP: 119/71 (06-10-22 @ 15:58)  RR: 18 (06-10-22 @ 11:11) (17 - 18)  SpO2: 97% (06-10-22 @ 15:58) (94% - 97%)       ==================>> LAB AND IMAGING <<==================                        10.4   7.31  )-----------( 165      ( 10 Waldemar 2022 07:27 )             33.2        06-10    139  |  106  |  40<H>  ----------------------------<  73  3.8   |  20<L>  |  1.07    Ca    9.0      10 Waldemar 2022 07:14  Mg     1.7     06-08    TPro  6.1  /  Alb  3.0<L>  /  TBili  0.2  /  DBili  x   /  AST  44<H>  /  ALT  36  /  AlkPhos  106  06-09    WBC count:   7.31 <<== ,  6.78 <<== ,  8.47 <<== ,  7.97 <<==   Hemoglobin:   10.4 <<==,  10.0 <<==,  12.0 <<==,  12.0 <<==  platelets:  165 <==, 156 <==, 168 <==, 209 <==    Creatinine:  1.07  <<==, 1.10  <<==, 1.47  <<==, 1.53  <<==, 1.70  <<==, 1.52  <<==  Sodium:   139  <==, 136  <==, 137  <==, 135  <==, 139  <==, 137  <==, 136  <==       AST:          44 <== , 27 <== , 34 <== , 38 <== , 34 <==      ALT:        36  <== , 29  <== , 31  <== , 36  <== , 74  <==      AP:        106  <=, 120  <=, 116  <=, 136  <=, 146  <=     Bili:        0.2  <=, 0.3  <=, 0.4  <=, 0.5  <=, 0.5  <=    ____________________________    M I C R O B I O L O G Y :    Culture - Urine (collected 08 Jun 2022 17:30)  Source: Clean Catch Clean Catch (Midstream)  Final Report (09 Jun 2022 21:26):    <10,000 CFU/mL Normal Urogenital Madelyn    Culture - Sputum (collected 02 Jun 2022 09:05)  Source: .Sputum Sputum  Gram Stain (04 Jun 2022 11:44):    No polymorphonuclear leukocytes seen per low power field    Moderate Squamous epithelial cells seen per low power field    Moderate Gram positive cocci in pairs seen per oil power field  Final Report (04 Jun 2022 11:44):    Normal Respiratory Madelyn present    Culture - Blood (collected 01 Jun 2022 09:11)  Source: .Blood Blood-Peripheral  Final Report (06 Jun 2022 10:00):    No Growth Final    Culture - Blood (collected 01 Jun 2022 09:11)  Source: .Blood Blood-Peripheral  Final Report (06 Jun 2022 10:00):    No Growth Final      TSH:      1.130   (06-06-22)       ,     0.90   (04-06-22)           < from: CT Abdomen and Pelvis No Cont (06.08.22 @ 18:16) >  IMPRESSION:  Diverticulosis without evidence of diverticulitis. No acute finding on   this noncontrast study to explain patient's symptomatology.  < end of copied text >    ___________________________________________________________________________________  ===============>>  A S S E S S M E N T   A N D   P L A N <<===============  ------------------------------------------------------------------------------------------    · Assessment	  89F w/PMH of Alzheimer Disease, HTN, CVA (difficulty with swallowing), HLD, Asthma, Afib , recent admission to   for SOB c/b afib wrvr started on digoxin and treated with lasix, discharged home on oxygen 2L NC  ; p/w hypotension x 1 day      Problem/Plan - 1:  ·  Problem: Hypotension.   ·  Plan: UA weakly positive , cxr clear afebrile , no leukocytosis , H/H stable compared to discharge labs , suspect hypovolemia from diuresis , s/p 500cc IV fluid bolus x 3 , with improvement in BP   - hed diuretic   - monitor BP given episode of hypotension as noted..   - cardio following, appreciated     Problem/Plan - 2:  ·  Problem: UTI (urinary tract infection).   ·  Plan: - continue ceftriaxone X3 doses > finish today   - f/u urine cultures.    Problem/Plan - 3:  ·  Problem: Chronic atrial fibrillation.   ·  Plan: - continue Eliquis   - holding metoprolol in setting of hypotension , can resume once clinically improved   - continue digoxin.    Problem/Plan - 4:  ·  Problem: Pancreatic insufficiency.   ·  Plan: - continue pancrelipase.    Problem/Plan - 5:  ·  Problem: CHF (congestive heart failure).   ·  Plan: bnp decreased compared to recent , appears dry on exam , s/p IV fluids   - hold lasix   - strict ins and outs.    Problem/Plan - 6:  ·  Problem: Asthma.   ·  Plan: - continue Symbicort  - continue supplemental o2 as needed.    Problem/Plan - 7:  ·  Problem: Dementia.   ·  Plan: - continue  rivastigmine patch   - zolpidem hs prn   - aspiration precautions   - easy to chew diet, thin liquids.    -GI/DVT Prophylaxis per protocol.    medically stable for DC home     --------------------------------------------  Case discussed with pt, NP ( d/w Dtr)   Education given on findings and plan of care  ___________________________  H. TERI Mcdonnell.  Pager: [phone]

## 2022-06-10 NOTE — CONSULT NOTE ADULT - SUBJECTIVE AND OBJECTIVE BOX
CHIEF COMPLAINT: weakness    HISTORY OF PRESENT ILLNESS:  89 year old  female w/PMH of Alzheimer Disease, HTN, CVA (difficulty with swallowing), HLD, Asthma, Afib , [facility] ( 5/31/22-6/7/22)  for SOB thought to be aspiration pna vs HF ,course c/b afib wrvr started on digoxin and treated with lasix, discharged home on oxygen 2L NC  ; patient now presents for hypotension and lightheadedness at post discharge follow up visit with PCP on day of admission. per daughter, patient was more fatigued and lightheaded on day of admission. Patient  complained of lower abdominal pain as well as diarrhea consistent with known pancreatic insufficiency. no black stools or blood in stool.   No fever or chills.       Allergies    Benadryl (Other)    Intolerances    	    MEDICATIONS:  apixaban 2.5 milliGRAM(s) Oral every 12 hours  digoxin     Tablet 125 MICROGram(s) Oral daily    cefTRIAXone   IVPB 1000 milliGRAM(s) IV Intermittent every 24 hours    budesonide 160 MICROgram(s)/formoterol 4.5 MICROgram(s) Inhaler 2 Puff(s) Inhalation two times a day    acetaminophen     Tablet .. 650 milliGRAM(s) Oral every 6 hours PRN  escitalopram 10 milliGRAM(s) Oral daily  melatonin 3 milliGRAM(s) Oral at bedtime PRN  rivastigmine patch  4.6 mG/24 Hr(s) 1 Patch Transdermal every 24 hours  zolpidem 5 milliGRAM(s) Oral at bedtime PRN    pancrelipase  (CREON 36,000 Lipase Units) 1 Capsule(s) Oral three times a day with meals    atorvastatin 20 milliGRAM(s) Oral at bedtime    calcium carbonate 1250 mG  + Vitamin D (OsCal 500 + D) 1 Tablet(s) Oral daily  cyanocobalamin 1000 MICROGram(s) Oral daily  latanoprost 0.005% Ophthalmic Solution 1 Drop(s) Both EYES at bedtime  multivitamin 1 Tablet(s) Oral daily  pyridoxine 100 milliGRAM(s) Oral daily  timolol 0.25% Solution 1 Drop(s) Both EYES daily      PAST MEDICAL & SURGICAL HISTORY:  Benign essential hypertension      Dyslipidemia      CVA (cerebral infarction)      Alzheimer disease      Hypertension      Asthma, mild      Other nonspecific abnormal finding of lung field      Anxiety      History of bilateral breast reduction surgery      History of appendectomy      History of cosmetic plastic surgery  face lift      History of abdominoplasty      H/O surgical biopsy  right lung; 05/2018      History of cataract extraction  bilateral, with lens implants      H/O varicose vein ligation and stripping  both legs      H/O umbilical hernia repair      Status post coronary angiogram  2017          FAMILY HISTORY:  No pertinent family history in first degree relatives        SOCIAL HISTORY:    non smoker. indep in adl    REVIEW OF SYSTEMS:  See HPI, otherwise complete 10 point review of systems negative    [ ] All others negative	      PHYSICAL EXAM:  T(C): 36.5 (06-09-22 @ 04:37), Max: 37.4 (06-08-22 @ 15:36)  HR: 71 (06-09-22 @ 04:37) (43 - 88)  BP: 98/58 (06-09-22 @ 04:37) (81/44 - 107/63)  RR: 18 (06-09-22 @ 04:37) (14 - 21)  SpO2: 94% (06-09-22 @ 04:37) (94% - 100%)  Wt(kg): --  I&O's Summary    08 Jun 2022 07:01  -  09 Jun 2022 07:00  --------------------------------------------------------  IN: 0 mL / OUT: 500 mL / NET: -500 mL        Appearance: No Acute Distress	  HEENT:  Normal oral mucosa, PERRL, EOMI	  Cardiovascular: Normal S1 S2, No JVD, 2/6 ricardo  Respiratory: Lungs clear to auscultation bilaterally  Gastrointestinal:  Soft, Non-tender, + BS	  Skin: No rashes, No ecchymoses, No cyanosis	  Neurologic: Non-focal  Extremities: No clubbing, cyanosis or edema  Vascular: Peripheral pulses palpable 2+ bilaterally  Psychiatry: A & O x 3, Mood & affect appropriate    Laboratory Data:	 	    CBC Full  -  ( 09 Jun 2022 00:26 )  WBC Count : 8.47 K/uL  Hemoglobin : 12.0 g/dL  Hematocrit : 38.2 %  Platelet Count - Automated : 168 K/uL  Mean Cell Volume : 96.5 fl  Mean Cell Hemoglobin : 30.3 pg  Mean Cell Hemoglobin Concentration : 31.4 gm/dL  Auto Neutrophil # : x  Auto Lymphocyte # : x  Auto Monocyte # : x  Auto Eosinophil # : x  Auto Basophil # : x  Auto Neutrophil % : x  Auto Lymphocyte % : x  Auto Monocyte % : x  Auto Eosinophil % : x  Auto Basophil % : x    06-09    137  |  101  |  54<H>  ----------------------------<  105<H>  3.7   |  21<L>  |  1.47<H>  06-08    135  |  101  |  58<H>  ----------------------------<  87  5.4<H>   |  21<L>  |  1.53<H>    Ca    8.7      09 Jun 2022 00:26  Ca    8.7      08 Jun 2022 18:53  Mg     1.7     06-08    TPro  7.1  /  Alb  3.6  /  TBili  0.3  /  DBili  x   /  AST  27  /  ALT  29  /  AlkPhos  120  06-09  TPro  6.8  /  Alb  3.3  /  TBili  0.4  /  DBili  x   /  AST  34  /  ALT  31  /  AlkPhos  116  06-08      proBNP: Serum Pro-Brain Natriuretic Peptide: 2764 pg/mL (06-08 @ 16:54)    Lipid Profile:   HgA1c:   TSH:       CARDIAC MARKERS:            Interpretation of Telemetry: 	 af rate controlled   ECG:  	  RADIOLOGY:  OTHER: 	    PREVIOUS DIAGNOSTIC TESTING:    [ ] Echocardiogram:  [ ] Catheterization:  [ ] Stress Test:  	    Assessment:  weakness  OMA  AAA, stable  TAA, stable  hx of CVA  dementia  nonobs CAD     Recs:  cardiac stable  vol status stable. cont to hold diuretics. no objection to gentle IV hydration if needed to maintain MAP > 60  afib currently rate controlled. concerned about potential dig toxicity in setting of oma. hold dig for now, resume at lower dose (0.625mg daily) once renal fxn normalizes. eventually trial lopressor 12.5mg bid with hold parameters  recent 2d echo results noted - mild to moderate mixed valvular disease, normal lv/rv function  recent pharm nst neg for ischemia  cw antiplatelets and antianginals  abx per primary team  will follow            Greater than 60 minutes spent on total encounter; more than 50% of the visit was spent counseling and/or coordinating care by the attending physician.   	  Redd Delacruz MD   Cardiovascular Diseases  [phone]    
HPI:  Patient is a 89 year old  female w/PMH of Alzheimer Disease, HTN, CVA (difficulty with swallowing), HLD, Asthma, Afib , [facility] ( 22-22)  for SOB thought to be aspiration pna vs HF ,course c/b afib wrvr started on digoxin and treated with lasix, discharged home on oxygen 2L NC  ; patient now presents for hypotension and lightheadedness at post discharge follow up visit with PCP on day of admission. per daughter, patient was more fatigued and lightheaded on day of admission. Patient  complained of lower abdominal pain as well as diarrhea consistent with known pancreatic insufficiency. no black stools or blood in stool.      Allergies:  Benadryl (Other)        Hospital Medications:  acetaminophen     Tablet .. 650 milliGRAM(s) Oral every 6 hours PRN  apixaban 2.5 milliGRAM(s) Oral every 12 hours  atorvastatin 20 milliGRAM(s) Oral at bedtime  budesonide 160 MICROgram(s)/formoterol 4.5 MICROgram(s) Inhaler 2 Puff(s) Inhalation two times a day  calcium carbonate 1250 mG  + Vitamin D (OsCal 500 + D) 1 Tablet(s) Oral daily  cefTRIAXone   IVPB 1000 milliGRAM(s) IV Intermittent every 24 hours  cyanocobalamin 1000 MICROGram(s) Oral daily  escitalopram 10 milliGRAM(s) Oral daily  latanoprost 0.005% Ophthalmic Solution 1 Drop(s) Both EYES at bedtime  melatonin 3 milliGRAM(s) Oral at bedtime PRN  metoprolol tartrate 12.5 milliGRAM(s) Oral two times a day  multivitamin 1 Tablet(s) Oral daily  pancrelipase  (CREON 36,000 Lipase Units) 1 Capsule(s) Oral three times a day with meals  pyridoxine 100 milliGRAM(s) Oral daily  rivastigmine patch  4.6 mG/24 Hr(s) 1 Patch Transdermal every 24 hours  sodium chloride 0.9%. 1000 milliLiter(s) IV Continuous <Continuous>  timolol 0.25% Solution 1 Drop(s) Both EYES daily  zolpidem 5 milliGRAM(s) Oral at bedtime PRN      PMHX/PSHX:  Benign essential hypertension    Dyslipidemia    CVA (cerebral infarction)    Alzheimer disease    Hypertension    Asthma, mild    Other nonspecific abnormal finding of lung field    Anxiety    No significant past surgical history    History of bilateral breast reduction surgery    History of appendectomy    History of cosmetic plastic surgery    History of abdominoplasty    H/O surgical biopsy    History of cataract extraction    H/O varicose vein ligation and stripping    H/O umbilical hernia repair    Status post coronary angiogram        Family history:  No pertinent family history in first degree relatives    No pertinent family history in first degree relatives        Social History: no smoking    ROS:   General:  No fevers, chills or night sweats  ENT:  No sore throat or dysphagia  CV:  No pain or palpitations  Resp:  No dyspnea, cough or  wheezing  GI:  as above  Skin:  No rash or edema  Neuro: no weakness   Hematologic: no bleeding  Musculoskeletal: no muscle pain or join pain  Psych: no agitation     : no dysuria      PHYSICAL EXAM:   GENERAL:  NAD, Appears stated age  HEENT:  NC/AT,  conjunctivae clear and pink, sclera -anicteric  CHEST:  CTA B/L, Normal effort  HEART:  RRR S1/S2,  ABDOMEN:  Soft, non-tender, non-distended,  no masses   EXTREMITIES:  No cyanosis or Edema  SKIN:  Warm & Dry. No rash or erythema  NEURO:  Alert, oriented, no focal deficit    Vital Signs:  Vital Signs Last 24 Hrs  T(C): 36.4 (10 Waldemar 2022 03:53), Max: 36.6 (2022 20:37)  T(F): 97.6 (10 Waldemar 2022 03:53), Max: 97.8 (2022 20:37)  HR: 100 (10 Waldemar 2022 03:53) (55 - 100)  BP: 114/72 (10 Waldemar 2022 03:53) (83/54 - 114/72)  BP(mean): --  RR: 18 (10 Waldemar 2022 03:53) (17 - 18)  SpO2: 95% (10 Waldemar 2022 03:53) (95% - 96%)  Daily     Daily     LABS:                        10.4   7.31  )-----------( 165      ( 10 Waldemar 2022 07:27 )             33.2     Mean Cell Volume: 96.5 fl (06-10-22 @ 07:27)    06-10    139  |  106  |  40<H>  ----------------------------<  73  3.8   |  20<L>  |  1.07    Ca    9.0      10 Waldemar 2022 07:14  Mg     1.7     06-08    TPro  6.1  /  Alb  3.0<L>  /  TBili  0.2  /  DBili  x   /  AST  44<H>  /  ALT  36  /  AlkPhos  106  06-09    LIVER FUNCTIONS - ( 2022 20:45 )  Alb: 3.0 g/dL / Pro: 6.1 g/dL / ALK PHOS: 106 U/L / ALT: 36 U/L / AST: 44 U/L / GGT: x           PT/INR - ( 2022 18:10 )   PT: 18.3 sec;   INR: 1.58 ratio         PTT - ( 2022 18:10 )  PTT:19.4 sec  Urinalysis Basic - ( 2022 17:30 )    Color: Yellow / Appearance: Clear / S.011 / pH: x  Gluc: x / Ketone: Negative  / Bili: Negative / Urobili: Negative   Blood: x / Protein: Negative / Nitrite: Negative   Leuk Esterase: Moderate / RBC: 1 /hpf / WBC 6 /HPF   Sq Epi: x / Non Sq Epi: 1 /hpf / Bacteria: Negative                              10.4   7.31  )-----------( 165      ( 10 Waldemar 2022 07:27 )             33.2                         10.0   6.78  )-----------( 156      ( 2022 20:45 )             31.6                         12.0   8.47  )-----------( 168      ( 2022 00:26 )             38.2                         12.0   7.97  )-----------( 209      ( 2022 16:53 )             38.1     Imaging:

## 2022-06-10 NOTE — CONSULT NOTE ADULT - ATTENDING COMMENTS
89F, patient of Dr Dudley following for pancreatic insufficiency.   Currently reporting hard stools and resolution of diarrhea.   Will continue to monitor. If recurrence of diarrhea and medically optimized, will consider colonsocopy for intraluminal evaluation w/ biopsies to r/o microscopic colitis.
no

## 2022-06-10 NOTE — PHYSICAL THERAPY INITIAL EVALUATION ADULT - BALANCE TRAINING, PT EVAL
GOAL: Patient will demonstrate an increase in static/dynamic balance in sitting/standing where deficient by at least 1 grade to facilitate greater independence during functional mobility and ADL's.

## 2022-06-11 ENCOUNTER — TRANSCRIPTION ENCOUNTER (OUTPATIENT)
Age: 87
End: 2022-06-11

## 2022-06-11 LAB
ANION GAP SERPL CALC-SCNC: 13 MMOL/L — SIGNIFICANT CHANGE UP (ref 5–17)
BUN SERPL-MCNC: 28 MG/DL — HIGH (ref 7–23)
CALCIUM SERPL-MCNC: 8.9 MG/DL — SIGNIFICANT CHANGE UP (ref 8.4–10.5)
CHLORIDE SERPL-SCNC: 105 MMOL/L — SIGNIFICANT CHANGE UP (ref 96–108)
CO2 SERPL-SCNC: 21 MMOL/L — LOW (ref 22–31)
CREAT SERPL-MCNC: 0.95 MG/DL — SIGNIFICANT CHANGE UP (ref 0.5–1.3)
EGFR: 56 ML/MIN/1.73M2 — LOW
GLUCOSE SERPL-MCNC: 77 MG/DL — SIGNIFICANT CHANGE UP (ref 70–99)
HCT VFR BLD CALC: 31.2 % — LOW (ref 34.5–45)
HGB BLD-MCNC: 10 G/DL — LOW (ref 11.5–15.5)
MCHC RBC-ENTMCNC: 31.1 PG — SIGNIFICANT CHANGE UP (ref 27–34)
MCHC RBC-ENTMCNC: 32.1 GM/DL — SIGNIFICANT CHANGE UP (ref 32–36)
MCV RBC AUTO: 96.9 FL — SIGNIFICANT CHANGE UP (ref 80–100)
NRBC # BLD: 0 /100 WBCS — SIGNIFICANT CHANGE UP (ref 0–0)
PLATELET # BLD AUTO: 175 K/UL — SIGNIFICANT CHANGE UP (ref 150–400)
POTASSIUM SERPL-MCNC: 4.2 MMOL/L — SIGNIFICANT CHANGE UP (ref 3.5–5.3)
POTASSIUM SERPL-SCNC: 4.2 MMOL/L — SIGNIFICANT CHANGE UP (ref 3.5–5.3)
RBC # BLD: 3.22 M/UL — LOW (ref 3.8–5.2)
RBC # FLD: 12.6 % — SIGNIFICANT CHANGE UP (ref 10.3–14.5)
SODIUM SERPL-SCNC: 139 MMOL/L — SIGNIFICANT CHANGE UP (ref 135–145)
WBC # BLD: 6.81 K/UL — SIGNIFICANT CHANGE UP (ref 3.8–10.5)
WBC # FLD AUTO: 6.81 K/UL — SIGNIFICANT CHANGE UP (ref 3.8–10.5)

## 2022-06-11 RX ORDER — PYRIDOXINE HCL (VITAMIN B6) 100 MG
1 TABLET ORAL
Qty: 0 | Refills: 0 | DISCHARGE
Start: 2022-06-11

## 2022-06-11 RX ORDER — RIVASTIGMINE 4.6 MG/24H
1 PATCH, EXTENDED RELEASE TRANSDERMAL
Qty: 0 | Refills: 0 | DISCHARGE
Start: 2022-06-11

## 2022-06-11 RX ORDER — ATORVASTATIN CALCIUM 80 MG/1
1 TABLET, FILM COATED ORAL
Qty: 0 | Refills: 0 | DISCHARGE
Start: 2022-06-11

## 2022-06-11 RX ORDER — APIXABAN 2.5 MG/1
1 TABLET, FILM COATED ORAL
Qty: 0 | Refills: 0 | DISCHARGE
Start: 2022-06-11

## 2022-06-11 RX ORDER — METOPROLOL TARTRATE 50 MG
25 TABLET ORAL
Refills: 0 | Status: DISCONTINUED | OUTPATIENT
Start: 2022-06-11 | End: 2022-06-14

## 2022-06-11 RX ORDER — TIMOLOL 0.5 %
1 DROPS OPHTHALMIC (EYE)
Qty: 0 | Refills: 0 | DISCHARGE

## 2022-06-11 RX ORDER — METOPROLOL TARTRATE 50 MG
1 TABLET ORAL
Qty: 0 | Refills: 0 | DISCHARGE
Start: 2022-06-11

## 2022-06-11 RX ORDER — TIMOLOL 0.5 %
1 DROPS OPHTHALMIC (EYE)
Qty: 0 | Refills: 0 | DISCHARGE
Start: 2022-06-11

## 2022-06-11 RX ORDER — BUDESONIDE AND FORMOTEROL FUMARATE DIHYDRATE 160; 4.5 UG/1; UG/1
2 AEROSOL RESPIRATORY (INHALATION)
Qty: 0 | Refills: 0 | DISCHARGE
Start: 2022-06-11

## 2022-06-11 RX ORDER — ZOLPIDEM TARTRATE 10 MG/1
1 TABLET ORAL
Qty: 0 | Refills: 0 | DISCHARGE
Start: 2022-06-11

## 2022-06-11 RX ORDER — ESCITALOPRAM OXALATE 10 MG/1
1 TABLET, FILM COATED ORAL
Qty: 0 | Refills: 0 | DISCHARGE
Start: 2022-06-11

## 2022-06-11 RX ORDER — PREGABALIN 225 MG/1
1 CAPSULE ORAL
Qty: 0 | Refills: 0 | DISCHARGE
Start: 2022-06-11

## 2022-06-11 RX ORDER — LIPASE/PROTEASE/AMYLASE 16-48-48K
1 CAPSULE,DELAYED RELEASE (ENTERIC COATED) ORAL
Qty: 0 | Refills: 0 | DISCHARGE
Start: 2022-06-11

## 2022-06-11 RX ADMIN — RIVASTIGMINE 1 PATCH: 4.6 PATCH, EXTENDED RELEASE TRANSDERMAL at 21:13

## 2022-06-11 RX ADMIN — LATANOPROST 1 DROP(S): 0.05 SOLUTION/ DROPS OPHTHALMIC; TOPICAL at 21:39

## 2022-06-11 RX ADMIN — Medication 1 DROP(S): at 11:18

## 2022-06-11 RX ADMIN — Medication 12.5 MILLIGRAM(S): at 05:43

## 2022-06-11 RX ADMIN — Medication 1 CAPSULE(S): at 08:13

## 2022-06-11 RX ADMIN — ESCITALOPRAM OXALATE 10 MILLIGRAM(S): 10 TABLET, FILM COATED ORAL at 11:18

## 2022-06-11 RX ADMIN — ZOLPIDEM TARTRATE 5 MILLIGRAM(S): 10 TABLET ORAL at 21:38

## 2022-06-11 RX ADMIN — RIVASTIGMINE 1 PATCH: 4.6 PATCH, EXTENDED RELEASE TRANSDERMAL at 07:11

## 2022-06-11 RX ADMIN — BUDESONIDE AND FORMOTEROL FUMARATE DIHYDRATE 2 PUFF(S): 160; 4.5 AEROSOL RESPIRATORY (INHALATION) at 05:43

## 2022-06-11 RX ADMIN — Medication 1 CAPSULE(S): at 17:02

## 2022-06-11 RX ADMIN — RIVASTIGMINE 1 PATCH: 4.6 PATCH, EXTENDED RELEASE TRANSDERMAL at 05:49

## 2022-06-11 RX ADMIN — Medication 1 TABLET(S): at 11:18

## 2022-06-11 RX ADMIN — APIXABAN 2.5 MILLIGRAM(S): 2.5 TABLET, FILM COATED ORAL at 05:43

## 2022-06-11 RX ADMIN — Medication 100 MILLIGRAM(S): at 11:18

## 2022-06-11 RX ADMIN — APIXABAN 2.5 MILLIGRAM(S): 2.5 TABLET, FILM COATED ORAL at 17:02

## 2022-06-11 RX ADMIN — ATORVASTATIN CALCIUM 20 MILLIGRAM(S): 80 TABLET, FILM COATED ORAL at 21:36

## 2022-06-11 RX ADMIN — RIVASTIGMINE 1 PATCH: 4.6 PATCH, EXTENDED RELEASE TRANSDERMAL at 05:44

## 2022-06-11 RX ADMIN — Medication 1 CAPSULE(S): at 11:21

## 2022-06-11 RX ADMIN — PREGABALIN 1000 MICROGRAM(S): 225 CAPSULE ORAL at 11:18

## 2022-06-11 RX ADMIN — Medication 25 MILLIGRAM(S): at 17:02

## 2022-06-11 RX ADMIN — BUDESONIDE AND FORMOTEROL FUMARATE DIHYDRATE 2 PUFF(S): 160; 4.5 AEROSOL RESPIRATORY (INHALATION) at 17:02

## 2022-06-11 NOTE — DISCHARGE NOTE PROVIDER - CARE PROVIDER_API CALL
Dr Reilly Villegas  PMD  Phone: [phone]  Fax: (   )    -  Follow Up Time: 2 weeks    Jerrod Dudley)  Gastroenterology; Internal Medicine  [address] [place] [address]  Phone: [phone]  Fax: [phone]  Follow Up Time: Routine   Jerrod Dudley)  Gastroenterology; Internal Medicine  [address] [place] [address]  Phone: [phone]  Fax: [phone]  Follow Up Time: Routine    Dr Reilly Villegas  PMD  Phone: [phone]  Fax: (   )    -  Follow Up Time: 2 weeks    Redd Delacruz)  Internal Medicine  [address] [address]  Phone: [phone]  Fax: [phone]  Follow Up Time: 2 weeks

## 2022-06-11 NOTE — DISCHARGE NOTE PROVIDER - CARE PROVIDERS DIRECT ADDRESSES
,DirectAddress_Unknown,[email or web address],DirectAddress_Unknown,DirectAddress_Unknown

## 2022-06-11 NOTE — PROGRESS NOTE ADULT - ASSESSMENT
admitted   with weakness  LORENZO  AAA, stable/  see  ct below   anmeia,  stbale  hx of CVA  Dementia  nonobs CAD   .lasix 20mg po qd   afib currently rate controlled. on   25mg bid.    cont to hold dig for now, pe r card  t 2d echo  - mild to moderate mixed valvular disease, normal lv/rv functi  cw antiplatelets and antianginals       rad< from: CT Abdomen and Pelvis No Cont (06.08.22 @ 18:16) >  VESSELS: Atherosclerotic changes. Infrarenal abdominal aortic aneurysm   measuring up to3.4 cm, not significant changed.  RETROPERITONEUM/LYMPH NODES: No lymphadenopathy.  ABDOMINAL WALL: Small fat-containing left inguinal hernia.  BONES: Degenerative changes of the spine.  IMPRESSION:  Diverticulosis without evidence of diverticulitis. No acute finding on   this noncontrast study to explain patient's symptomatology.  --- End of Report ---  < end of copied text >

## 2022-06-11 NOTE — DISCHARGE NOTE PROVIDER - PROVIDER TOKENS
FREE:[LAST:[Peidik],FIRST:[Dr Grover],PHONE:[phone]],FAX:[(   )    -],ADDRESS:[PMD],FOLLOWUP:[2 weeks]],PROVIDER:[TOKEN:[[id number]],FOLLOWUP:[Routine]] PROVIDER:[TOKEN:[[id number]],FOLLOWUP:[Routine]],FREE:[LAST:[Peidik],FIRST:[Dr Grover],PHONE:[phone]],FAX:[(   )    -],ADDRESS:[PMD],FOLLOWUP:[2 weeks]],PROVIDER:[TOKEN:[[id number]],FOLLOWUP:[2 weeks]]

## 2022-06-11 NOTE — DISCHARGE NOTE PROVIDER - NSDCCAREPROVSEEN_GEN_ALL_CORE_FT
Christine Mcdonnell Christine Delacruz  Ordering Physician  Jerrod Mancia  Team [facility] Gastroenterology  Advance PracticeTeam [facility] Medicine

## 2022-06-11 NOTE — CHART NOTE - NSCHARTNOTEFT_GEN_A_CORE
MEDICINE NP NOTE  Spectra [id number]    Case discussed with GI Fellow, Dr Ilia Mcqueen, plan for colonoscopy on Monday to rule out microscopic colitis given patient's weight loss and frequent episodes of diarrhea.  Patient to be on clear liquid diet on 6/12/22, Covid swab ordered. GI will order prep on 6/12/22. Will need cardiac clearance.  Dr Mcdonnell aware.

## 2022-06-11 NOTE — DISCHARGE NOTE PROVIDER - NSDCFUSCHEDAPPT_GEN_ALL_CORE_FT
Jerrod Dudley  [facility] Physician [facility]  Gastro [address]  Scheduled Appointment: 06/20/2022    Jerrod Dudley  [facility]  GASTRO [address]  Scheduled Appointment: 08/17/2022     [facility]  DIAGRAD [address]  Scheduled Appointment: 07/26/2022    Jerrod Dudley  [facility]  GASTRO [address]  Scheduled Appointment: 08/17/2022    Jerrod Dudley  [facility]  GASTRO [address]  Scheduled Appointment: 08/29/2022

## 2022-06-11 NOTE — DISCHARGE NOTE PROVIDER - HOSPITAL COURSE
89F w/PMH of Alzheimer Disease, HTN, CVA (difficulty with swallowing), HLD, Asthma, Afib , recent admission to [facility]  for SOB c/b afib wrvr started on digoxin and treated with lasix, discharged home on oxygen 2L NC  ; p/w hypotension x 1 day       Problem/Plan - 1:  ·  Problem: Hypotension.   ·  Plan: UA weakly positive , cxr clear afebrile , no leukocytosis , H/H stable compared to discharge labs , suspect hypovolemia from diuresis , s/p 500cc IV fluid bolus x 3 , with improvement in BP   - hed diuretic   - monitor BP given episode of hypotension as noted..   - cardio following, appreciated      Problem/Plan - 2:  ·  Problem: UTI (urinary tract infection).   ·  Plan: - continue ceftriaxone X3 doses   - f/u urine cultures.     Problem/Plan - 3:  ·  Problem: Chronic atrial fibrillation.   ·  Plan: - continue Eliquis   - holding metoprolol in setting of hypotension , can resume once clinically improved   - continue digoxin.     Problem/Plan - 4:  ·  Problem: Pancreatic insufficiency.   ·  Plan: - continue pancrelipase.     Problem/Plan - 5:  ·  Problem: CHF (congestive heart failure).   ·  Plan: bnp decreased compared to recent , appears dry on exam , s/p IV fluids   - hold lasix   - strict ins and outs.     Problem/Plan - 6:  ·  Problem: Asthma.   ·  Plan: - continue Symbicort  - continue supplemental o2 as needed.     Problem/Plan - 7:  ·  Problem: Dementia.   ·  Plan: - continue  rivastigmine patch   - zolpidem hs prn   - aspiration precautions   - easy to chew diet, thin liquids.      medically stable for DC home    HPI:  89 year old  female w/PMH of Alzheimer Disease, HTN, CVA (difficulty with swallowing), HLD, Asthma, Afib , [facility] ( 5/31/22-6/7/22)  for SOB thought to be aspiration pna vs HF ,course c/b afib wrvr started on digoxin and treated with lasix, discharged home on oxygen 2L NC  ; patient now presents for hypotension and lightheadedness at post discharge follow up visit with PCP on day of admission. per daughter, patient was more fatigued and lightheaded on day of admission. Patient  complained of lower abdominal pain as well as diarrhea consistent with known pancreatic insufficiency. no black stools or blood in stool.   No fever or chills.   Hospital Course:  #hypotension  - possible from diuresis, lasix held now resumes, tolerating  - improved    #CHF  cardiac stable  vol status stable. cw lasix 20mg po qd   afib currently rate controlled. cw lopressor 25mg bid.  cont to hold dig for now  recent 2d echo results noted - mild to moderate mixed valvular disease, normal lv/rv function  recent pharm nst neg for ischemia  cw antiplatelets and antianginals    # UTI (urinary tract infection).   status post abx : observe ff     # Chronic atrial fibrillation.   - continue Eliquis   - held metoprolol in setting of hypotension , now resumed and tolerating  - dig being held    #Pancreatic insufficiency.   - continue pancrelipase.  Colonoscopy 6/13:   - Preparation of the colon was inadequate.                       - Diverticulosis in the entire examined colon.                       - One small polyp in the ascending colon. No specimens collected.                       - The examined portion of the ileum was normal.                       - Biopsies were taken with a cold forceps from the right colon and left colon                        for evaluation of microscopic colitis.  Recommendation:      - Return patient to hospital [facility] for ongoing care.                       - Advance diet as tolerated.                       - Await pathology results.                       - Continue Creon with meals, Imodium PRN.                       - Patient can follow-up as outpaitent for pathology results. Follow-up with                        Dr. Dudley at [phone]. No objection for discharge home today from GI                        standpoint.                       - Repeat colonoscopy is not recommended due to current age (75 years or                        older) for screening purposes.    -biopsies taken but pt had already taken Eliquis >> monitored overnight, no overt signs of bleeding, H/H overall stable    # Asthma.   - continue Symbicort  - continue supplemental o2 as needed.    #Dementia.   - continue  rivastigmine patch   - zolpidem hs prn   - aspiration precautions   - easy to chew diet, thin liquids.    Pt HDS and safe for discharge. Discussed with medicine attending. Med rec to be reviewed prior to discharge.        89 year old  female w/PMH of Alzheimer Disease, HTN, CVA (difficulty with swallowing), HLD, Asthma, Afib , [facility] ( 5/31/22-6/7/22)  for SOB thought to be aspiration pna vs HF ,course c/b afib wrvr started on digoxin and treated with lasix, discharged home on oxygen 2L NC  ; patient now presents for hypotension and lightheadedness at post discharge follow up visit with PCP on day of admission. per daughter, patient was more fatigued and lightheaded on day of admission. Patient  complained of lower abdominal pain as well as diarrhea consistent with known pancreatic insufficiency. no black stools or blood in stool.   No fever or chills.   Summery of hospital course:  Patient was seen and evaluated and followed by multiple specialists throughout the stay and treated medically with improvement.  Patient was otherwise stable and had no other complications. See chart for further details.  Patient was discharged to home in stable condition to follow up with primary doctor as outpatient for follow up and further care.

## 2022-06-11 NOTE — DISCHARGE NOTE PROVIDER - NSDCCPCAREPLAN_GEN_ALL_CORE_FT
PRINCIPAL DISCHARGE DIAGNOSIS  Diagnosis: Hypotension  Assessment and Plan of Treatment:       SECONDARY DISCHARGE DIAGNOSES  Diagnosis: Urinary tract infection  Assessment and Plan of Treatment: You have completed your entire course of antibiotics.   Avoid caffeine, tea, and carbonated beverages. They tend to irritate your bladder.  Empty your bladder often. Avoid holding urine for long periods of time.  Empty your bladder before and after sexual intercourse.  After a bowel movement, women should cleanse from front to back. Use each tissue only once.  SEEK MEDICAL CARE IF:  You have back pain.  You develop a fever.  Your symptoms do not begin to resolve within 3 days.  SEEK IMMEDIATE MEDICAL CARE IF:  You have severe back pain or lower abdominal pain.  You develop chills.  You have nausea or vomiting.  You have continued burning or discomfort with urination.      Diagnosis: Chronic atrial fibrillation  Assessment and Plan of Treatment: Atrial fibrillation is the most common heart rhythm problem & has the risk of stroke & heart attack  It helps if you control your blood pressure, not drink more than 1-2 alcohol drinks per day, cut down on caffeine, getting treatment for over active thyroid gland, & getting exercise  Call your doctor if you feel your heart racing or beating unusually, chest tightness or pain, lightheaded, faint, shortness of breath especially with exercise  It is important to take your heart medication as prescribed  You may be on anticoagulation which is very important to take as directed - you may need blood work to monitor drug levels      Diagnosis: Congestive heart failure  Assessment and Plan of Treatment: Weigh yourself daily.  If you gain 3lbs in 3 days, or 5lbs in a week call your Health Care Provider.  Do not eat or drink foods containing more than 2000mg of salt (sodium) in your diet every day.  Call your Health Care Provider if you have any swelling or increased swelling in your feet, ankles, and/or stomach.  The Pt was provided with CHF diet instruction (low sodium diet, daily weights, label reading, Heart Healthy Cooking Tips & Heart Healthy shopping Tips).  Take all of your medication as directed.  If you become dizzy call your Health Care Provider.      Diagnosis: Pancreatic insufficiency  Assessment and Plan of Treatment:     Diagnosis: Dementia  Assessment and Plan of Treatment:      PRINCIPAL DISCHARGE DIAGNOSIS  Diagnosis: Hypotension  Assessment and Plan of Treatment: Take all your medications as prescribed by your physician.  Your Lasix was held during your hospital course, you can restart it on 6/12/22.   Follow up with Dr Delacruz in 2 weeks.      SECONDARY DISCHARGE DIAGNOSES  Diagnosis: Urinary tract infection  Assessment and Plan of Treatment: You have completed your entire course of antibiotics.   Avoid caffeine, tea, and carbonated beverages. They tend to irritate your bladder.  Empty your bladder often. Avoid holding urine for long periods of time.  Empty your bladder before and after sexual intercourse.  After a bowel movement, women should cleanse from front to back. Use each tissue only once.  SEEK MEDICAL CARE IF:  You have back pain.  You develop a fever.  Your symptoms do not begin to resolve within 3 days.  SEEK IMMEDIATE MEDICAL CARE IF:  You have severe back pain or lower abdominal pain.  You develop chills.  You have nausea or vomiting.  You have continued burning or discomfort with urination.      Diagnosis: Chronic atrial fibrillation  Assessment and Plan of Treatment: Atrial fibrillation is the most common heart rhythm problem & has the risk of stroke & heart attack  It helps if you control your blood pressure, not drink more than 1-2 alcohol drinks per day, cut down on caffeine, getting treatment for over active thyroid gland, & getting exercise  Call your doctor if you feel your heart racing or beating unusually, chest tightness or pain, lightheaded, faint, shortness of breath especially with exercise  It is important to take your heart medication as prescribed  You may be on anticoagulation which is very important to take as directed - you may need blood work to monitor drug levels      Diagnosis: Congestive heart failure  Assessment and Plan of Treatment: Weigh yourself daily.  If you gain 3lbs in 3 days, or 5lbs in a week call your Health Care Provider.  Do not eat or drink foods containing more than 2000mg of salt (sodium) in your diet every day.  Call your Health Care Provider if you have any swelling or increased swelling in your feet, ankles, and/or stomach.  The Pt was provided with CHF diet instruction (low sodium diet, daily weights, label reading, Heart Healthy Cooking Tips & Heart Healthy shopping Tips).  Take all of your medication as directed.  If you become dizzy call your Health Care Provider.      Diagnosis: Pancreatic insufficiency  Assessment and Plan of Treatment: Continue pancrelipase.  Follow up with Dr Dudley as previously scheduled.    Diagnosis: Dementia  Assessment and Plan of Treatment: Continue  rivastigmine patch   Zolpidem as needed for insomnia  Aspiration precautions   Easy to chew diet, thin liquids.       PRINCIPAL DISCHARGE DIAGNOSIS  Diagnosis: Hypotension  Assessment and Plan of Treatment: Take all your medications as prescribed by your physician.  Your Lasix was held during your hospital course, this was restarted and tolerating  Lopressor is also continued and your Blood pressure is tolerating, please continue as ordered  Follow up with Dr Delacruz in 2 weeks.      SECONDARY DISCHARGE DIAGNOSES  Diagnosis: Urinary tract infection  Assessment and Plan of Treatment: You have completed your entire course of antibiotics.   Avoid caffeine, tea, and carbonated beverages. They tend to irritate your bladder.  Empty your bladder often. Avoid holding urine for long periods of time.  Empty your bladder before and after sexual intercourse.  After a bowel movement, women should cleanse from front to back. Use each tissue only once.  SEEK MEDICAL CARE IF:  You have back pain.  You develop a fever.  Your symptoms do not begin to resolve within 3 days.  SEEK IMMEDIATE MEDICAL CARE IF:  You have severe back pain or lower abdominal pain.  You develop chills.  You have nausea or vomiting.  You have continued burning or discomfort with urination.  NO need for further antibiotics      Diagnosis: Chronic atrial fibrillation  Assessment and Plan of Treatment: Atrial fibrillation is the most common heart rhythm problem & has the risk of stroke & heart attack  It helps if you control your blood pressure, not drink more than 1-2 alcohol drinks per day, cut down on caffeine, getting treatment for over active thyroid gland, & getting exercise  Call your doctor if you feel your heart racing or beating unusually, chest tightness or pain, lightheaded, faint, shortness of breath especially with exercise  It is important to take your heart medication as prescribed  You may be on anticoagulation which is very important to take as directed - you may need blood work to monitor drug levels  Continue eliquis and lopressor  digoxin was held, continue to hold per cardiology and   Follow up with Dr. Jayme arnold      Diagnosis: Congestive heart failure  Assessment and Plan of Treatment: Weigh yourself daily.  If you gain 3lbs in 3 days, or 5lbs in a week call your Health Care Provider.  Do not eat or drink foods containing more than 2000mg of salt (sodium) in your diet every day.  Call your Health Care Provider if you have any swelling or increased swelling in your feet, ankles, and/or stomach.  The Pt was provided with CHF diet instruction (low sodium diet, daily weights, label reading, Heart Healthy Cooking Tips & Heart Healthy shopping Tips).  Take all of your medication as directed.  If you become dizzy call your Health Care Provider.  Continue lasix and follow up with cardiology    Diagnosis: Pancreatic insufficiency  Assessment and Plan of Treatment: Continue pancrelipase.  Follow up with Dr. Dudley as previously scheduled.    Diagnosis: Dementia  Assessment and Plan of Treatment: Continue  rivastigmine patch   Zolpidem as needed for insomnia  Aspiration precautions   Easy to chew diet, thin liquids.      Diagnosis: Diarrhea  Assessment and Plan of Treatment: You had a Colonoscopy done which has diverticulosis and small polyp. Biopsies were taken to assess for microscopic colitis  The results can be followed up as an outpatinet with GI, Dr. Dudley  Continue creon and as needed imodium over the counter 2mg after episode of diarrhea, maximum 16 mg /day

## 2022-06-11 NOTE — DISCHARGE NOTE PROVIDER - NSDCMRMEDTOKEN_GEN_ALL_CORE_FT
apixaban 2.5 mg oral tablet: 1 tab(s) orally every 12 hours  atorvastatin 20 mg oral tablet: 1 tab(s) orally once a day (at bedtime)  budesonide-formoterol 160 mcg-4.5 mcg/inh inhalation aerosol: 2 puff(s) inhaled 2 times a day  calcium-vitamin D 500 mg-5 mcg (200 intl units) oral tablet: 1 tab(s) orally once a day  cyanocobalamin 1000 mcg oral tablet: 1 tab(s) orally once a day  escitalopram 10 mg oral tablet: 1 tab(s) orally once a day  Lumigan 0.01% ophthalmic solution: 1 drop(s) to each affected eye 2 times a week  metoprolol tartrate 25 mg oral tablet: 1 tab(s) orally 2 times a day  Multiple Vitamins oral tablet: 1 tab(s) orally once a day  pancrelipase 36,000 units-114,000 units-180,000 units oral delayed release capsule: 1 cap(s) orally 3 times a day (with meals)  pyridoxine 100 mg oral tablet: 1 tab(s) orally once a day  Restasis 0.05% ophthalmic emulsion: 1 drop(s) to each affected eye 2 times a day  rivastigmine 4.6 mg/24 hr transdermal film, extended release: 1 patch transdermal every 24 hours  timolol maleate 0.25% ophthalmic solution: 1 drop(s) to each affected eye once a day  zolpidem 5 mg oral tablet: 1 tab(s) orally once a day (at bedtime), As needed, Insomnia   apixaban 2.5 mg oral tablet: 1 tab(s) orally every 12 hours  atorvastatin 20 mg oral tablet: 1 tab(s) orally once a day (at bedtime)  budesonide-formoterol 160 mcg-4.5 mcg/inh inhalation aerosol: 2 puff(s) inhaled 2 times a day  calcium-vitamin D 500 mg-5 mcg (200 intl units) oral tablet: 1 tab(s) orally once a day  cyanocobalamin 1000 mcg oral tablet: 1 tab(s) orally once a day  escitalopram 10 mg oral tablet: 1 tab(s) orally once a day  Lasix 20 mg oral tablet: 1 tab(s) orally once a day, can start on 6/12/22  Lumigan 0.01% ophthalmic solution: 1 drop(s) to each affected eye 2 times a week  metoprolol tartrate 25 mg oral tablet: 1 tab(s) orally 2 times a day  Multiple Vitamins oral tablet: 1 tab(s) orally once a day  pancrelipase 36,000 units-114,000 units-180,000 units oral delayed release capsule: 1 cap(s) orally 3 times a day (with meals)  pyridoxine 100 mg oral tablet: 1 tab(s) orally once a day  Restasis 0.05% ophthalmic emulsion: 1 drop(s) to each affected eye 2 times a day  rivastigmine 4.6 mg/24 hr transdermal film, extended release: 1 patch transdermal every 24 hours  timolol maleate 0.25% ophthalmic solution: 1 drop(s) to each affected eye once a day  zolpidem 5 mg oral tablet: 1 tab(s) orally once a day (at bedtime), As needed, Insomnia   apixaban 2.5 mg oral tablet: 1 tab(s) orally every 12 hours  atorvastatin 20 mg oral tablet: 1 tab(s) orally once a day (at bedtime)  budesonide-formoterol 160 mcg-4.5 mcg/inh inhalation aerosol: 2 puff(s) inhaled 2 times a day  calcium-vitamin D 500 mg-5 mcg (200 intl units) oral tablet: 1 tab(s) orally once a day  cyanocobalamin 1000 mcg oral tablet: 1 tab(s) orally once a day  escitalopram 10 mg oral tablet: 1 tab(s) orally once a day  Lasix 20 mg oral tablet: 1 tab(s) orally once a day  Lumigan 0.01% ophthalmic solution: 1 drop(s) to each affected eye 2 times a week  metoprolol tartrate 25 mg oral tablet: 1 tab(s) orally 2 times a day  Multiple Vitamins oral tablet: 1 tab(s) orally once a day  pancrelipase 36,000 units-114,000 units-180,000 units oral delayed release capsule: 1 cap(s) orally 3 times a day (with meals)  pyridoxine 100 mg oral tablet: 1 tab(s) orally once a day  Restasis 0.05% ophthalmic emulsion: 1 drop(s) to each affected eye 2 times a day  rivastigmine 4.6 mg/24 hr transdermal film, extended release: 1 patch transdermal every 24 hours  timolol maleate 0.25% ophthalmic solution: 1 drop(s) to each affected eye once a day  zolpidem 5 mg oral tablet: 1 tab(s) orally once a day (at bedtime), As needed, Insomnia

## 2022-06-12 LAB
ANION GAP SERPL CALC-SCNC: 10 MMOL/L — SIGNIFICANT CHANGE UP (ref 5–17)
BUN SERPL-MCNC: 23 MG/DL — SIGNIFICANT CHANGE UP (ref 7–23)
CALCIUM SERPL-MCNC: 8.6 MG/DL — SIGNIFICANT CHANGE UP (ref 8.4–10.5)
CHLORIDE SERPL-SCNC: 106 MMOL/L — SIGNIFICANT CHANGE UP (ref 96–108)
CO2 SERPL-SCNC: 21 MMOL/L — LOW (ref 22–31)
CREAT SERPL-MCNC: 0.89 MG/DL — SIGNIFICANT CHANGE UP (ref 0.5–1.3)
EGFR: 60 ML/MIN/1.73M2 — SIGNIFICANT CHANGE UP
GLUCOSE SERPL-MCNC: 79 MG/DL — SIGNIFICANT CHANGE UP (ref 70–99)
HCT VFR BLD CALC: 31.1 % — LOW (ref 34.5–45)
HGB BLD-MCNC: 10 G/DL — LOW (ref 11.5–15.5)
MCHC RBC-ENTMCNC: 30.6 PG — SIGNIFICANT CHANGE UP (ref 27–34)
MCHC RBC-ENTMCNC: 32.2 GM/DL — SIGNIFICANT CHANGE UP (ref 32–36)
MCV RBC AUTO: 95.1 FL — SIGNIFICANT CHANGE UP (ref 80–100)
NRBC # BLD: 0 /100 WBCS — SIGNIFICANT CHANGE UP (ref 0–0)
PLATELET # BLD AUTO: 156 K/UL — SIGNIFICANT CHANGE UP (ref 150–400)
POTASSIUM SERPL-MCNC: 4.4 MMOL/L — SIGNIFICANT CHANGE UP (ref 3.5–5.3)
POTASSIUM SERPL-SCNC: 4.4 MMOL/L — SIGNIFICANT CHANGE UP (ref 3.5–5.3)
RBC # BLD: 3.27 M/UL — LOW (ref 3.8–5.2)
RBC # FLD: 12.6 % — SIGNIFICANT CHANGE UP (ref 10.3–14.5)
SARS-COV-2 RNA SPEC QL NAA+PROBE: SIGNIFICANT CHANGE UP
SODIUM SERPL-SCNC: 137 MMOL/L — SIGNIFICANT CHANGE UP (ref 135–145)
WBC # BLD: 6.48 K/UL — SIGNIFICANT CHANGE UP (ref 3.8–10.5)
WBC # FLD AUTO: 6.48 K/UL — SIGNIFICANT CHANGE UP (ref 3.8–10.5)

## 2022-06-12 RX ORDER — SOD SULF/SODIUM/NAHCO3/KCL/PEG
1000 SOLUTION, RECONSTITUTED, ORAL ORAL EVERY 4 HOURS
Refills: 0 | Status: COMPLETED | OUTPATIENT
Start: 2022-06-12 | End: 2022-06-12

## 2022-06-12 RX ADMIN — RIVASTIGMINE 1 PATCH: 4.6 PATCH, EXTENDED RELEASE TRANSDERMAL at 06:48

## 2022-06-12 RX ADMIN — Medication 25 MILLIGRAM(S): at 17:18

## 2022-06-12 RX ADMIN — ATORVASTATIN CALCIUM 20 MILLIGRAM(S): 80 TABLET, FILM COATED ORAL at 22:06

## 2022-06-12 RX ADMIN — ESCITALOPRAM OXALATE 10 MILLIGRAM(S): 10 TABLET, FILM COATED ORAL at 11:32

## 2022-06-12 RX ADMIN — Medication 1 TABLET(S): at 11:32

## 2022-06-12 RX ADMIN — Medication 1000 MILLILITER(S): at 20:43

## 2022-06-12 RX ADMIN — RIVASTIGMINE 1 PATCH: 4.6 PATCH, EXTENDED RELEASE TRANSDERMAL at 06:36

## 2022-06-12 RX ADMIN — Medication 1 CAPSULE(S): at 17:18

## 2022-06-12 RX ADMIN — Medication 1000 MILLILITER(S): at 17:20

## 2022-06-12 RX ADMIN — BUDESONIDE AND FORMOTEROL FUMARATE DIHYDRATE 2 PUFF(S): 160; 4.5 AEROSOL RESPIRATORY (INHALATION) at 05:19

## 2022-06-12 RX ADMIN — Medication 25 MILLIGRAM(S): at 05:16

## 2022-06-12 RX ADMIN — Medication 1 DROP(S): at 11:32

## 2022-06-12 RX ADMIN — Medication 1 CAPSULE(S): at 11:31

## 2022-06-12 RX ADMIN — Medication 100 MILLIGRAM(S): at 11:32

## 2022-06-12 RX ADMIN — APIXABAN 2.5 MILLIGRAM(S): 2.5 TABLET, FILM COATED ORAL at 17:19

## 2022-06-12 RX ADMIN — ZOLPIDEM TARTRATE 5 MILLIGRAM(S): 10 TABLET ORAL at 22:06

## 2022-06-12 RX ADMIN — RIVASTIGMINE 1 PATCH: 4.6 PATCH, EXTENDED RELEASE TRANSDERMAL at 07:12

## 2022-06-12 RX ADMIN — Medication 1 CAPSULE(S): at 08:11

## 2022-06-12 RX ADMIN — PREGABALIN 1000 MICROGRAM(S): 225 CAPSULE ORAL at 11:33

## 2022-06-12 RX ADMIN — Medication 3 MILLIGRAM(S): at 22:05

## 2022-06-12 RX ADMIN — APIXABAN 2.5 MILLIGRAM(S): 2.5 TABLET, FILM COATED ORAL at 05:17

## 2022-06-12 RX ADMIN — BUDESONIDE AND FORMOTEROL FUMARATE DIHYDRATE 2 PUFF(S): 160; 4.5 AEROSOL RESPIRATORY (INHALATION) at 17:18

## 2022-06-12 NOTE — CHART NOTE - NSCHARTNOTEFT_GEN_A_CORE
asked to clear patient for colonoscopy  patient with rate controlled afib. euvolemiv. no ischemic or HF sx on this admission  hold lasix while npo  no cardiac obj to holding eliquis as needed  patient at elevated but acceptable cardiac risk. CAN PROCEED TO COLONOSCOPY WITHOUT FURTHER CARDIAC WORKUP    Redd Delacruz MD  Cardiology  [phone]

## 2022-06-12 NOTE — PROGRESS NOTE ADULT - ASSESSMENT
_________________________________________________________________________________________  ========>>  M E D I C A L   A T T E N D I N G    F O L L O W  U P  N O T E  <<=========  -----------------------------------------------------------------------------------------------------    - Patient seen and examined by me earlier today.   - In summary,  HOWARD SCHULZ is a 89y year old woman admitted with weakness  - Patient today overall doing ok, comfortable, eating ok, feels overall much better, walked with PT..       noted events and discussed with NP        reportedly per private conversation of pt's Dtr and GI >> plan is for an inpatient colonoscopy tomorrow for chronic diarrhea ( although pt with known pancreatic insufficiency  )    ==================>> REVIEW OF SYSTEM <<=================    GEN: no fever, no chills, no pain  RESP: no SOB, no cough, no sputum  CVS: no chest pain, no palpitations, no edema  GI: no abdominal pain, no nausea, + started prep with diarrhea..   : no dysuria, no frequency  Neuro: no headache, no dizziness   Derm : no itching, no rash    ==================>> PHYSICAL EXAM <<=================    GEN: A&O X 3 , NAD , comfortable, pleasant, calm sitting on bed    HEENT: NCAT, PERRL, MMM, hearing intact  Neck: supple , no JVD appreciated  CVS: S1S2 , regular , No M/R/G appreciated  PULM: CTA B/L,  no W/R/R appreciated  ABD.: soft. non tender, non distended,  bowel sounds present  Extrem: intact pulses , no edema   PSYCH : normal mood,  not anxious                             ( Note written / Date of service 06-12-22 )    ==================>> MEDICATIONS <<====================    apixaban 2.5 milliGRAM(s) Oral every 12 hours  atorvastatin 20 milliGRAM(s) Oral at bedtime  budesonide 160 MICROgram(s)/formoterol 4.5 MICROgram(s) Inhaler 2 Puff(s) Inhalation two times a day  calcium carbonate 1250 mG  + Vitamin D (OsCal 500 + D) 1 Tablet(s) Oral daily  cyanocobalamin 1000 MICROGram(s) Oral daily  escitalopram 10 milliGRAM(s) Oral daily  latanoprost 0.005% Ophthalmic Solution 1 Drop(s) Both EYES at bedtime  metoprolol tartrate 25 milliGRAM(s) Oral two times a day  multivitamin 1 Tablet(s) Oral daily  pancrelipase  (CREON 36,000 Lipase Units) 1 Capsule(s) Oral three times a day with meals  polyethylene glycol/electrolyte Solution 1000 milliLiter(s) Oral every 4 hours  pyridoxine 100 milliGRAM(s) Oral daily  rivastigmine patch  4.6 mG/24 Hr(s) 1 Patch Transdermal every 24 hours  timolol 0.25% Solution 1 Drop(s) Both EYES daily    MEDICATIONS  (PRN):  acetaminophen     Tablet .. 650 milliGRAM(s) Oral every 6 hours PRN Temp greater or equal to 38C (100.4F), Mild Pain (1 - 3)  melatonin 3 milliGRAM(s) Oral at bedtime PRN Insomnia  zolpidem 5 milliGRAM(s) Oral at bedtime PRN Insomnia    ___________  Active diet:  Diet, NPO after Midnight:      NPO Start Date: 12-Jun-2022,   NPO Start Time: 23:59  Diet, Clear Liquid  ___________________    ==================>> VITAL SIGNS <<==================    Vital Signs Last 24 HrsT(C): 36.4 (06-12-22 @ 11:12)  T(F): 97.5 (06-12-22 @ 11:12), Max: 97.7 (06-11-22 @ 20:11)  HR: 70 (06-12-22 @ 17:16) (66 - 78)  BP: 103/68 (06-12-22 @ 17:16)  RR: 18 (06-12-22 @ 11:12) (18 - 18)  SpO2: 93% (06-12-22 @ 11:12) (93% - 95%)       ==================>> LAB AND IMAGING <<==================                        10.0   6.48  )-----------( 156      ( 12 Jun 2022 06:55 )             31.1        06-12    137  |  106  |  23  ----------------------------<  79  4.4   |  21<L>  |  0.89    Ca    8.6      12 Jun 2022 06:55    WBC count:   6.48 <<== ,  6.81 <<== ,  7.31 <<== ,  6.78 <<== ,  8.47 <<== ,  7.97 <<==   Hemoglobin:   10.0 <<==,  10.0 <<==,  10.4 <<==,  10.0 <<==,  12.0 <<==,  12.0 <<==  platelets:  156 <==, 175 <==, 165 <==, 156 <==, 168 <==, 209 <==    Creatinine:  0.89  <<==, 0.95  <<==, 1.07  <<==, 1.10  <<==, 1.47  <<==, 1.53  <<==  Sodium:   137  <==, 139  <==, 139  <==, 136  <==, 137  <==, 135  <==, 139  <==       AST:          44 <== , 27 <== , 34 <== , 38 <==      ALT:        36  <== , 29  <== , 31  <== , 36  <==      AP:        106  <=, 120  <=, 116  <=, 136  <=     Bili:        0.2  <=, 0.3  <=, 0.4  <=, 0.5  <=    ____________________________    M I C R O B I O L O G Y :    Culture - Blood (collected 09 Jun 2022 11:00)  Source: .Blood Blood-Peripheral  Preliminary Report (10 Waldemar 2022 18:01):    No growth to date.    Culture - Blood (collected 09 Jun 2022 11:00)  Source: .Blood Blood-Peripheral  Preliminary Report (10 Waldemar 2022 18:01):    No growth to date.    Culture - Urine (collected 08 Jun 2022 17:30)  Source: Clean Catch Clean Catch (Midstream)  Final Report (09 Jun 2022 21:26):    <10,000 CFU/mL Normal Urogenital Madelyn      < from: CT Abdomen and Pelvis No Cont (06.08.22 @ 18:16) >  IMPRESSION:  Diverticulosis without evidence of diverticulitis. No acute finding on   this noncontrast study to explain patient's symptomatology.  < end of copied text >    ___________________________________________________________________________________  ===============>>  A S S E S S M E N T   A N D   P L A N <<===============  ------------------------------------------------------------------------------------------    · Assessment	  89F w/PMH of Alzheimer Disease, HTN, CVA (difficulty with swallowing), HLD, Asthma, Afib , recent admission to [facility]  for SOB c/b afib wrvr started on digoxin and treated with lasix, discharged home on oxygen 2L NC  ; p/w hypotension x 1 day      Problem/Plan - 1:  ·  Problem: Hypotension.   - overall clinically better   - cardio following, appreciated   - med optimization and recom for DC meds     Problem/Plan - 2:  ·  Problem: UTI (urinary tract infection).   post abx : observe ff     Problem/Plan - 3:  ·  Problem: Chronic atrial fibrillation.   ·  Plan: - continue Eliquis   - holding metoprolol in setting of hypotension , can resume once clinically improved   - continue digoxin.    Problem/Plan - 4:  ·  Problem: Pancreatic insufficiency.   ·  Plan: - continue pancrelipase.  >> plan for colonoscopy  tomorrow    Problem/Plan - 5:  ·  Problem: CHF (congestive heart failure).   ·  Plan: bnp decreased compared to recent , appears dry on exam , s/p IV fluids   - hold lasix   - monitor ins and outs.    Problem/Plan - 6:  ·  Problem: Asthma.   ·  Plan: - continue Symbicort  - continue supplemental o2 as needed.    Problem/Plan - 7:  ·  Problem: Dementia.   ·  Plan: - continue  rivastigmine patch   - zolpidem hs prn   - aspiration precautions   - easy to chew diet, thin liquids.    -GI/DVT Prophylaxis per protocol.    medically stable for DC home     --------------------------------------------  Case discussed with pt, NP, cardio   Education given on findings and plan of care  ___________________________  HMario Mcdonnell D.O.  Pager: [phone]

## 2022-06-12 NOTE — CHART NOTE - NSCHARTNOTEFT_GEN_A_CORE
GI Brief Note:    Plan for Colonoscopy tomorrow. Keep on clear liquid diet, NPO after midnight.  Prep ordered, along enemas    Antione Cuevas MD  Gastroenterology/Hepatology Fellow  Contact on-call GI fellow via answering service ([phone]) from 5pm-8am AND on weekends/holidays GI Brief Note:    Plan for Colonoscopy tomorrow. Keep on clear liquid diet, NPO after midnight.  Prep ordered, along enemas  Obtain COVID swab    Antione Cuevas MD  Gastroenterology/Hepatology Fellow  Contact on-call GI fellow via answering service ([phone]) from 5pm-8am AND on weekends/holidays

## 2022-06-13 ENCOUNTER — TRANSCRIPTION ENCOUNTER (OUTPATIENT)
Age: 87
End: 2022-06-13

## 2022-06-13 ENCOUNTER — RESULT REVIEW (OUTPATIENT)
Age: 87
End: 2022-06-13

## 2022-06-13 LAB
ANION GAP SERPL CALC-SCNC: 12 MMOL/L — SIGNIFICANT CHANGE UP (ref 5–17)
BUN SERPL-MCNC: 23 MG/DL — SIGNIFICANT CHANGE UP (ref 7–23)
CALCIUM SERPL-MCNC: 9.4 MG/DL — SIGNIFICANT CHANGE UP (ref 8.4–10.5)
CHLORIDE SERPL-SCNC: 108 MMOL/L — SIGNIFICANT CHANGE UP (ref 96–108)
CO2 SERPL-SCNC: 20 MMOL/L — LOW (ref 22–31)
CREAT SERPL-MCNC: 1.11 MG/DL — SIGNIFICANT CHANGE UP (ref 0.5–1.3)
EGFR: 46 ML/MIN/1.73M2 — LOW
GLUCOSE SERPL-MCNC: 95 MG/DL — SIGNIFICANT CHANGE UP (ref 70–99)
HCT VFR BLD CALC: 34.7 % — SIGNIFICANT CHANGE UP (ref 34.5–45)
HGB BLD-MCNC: 10.9 G/DL — LOW (ref 11.5–15.5)
MAGNESIUM SERPL-MCNC: 1.6 MG/DL — SIGNIFICANT CHANGE UP (ref 1.6–2.6)
MCHC RBC-ENTMCNC: 30.4 PG — SIGNIFICANT CHANGE UP (ref 27–34)
MCHC RBC-ENTMCNC: 31.4 GM/DL — LOW (ref 32–36)
MCV RBC AUTO: 96.9 FL — SIGNIFICANT CHANGE UP (ref 80–100)
MRSA PCR RESULT.: SIGNIFICANT CHANGE UP
NRBC # BLD: 0 /100 WBCS — SIGNIFICANT CHANGE UP (ref 0–0)
PLATELET # BLD AUTO: 174 K/UL — SIGNIFICANT CHANGE UP (ref 150–400)
POTASSIUM SERPL-MCNC: 4.5 MMOL/L — SIGNIFICANT CHANGE UP (ref 3.5–5.3)
POTASSIUM SERPL-SCNC: 4.5 MMOL/L — SIGNIFICANT CHANGE UP (ref 3.5–5.3)
RBC # BLD: 3.58 M/UL — LOW (ref 3.8–5.2)
RBC # FLD: 12.7 % — SIGNIFICANT CHANGE UP (ref 10.3–14.5)
S AUREUS DNA NOSE QL NAA+PROBE: SIGNIFICANT CHANGE UP
SODIUM SERPL-SCNC: 140 MMOL/L — SIGNIFICANT CHANGE UP (ref 135–145)
WBC # BLD: 5.27 K/UL — SIGNIFICANT CHANGE UP (ref 3.8–10.5)
WBC # FLD AUTO: 5.27 K/UL — SIGNIFICANT CHANGE UP (ref 3.8–10.5)

## 2022-06-13 PROCEDURE — 88305 TISSUE EXAM BY PATHOLOGIST: CPT | Mod: 26

## 2022-06-13 PROCEDURE — 45380 COLONOSCOPY AND BIOPSY: CPT | Mod: GC

## 2022-06-13 DEVICE — NET RETRV ROT ROTH 2.5MMX230CM: Type: IMPLANTABLE DEVICE | Status: FUNCTIONAL

## 2022-06-13 RX ORDER — CHLORHEXIDINE GLUCONATE 213 G/1000ML
1 SOLUTION TOPICAL DAILY
Refills: 0 | Status: DISCONTINUED | OUTPATIENT
Start: 2022-06-13 | End: 2022-06-14

## 2022-06-13 RX ORDER — SODIUM CHLORIDE 9 MG/ML
500 INJECTION INTRAMUSCULAR; INTRAVENOUS; SUBCUTANEOUS
Refills: 0 | Status: DISCONTINUED | OUTPATIENT
Start: 2022-06-13 | End: 2022-06-13

## 2022-06-13 RX ORDER — FUROSEMIDE 40 MG
20 TABLET ORAL DAILY
Refills: 0 | Status: DISCONTINUED | OUTPATIENT
Start: 2022-06-14 | End: 2022-06-14

## 2022-06-13 RX ADMIN — RIVASTIGMINE 1 PATCH: 4.6 PATCH, EXTENDED RELEASE TRANSDERMAL at 05:44

## 2022-06-13 RX ADMIN — Medication 1 TABLET(S): at 15:04

## 2022-06-13 RX ADMIN — BUDESONIDE AND FORMOTEROL FUMARATE DIHYDRATE 2 PUFF(S): 160; 4.5 AEROSOL RESPIRATORY (INHALATION) at 05:44

## 2022-06-13 RX ADMIN — APIXABAN 2.5 MILLIGRAM(S): 2.5 TABLET, FILM COATED ORAL at 05:40

## 2022-06-13 RX ADMIN — RIVASTIGMINE 1 PATCH: 4.6 PATCH, EXTENDED RELEASE TRANSDERMAL at 08:12

## 2022-06-13 RX ADMIN — ATORVASTATIN CALCIUM 20 MILLIGRAM(S): 80 TABLET, FILM COATED ORAL at 21:43

## 2022-06-13 RX ADMIN — Medication 25 MILLIGRAM(S): at 17:55

## 2022-06-13 RX ADMIN — ESCITALOPRAM OXALATE 10 MILLIGRAM(S): 10 TABLET, FILM COATED ORAL at 15:04

## 2022-06-13 RX ADMIN — LATANOPROST 1 DROP(S): 0.05 SOLUTION/ DROPS OPHTHALMIC; TOPICAL at 21:43

## 2022-06-13 RX ADMIN — RIVASTIGMINE 1 PATCH: 4.6 PATCH, EXTENDED RELEASE TRANSDERMAL at 06:57

## 2022-06-13 RX ADMIN — Medication 1 CAPSULE(S): at 15:03

## 2022-06-13 RX ADMIN — Medication 25 MILLIGRAM(S): at 05:40

## 2022-06-13 RX ADMIN — Medication 1 DROP(S): at 15:03

## 2022-06-13 RX ADMIN — CHLORHEXIDINE GLUCONATE 1 APPLICATION(S): 213 SOLUTION TOPICAL at 15:04

## 2022-06-13 RX ADMIN — BUDESONIDE AND FORMOTEROL FUMARATE DIHYDRATE 2 PUFF(S): 160; 4.5 AEROSOL RESPIRATORY (INHALATION) at 21:43

## 2022-06-13 RX ADMIN — RIVASTIGMINE 1 PATCH: 4.6 PATCH, EXTENDED RELEASE TRANSDERMAL at 20:31

## 2022-06-13 RX ADMIN — PREGABALIN 1000 MICROGRAM(S): 225 CAPSULE ORAL at 15:03

## 2022-06-13 RX ADMIN — Medication 100 MILLIGRAM(S): at 15:04

## 2022-06-13 NOTE — PRE-ANESTHESIA EVALUATION ADULT - NSRADCARDRESULTSFT_GEN_ALL_CORE
< from: Transthoracic Echocardiogram (04.06.22 @ 08:12) >    CONCLUSIONS:  1. Mitral annular calcification, otherwise normal mitral  valve. Mild-moderate mitral regurgitation.  2. Calcified trileaflet aortic valve with decreased  opening. Peak transaortic valve gradient equals 32 mm Hg,  mean transaortic valve gradient equals 16 mm Hg, estimated  aortic valve area equals 1.7 sqcm (by continuity equation),  consistent with mild aortic stenosis. Mild-moderate aortic  regurgitation.  3. Moderately dilated left atrium.  LA volume index = 46  cc/m2.  4. Normal left ventricular internal dimensions and wall  thicknesses.  5. Normal left ventricular systolic function. No segmental  wall motion abnormalities.  6. Mild diastolic dysfunction (Stage I).  7. Normal right ventricular size and function.    < end of copied text >

## 2022-06-13 NOTE — DISCHARGE NOTE NURSING/CASE MANAGEMENT/SOCIAL WORK - NSDCVIVACCINE_GEN_ALL_CORE_FT
influenza, injectable, quadrivalent, preservative free; 13-Mar-2015 18:44; Umm Mcdaniel (RN); [employer]; [id number]; IntraMuscular; Deltoid Left.; 0.5 milliLiter(s); VIS (VIS Published: 19-Aug-2014, VIS Presented: 13-Mar-2015);   influenza, injectable, quadrivalent, preservative free; 08-Sep-2017 13:53; Hans Grey); [employer]; [id number]; IntraMuscular; Deltoid Right.; 0.5 milliLiter(s); VIS (VIS Published: 07-Aug-2015, VIS Presented: 08-Sep-2017);

## 2022-06-13 NOTE — PROGRESS NOTE ADULT - ASSESSMENT
_________________________________________________________________________________________  ========>>  M E D I C A L   A T T E N D I N G    F O L L O W  U P  N O T E  <<=========  -----------------------------------------------------------------------------------------------------    - Patient evaluated by me, chart reviewed.   - In summary,  HOWARD SCHULZ is a 89y year old woman admitted with weakness  - Patient today overall doing ok, comfortable, eating ok, feels overall much better, walked with PT..     ==================>> REVIEW OF SYSTEM <<=================    GEN: no fever, no chills, no pain  RESP: no SOB, no cough, no sputum  CVS: no chest pain, no palpitations, no edema  GI: no abdominal pain, no nausea  : no dysuria, no frequency  Neuro: no headache, no dizziness   Derm : no itching, no rash    ==================>> PHYSICAL EXAM <<=================    GEN: A&O X 2 , NAD , comfortable, pleasant, calm   HEENT: NCAT, PERRL, MMM, hearing intact  Neck: supple , no JVD appreciated  CVS: S1S2 , regular , No M/R/G appreciated  PULM: CTA B/L,  no W/R/R appreciated  ABD.: soft. non tender, non distended,  bowel sounds present  Extrem: intact pulses , no edema   PSYCH : normal mood,  not anxious                             ( Note written / Date of service 06-13-22 )    ==================>> MEDICATIONS <<====================    apixaban 2.5 milliGRAM(s) Oral every 12 hours  atorvastatin 20 milliGRAM(s) Oral at bedtime  budesonide 160 MICROgram(s)/formoterol 4.5 MICROgram(s) Inhaler 2 Puff(s) Inhalation two times a day  calcium carbonate 1250 mG  + Vitamin D (OsCal 500 + D) 1 Tablet(s) Oral daily  chlorhexidine 2% Cloths 1 Application(s) Topical daily  cyanocobalamin 1000 MICROGram(s) Oral daily  escitalopram 10 milliGRAM(s) Oral daily  latanoprost 0.005% Ophthalmic Solution 1 Drop(s) Both EYES at bedtime  metoprolol tartrate 25 milliGRAM(s) Oral two times a day  multivitamin 1 Tablet(s) Oral daily  pancrelipase  (CREON 36,000 Lipase Units) 1 Capsule(s) Oral three times a day with meals  pyridoxine 100 milliGRAM(s) Oral daily  rivastigmine patch  4.6 mG/24 Hr(s) 1 Patch Transdermal every 24 hours  timolol 0.25% Solution 1 Drop(s) Both EYES daily    MEDICATIONS  (PRN):  acetaminophen     Tablet .. 650 milliGRAM(s) Oral every 6 hours PRN Temp greater or equal to 38C (100.4F), Mild Pain (1 - 3)  melatonin 3 milliGRAM(s) Oral at bedtime PRN Insomnia  zolpidem 5 milliGRAM(s) Oral at bedtime PRN Insomnia    ___________  Active diet:  Diet, Regular:   DASH/TLC Sodium & Cholesterol Restricted (DASH)  Soft and Bite Sized (SOFTBTSZ)  No Dairy  ___________________    ==================>> VITAL SIGNS <<==================  Height (cm): 162.6  Weight (kg): 59  BMI (kg/m2): 22.3  Vital Signs Last 24 HrsT(C): 35.9 (06-13-22 @ 11:16)  T(F): 96.6 (06-13-22 @ 11:16), Max: 98.4 (06-12-22 @ 19:54)  HR: 77 (06-13-22 @ 13:00) (63 - 87)  BP: 122/62 (06-13-22 @ 13:00)  RR: 16 (06-13-22 @ 13:00) (16 - 18)  SpO2: 97% (06-13-22 @ 13:00) (95% - 98%)      CAPILLARY BLOOD GLUCOSE         ==================>> LAB AND IMAGING <<==================                        10.9   5.27  )-----------( 174      ( 13 Jun 2022 06:21 )             34.7        06-13    140  |  108  |  23  ----------------------------<  95  4.5   |  20<L>  |  1.11    Ca    9.4      13 Jun 2022 06:21  Mg     1.6     06-13      WBC count:   5.27 <<== ,  6.48 <<== ,  6.81 <<== ,  7.31 <<== ,  6.78 <<== ,  8.47 <<==   Hemoglobin:   10.9 <<==,  10.0 <<==,  10.0 <<==,  10.4 <<==,  10.0 <<==,  12.0 <<==  platelets:  174 <==, 156 <==, 175 <==, 165 <==, 156 <==, 168 <==, 209 <==    Creatinine:  1.11  <<==, 0.89  <<==, 0.95  <<==, 1.07  <<==, 1.10  <<==, 1.47  <<==  Sodium:   140  <==, 137  <==, 139  <==, 139  <==, 136  <==, 137  <==, 135  <==       AST:          44 <== , 27 <== , 34 <== , 38 <==      ALT:        36  <== , 29  <== , 31  <== , 36  <==      AP:        106  <=, 120  <=, 116  <=, 136  <=     Bili:        0.2  <=, 0.3  <=, 0.4  <=, 0.5  <=    ____________________________    M I C R O B I O L O G Y :    Culture - Blood (collected 09 Jun 2022 11:00)  Source: .Blood Blood-Peripheral  Preliminary Report (10 Waldemar 2022 18:01):    No growth to date.    Culture - Blood (collected 09 Jun 2022 11:00)  Source: .Blood Blood-Peripheral  Preliminary Report (10 Waldemar 2022 18:01):    No growth to date.    Culture - Urine (collected 08 Jun 2022 17:30)  Source: Clean Catch Clean Catch (Midstream)  Final Report (09 Jun 2022 21:26):    <10,000 CFU/mL Normal Urogenital Madelyn    < from: Colonoscopy (06.13.22 @ 11:11) >  Impression:    - Preparation of the colon was inadequate.                       - Diverticulosis in the entire examined colon.                       - One small polyp in the ascending colon. No specimens collected.                       - The examined portion of the ileum was normal.                       - Biopsies were taken with a cold forceps from the right colon and left colon                        for evaluation of microscopic colitis.  Recommendation:      - [facility] to [facility] for [facility] care.                       - Advance diet as tolerated.                       - Await pathology results.                       - Continue Creon with meals, Imodium PRN.                       - Patient can follow-up as outpaitent for pathology results. Follow-up with                        Dr. Dudley at [phone]. No objection for discharge home today from GI standpoint.                       - Repeat colonoscopy is not recommended due to current age (75 years or older) for screening purposes.  < end of copied text >    < from: CT Abdomen and Pelvis No Cont (06.08.22 @ 18:16) >  IMPRESSION:  Diverticulosis without evidence of diverticulitis. No acute finding on   this noncontrast study to explain patient's symptomatology.  < end of copied text >    ___________________________________________________________________________________  ===============>>  A S S E S S M E N T   A N D   P L A N <<===============  ------------------------------------------------------------------------------------------    · Assessment	  89F w/PMH of Alzheimer Disease, HTN, CVA (difficulty with swallowing), HLD, Asthma, Afib , recent admission to [facility]  for SOB c/b afib wrvr started on digoxin and treated with lasix, discharged home on oxygen 2L NC  ; p/w hypotension x 1 day      Problem/Plan - 1:  ·  Problem: Hypotension.   - overall clinically better   - cardio following, appreciated   - med optimization and recom for DC meds     Problem/Plan - 2:  ·  Problem: UTI (urinary tract infection).   post abx : observe ff     Problem/Plan - 3:  ·  Problem: Chronic atrial fibrillation.   ·  Plan: - continue Eliquis   - holding metoprolol in setting of hypotension , can resume once clinically improved   - continue digoxin.    Problem/Plan - 4:  ·  Problem: Pancreatic insufficiency.   ·  Plan: - continue pancrelipase.  >> colonoscopy  as above       biopsies taken but pt had already taken Eliquis >> would monitor overnight for any bleeding     Problem/Plan - 5:  ·  Problem: CHF (congestive heart failure).   ·  Plan: bnp decreased compared to recent , appears dry on exam , s/p IV fluids   - hold lasix   - monitor ins and outs.    Problem/Plan - 6:  ·  Problem: Asthma.   ·  Plan: - continue Symbicort  - continue supplemental o2 as needed.    Problem/Plan - 7:  ·  Problem: Dementia.   ·  Plan: - continue  rivastigmine patch   - zolpidem hs prn   - aspiration precautions   - easy to chew diet, thin liquids.    -GI/DVT Prophylaxis per protocol.    DC home in AM if stable     --------------------------------------------  Case discussed with  NP,   ___________________________  H. TERI Mcdonnell.  Pager: [phone]

## 2022-06-13 NOTE — PRE-ANESTHESIA EVALUATION ADULT - NSANTHPMHFT_GEN_ALL_CORE
89 yo F w/ PMHx of Alzheimer Disease, HTN, CVA (difficulty with swallowing), HLD, Asthma, Afib, pancreatic insufficiency, Valvular heart disease, HFpEF w/ recent admission to [facility]  for SOB c/b afib wrvr started on digoxin and treated with lasix, discharged home on oxygen 2L NC  ; p/w hypotension x 1 day. Admitted for aspiration PNA vs heart failure exacerbation. Tolerated diuresis and now presenting for colonoscopy to evaluate for chronic diarrhea. 89 yo F w/ PMHx of Alzheimer Disease, HTN, CVA (difficulty with swallowing), HLD, Asthma, lung nodule s/p RLL partial lobectomy, Afib, pancreatic insufficiency, Valvular heart disease, HFpEF w/ recent admission to [facility]  for SOB c/b afib wrvr started on digoxin and treated with lasix, discharged home on oxygen 2L NC  ; p/w hypotension x 1 day. Admitted for aspiration PNA vs heart failure exacerbation. Tolerated diuresis and now presenting for colonoscopy to evaluate for chronic diarrhea.

## 2022-06-13 NOTE — PRE PROCEDURE NOTE - PRE PROCEDURE EVALUATION
Attending Physician:    Dr. Dudley                        Procedure:   Colonoscopy    Indication for Procedure:   Diarrhea  ________________________________________________________  PAST MEDICAL & SURGICAL HISTORY:    Benign essential hypertension  Dyslipidemia  CVA (cerebral infarction)  Alzheimer disease  Hypertension  Asthma, mild  Other nonspecific abnormal finding of lung field  Anxiety    History of bilateral breast reduction surgery  History of appendectomy  History of cosmetic plastic surgery-face lift  History of abdominoplasty  H/O surgical biopsy-right lung; 05/2018  History of cataract extraction-bilateral, with lens implants  H/O varicose vein ligation and stripping both legs  H/O umbilical hernia repair  Status post coronary angiogram 2017    ALLERGIES:  Benadryl (Other)    HOME MEDICATIONS:  apixaban 2.5 mg oral tablet: 1 tab(s) orally every 12 hours  atorvastatin 20 mg oral tablet: 1 tab(s) orally once a day (at bedtime)  budesonide-formoterol 160 mcg-4.5 mcg/inh inhalation aerosol: 2 puff(s) inhaled 2 times a day  calcium-vitamin D 500 mg-5 mcg (200 intl units) oral tablet: 1 tab(s) orally once a day  cyanocobalamin 1000 mcg oral tablet: 1 tab(s) orally once a day  escitalopram 10 mg oral tablet: 1 tab(s) orally once a day  Lasix 20 mg oral tablet: 1 tab(s) orally once a day, can start on 6/12/22  Lumigan 0.01% ophthalmic solution: 1 drop(s) to each affected eye 2 times a week  metoprolol tartrate 25 mg oral tablet: 1 tab(s) orally 2 times a day  Multiple Vitamins oral tablet: 1 tab(s) orally once a day  pancrelipase 36,000 units-114,000 units-180,000 units oral delayed release capsule: 1 cap(s) orally 3 times a day (with meals)  pyridoxine 100 mg oral tablet: 1 tab(s) orally once a day  Restasis 0.05% ophthalmic emulsion: 1 drop(s) to each affected eye 2 times a day  rivastigmine 4.6 mg/24 hr transdermal film, extended release: 1 patch transdermal every 24 hours  timolol maleate 0.25% ophthalmic solution: 1 drop(s) to each affected eye once a day  zolpidem 5 mg oral tablet: 1 tab(s) orally once a day (at bedtime), As needed, Insomnia    AICD/PPM: [ ] yes   [X ] no    PERTINENT LAB DATA:                        10.9   5.27  )-----------( 174      ( 13 Jun 2022 06:21 )             34.7     06-13    140  |  108  |  23  ----------------------------<  95  4.5   |  20<L>  |  1.11    Ca    9.4      13 Jun 2022 06:21  Mg     1.6     06-13    PHYSICAL EXAMINATION:    T(C): 36.3  HR: 87  BP: 113/80  RR: 18  SpO2: 96%    Constitutional: NAD    Neck:  No JVD  Respiratory: CTAB/L  Cardiovascular: S1 and S2  Gastrointestinal: BS+, soft, NT/ND  Extremities: No peripheral edema  Neurological: A/O x 4      COMMENTS:    The patient is a suitable candidate for the planned procedure unless box checked [ ]  No, explain:

## 2022-06-13 NOTE — DISCHARGE NOTE NURSING/CASE MANAGEMENT/SOCIAL WORK - PATIENT PORTAL LINK FT
You can access the FollowMyHealth Patient Portal offered by [facility] by registering at the following website: [email or web address]. By joining [facility]’s FollowMyHealth portal, you will also be able to view your health information using other applications (apps) compatible with our system.

## 2022-06-13 NOTE — DISCHARGE NOTE NURSING/CASE MANAGEMENT/SOCIAL WORK - NSDCPEFALRISK_GEN_ALL_CORE
For information on Fall & Injury Prevention, visit: [email or web address] OR  [email or web address] OR  https://www.cdc.gov/steadi/patient.html

## 2022-06-14 ENCOUNTER — NON-APPOINTMENT (OUTPATIENT)
Age: 87
End: 2022-06-14

## 2022-06-14 VITALS
HEART RATE: 68 BPM | TEMPERATURE: 98 F | RESPIRATION RATE: 18 BRPM | OXYGEN SATURATION: 97 % | SYSTOLIC BLOOD PRESSURE: 115 MMHG | DIASTOLIC BLOOD PRESSURE: 65 MMHG

## 2022-06-14 LAB
ANION GAP SERPL CALC-SCNC: 10 MMOL/L — SIGNIFICANT CHANGE UP (ref 5–17)
BUN SERPL-MCNC: 22 MG/DL — SIGNIFICANT CHANGE UP (ref 7–23)
CALCIUM SERPL-MCNC: 9.1 MG/DL — SIGNIFICANT CHANGE UP (ref 8.4–10.5)
CHLORIDE SERPL-SCNC: 106 MMOL/L — SIGNIFICANT CHANGE UP (ref 96–108)
CO2 SERPL-SCNC: 22 MMOL/L — SIGNIFICANT CHANGE UP (ref 22–31)
CREAT SERPL-MCNC: 1.15 MG/DL — SIGNIFICANT CHANGE UP (ref 0.5–1.3)
CULTURE RESULTS: SIGNIFICANT CHANGE UP
CULTURE RESULTS: SIGNIFICANT CHANGE UP
EGFR: 44 ML/MIN/1.73M2 — LOW
GLUCOSE SERPL-MCNC: 93 MG/DL — SIGNIFICANT CHANGE UP (ref 70–99)
HCT VFR BLD CALC: 29.9 % — LOW (ref 34.5–45)
HGB BLD-MCNC: 9.4 G/DL — LOW (ref 11.5–15.5)
MCHC RBC-ENTMCNC: 30.3 PG — SIGNIFICANT CHANGE UP (ref 27–34)
MCHC RBC-ENTMCNC: 31.4 GM/DL — LOW (ref 32–36)
MCV RBC AUTO: 96.5 FL — SIGNIFICANT CHANGE UP (ref 80–100)
NRBC # BLD: 0 /100 WBCS — SIGNIFICANT CHANGE UP (ref 0–0)
PLATELET # BLD AUTO: 182 K/UL — SIGNIFICANT CHANGE UP (ref 150–400)
POTASSIUM SERPL-MCNC: 4.1 MMOL/L — SIGNIFICANT CHANGE UP (ref 3.5–5.3)
POTASSIUM SERPL-SCNC: 4.1 MMOL/L — SIGNIFICANT CHANGE UP (ref 3.5–5.3)
RBC # BLD: 3.1 M/UL — LOW (ref 3.8–5.2)
RBC # FLD: 12.8 % — SIGNIFICANT CHANGE UP (ref 10.3–14.5)
SODIUM SERPL-SCNC: 138 MMOL/L — SIGNIFICANT CHANGE UP (ref 135–145)
SPECIMEN SOURCE: SIGNIFICANT CHANGE UP
SPECIMEN SOURCE: SIGNIFICANT CHANGE UP
WBC # BLD: 6.27 K/UL — SIGNIFICANT CHANGE UP (ref 3.8–10.5)
WBC # FLD AUTO: 6.27 K/UL — SIGNIFICANT CHANGE UP (ref 3.8–10.5)

## 2022-06-14 PROCEDURE — 88305 TISSUE EXAM BY PATHOLOGIST: CPT

## 2022-06-14 PROCEDURE — 97530 THERAPEUTIC ACTIVITIES: CPT

## 2022-06-14 PROCEDURE — 85027 COMPLETE CBC AUTOMATED: CPT

## 2022-06-14 PROCEDURE — 85025 COMPLETE CBC W/AUTO DIFF WBC: CPT

## 2022-06-14 PROCEDURE — 83735 ASSAY OF MAGNESIUM: CPT

## 2022-06-14 PROCEDURE — 99285 EMERGENCY DEPT VISIT HI MDM: CPT | Mod: 25

## 2022-06-14 PROCEDURE — 83880 ASSAY OF NATRIURETIC PEPTIDE: CPT

## 2022-06-14 PROCEDURE — 93308 TTE F-UP OR LMTD: CPT

## 2022-06-14 PROCEDURE — 87086 URINE CULTURE/COLONY COUNT: CPT

## 2022-06-14 PROCEDURE — 82435 ASSAY OF BLOOD CHLORIDE: CPT

## 2022-06-14 PROCEDURE — 85730 THROMBOPLASTIN TIME PARTIAL: CPT

## 2022-06-14 PROCEDURE — 85610 PROTHROMBIN TIME: CPT

## 2022-06-14 PROCEDURE — U0005: CPT

## 2022-06-14 PROCEDURE — 82803 BLOOD GASES ANY COMBINATION: CPT

## 2022-06-14 PROCEDURE — 85014 HEMATOCRIT: CPT

## 2022-06-14 PROCEDURE — 83605 ASSAY OF LACTIC ACID: CPT

## 2022-06-14 PROCEDURE — U0003: CPT

## 2022-06-14 PROCEDURE — 74176 CT ABD & PELVIS W/O CONTRAST: CPT | Mod: MA

## 2022-06-14 PROCEDURE — 80053 COMPREHEN METABOLIC PANEL: CPT

## 2022-06-14 PROCEDURE — 84132 ASSAY OF SERUM POTASSIUM: CPT

## 2022-06-14 PROCEDURE — 93005 ELECTROCARDIOGRAM TRACING: CPT

## 2022-06-14 PROCEDURE — 87641 MR-STAPH DNA AMP PROBE: CPT

## 2022-06-14 PROCEDURE — 84295 ASSAY OF SERUM SODIUM: CPT

## 2022-06-14 PROCEDURE — 80048 BASIC METABOLIC PNL TOTAL CA: CPT

## 2022-06-14 PROCEDURE — 82947 ASSAY GLUCOSE BLOOD QUANT: CPT

## 2022-06-14 PROCEDURE — 36415 COLL VENOUS BLD VENIPUNCTURE: CPT

## 2022-06-14 PROCEDURE — 71045 X-RAY EXAM CHEST 1 VIEW: CPT

## 2022-06-14 PROCEDURE — 84484 ASSAY OF TROPONIN QUANT: CPT

## 2022-06-14 PROCEDURE — 99232 SBSQ HOSP IP/OBS MODERATE 35: CPT | Mod: GC

## 2022-06-14 PROCEDURE — 87640 STAPH A DNA AMP PROBE: CPT

## 2022-06-14 PROCEDURE — 80162 ASSAY OF DIGOXIN TOTAL: CPT

## 2022-06-14 PROCEDURE — 94640 AIRWAY INHALATION TREATMENT: CPT

## 2022-06-14 PROCEDURE — 85018 HEMOGLOBIN: CPT

## 2022-06-14 PROCEDURE — 97161 PT EVAL LOW COMPLEX 20 MIN: CPT

## 2022-06-14 PROCEDURE — 82330 ASSAY OF CALCIUM: CPT

## 2022-06-14 PROCEDURE — 83690 ASSAY OF LIPASE: CPT

## 2022-06-14 PROCEDURE — 96374 THER/PROPH/DIAG INJ IV PUSH: CPT

## 2022-06-14 PROCEDURE — 81001 URINALYSIS AUTO W/SCOPE: CPT

## 2022-06-14 PROCEDURE — 97116 GAIT TRAINING THERAPY: CPT

## 2022-06-14 PROCEDURE — 84145 PROCALCITONIN (PCT): CPT

## 2022-06-14 PROCEDURE — 87040 BLOOD CULTURE FOR BACTERIA: CPT

## 2022-06-14 PROCEDURE — 82565 ASSAY OF CREATININE: CPT

## 2022-06-14 RX ORDER — METOPROLOL TARTRATE 50 MG
1 TABLET ORAL
Qty: 60 | Refills: 0
Start: 2022-06-14 | End: 2022-07-13

## 2022-06-14 RX ORDER — FUROSEMIDE 40 MG
1 TABLET ORAL
Qty: 0 | Refills: 0 | DISCHARGE

## 2022-06-14 RX ORDER — FUROSEMIDE 40 MG
1 TABLET ORAL
Qty: 30 | Refills: 0
Start: 2022-06-14 | End: 2022-07-13

## 2022-06-14 RX ADMIN — Medication 20 MILLIGRAM(S): at 05:35

## 2022-06-14 RX ADMIN — Medication 1 CAPSULE(S): at 08:12

## 2022-06-14 RX ADMIN — CHLORHEXIDINE GLUCONATE 1 APPLICATION(S): 213 SOLUTION TOPICAL at 12:18

## 2022-06-14 RX ADMIN — Medication 1 TABLET(S): at 12:16

## 2022-06-14 RX ADMIN — Medication 25 MILLIGRAM(S): at 05:35

## 2022-06-14 RX ADMIN — BUDESONIDE AND FORMOTEROL FUMARATE DIHYDRATE 2 PUFF(S): 160; 4.5 AEROSOL RESPIRATORY (INHALATION) at 05:35

## 2022-06-14 RX ADMIN — RIVASTIGMINE 1 PATCH: 4.6 PATCH, EXTENDED RELEASE TRANSDERMAL at 10:12

## 2022-06-14 RX ADMIN — Medication 100 MILLIGRAM(S): at 12:17

## 2022-06-14 RX ADMIN — RIVASTIGMINE 1 PATCH: 4.6 PATCH, EXTENDED RELEASE TRANSDERMAL at 05:36

## 2022-06-14 RX ADMIN — ZOLPIDEM TARTRATE 5 MILLIGRAM(S): 10 TABLET ORAL at 00:39

## 2022-06-14 RX ADMIN — Medication 1 CAPSULE(S): at 12:16

## 2022-06-14 RX ADMIN — Medication 1 TABLET(S): at 12:18

## 2022-06-14 RX ADMIN — Medication 1 DROP(S): at 12:17

## 2022-06-14 RX ADMIN — RIVASTIGMINE 1 PATCH: 4.6 PATCH, EXTENDED RELEASE TRANSDERMAL at 05:35

## 2022-06-14 RX ADMIN — PREGABALIN 1000 MICROGRAM(S): 225 CAPSULE ORAL at 12:16

## 2022-06-14 RX ADMIN — ESCITALOPRAM OXALATE 10 MILLIGRAM(S): 10 TABLET, FILM COATED ORAL at 12:17

## 2022-06-14 RX ADMIN — APIXABAN 2.5 MILLIGRAM(S): 2.5 TABLET, FILM COATED ORAL at 05:35

## 2022-06-14 NOTE — PROGRESS NOTE ADULT - ATTENDING COMMENTS
Agree with above. Patient is tolerating PO, no overt diarrhea, <2-3 bouts a day. No objection to discharge. Called patient's daughter regarding colonoscopy result and left message on her voicemail. She will follow-up with me in office for pathology results and for her diarrhea.

## 2022-06-14 NOTE — PATIENT PROFILE ADULT - FALL HARM RISK - HARM RISK INTERVENTIONS
Assistance with ambulation/Assistance OOB with selected safe patient handling equipment/Communicate Risk of Fall with Harm to all staff/Discuss with provider need for PT consult/Monitor gait and stability/Provide patient with walking aids - walker, cane, crutches/Reinforce activity limits and safety measures with patient and family/Tailored Fall Risk Interventions/Use of alarms - bed, chair and/or voice tab/Visual Cue: Yellow wristband and red socks/Bed in lowest position, wheels locked, appropriate side rails in place/Call bell, personal items and telephone in reach/Instruct patient to call for assistance before getting out of bed or chair/Non-slip footwear when patient is out of bed/Holden to call system/Physically safe environment - no spills, clutter or unnecessary equipment/Purposeful Proactive Rounding/Room/bathroom lighting operational, light cord in reach

## 2022-06-14 NOTE — PROGRESS NOTE ADULT - REASON FOR ADMISSION
hypotension x 1 day

## 2022-06-14 NOTE — PATIENT PROFILE ADULT - FALL HARM RISK - FACTORS
[Nasal Discharge] : nasal discharge [Nocturia] : nocturia [Unsteady Walk] : ataxia [Memory Loss] : memory loss [Negative] : Heme/Lymph [FreeTextEntry3] :  difficulty subjective Weakness [de-identified] : Stable

## 2022-06-14 NOTE — PROGRESS NOTE ADULT - SUBJECTIVE AND OBJECTIVE BOX
Cardiovascular Disease Progress Note    Overnight events: No acute events overnight.  no new cardiac sx  Otherwise review of systems negative    Objective Findings:  T(C): 36.4 (06-10-22 @ 03:53), Max: 36.6 (06-09-22 @ 20:37)  HR: 100 (06-10-22 @ 03:53) (55 - 100)  BP: 114/72 (06-10-22 @ 03:53) (83/54 - 114/72)  RR: 18 (06-10-22 @ 03:53) (17 - 18)  SpO2: 95% (06-10-22 @ 03:53) (95% - 96%)  Wt(kg): --  Daily     Daily       Physical Exam:  Gen: NAD  HEENT: EOMI  CV: RRR, normal S1 + S2, no m/r/g  Lungs: CTAB  Abd: soft, non-tender  Ext: No edema    Telemetry:    Laboratory Data:                        10.0   6.78  )-----------( 156      ( 09 Jun 2022 20:45 )             31.6     06-09    136  |  104  |  47<H>  ----------------------------<  133<H>  4.3   |  21<L>  |  1.10    Ca    8.4      09 Jun 2022 20:45  Mg     1.7     06-08    TPro  6.1  /  Alb  3.0<L>  /  TBili  0.2  /  DBili  x   /  AST  44<H>  /  ALT  36  /  AlkPhos  106  06-09    PT/INR - ( 08 Jun 2022 18:10 )   PT: 18.3 sec;   INR: 1.58 ratio         PTT - ( 08 Jun 2022 18:10 )  PTT:19.4 sec          Inpatient Medications:  MEDICATIONS  (STANDING):  apixaban 2.5 milliGRAM(s) Oral every 12 hours  atorvastatin 20 milliGRAM(s) Oral at bedtime  budesonide 160 MICROgram(s)/formoterol 4.5 MICROgram(s) Inhaler 2 Puff(s) Inhalation two times a day  calcium carbonate 1250 mG  + Vitamin D (OsCal 500 + D) 1 Tablet(s) Oral daily  cefTRIAXone   IVPB 1000 milliGRAM(s) IV Intermittent every 24 hours  cyanocobalamin 1000 MICROGram(s) Oral daily  escitalopram 10 milliGRAM(s) Oral daily  latanoprost 0.005% Ophthalmic Solution 1 Drop(s) Both EYES at bedtime  multivitamin 1 Tablet(s) Oral daily  pancrelipase  (CREON 36,000 Lipase Units) 1 Capsule(s) Oral three times a day with meals  pyridoxine 100 milliGRAM(s) Oral daily  rivastigmine patch  4.6 mG/24 Hr(s) 1 Patch Transdermal every 24 hours  sodium chloride 0.9%. 1000 milliLiter(s) (60 mL/Hr) IV Continuous <Continuous>  timolol 0.25% Solution 1 Drop(s) Both EYES daily      Assessment:  weakness  LORENZO  AAA, stable  TAA, stable  hx of CVA  dementia  nonobs CAD     Recs:  cardiac stable  vol status stable. cont to hold diuretics today. likely resume lasix 20mg po qd in 24-48 hours  afib currently rate controlled. concerned about potential dig toxicity in setting of lorenzo. hold dig for now, resume at lower dose (0.625mg daily) once renal fxn normalizes. start lopressor 12.5mg bid with hold parameters  recent 2d echo results noted - mild to moderate mixed valvular disease, normal lv/rv function  recent pharm nst neg for ischemia  cw antiplatelets and antianginals  abx per primary team  will follow          Over 25 minutes spent on total encounter; more than 50% of the visit was spent counseling and/or coordinating care by the attending physician.      Redd Delacruz MD   Cardiovascular Disease  [phone]
Interval Events:   No acute events overnight following colonoscopy.  Spoke to patient and daughter on phone.  Patient without acute symptoms at this time, and denies any further diarrhea overnight.    ROS:   12 point review of systems performed and negative except otherwise noted in HPI.    Hospital Medications:  acetaminophen     Tablet .. 650 milliGRAM(s) Oral every 6 hours PRN  apixaban 2.5 milliGRAM(s) Oral every 12 hours  atorvastatin 20 milliGRAM(s) Oral at bedtime  budesonide 160 MICROgram(s)/formoterol 4.5 MICROgram(s) Inhaler 2 Puff(s) Inhalation two times a day  calcium carbonate 1250 mG  + Vitamin D (OsCal 500 + D) 1 Tablet(s) Oral daily  chlorhexidine 2% Cloths 1 Application(s) Topical daily  cyanocobalamin 1000 MICROGram(s) Oral daily  escitalopram 10 milliGRAM(s) Oral daily  furosemide    Tablet 20 milliGRAM(s) Oral daily  latanoprost 0.005% Ophthalmic Solution 1 Drop(s) Both EYES at bedtime  melatonin 3 milliGRAM(s) Oral at bedtime PRN  metoprolol tartrate 25 milliGRAM(s) Oral two times a day  multivitamin 1 Tablet(s) Oral daily  pancrelipase  (CREON 36,000 Lipase Units) 1 Capsule(s) Oral three times a day with meals  pyridoxine 100 milliGRAM(s) Oral daily  rivastigmine patch  4.6 mG/24 Hr(s) 1 Patch Transdermal every 24 hours  timolol 0.25% Solution 1 Drop(s) Both EYES daily  zolpidem 5 milliGRAM(s) Oral at bedtime PRN      PHYSICAL EXAM:   Vital Signs:  Vital Signs Last 24 Hrs  T(C): 36.6 (14 Jun 2022 04:03), Max: 36.6 (14 Jun 2022 04:03)  T(F): 97.8 (14 Jun 2022 04:03), Max: 97.8 (14 Jun 2022 04:03)  HR: 75 (14 Jun 2022 04:03) (63 - 88)  BP: 100/56 (14 Jun 2022 04:03) (100/55 - 122/62)  BP(mean): --  RR: 18 (14 Jun 2022 04:03) (16 - 18)  SpO2: 95% (14 Jun 2022 04:03) (94% - 98%)  Daily Height in cm: 162.56 (13 Jun 2022 11:10)    Daily     GENERAL: no acute distress  NEURO: alert  HEENT: NCAT, no conjunctival pallor appreciated  CHEST: no respiratory distress, no accessory muscle use  CARDIAC: regular rate, +S1/S2  ABDOMEN: soft, nondistended, nontender, no rebound or guarding  EXTREMITIES: warm, well perfused  SKIN: no lesions noted    LABS: reviewed                        9.4    6.27  )-----------( 182      ( 14 Jun 2022 07:26 )             29.9     06-14    138  |  106  |  22  ----------------------------<  93  4.1   |  22  |  1.15    Ca    9.1      14 Jun 2022 07:26  Mg     1.6     06-13          Interval Diagnostic Studies: see sunrise for full report  
Cardiovascular Disease Progress Note    Overnight events: No acute events overnight.  no cp/sob/palps  Otherwise review of systems negative    Objective Findings:  T(C): 36.6 (06-14-22 @ 04:03), Max: 36.6 (06-14-22 @ 04:03)  HR: 75 (06-14-22 @ 04:03) (63 - 88)  BP: 100/56 (06-14-22 @ 04:03) (100/55 - 122/62)  RR: 18 (06-14-22 @ 04:03) (16 - 18)  SpO2: 95% (06-14-22 @ 04:03) (94% - 98%)  Wt(kg): --  Daily Height in cm: 162.56 (13 Jun 2022 11:10)    Daily       Physical Exam:  Gen: NAD  HEENT: EOMI  CV: RRR, normal S1 + S2, no m/r/g  Lungs: CTAB  Abd: soft, non-tender  Ext: No edema    Telemetry:    Laboratory Data:                        10.9   5.27  )-----------( 174      ( 13 Jun 2022 06:21 )             34.7     06-13    140  |  108  |  23  ----------------------------<  95  4.5   |  20<L>  |  1.11    Ca    9.4      13 Jun 2022 06:21  Mg     1.6     06-13                Inpatient Medications:  MEDICATIONS  (STANDING):  apixaban 2.5 milliGRAM(s) Oral every 12 hours  atorvastatin 20 milliGRAM(s) Oral at bedtime  budesonide 160 MICROgram(s)/formoterol 4.5 MICROgram(s) Inhaler 2 Puff(s) Inhalation two times a day  calcium carbonate 1250 mG  + Vitamin D (OsCal 500 + D) 1 Tablet(s) Oral daily  chlorhexidine 2% Cloths 1 Application(s) Topical daily  cyanocobalamin 1000 MICROGram(s) Oral daily  escitalopram 10 milliGRAM(s) Oral daily  furosemide    Tablet 20 milliGRAM(s) Oral daily  latanoprost 0.005% Ophthalmic Solution 1 Drop(s) Both EYES at bedtime  metoprolol tartrate 25 milliGRAM(s) Oral two times a day  multivitamin 1 Tablet(s) Oral daily  pancrelipase  (CREON 36,000 Lipase Units) 1 Capsule(s) Oral three times a day with meals  pyridoxine 100 milliGRAM(s) Oral daily  rivastigmine patch  4.6 mG/24 Hr(s) 1 Patch Transdermal every 24 hours  timolol 0.25% Solution 1 Drop(s) Both EYES daily      Assessment:  weakness  LORENZO  AAA, stable  TAA, stable  hx of CVA  dementia  nonobs CAD     Recs:  cardiac stable  vol status stable. cw lasix 20mg po qd   afib currently rate controlled. cw lopressor 25mg bid.  cont to hold dig for now  recent 2d echo results noted - mild to moderate mixed valvular disease, normal lv/rv function  recent pharm nst neg for ischemia  cw antiplatelets and antianginals  s/p cscope. being worked up for microscopic colitis  will follow        Over 25 minutes spent on total encounter; more than 50% of the visit was spent counseling and/or coordinating care by the attending physician.      Redd Delacruz MD   Cardiovascular Disease  [phone]
Cardiovascular Disease Progress Note    Overnight events: No acute events overnight.  no new cardiac sx  Otherwise review of systems negative    Objective Findings:  T(C): 36.8 (06-11-22 @ 05:02), Max: 36.8 (06-11-22 @ 05:02)  HR: 73 (06-11-22 @ 05:02) (66 - 76)  BP: 109/68 (06-11-22 @ 05:02) (109/67 - 122/77)  RR: 18 (06-11-22 @ 05:02) (18 - 18)  SpO2: 94% (06-11-22 @ 05:02) (94% - 97%)  Wt(kg): --  Daily     Daily       Physical Exam:  Gen: NAD  HEENT: EOMI  CV: RRR, normal S1 + S2, no m/r/g  Lungs: CTAB  Abd: soft, non-tender  Ext: No edema    Telemetry: af rc    Laboratory Data:                        10.0   6.81  )-----------( 175      ( 11 Jun 2022 06:48 )             31.2     06-11    139  |  105  |  28<H>  ----------------------------<  77  4.2   |  21<L>  |  0.95    Ca    8.9      11 Jun 2022 06:48    TPro  6.1  /  Alb  3.0<L>  /  TBili  0.2  /  DBili  x   /  AST  44<H>  /  ALT  36  /  AlkPhos  106  06-09              Inpatient Medications:  MEDICATIONS  (STANDING):  apixaban 2.5 milliGRAM(s) Oral every 12 hours  atorvastatin 20 milliGRAM(s) Oral at bedtime  budesonide 160 MICROgram(s)/formoterol 4.5 MICROgram(s) Inhaler 2 Puff(s) Inhalation two times a day  calcium carbonate 1250 mG  + Vitamin D (OsCal 500 + D) 1 Tablet(s) Oral daily  cyanocobalamin 1000 MICROGram(s) Oral daily  escitalopram 10 milliGRAM(s) Oral daily  latanoprost 0.005% Ophthalmic Solution 1 Drop(s) Both EYES at bedtime  metoprolol tartrate 12.5 milliGRAM(s) Oral two times a day  multivitamin 1 Tablet(s) Oral daily  pancrelipase  (CREON 36,000 Lipase Units) 1 Capsule(s) Oral three times a day with meals  pyridoxine 100 milliGRAM(s) Oral daily  rivastigmine patch  4.6 mG/24 Hr(s) 1 Patch Transdermal every 24 hours  sodium chloride 0.9%. 1000 milliLiter(s) (60 mL/Hr) IV Continuous <Continuous>  timolol 0.25% Solution 1 Drop(s) Both EYES daily      Assessment:  weakness  LORENZO  AAA, stable  TAA, stable  hx of CVA  dementia  nonobs CAD     Recs:  cardiac stable  vol status stable. lasix 20mg po qd to resume tomm  afib currently rate controlled. inc lopressor to 25mg bid.  cont to hold dig for now  recent 2d echo results noted - mild to moderate mixed valvular disease, normal lv/rv function  recent pharm nst neg for ischemia  cw antiplatelets and antianginals  abx per primary team  will follow          Over 25 minutes spent on total encounter; more than 50% of the visit was spent counseling and/or coordinating care by the attending physician.      Redd Delacruz MD   Cardiovascular Disease  [phone]
  afberile    REVIEW OF SYSTEMS:  GEN: no fever,    no chills  RESP: no SOB,   no cough  CVS: no chest pain,   no palpitations  GI: no abdominal pain,   no nausea,   no vomiting,   no constipation,   no diarrhea  : no dysuria,   no frequency  NEURO: no headache,   no dizziness  PSYCH: no depression,   not anxious  Derm : no rash    MEDICATIONS  (STANDING):  apixaban 2.5 milliGRAM(s) Oral every 12 hours  atorvastatin 20 milliGRAM(s) Oral at bedtime  budesonide 160 MICROgram(s)/formoterol 4.5 MICROgram(s) Inhaler 2 Puff(s) Inhalation two times a day  calcium carbonate 1250 mG  + Vitamin D (OsCal 500 + D) 1 Tablet(s) Oral daily  cyanocobalamin 1000 MICROGram(s) Oral daily  escitalopram 10 milliGRAM(s) Oral daily  latanoprost 0.005% Ophthalmic Solution 1 Drop(s) Both EYES at bedtime  metoprolol tartrate 25 milliGRAM(s) Oral two times a day  multivitamin 1 Tablet(s) Oral daily  pancrelipase  (CREON 36,000 Lipase Units) 1 Capsule(s) Oral three times a day with meals  pyridoxine 100 milliGRAM(s) Oral daily  rivastigmine patch  4.6 mG/24 Hr(s) 1 Patch Transdermal every 24 hours  timolol 0.25% Solution 1 Drop(s) Both EYES daily    MEDICATIONS  (PRN):  acetaminophen     Tablet .. 650 milliGRAM(s) Oral every 6 hours PRN Temp greater or equal to 38C (100.4F), Mild Pain (1 - 3)  melatonin 3 milliGRAM(s) Oral at bedtime PRN Insomnia  zolpidem 5 milliGRAM(s) Oral at bedtime PRN Insomnia      Vital Signs Last 24 Hrs  T(C): 36.4 (11 Jun 2022 11:15), Max: 36.8 (11 Jun 2022 05:02)  T(F): 97.5 (11 Jun 2022 11:15), Max: 98.3 (11 Jun 2022 05:02)  HR: 76 (11 Jun 2022 11:15) (66 - 76)  BP: 116/66 (11 Jun 2022 11:15) (109/67 - 119/71)  BP(mean): --  RR: 18 (11 Jun 2022 11:15) (18 - 18)  SpO2: 94% (11 Jun 2022 11:15) (94% - 97%)  CAPILLARY BLOOD GLUCOSE        I&O's Summary    10 Waldemar 2022 07:01  -  11 Jun 2022 07:00  --------------------------------------------------------  IN: 240 mL / OUT: 500 mL / NET: -260 mL        PHYSICAL EXAM:  HEAD:  Atraumatic, Normocephalic  NECK: Supple, No   JVD  CHEST/LUNG:   no     rales,     no,    rhonchi  HEART: Regular rate and rhythm;         murmur  ABDOMEN: Soft, Nontender, ;   EXTREMITIES:      no  edema  NEUROLOGY:  alert    LABS:                        10.0   6.81  )-----------( 175      ( 11 Jun 2022 06:48 )             31.2     06-11    139  |  105  |  28<H>  ----------------------------<  77  4.2   |  21<L>  |  0.95    Ca    8.9      11 Jun 2022 06:48    TPro  6.1  /  Alb  3.0<L>  /  TBili  0.2  /  DBili  x   /  AST  44<H>  /  ALT  36  /  AlkPhos  106  06-09                    Thyroid Stimulating Hormone, Serum: 1.130 uU/mL (06-06 @ 12:34)          Consultant(s) Notes Reviewed:      Care Discussed with Consultants/Other Providers:

## 2022-06-14 NOTE — PROGRESS NOTE ADULT - PROVIDER SPECIALTY LIST ADULT
Cardiology
Cardiology
Internal Medicine
Cardiology
Gastroenterology
Internal Medicine
Internal Medicine

## 2022-06-14 NOTE — PROGRESS NOTE ADULT - ASSESSMENT
#Pancreatic insufficiency with known atrophy of pancreas on MRI from 3/22, improvement in diarrhea on Creon (2 tab with meals).  Colonoscopy performed on 6/13/2022 with inadequate prep, diverticulosis throughout entire colon, small ascending colon polyp not removed, normal TI, and biopsies taken from right and left colon to r/o microscopic colitis.  #Pancreatic duct dilation, no mass on MRI    Recommendations:  -Continue with Creon 24,000 2 tab w/ meals and Imodium as needed  -No further work up for pancreatic duct dilation. This is known and has been worked up by Dr. Dudley.   -colonoscopy findings as noted above  -advance diet as tolerated  -f/u path  -patient can follow-up as outpatient for pathology results. Follow-up with Dr. Dudley at [phone]. No objection for discharge home today from GI standpoint.  -repeat colonoscopy is not recommended due to current age (75 years or older) for screening purposes  -will sign off at this time, please call back with questions or if new issues arise    Recommendations incomplete until finalized by attending signature/attestation to note.    Stuart Garcia, PGY-4  GI/Hepatology Fellow    MONDAY-FRIDAY 8AM-5PM:  Pager# [phone] ([facility]) or [phone] ([facility])    NON-URGENT CONSULTS:  Please email [email or web address]  AT NIGHT AND ON WEEKENDS:  Contact on-call GI fellow via answering service ([phone]) from 5pm-8am and on weekends/holidays     #Pancreatic insufficiency with known atrophy of pancreas on MRI from 3/22, improvement in diarrhea on Creon (2 tab with meals).  Colonoscopy performed on 6/13/2022 with inadequate prep, diverticulosis throughout entire colon, small ascending colon polyp not removed, normal TI, and biopsies taken from right and left colon to r/o microscopic colitis.  #Pancreatic duct dilation, no mass on MRI    Recommendations:  -Continue with Creon 36,000w/ meals and Imodium as needed  -No further work up for pancreatic duct dilation. This is known and has been worked up by Dr. Dudley.   -colonoscopy findings as noted above  -advance diet as tolerated  -f/u path  -patient can follow-up as outpatient for pathology results. Follow-up with Dr. Dudley at [phone]. No objection for discharge home today from GI standpoint.  -repeat colonoscopy is not recommended due to current age (75 years or older) for screening purposes  -will sign off at this time, please call back with questions or if new issues arise    Recommendations incomplete until finalized by attending signature/attestation to note.    Stuart Garcia, PGY-4  GI/Hepatology Fellow    MONDAY-FRIDAY 8AM-5PM:  Pager# [phone] ([facility]) or [phone] ([facility])    NON-URGENT CONSULTS:  Please email [email or web address]  AT NIGHT AND ON WEEKENDS:  Contact on-call GI fellow via answering service ([phone]) from 5pm-8am and on weekends/holidays

## 2022-06-14 NOTE — PATIENT PROFILE ADULT - PATIENT REPRESENTATIVE: ( YOU CAN CHOOSE ANY PERSON THAT CAN ASSIST YOU WITH YOUR HEALTH CARE PREFERENCES, DOES NOT HAVE TO BE A SPOUSE, IMMEDIATE FAMILY OR SIGNIFICANT OTHER/PARTNER)
Regular rate and rhythm, Heart sounds S1 S2 present, midsystolic click murmurs, rubs or gallops same name as above

## 2022-06-14 NOTE — PROGRESS NOTE ADULT - ASSESSMENT
M E D I C A L   A T T E N D I N G    F O L L O W    U P   N O T E  (06-14-22 )                                     ------------------------------------------------------------------------------------------------    patient evaluated by me, case discussed with team, chart, medications, and physical exam reviewed, labs / tests  and vitals reviewed by me, as bellow.   Patient is stable for discharge today.  no bleeding reported today or overnight, pt had no BM.. otherwise feels better.. ambulating with cane, plan to go home..   Patient to follow up with  PMD, GI  See discharge document for full note.                             ( note written / Date of service  06-14-22 )    ==================>> MEDICATIONS <<====================    apixaban 2.5 milliGRAM(s) Oral every 12 hours  atorvastatin 20 milliGRAM(s) Oral at bedtime  budesonide 160 MICROgram(s)/formoterol 4.5 MICROgram(s) Inhaler 2 Puff(s) Inhalation two times a day  calcium carbonate 1250 mG  + Vitamin D (OsCal 500 + D) 1 Tablet(s) Oral daily  chlorhexidine 2% Cloths 1 Application(s) Topical daily  cyanocobalamin 1000 MICROGram(s) Oral daily  escitalopram 10 milliGRAM(s) Oral daily  furosemide    Tablet 20 milliGRAM(s) Oral daily  latanoprost 0.005% Ophthalmic Solution 1 Drop(s) Both EYES at bedtime  metoprolol tartrate 25 milliGRAM(s) Oral two times a day  multivitamin 1 Tablet(s) Oral daily  pancrelipase  (CREON 36,000 Lipase Units) 1 Capsule(s) Oral three times a day with meals  pyridoxine 100 milliGRAM(s) Oral daily  rivastigmine patch  4.6 mG/24 Hr(s) 1 Patch Transdermal every 24 hours  timolol 0.25% Solution 1 Drop(s) Both EYES daily    MEDICATIONS  (PRN):  acetaminophen     Tablet .. 650 milliGRAM(s) Oral every 6 hours PRN Temp greater or equal to 38C (100.4F), Mild Pain (1 - 3)  melatonin 3 milliGRAM(s) Oral at bedtime PRN Insomnia  zolpidem 5 milliGRAM(s) Oral at bedtime PRN Insomnia    ___________  Active diet:  Diet, Regular:   DASH/TLC Sodium & Cholesterol Restricted (DASH)  Soft and Bite Sized (SOFTBTSZ)  No Dairy  ___________________    ==================>> VITAL SIGNS <<==================    T(C): 36.6 (06-14-22 @ 12:12), Max: 36.6 (06-14-22 @ 04:03)  HR: 68 (06-14-22 @ 12:12) (68 - 88)  BP: 115/65 (06-14-22 @ 12:12) (100/56 - 122/62)  BP(mean): --  RR: 18 (06-14-22 @ 12:12) (16 - 18)  SpO2: 97% (06-14-22 @ 12:12) (94% - 97%)     I&O's Summary    13 Jun 2022 07:01 - 14 Jun 2022 07:00  --------------------------------------------------------  IN: 300 mL / OUT: 0 mL / NET: 300 mL    14 Jun 2022 07:01  -  14 Jun 2022 12:49  --------------------------------------------------------  IN: 610 mL / OUT: 0 mL / NET: 610 mL       ==================>> LAB AND IMAGING <<==================                        9.4    6.27  )-----------( 182      ( 14 Jun 2022 07:26 )             29.9        06-14    138  |  106  |  22  ----------------------------<  93  4.1   |  22  |  1.15    Ca    9.1      14 Jun 2022 07:26  Mg     1.6     06-13       TSH:      1.130   (06-06-22)       ,     0.90   (04-06-22)           WBC count:   6.27 <<== ,  5.27 <<== ,  6.48 <<== ,  6.81 <<== ,  7.31 <<== ,  6.78 <<==   Hemoglobin:   9.4 <<==,  10.9 <<==,  10.0 <<==,  10.0 <<==,  10.4 <<==,  10.0 <<==  platelets:  182 <==, 174 <==, 156 <==, 175 <==, 165 <==, 156 <==, 168 <==    Creatinine:  1.15  <<==, 1.11  <<==, 0.89  <<==, 0.95  <<==, 1.07  <<==, 1.10  <<==  Sodium:   138  <==, 140  <==, 137  <==, 139  <==, 139  <==, 136  <==, 137  <==       AST:          44 <== , 27 <== , 34 <== , 38 <==      ALT:        36  <== , 29  <== , 31  <== , 36  <==      AP:        106  <=, 120  <=, 116  <=, 136  <=     Bili:        0.2  <=, 0.3  <=, 0.4  <=, 0.5  <=       colonoscopy report noted as before.

## 2022-06-15 DIAGNOSIS — I11.0 HYPERTENSIVE HEART DISEASE WITH HEART FAILURE: ICD-10-CM

## 2022-06-15 DIAGNOSIS — I48.91 UNSPECIFIED ATRIAL FIBRILLATION: ICD-10-CM

## 2022-06-15 DIAGNOSIS — D64.9 ANEMIA, UNSPECIFIED: ICD-10-CM

## 2022-06-15 DIAGNOSIS — I48.0 PAROXYSMAL ATRIAL FIBRILLATION: ICD-10-CM

## 2022-06-15 DIAGNOSIS — E87.6 HYPOKALEMIA: ICD-10-CM

## 2022-06-15 DIAGNOSIS — K86.89 OTHER SPECIFIED DISEASES OF PANCREAS: ICD-10-CM

## 2022-06-15 DIAGNOSIS — J45.909 UNSPECIFIED ASTHMA, UNCOMPLICATED: ICD-10-CM

## 2022-06-15 DIAGNOSIS — Z86.73 PERSONAL HISTORY OF TRANSIENT ISCHEMIC ATTACK (TIA), AND CEREBRAL INFARCTION WITHOUT RESIDUAL DEFICITS: ICD-10-CM

## 2022-06-15 DIAGNOSIS — F41.9 ANXIETY DISORDER, UNSPECIFIED: ICD-10-CM

## 2022-06-15 DIAGNOSIS — E78.5 HYPERLIPIDEMIA, UNSPECIFIED: ICD-10-CM

## 2022-06-15 DIAGNOSIS — I50.33 ACUTE ON CHRONIC DIASTOLIC (CONGESTIVE) HEART FAILURE: ICD-10-CM

## 2022-06-15 DIAGNOSIS — N17.9 ACUTE KIDNEY FAILURE, UNSPECIFIED: ICD-10-CM

## 2022-06-20 ENCOUNTER — APPOINTMENT (OUTPATIENT)
Dept: GASTROENTEROLOGY | Facility: CLINIC | Age: 87
End: 2022-06-20
Payer: MEDICARE

## 2022-06-20 VITALS
DIASTOLIC BLOOD PRESSURE: 62 MMHG | WEIGHT: 136 LBS | OXYGEN SATURATION: 98 % | HEIGHT: 62 IN | BODY MASS INDEX: 25.03 KG/M2 | SYSTOLIC BLOOD PRESSURE: 96 MMHG

## 2022-06-20 PROCEDURE — 99214 OFFICE O/P EST MOD 30 MIN: CPT

## 2022-06-20 NOTE — PHYSICAL EXAM
[General Appearance - Alert] : alert [General Appearance - In No Acute Distress] : in no acute distress [Sclera] : the sclera and conjunctiva were normal [Outer Ear] : the ears and nose were normal in appearance [Neck Appearance] : the appearance of the neck was normal [] : no respiratory distress [Exaggerated Use Of Accessory Muscles For Inspiration] : no accessory muscle use [Auscultation Breath Sounds / Voice Sounds] : lungs were clear to auscultation bilaterally [Heart Rate And Rhythm] : heart rate was normal and rhythm regular [Heart Sounds] : normal S1 and S2 [Murmurs] : no murmurs [Edema] : there was no peripheral edema [Bowel Sounds] : normal bowel sounds [Abdomen Soft] : soft [Abdomen Tenderness] : non-tender [Abdomen Mass (___ Cm)] : no abdominal mass palpated [Involuntary Movements] : no involuntary movements were seen [Skin Color & Pigmentation] : normal skin color and pigmentation [No Focal Deficits] : no focal deficits [Affect] : the affect was normal [No Spinal Tenderness] : no spinal tenderness

## 2022-06-20 NOTE — HISTORY OF PRESENT ILLNESS
[Heartburn] : denies heartburn [Nausea] : denies nausea [Vomiting] : denies vomiting [Diarrhea] : stable diarrhea [Constipation] : denies constipation [Yellow Skin Or Eyes (Jaundice)] : denies jaundice [Abdominal Pain] : denies abdominal pain [Abdominal Swelling] : denies abdominal swelling [Wt Loss ___ Lbs] : recent [unfilled] ~Upound(s) weight loss [_________] : Performed [unfilled] [de-identified] : This is a 89 year old female with HTN, HLD, CVAs now on Plavix, s/p right VATS and wedge resection RUL with 2018 for organizing pneumonia, who presents for follow-up of exocrine pancreatic insufficiency.\par \par Patient had recent admissions to [facility] for A fib/fluid overload. She was seen by our inpatient team and had a colonoscopy which was unremarkable with negative biopsies for microscopic colitis. In the hospital, she did not have much diarrhea at all. Since discharge, daughter reports she is having BMs ranging mostly [place] stool scale 5, to sometimes 7 and sometimes 1. There is no jonathan steatorrhea, stool usually "sinks." She is eating little bites and frequently is eating/drinking small amounts of protein supplements away from the times she took the Creon with meals TID. She reports poor appetite that she just doesn't feel like eating especially at night. She denies dysphagia, and she states she feels nauseous though she does not actually vomit. Overall her daughter thinks she's better than initially with onset of the diarrhea, but it's still persistent and she is still slowly losing weight. She is off Remeron and her doctors (PMD and neurologist) are switching her to Zoloft instead.\par \par 6/14/22: WBC 6.2, hgb 9.4, hct 29, \par Na 138, K 4.1, Cl 106, bicarb 22, BUN 22, Cr 1.15, gluocse 93\par 6/9/22: T protein 6.1, albumin 3, T bili 0.2, , AST 44, ALT 36\par  [de-identified] : TTE 4/6/22:\par CONCLUSIONS:\par 1. Mitral annular calcification, otherwise normal mitral\par valve. Mild-moderate mitral regurgitation.\par 2. Calcified trileaflet aortic valve with decreased\par opening. Peak transaortic valve gradient equals 32 mm Hg,\par mean transaortic valve gradient equals 16 mm Hg, estimated\par aortic valve area equals 1.7 sqcm (by continuity equation),\par consistent with mild aortic stenosis. Mild-moderate aortic\par regurgitation.\par 3. Moderately dilated left atrium.  LA volume index = 46\par cc/m2.\par 4. Normal left ventricular internal dimensions and wall\par thicknesses.\par 5. Normal left ventricular systolic function. No segmental\par wall motion abnormalities.\par 6. Mild diastolic dysfunction (Stage I).\par 7. Normal right ventricular size and function.\par \par \par \par Stress test 4/8/22:\par IMPRESSIONS:Normal Study\par * The left ventricle was normal in size.\par * Tracer uptake was homogeneous throughout the left\par ventricle.\par * Normal study; no evidence for myocardial infarction or\par ischemia.\par * Post-stress gated wall motion analysis was performed\par (LVEF = 67 %;LVEDV = 54 ml.), revealing normal LV\par function. The RV function appeared normal. [de-identified] : FINDINGS:\par LOWER CHEST: Aortic valve calcifications. Atherosclerotic calcifications \par of the aorta.\par \par LIVER: Within normal limits.\par BILE DUCTS: Normal caliber.\par GALLBLADDER: Within normal limits.\par SPLEEN: Redemonstrated indeterminate 1.5 cm low-attenuation lesion, not \par significantly changed.\par PANCREAS: Redemonstrated diffuse main pancreatic duct dilation up to 4 mm.\par ADRENALS: Stable indeterminate 2 cm left adrenal nodule.\par KIDNEYS/URETERS: Left renal cyst mild right-sided hydroureteronephrosis \par and right perinephric fat. Bilateral renal subcentimeter hypodense \par lesions which are too small to characterize. No hydronephrosis. Symmetric \par nonspecific perinephric stranding is similar to the prior exam. No \par nephrolithiasis.\par \par BLADDER: Within normal limits.\par REPRODUCTIVE ORGANS: Uterus and adnexa within normal limits.\par \par BOWEL: No bowel obstruction. Appendix is not visualized. Colonic \par diverticulosis without evidence of diverticulitis.\par PERITONEUM: No ascites.\par VESSELS: Atherosclerotic changes. Infrarenal abdominal aortic aneurysm \par measuring up to 3.4 cm, not significant changed.\par RETROPERITONEUM/LYMPH NODES: No lymphadenopathy.\par ABDOMINAL WALL: Small fat-containing left inguinal hernia.\par BONES: Degenerative changes of the spine.\par \par IMPRESSION:\par Diverticulosis without evidence of diverticulitis. No acute finding on \par this noncontrast study to explain patient's symptomatology. [de-identified] : Findings:\par      Multiple diverticula were found in the entire colon.\par      Biopsies for histology were taken with a cold forceps from the right colon and left colon for \par      evaluation of microscopic colitis.\par      A small polyp was found in the ascending colon. The polyp was sessile. No biopsies or other \par      specimens were collected for this exam due to patient's age and inadequate prep.\par      The exam was otherwise normal throughout the examined colon.\par      The terminal ileum appeared normal.\par                                                                                                     \par Impression:          - Preparation of the colon was inadequate.\par                      - Diverticulosis in the entire examined colon.\par                      - One small polyp in the ascending colon. No specimens collected.\par                      - The examined portion of the ileum was normal.\par                      - Biopsies were taken with a cold forceps from the right colon and left colon \par                      for evaluation of microscopic colitis.\par \par Path:\par 1. Colon, right, biopsy:\par - Colonic mucosa with no significant diagnostic alterations\par \par \par 2. Colon, left, biopsy:\par - Colonic mucosa with no significant diagnostic alterations

## 2022-06-20 NOTE — ASSESSMENT
[FreeTextEntry1] : Impression:\par 1. Diarrhea/weight loss - extensive workup negative, likely due to exocrine pancreatic insufficiency\par 2. Decreased appetite/early satiety - no findings on imaging or colonoscopy, r/o upper GI pathology/structural obstructive process\par \par Plan:\par -Patient is frequently eating/drinking small amounts in between her standard meals where she takes the Creon\par -Suspect some diarrhea is due to meals away from the time she is taking the Creon\par -Asked daughter to try to consolidate her meals together as she can't take Creon every hour with her small snacks/in between PO intake\par -Advised continued PPI daily with Creon TID\par -Patient to discuss with PMD/neurologist if mirtazapine in place of other antidepressants can be tried to increase appetite\par -Send patient for UGI series to r/o GOO given reported early satiety, would avoid further endoscopic evaluation given advanced age and comorbidities

## 2022-06-22 ENCOUNTER — OUTPATIENT (OUTPATIENT)
Dept: OUTPATIENT SERVICES | Facility: HOSPITAL | Age: 87
LOS: 1 days | Discharge: HOME | End: 2022-06-22
Payer: MEDICARE

## 2022-06-22 ENCOUNTER — APPOINTMENT (OUTPATIENT)
Dept: RADIOLOGY | Facility: HOSPITAL | Age: 87
End: 2022-06-22

## 2022-06-22 DIAGNOSIS — Z98.890 OTHER SPECIFIED POSTPROCEDURAL STATES: Chronic | ICD-10-CM

## 2022-06-22 DIAGNOSIS — Z98.49 CATARACT EXTRACTION STATUS, UNSPECIFIED EYE: Chronic | ICD-10-CM

## 2022-06-22 DIAGNOSIS — R63.4 ABNORMAL WEIGHT LOSS: ICD-10-CM

## 2022-06-22 DIAGNOSIS — Z90.49 ACQUIRED ABSENCE OF OTHER SPECIFIED PARTS OF DIGESTIVE TRACT: Chronic | ICD-10-CM

## 2022-06-22 PROCEDURE — 74240 X-RAY XM UPR GI TRC 1CNTRST: CPT | Mod: 26

## 2022-07-26 ENCOUNTER — APPOINTMENT (OUTPATIENT)
Dept: RADIOLOGY | Facility: HOSPITAL | Age: 87
End: 2022-07-26

## 2022-08-17 ENCOUNTER — APPOINTMENT (OUTPATIENT)
Dept: GASTROENTEROLOGY | Facility: CLINIC | Age: 87
End: 2022-08-17

## 2022-08-29 NOTE — H&P PST ADULT - PROBLEM SELECTOR PROBLEM 1
[NL] : warm, clear [FreeTextEntry5] : pit medial right eye Other nonspecific abnormal finding of lung field

## 2022-09-09 ENCOUNTER — RX RENEWAL (OUTPATIENT)
Age: 87
End: 2022-09-09

## 2023-01-03 NOTE — PHYSICAL THERAPY INITIAL EVALUATION ADULT - PAIN SENSATION, RUE, REHAB EVAL
Bone density test came back and was in the normal range.  Please let her know  
Called and informed patient that her bone scan results wasn't I yet and once they are in we will give her a call.      LATISHA Weinstein  
DEXA done on 12/22.  Please see when results will be done  
From: Mary Kaiser  To: Avelina Russ  Sent: 12/28/2022 7:11 AM CST  Subject: Bone scan    I haven’t gotten my bone scan results yet. Is it available?  
Message sent to radiology requesting info.  
Spoke to patient advised MD results note below. Patient verbalized understanding   
within normal limits

## 2023-01-06 ENCOUNTER — NON-APPOINTMENT (OUTPATIENT)
Age: 88
End: 2023-01-06

## 2023-01-19 NOTE — ED ADULT NURSE NOTE - NEURO ASSESSMENT
[facility]  Hospital Medicine Progress Note        Chief Complaint: Inpatient Follow-up    HPI:   80-year-old female with significant history of HTN, HLD, chronic back pain status post spinal stimulator placement presented to the ED with worsening cough x3 weeks.  Patient was prescribed Levaquin, doxycycline, prednisone by PCP with no symptomatic improvement.  Also noted bilateral lower extremity swelling.  Given persistent complaints she decided to come to the ED.  Chest x-ray revealed a small left pleural effusion paid CT chest confirmed this.  Patient was admitted hospitalist medicine service.  Pulmonary was consulted for evaluation of pleural effusion.  Patient underwent left-sided thoracentesis on 01/16.  Echocardiogram on 01/17 with normal ejection fraction, grade 1 diastolic dysfunction.  Concern for small mobile echogenic structure on the aortic valve which needed further evaluation.  Cardiology consulted for THEODORA.  Left-sided pleural effusion consistent with exudative effusion.  Cytology negative for malignancy and cultures negative.  Patient was initiated on Zosyn on admit which was being continued.  Patient remains hemodynamically stable.  No leukocytosis and afebrile.    Interval Hx:   Patient seen at bedside after THEODORA.  Hemodynamics stable.  No new complaints except for persistent cough.  Patient reports improvement in cough while on oxygen.  No other new complaints.     Objective/physical exam:  General: In no acute distress, afebrile  Chest: Clear to auscultation bilaterally  Heart: S1, S2, no appreciable murmur  Abdomen: Soft, nontender, BS +  MSK: Warm, no lower extremity edema, no clubbing or cyanosis  Neurologic: Alert and oriented x4, moving all extremities with good strength     VITAL SIGNS: 24 HRS MIN & MAX LAST   Temp  Min: 98 °F (36.7 °C)  Max: 98.6 °F (37 °C) 98 °F (36.7 °C)   BP  Min: 130/66  Max: 151/75 (!) 144/74     Pulse  Min: 62  Max: 75  62   Resp  Min: 18   Max: 18 18   SpO2  Min: 92 %  Max: 98 % 97 %       Recent Labs   Lab 01/19/23  0441   WBC 7.4   RBC 3.40*   HGB 10.4*   HCT 30.5*   MCV 89.7   MCH 30.6   MCHC 34.1   RDW 13.0      MPV 9.5         Recent Labs   Lab 01/16/23  0307 01/17/23  0838 01/19/23  0441   * 128* 128*   K 4.1 3.9 3.8   CO2 22* 26 25   BUN 7.5* 5.1* 5.4*   CREATININE 0.70 0.73 0.68   CALCIUM 8.3* 8.2* 8.0*   MG 1.60 1.60  --    ALBUMIN 2.2*  --   --    ALKPHOS 71  --   --    ALT 15  --   --    AST 22  --   --    BILITOT 0.6  --   --           Microbiology Results (last 7 days)       Procedure Component Value Units Date/Time    Blood Culture [[id number]] Collected: 01/18/23 0747    Order Status: Resulted Specimen: Blood Updated: 01/18/23 0837    Blood Culture [[id number]] Collected: 01/18/23 0747    Order Status: Resulted Specimen: Blood Updated: 01/18/23 0837    CHARLES Prep [[id number]] Collected: 01/16/23 0916    Order Status: Completed Specimen: Body Fluid from Pleural, Left Updated: 01/16/23 1017     KOH Prep No fungal elements seen    Mycobacteria and Nocardia Culture [[id number]] Collected: 01/16/23 0917    Order Status: Sent Specimen: Body Fluid from Pleural Fluid, Left Updated: 01/16/23 0917    Fungal Culture [[id number]] Collected: 01/16/23 0916    Order Status: Sent Specimen: Body Fluid from Pleural Fluid, Left Updated: 01/16/23 0916    Respiratory Culture [[id number]] Collected: 01/14/23 1219    Order Status: Completed Specimen: Sputum, Expectorated Updated: 01/16/23 0723     Respiratory Culture Normal respiratory prasanth     GRAM STAIN Quality 1+      Many Gram positive cocci             Scheduled Med:   amLODIPine  10 mg Oral Daily    benzonatate  100 mg Oral TID    calcium-vitamin D3  1 tablet Oral Daily    cholestyramine  1 packet Oral BID    EScitalopram oxalate  5 mg Oral Daily    Lactobacillus acidophilus  1 capsule Oral Daily    multivitamin  1 tablet Oral Daily    umeclidinium-vilanteroL  1 puff Inhalation Daily    ursodioL   500 mg Oral BID    valsartan  160 mg Oral Daily          Assessment/Plan:    Exudative, ?  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  Essential HTN-stable  Chronic back pain status post spinal cord stimulator placement   Chronic migraine  Prophylaxis    Patient underwent thoracentesis  Fluids studies with negative cultures, no malignancy   Pulmonology on board   Repeat chest x-ray today with persistent effusion   I will obtain CT thorax to further evaluate  Pulmonology discontinued Zosyn today as she completed 6 days   Patient is afebrile, hemodynamically stable with no leukocytosis   Supplemental oxygen to keep saturations more than 90%   EF is normal, has diastolic dysfunction  VU today to further evaluate abnormal TT, no vegetation, no concern for infective endocarditis   Persistent hyponatremia, slightly better compared to admit   Obtain osmolality studies   Patient was on HCTZ which is now discontinued  Replace Mag, recheck tomorrow  Continue home meds-amlodipine, ursodiol, Questran, calcium vitamin-D, citalopram, probiotic, multivitamin, valsartan   DVT prophylaxis-subQ heparin      Ching Monzon MD   01/19/2023                 [facility]

## 2023-01-30 ENCOUNTER — APPOINTMENT (OUTPATIENT)
Dept: GASTROENTEROLOGY | Facility: CLINIC | Age: 88
End: 2023-01-30
Payer: MEDICARE

## 2023-01-30 VITALS
HEIGHT: 62 IN | HEART RATE: 79 BPM | DIASTOLIC BLOOD PRESSURE: 72 MMHG | WEIGHT: 126 LBS | TEMPERATURE: 97.3 F | BODY MASS INDEX: 23.19 KG/M2 | OXYGEN SATURATION: 94 % | SYSTOLIC BLOOD PRESSURE: 112 MMHG

## 2023-01-30 DIAGNOSIS — R63.4 ABNORMAL WEIGHT LOSS: ICD-10-CM

## 2023-01-30 PROCEDURE — 99214 OFFICE O/P EST MOD 30 MIN: CPT

## 2023-01-30 RX ORDER — PANCRELIPASE 36000; 180000; 114000 [USP'U]/1; [USP'U]/1; [USP'U]/1
36000-114000 CAPSULE, DELAYED RELEASE PELLETS ORAL
Qty: 540 | Refills: 1 | Status: COMPLETED | COMMUNITY
Start: 2022-02-17 | End: 2023-01-30

## 2023-01-30 NOTE — HISTORY OF PRESENT ILLNESS
[de-identified] : TTE 4/6/22:\par CONCLUSIONS:\par 1. Mitral annular calcification, otherwise normal mitral\par valve. Mild-moderate mitral regurgitation.\par 2. Calcified trileaflet aortic valve with decreased\par opening. Peak transaortic valve gradient equals 32 mm Hg,\par mean transaortic valve gradient equals 16 mm Hg, estimated\par aortic valve area equals 1.7 sqcm (by continuity equation),\par consistent with mild aortic stenosis. Mild-moderate aortic\par regurgitation.\par 3. Moderately dilated left atrium.  LA volume index = 46\par cc/m2.\par 4. Normal left ventricular internal dimensions and wall\par thicknesses.\par 5. Normal left ventricular systolic function. No segmental\par wall motion abnormalities.\par 6. Mild diastolic dysfunction (Stage I).\par 7. Normal right ventricular size and function.\par \par \par \par Stress test 4/8/22:\par IMPRESSIONS:Normal Study\par * The left ventricle was normal in size.\par * Tracer uptake was homogeneous throughout the left\par ventricle.\par * Normal study; no evidence for myocardial infarction or\par ischemia.\par * Post-stress gated wall motion analysis was performed\par (LVEF = 67 %;LVEDV = 54 ml.), revealing normal LV\par function. The RV function appeared normal. [de-identified] : 6/8/22\par FINDINGS:\par LOWER CHEST: Aortic valve calcifications. Atherosclerotic calcifications \par of the aorta.\par \par LIVER: Within normal limits.\par BILE DUCTS: Normal caliber.\par GALLBLADDER: Within normal limits.\par SPLEEN: Redemonstrated indeterminate 1.5 cm low-attenuation lesion, not \par significantly changed.\par PANCREAS: Redemonstrated diffuse main pancreatic duct dilation up to 4 mm.\par ADRENALS: Stable indeterminate 2 cm left adrenal nodule.\par KIDNEYS/URETERS: Left renal cyst mild right-sided hydroureteronephrosis \par and right perinephric fat. Bilateral renal subcentimeter hypodense \par lesions which are too small to characterize. No hydronephrosis. Symmetric \par nonspecific perinephric stranding is similar to the prior exam. No \par nephrolithiasis.\par \par BLADDER: Within normal limits.\par REPRODUCTIVE ORGANS: Uterus and adnexa within normal limits.\par \par BOWEL: No bowel obstruction. Appendix is not visualized. Colonic \par diverticulosis without evidence of diverticulitis.\par PERITONEUM: No ascites.\par VESSELS: Atherosclerotic changes. Infrarenal abdominal aortic aneurysm \par measuring up to 3.4 cm, not significant changed.\par RETROPERITONEUM/LYMPH NODES: No lymphadenopathy.\par ABDOMINAL WALL: Small fat-containing left inguinal hernia.\par BONES: Degenerative changes of the spine.\par \par IMPRESSION:\par Diverticulosis without evidence of diverticulitis. No acute finding on \par this noncontrast study to explain patient's symptomatology. [de-identified] : UGI series 6/22/22:\par FINDINGS:\par \par  image is within normal limits.\par \par The patient swallowed barium without difficulty.  However, there is slightly abnormal esophageal motility with mild reflux and occasional tertiary waves. The stomach is well outlined and there is no evidence of obstruction or significant delay.  The duodenal bulb and sweep as well as the visualized small bowel are unremarkable.\par \par IMPRESSION:\par \par Slightly abnormal esophageal motility with mild reflux and occasional tertiary waves.\par \par No gastric outlet obstruction. [FreeTextEntry1] : 3/11/22\par FINDINGS:\par LOWER CHEST: Within normal limits.\par \par LIVER: Scattered subcentimeter cysts are noted. Otherwise within normal limits.\par BILE DUCTS: Normal caliber.\par GALLBLADDER: Within normal limits.\par SPLEEN: Nonspecific 1.2 cm T2 hyperintense lesion in the spleen.\par PANCREAS: Mild diffuse parenchymal atrophy. There is no pancreatic mass identified. Pancreatic duct is dilated to 5 mm in the neck and 6 mm in the head. It is normal caliber within the tail.\par ADRENALS: Within normal limits.\par KIDNEYS/URETERS: There are subcentimeter bilateral cortical cysts. There is no solid renal mass. There is no hydronephrosis.\par \par BLADDER: Within normal limits.\par REPRODUCTIVE ORGANS: Uterus and adnexa within normal limits.\par \par BOWEL: No bowel obstruction. There is colonic diverticulosis.\par PERITONEUM: No ascites.\par VESSELS: There is fusiform dilatation of the infrarenal abdominal aorta, measuring up to 3.2 cm.\par RETROPERITONEUM/LYMPH NODES: No lymphadenopathy.\par ABDOMINAL WALL: Small calcification is seen superficial to the left gluteus savage muscle.\par BONES: Within normal limits.\par \par IMPRESSION:\par Mild diffuse pancreatic atrophic changes and dilatation of the pancreatic duct up to 6 mm in the head without identifiable cause.\par \par 3.2 cm infrarenal abdominal aortic aneurysm.\par \par No adenopathy or mass in the pelvis. [Wt Loss ___ Lbs] : recent [unfilled] ~Upound(s) weight loss

## 2023-01-30 NOTE — ASSESSMENT
[FreeTextEntry1] : Impression:\par 1. Diarrhea/weight loss - extensive workup negative, likely due to exocrine pancreatic insufficiency\par 2. Decreased appetite/early satiety - no findings on imaging or colonoscopy, possibly combination of pancreatic insufficiency vs mood disorder\par \par Plan:\par -Patient will follow-up with psychiatry\par -Advised continued PPI along with pancreatic enzyme supplement; since she still reports continued weight loss and steatorrhea, will change formulation of pancreatic enzyme to Zenpep to see if it improves\par -Daughter will call me to discuss progress in 2-3 weeks\par -Will repeat stool fat and elastase after patient has been on new formulation of pancreatic enzymes\par

## 2023-01-30 NOTE — REASON FOR VISIT
[Follow-up] : a follow-up of an existing diagnosis [Family Member] : family member [Source: ______] : History obtained from [unfilled] [FreeTextEntry1] : weight loss, exocrine pancreatic insufficiency

## 2023-01-30 NOTE — PHYSICAL EXAM
[Alert] : alert [Normal Voice/Communication] : normal voice/communication [Sclera] : the sclera and conjunctiva were normal [Hearing Threshold Finger Rub Not Wheeler] : hearing was normal [Normal Appearance] : the appearance of the neck was normal [No Respiratory Distress] : no respiratory distress [No Acc Muscle Use] : no accessory muscle use [Respiration, Rhythm And Depth] : normal respiratory rhythm and effort [Heart Rate And Rhythm] : heart rate was normal and rhythm regular [Normal S1, S2] : normal S1 and S2 [Bowel Sounds] : normal bowel sounds [Abdomen Tenderness] : non-tender [No Masses] : no abdominal mass palpated [Abdomen Soft] : soft [No Spinal Tenderness] : no spinal tenderness [Abnormal Walk] : normal gait [Normal Color / Pigmentation] : normal skin color and pigmentation [No Focal Deficits] : no focal deficits [Oriented To Time, Place, And Person] : oriented to person, place, and time

## 2023-02-06 ENCOUNTER — OUTPATIENT (OUTPATIENT)
Dept: OUTPATIENT SERVICES | Facility: HOSPITAL | Age: 88
LOS: 1 days | Discharge: STILL ADMITTED OR ADMINISTRATIVE | End: 2023-02-06
Payer: MEDICARE

## 2023-02-06 DIAGNOSIS — Z98.890 OTHER SPECIFIED POSTPROCEDURAL STATES: Chronic | ICD-10-CM

## 2023-02-06 DIAGNOSIS — Z98.49 CATARACT EXTRACTION STATUS, UNSPECIFIED EYE: Chronic | ICD-10-CM

## 2023-02-06 DIAGNOSIS — Z90.49 ACQUIRED ABSENCE OF OTHER SPECIFIED PARTS OF DIGESTIVE TRACT: Chronic | ICD-10-CM

## 2023-02-06 PROCEDURE — 99215 OFFICE O/P EST HI 40 MIN: CPT | Mod: 95

## 2023-02-07 DIAGNOSIS — F41.1 GENERALIZED ANXIETY DISORDER: ICD-10-CM

## 2023-02-07 DIAGNOSIS — F33.1 MAJOR DEPRESSIVE DISORDER, RECURRENT, MODERATE: ICD-10-CM

## 2023-02-22 ENCOUNTER — NON-APPOINTMENT (OUTPATIENT)
Age: 88
End: 2023-02-22

## 2023-02-28 ENCOUNTER — APPOINTMENT (OUTPATIENT)
Dept: CARE COORDINATION | Facility: HOME HEALTH | Age: 88
End: 2023-02-28
Payer: MEDICARE

## 2023-02-28 ENCOUNTER — NON-APPOINTMENT (OUTPATIENT)
Age: 88
End: 2023-02-28

## 2023-02-28 DIAGNOSIS — E78.5 HYPERLIPIDEMIA, UNSPECIFIED: ICD-10-CM

## 2023-02-28 DIAGNOSIS — R41.3 OTHER AMNESIA: ICD-10-CM

## 2023-02-28 DIAGNOSIS — H40.9 UNSPECIFIED GLAUCOMA: ICD-10-CM

## 2023-02-28 DIAGNOSIS — N18.32 CHRONIC KIDNEY DISEASE, STAGE 3B: ICD-10-CM

## 2023-02-28 PROCEDURE — 99349 HOME/RES VST EST MOD MDM 40: CPT | Mod: 95

## 2023-02-28 RX ORDER — RIVASTIGMINE 4.6 MG/24H
4.6 PATCH, EXTENDED RELEASE TRANSDERMAL DAILY
Refills: 0 | Status: DISCONTINUED | COMMUNITY
Start: 2021-04-16 | End: 2023-02-28

## 2023-02-28 RX ORDER — AMLODIPINE BESYLATE 5 MG/1
5 TABLET ORAL
Qty: 90 | Refills: 0 | Status: DISCONTINUED | COMMUNITY
Start: 2019-06-12 | End: 2023-02-28

## 2023-02-28 RX ORDER — CLOPIDOGREL 75 MG/1
75 TABLET, FILM COATED ORAL DAILY
Refills: 0 | Status: DISCONTINUED | COMMUNITY
End: 2023-02-28

## 2023-02-28 RX ORDER — B-COMPLEX WITH VITAMIN C
TABLET ORAL DAILY
Qty: 1 | Refills: 0 | Status: ACTIVE | COMMUNITY
Start: 2023-02-28

## 2023-02-28 RX ORDER — APIXABAN 2.5 MG/1
2.5 TABLET, FILM COATED ORAL TWICE DAILY
Qty: 1 | Refills: 0 | Status: ACTIVE | COMMUNITY
Start: 2023-02-28

## 2023-02-28 RX ORDER — ESCITALOPRAM OXALATE 10 MG/1
10 TABLET ORAL
Qty: 90 | Refills: 0 | Status: DISCONTINUED | COMMUNITY
Start: 2019-05-22 | End: 2023-02-28

## 2023-02-28 RX ORDER — FUROSEMIDE 20 MG/1
20 TABLET ORAL DAILY
Qty: 30 | Refills: 0 | Status: ACTIVE | COMMUNITY
Start: 2023-02-28

## 2023-02-28 RX ORDER — RIVASTIGMINE 4.6 MG/24H
4.6 PATCH, EXTENDED RELEASE TRANSDERMAL DAILY
Qty: 1 | Refills: 0 | Status: ACTIVE | COMMUNITY
Start: 2023-02-28

## 2023-02-28 RX ORDER — CHLORHEXIDINE GLUCONATE 4 %
325 (65 FE) LIQUID (ML) TOPICAL DAILY
Qty: 1 | Refills: 0 | Status: ACTIVE | COMMUNITY
Start: 2023-02-28

## 2023-02-28 RX ORDER — TIMOLOL MALEATE 2.5 MG/ML
0.25 SOLUTION/ DROPS OPHTHALMIC
Qty: 1 | Refills: 0 | Status: ACTIVE | COMMUNITY
Start: 2023-02-28

## 2023-02-28 RX ORDER — ATORVASTATIN CALCIUM 10 MG/1
10 TABLET, FILM COATED ORAL
Refills: 0 | Status: ACTIVE | COMMUNITY

## 2023-02-28 RX ORDER — ZOLPIDEM TARTRATE 5 MG/1
5 TABLET ORAL
Refills: 0 | Status: DISCONTINUED | COMMUNITY
End: 2023-02-28

## 2023-02-28 RX ORDER — DIGOXIN 125 UG/1
125 TABLET ORAL
Qty: 1 | Refills: 0 | Status: ACTIVE | COMMUNITY
Start: 2023-02-28

## 2023-02-28 RX ORDER — LISINOPRIL AND HYDROCHLOROTHIAZIDE TABLETS 20; 12.5 MG/1; MG/1
20-12.5 TABLET ORAL TWICE DAILY
Refills: 0 | Status: DISCONTINUED | COMMUNITY
End: 2023-02-28

## 2023-03-01 ENCOUNTER — APPOINTMENT (OUTPATIENT)
Dept: GASTROENTEROLOGY | Facility: CLINIC | Age: 88
End: 2023-03-01
Payer: MEDICARE

## 2023-03-01 VITALS
TEMPERATURE: 94 F | OXYGEN SATURATION: 85 % | HEIGHT: 62 IN | HEART RATE: 76 BPM | WEIGHT: 129 LBS | SYSTOLIC BLOOD PRESSURE: 112 MMHG | DIASTOLIC BLOOD PRESSURE: 76 MMHG | BODY MASS INDEX: 23.74 KG/M2

## 2023-03-01 PROCEDURE — 99214 OFFICE O/P EST MOD 30 MIN: CPT

## 2023-03-01 NOTE — HISTORY OF PRESENT ILLNESS
[de-identified] : TTE 4/6/22:\par CONCLUSIONS:\par 1. Mitral annular calcification, otherwise normal mitral\par valve. Mild-moderate mitral regurgitation.\par 2. Calcified trileaflet aortic valve with decreased\par opening. Peak transaortic valve gradient equals 32 mm Hg,\par mean transaortic valve gradient equals 16 mm Hg, estimated\par aortic valve area equals 1.7 sqcm (by continuity equation),\par consistent with mild aortic stenosis. Mild-moderate aortic\par regurgitation.\par 3. Moderately dilated left atrium.  LA volume index = 46\par cc/m2.\par 4. Normal left ventricular internal dimensions and wall\par thicknesses.\par 5. Normal left ventricular systolic function. No segmental\par wall motion abnormalities.\par 6. Mild diastolic dysfunction (Stage I).\par 7. Normal right ventricular size and function.\par \par \par \par Stress test 4/8/22:\par IMPRESSIONS:Normal Study\par * The left ventricle was normal in size.\par * Tracer uptake was homogeneous throughout the left\par ventricle.\par * Normal study; no evidence for myocardial infarction or\par ischemia.\par * Post-stress gated wall motion analysis was performed\par (LVEF = 67 %;LVEDV = 54 ml.), revealing normal LV\par function. The RV function appeared normal. [de-identified] : 6/8/22\par FINDINGS:\par LOWER CHEST: Aortic valve calcifications. Atherosclerotic calcifications \par of the aorta.\par \par LIVER: Within normal limits.\par BILE DUCTS: Normal caliber.\par GALLBLADDER: Within normal limits.\par SPLEEN: Redemonstrated indeterminate 1.5 cm low-attenuation lesion, not \par significantly changed.\par PANCREAS: Redemonstrated diffuse main pancreatic duct dilation up to 4 mm.\par ADRENALS: Stable indeterminate 2 cm left adrenal nodule.\par KIDNEYS/URETERS: Left renal cyst mild right-sided hydroureteronephrosis \par and right perinephric fat. Bilateral renal subcentimeter hypodense \par lesions which are too small to characterize. No hydronephrosis. Symmetric \par nonspecific perinephric stranding is similar to the prior exam. No \par nephrolithiasis.\par \par BLADDER: Within normal limits.\par REPRODUCTIVE ORGANS: Uterus and adnexa within normal limits.\par \par BOWEL: No bowel obstruction. Appendix is not visualized. Colonic \par diverticulosis without evidence of diverticulitis.\par PERITONEUM: No ascites.\par VESSELS: Atherosclerotic changes. Infrarenal abdominal aortic aneurysm \par measuring up to 3.4 cm, not significant changed.\par RETROPERITONEUM/LYMPH NODES: No lymphadenopathy.\par ABDOMINAL WALL: Small fat-containing left inguinal hernia.\par BONES: Degenerative changes of the spine.\par \par IMPRESSION:\par Diverticulosis without evidence of diverticulitis. No acute finding on \par this noncontrast study to explain patient's symptomatology. [FreeTextEntry1] : 3/11/22\par FINDINGS:\par LOWER CHEST: Within normal limits.\par \par LIVER: Scattered subcentimeter cysts are noted. Otherwise within normal limits.\par BILE DUCTS: Normal caliber.\par GALLBLADDER: Within normal limits.\par SPLEEN: Nonspecific 1.2 cm T2 hyperintense lesion in the spleen.\par PANCREAS: Mild diffuse parenchymal atrophy. There is no pancreatic mass identified. Pancreatic duct is dilated to 5 mm in the neck and 6 mm in the head. It is normal caliber within the tail.\par ADRENALS: Within normal limits.\par KIDNEYS/URETERS: There are subcentimeter bilateral cortical cysts. There is no solid renal mass. There is no hydronephrosis.\par \par BLADDER: Within normal limits.\par REPRODUCTIVE ORGANS: Uterus and adnexa within normal limits.\par \par BOWEL: No bowel obstruction. There is colonic diverticulosis.\par PERITONEUM: No ascites.\par VESSELS: There is fusiform dilatation of the infrarenal abdominal aorta, measuring up to 3.2 cm.\par RETROPERITONEUM/LYMPH NODES: No lymphadenopathy.\par ABDOMINAL WALL: Small calcification is seen superficial to the left gluteus savage muscle.\par BONES: Within normal limits.\par \par IMPRESSION:\par Mild diffuse pancreatic atrophic changes and dilatation of the pancreatic duct up to 6 mm in the head without identifiable cause.\par \par 3.2 cm infrarenal abdominal aortic aneurysm.\par \par No adenopathy or mass in the pelvis. [de-identified] : UGI series 6/22/22:\par FINDINGS:\par \par  image is within normal limits.\par \par The patient swallowed barium without difficulty.  However, there is slightly abnormal esophageal motility with mild reflux and occasional tertiary waves. The stomach is well outlined and there is no evidence of obstruction or significant delay.  The duodenal bulb and sweep as well as the visualized small bowel are unremarkable.\par \par IMPRESSION:\par \par Slightly abnormal esophageal motility with mild reflux and occasional tertiary waves.\par \par No gastric outlet obstruction.

## 2023-03-01 NOTE — PHYSICAL EXAM
[Alert] : alert [Normal Voice/Communication] : normal voice/communication [Sclera] : the sclera and conjunctiva were normal [Hearing Threshold Finger Rub Not Arthur] : hearing was normal [Normal Appearance] : the appearance of the neck was normal [No Respiratory Distress] : no respiratory distress [No Acc Muscle Use] : no accessory muscle use [Respiration, Rhythm And Depth] : normal respiratory rhythm and effort [Heart Rate And Rhythm] : heart rate was normal and rhythm regular [Normal S1, S2] : normal S1 and S2 [Bowel Sounds] : normal bowel sounds [Abdomen Tenderness] : non-tender [No Masses] : no abdominal mass palpated [Abdomen Soft] : soft [No Spinal Tenderness] : no spinal tenderness [Abnormal Walk] : normal gait [Normal Color / Pigmentation] : normal skin color and pigmentation [No Focal Deficits] : no focal deficits [Oriented To Time, Place, And Person] : oriented to person, place, and time

## 2023-03-01 NOTE — ASSESSMENT
[FreeTextEntry1] : Impression:\par 1. Severe exocrine pancreatic insufficiency - now stabilizing with change of lipase supplementation to Zenpep instead\par \par Plan:\par -Repeat stool elastase and stool fat\par -Asked patient to continue Zepep for now with her meals and continue PPI along with it daily to prevent degradation\par -Pending stool studies, if elastase still low, consider increasing to 2 tabs with meals\par -Continue mirtazapine, asked patient to f/u with psychiatry to see if increasing it further is needed to stimulate her appetite\par -Advised patient that if she wants high caloric less healthy foods like juice/ice ream if she will  be willing to eat it, it can be consumed along with the Zenpep \par \par RTC in 3 months, or earlier if stool studies are abnormal/weight loss persists

## 2023-03-02 PROCEDURE — 99215 OFFICE O/P EST HI 40 MIN: CPT | Mod: 95

## 2023-03-05 PROBLEM — N18.32 STAGE 3B CHRONIC KIDNEY DISEASE: Status: ACTIVE | Noted: 2023-03-02

## 2023-03-06 ENCOUNTER — OUTPATIENT (OUTPATIENT)
Dept: OUTPATIENT SERVICES | Facility: HOSPITAL | Age: 87
LOS: 1 days | Discharge: HOME | End: 2023-03-06
Payer: MEDICARE

## 2023-03-06 DIAGNOSIS — Z98.890 OTHER SPECIFIED POSTPROCEDURAL STATES: Chronic | ICD-10-CM

## 2023-03-06 DIAGNOSIS — Z90.49 ACQUIRED ABSENCE OF OTHER SPECIFIED PARTS OF DIGESTIVE TRACT: Chronic | ICD-10-CM

## 2023-03-06 DIAGNOSIS — Z98.49 CATARACT EXTRACTION STATUS, UNSPECIFIED EYE: Chronic | ICD-10-CM

## 2023-03-06 PROCEDURE — 90792 PSYCH DIAG EVAL W/MED SRVCS: CPT

## 2023-03-06 PROCEDURE — 99214 OFFICE O/P EST MOD 30 MIN: CPT | Mod: 1L

## 2023-03-07 DIAGNOSIS — N18.32 CHRONIC KIDNEY DISEASE, STAGE 3B: ICD-10-CM

## 2023-03-07 DIAGNOSIS — I50.9 HEART FAILURE, UNSPECIFIED: ICD-10-CM

## 2023-03-07 DIAGNOSIS — F33.1 MAJOR DEPRESSIVE DISORDER, RECURRENT, MODERATE: ICD-10-CM

## 2023-03-07 DIAGNOSIS — I48.91 UNSPECIFIED ATRIAL FIBRILLATION: ICD-10-CM

## 2023-03-07 DIAGNOSIS — E78.5 HYPERLIPIDEMIA, UNSPECIFIED: ICD-10-CM

## 2023-03-19 ENCOUNTER — EMERGENCY (EMERGENCY)
Facility: HOSPITAL | Age: 88
LOS: 0 days | Discharge: HOME | End: 2023-03-19
Attending: STUDENT IN AN ORGANIZED HEALTH CARE EDUCATION/TRAINING PROGRAM
Payer: MEDICARE

## 2023-03-19 VITALS
TEMPERATURE: 98 F | OXYGEN SATURATION: 97 % | DIASTOLIC BLOOD PRESSURE: 71 MMHG | HEART RATE: 73 BPM | RESPIRATION RATE: 21 BRPM | SYSTOLIC BLOOD PRESSURE: 119 MMHG

## 2023-03-19 VITALS
DIASTOLIC BLOOD PRESSURE: 66 MMHG | HEART RATE: 71 BPM | SYSTOLIC BLOOD PRESSURE: 100 MMHG | TEMPERATURE: 98 F | WEIGHT: 136.03 LBS | RESPIRATION RATE: 18 BRPM | HEIGHT: 66 IN | OXYGEN SATURATION: 98 %
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
## 2023-03-19 DIAGNOSIS — I48.91 UNSPECIFIED ATRIAL FIBRILLATION: ICD-10-CM

## 2023-03-19 DIAGNOSIS — Z98.49 CATARACT EXTRACTION STATUS, UNSPECIFIED EYE: Chronic | ICD-10-CM

## 2023-03-19 DIAGNOSIS — Z98.890 OTHER SPECIFIED POSTPROCEDURAL STATES: Chronic | ICD-10-CM

## 2023-03-19 DIAGNOSIS — J45.909 UNSPECIFIED ASTHMA, UNCOMPLICATED: ICD-10-CM

## 2023-03-19 DIAGNOSIS — E78.5 HYPERLIPIDEMIA, UNSPECIFIED: ICD-10-CM

## 2023-03-19 DIAGNOSIS — G30.9 ALZHEIMER'S DISEASE, UNSPECIFIED: ICD-10-CM

## 2023-03-19 DIAGNOSIS — M25.512 PAIN IN LEFT SHOULDER: ICD-10-CM

## 2023-03-19 DIAGNOSIS — F02.80 DEMENTIA IN OTHER DISEASES CLASSIFIED ELSEWHERE, UNSPECIFIED SEVERITY, WITHOUT BEHAVIORAL DISTURBANCE, PSYCHOTIC DISTURBANCE, MOOD DISTURBANCE, AND ANXIETY: ICD-10-CM

## 2023-03-19 DIAGNOSIS — I69.391 DYSPHAGIA FOLLOWING CEREBRAL INFARCTION: ICD-10-CM

## 2023-03-19 DIAGNOSIS — I10 ESSENTIAL (PRIMARY) HYPERTENSION: ICD-10-CM

## 2023-03-19 DIAGNOSIS — Z88.8 ALLERGY STATUS TO OTHER DRUGS, MEDICAMENTS AND BIOLOGICAL SUBSTANCES: ICD-10-CM

## 2023-03-19 DIAGNOSIS — Z79.01 LONG TERM (CURRENT) USE OF ANTICOAGULANTS: ICD-10-CM
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
## 2023-03-19 DIAGNOSIS — Z90.49 ACQUIRED ABSENCE OF OTHER SPECIFIED PARTS OF DIGESTIVE TRACT: Chronic | ICD-10-CM

## 2023-03-19 LAB
ALBUMIN SERPL ELPH-MCNC: 2.9 G/DL — LOW (ref 3.3–5)
ALP SERPL-CCNC: 131 U/L — HIGH (ref 40–120)
ALT FLD-CCNC: 43 U/L — SIGNIFICANT CHANGE UP (ref 12–78)
ANION GAP SERPL CALC-SCNC: 3 MMOL/L — LOW (ref 5–17)
AST SERPL-CCNC: 61 U/L — HIGH (ref 15–37)
BASOPHILS # BLD AUTO: 0.03 K/UL — SIGNIFICANT CHANGE UP (ref 0–0.2)
BASOPHILS NFR BLD AUTO: 0.4 % — SIGNIFICANT CHANGE UP (ref 0–2)
BILIRUB SERPL-MCNC: 0.3 MG/DL — SIGNIFICANT CHANGE UP (ref 0.2–1.2)
BUN SERPL-MCNC: 49 MG/DL — HIGH (ref 7–23)
CALCIUM SERPL-MCNC: 8.3 MG/DL — LOW (ref 8.5–10.1)
CHLORIDE SERPL-SCNC: 107 MMOL/L — SIGNIFICANT CHANGE UP (ref 96–108)
CO2 SERPL-SCNC: 29 MMOL/L — SIGNIFICANT CHANGE UP (ref 22–31)
CREAT SERPL-MCNC: 1.25 MG/DL — SIGNIFICANT CHANGE UP (ref 0.5–1.3)
EGFR: 40 ML/MIN/1.73M2 — LOW
EOSINOPHIL # BLD AUTO: 0.15 K/UL — SIGNIFICANT CHANGE UP (ref 0–0.5)
EOSINOPHIL NFR BLD AUTO: 2.1 % — SIGNIFICANT CHANGE UP (ref 0–6)
GLUCOSE SERPL-MCNC: 101 MG/DL — HIGH (ref 70–99)
HCT VFR BLD CALC: 35.5 % — SIGNIFICANT CHANGE UP (ref 34.5–45)
HGB BLD-MCNC: 11 G/DL — LOW (ref 11.5–15.5)
IMM GRANULOCYTES NFR BLD AUTO: 0.3 % — SIGNIFICANT CHANGE UP (ref 0–0.9)
LYMPHOCYTES # BLD AUTO: 1.66 K/UL — SIGNIFICANT CHANGE UP (ref 1–3.3)
LYMPHOCYTES # BLD AUTO: 23.7 % — SIGNIFICANT CHANGE UP (ref 13–44)
MCHC RBC-ENTMCNC: 29.5 PG — SIGNIFICANT CHANGE UP (ref 27–34)
MCHC RBC-ENTMCNC: 31 G/DL — LOW (ref 32–36)
MCV RBC AUTO: 95.2 FL — SIGNIFICANT CHANGE UP (ref 80–100)
MONOCYTES # BLD AUTO: 0.69 K/UL — SIGNIFICANT CHANGE UP (ref 0–0.9)
MONOCYTES NFR BLD AUTO: 9.9 % — SIGNIFICANT CHANGE UP (ref 2–14)
NEUTROPHILS # BLD AUTO: 4.45 K/UL — SIGNIFICANT CHANGE UP (ref 1.8–7.4)
NEUTROPHILS NFR BLD AUTO: 63.6 % — SIGNIFICANT CHANGE UP (ref 43–77)
NRBC # BLD: 0 /100 WBCS — SIGNIFICANT CHANGE UP (ref 0–0)
PLATELET # BLD AUTO: 141 K/UL — LOW (ref 150–400)
POTASSIUM SERPL-MCNC: 4.9 MMOL/L — SIGNIFICANT CHANGE UP (ref 3.5–5.3)
POTASSIUM SERPL-SCNC: 4.9 MMOL/L — SIGNIFICANT CHANGE UP (ref 3.5–5.3)
PROT SERPL-MCNC: 6.9 GM/DL — SIGNIFICANT CHANGE UP (ref 6–8.3)
RBC # BLD: 3.73 M/UL — LOW (ref 3.8–5.2)
RBC # FLD: 17 % — HIGH (ref 10.3–14.5)
SODIUM SERPL-SCNC: 139 MMOL/L — SIGNIFICANT CHANGE UP (ref 135–145)
WBC # BLD: 7 K/UL — SIGNIFICANT CHANGE UP (ref 3.8–10.5)
WBC # FLD AUTO: 7 K/UL — SIGNIFICANT CHANGE UP (ref 3.8–10.5)

## 2023-03-19 PROCEDURE — 73090 X-RAY EXAM OF FOREARM: CPT | Mod: 26,LT

## 2023-03-19 PROCEDURE — 73060 X-RAY EXAM OF HUMERUS: CPT | Mod: 26,LT

## 2023-03-19 PROCEDURE — 74177 CT ABD & PELVIS W/CONTRAST: CPT | Mod: 26,MA

## 2023-03-19 PROCEDURE — 99285 EMERGENCY DEPT VISIT HI MDM: CPT

## 2023-03-19 PROCEDURE — 73610 X-RAY EXAM OF ANKLE: CPT | Mod: 26,LT

## 2023-03-19 PROCEDURE — 72125 CT NECK SPINE W/O DYE: CPT | Mod: 26,MA

## 2023-03-19 PROCEDURE — 73030 X-RAY EXAM OF SHOULDER: CPT | Mod: 26,LT

## 2023-03-19 PROCEDURE — 70450 CT HEAD/BRAIN W/O DYE: CPT | Mod: 26,MA

## 2023-03-19 PROCEDURE — 71260 CT THORAX DX C+: CPT | Mod: 26,MA

## 2023-03-19 RX ORDER — ACETAMINOPHEN 500 MG
650 TABLET ORAL ONCE
Refills: 0 | Status: COMPLETED | OUTPATIENT
Start: 2023-03-19 | End: 2023-03-19

## 2023-03-19 RX ADMIN — Medication 650 MILLIGRAM(S): at 16:12

## 2023-03-19 NOTE — ED PROVIDER NOTE - PATIENT PORTAL LINK FT
You can access the FollowMyHealth Patient Portal offered by [facility] by registering at the following website: [email or web address]. By joining [employer]’s FollowMyHealth portal, you will also be able to view your health information using other applications (apps) compatible with our system.

## 2023-03-19 NOTE — ED PROVIDER NOTE - NSFOLLOWUPINSTRUCTIONS_ED_ALL_ED_FT
Fall Prevention in the Home  ImageFalls can cause injuries. They can happen to people of all ages. There are many things you can do to make your home safe and to help prevent falls.    What can I do on the outside of my home?  Regularly fix the edges of walkways and driveways and fix any cracks.  Remove anything that might make you trip as you walk through a door, such as a raised step or threshold.  Trim any bushes or trees on the path to your home.  Use bright outdoor lighting.  Clear any walking paths of anything that might make someone trip, such as rocks or tools.  Regularly check to see if handrails are loose or broken. Make sure that both sides of any steps have handrails.  Any raised decks and porches should have guardrails on the edges.  Have any leaves, snow, or ice cleared regularly.  Use sand or salt on walking paths during winter.  Clean up any spills in your garage right away. This includes oil or grease spills.  What can I do in the bathroom?  Use night lights.  Install grab bars by the toilet and in the tub and shower. Do not use towel bars as grab bars.  Use non-skid mats or decals in the tub or shower.  If you need to sit down in the shower, use a plastic, non-slip stool.  Keep the floor dry. Clean up any water that spills on the floor as soon as it happens.  Remove soap buildup in the tub or shower regularly.  Attach bath mats securely with double-sided non-slip rug tape.  Do not have throw rugs and other things on the floor that can make you trip.  What can I do in the bedroom?  Use night lights.  Make sure that you have a light by your bed that is easy to reach.  Do not use any sheets or blankets that are too big for your bed. They should not hang down onto the floor.  Have a firm chair that has side arms. You can use this for support while you get dressed.  Do not have throw rugs and other things on the floor that can make you trip.  What can I do in the kitchen?  Clean up any spills right away.  Avoid walking on wet floors.  Keep items that you use a lot in easy-to-reach places.  If you need to reach something above you, use a strong step stool that has a grab bar.  Keep electrical cords out of the way.  Do not use floor polish or wax that makes floors slippery. If you must use wax, use non-skid floor wax.  Do not have throw rugs and other things on the floor that can make you trip.  What can I do with my stairs?  Do not leave any items on the stairs.  Make sure that there are handrails on both sides of the stairs and use them. Fix handrails that are broken or loose. Make sure that handrails are as long as the stairways.  Check any carpeting to make sure that it is firmly attached to the stairs. Fix any carpet that is loose or worn.  Avoid having throw rugs at the top or bottom of the stairs. If you do have throw rugs, attach them to the floor with carpet tape.  Make sure that you have a light switch at the top of the stairs and the bottom of the stairs. If you do not have them, ask someone to add them for you.  What else can I do to help prevent falls?  Wear shoes that:    Do not have high heels.  Have rubber bottoms.  Are comfortable and fit you well.  Are closed at the toe. Do not wear sandals.    If you use a stepladder:    Make sure that it is fully opened. Do not climb a closed stepladder.  Make sure that both sides of the stepladder are locked into place.  Ask someone to hold it for you, if possible.    Clearly martha and make sure that you can see:    Any grab bars or handrails.  First and last steps.  Where the edge of each step is.    Use tools that help you move around (mobility aids) if they are needed. These include:    Canes.  Walkers.  Scooters.  Crutches.    Turn on the lights when you go into a dark area. Replace any light bulbs as soon as they burn out.  Set up your furniture so you have a clear path. Avoid moving your furniture around.  If any of your floors are uneven, fix them.  If there are any pets around you, be aware of where they are.  Review your medicines with your doctor. Some medicines can make you feel dizzy. This can increase your chance of falling.  Ask your doctor what other things that you can do to help prevent falls.    This information is not intended to replace advice given to you by your health care provider. Make sure you discuss any questions you have with your health care provider.

## 2023-03-19 NOTE — ED PROVIDER NOTE - OBJECTIVE STATEMENT
89 y/o F with PMH afib on eliquis, HTN, CVA with difficulty swallowing, HLD, asthma, Alzheimer's dementia-- @ baseline A&Ox2-3, presents with daughter for L-shoulder pain x yesterday s/p fall on level ground @ 8pm last night.   no palliating/provoking factors.  pt is poor historian.   has been ambulating with cane and holding purse in L hand as per baseline.   no vomiting, loc, cp, sob, back pain.

## 2023-03-19 NOTE — ED ADULT NURSE NOTE - OBJECTIVE STATEMENT
A&OX3. Patient refuse  patient daughter states patient fell yesterday. patient daughter states patient has HX of falls for "attention. patient taking Eliquis. patient states CP/ head pain at this time.  HTN/ Dementia

## 2023-03-19 NOTE — ED PROVIDER NOTE - ATTENDING APP SHARED VISIT CONTRIBUTION OF CARE
89 y/o F with PMH afib on eliquis, HTN, CVA, HLD, asthma, Alzheimer's dementia presents to the ED s/p fall yesterday. Per daughter, patient able to ambulate after the fall and in NAD. However, she was requesting to come to the hospital for evaluation so daughter brought her to the ED. Patient mostly complaining of L arm pain but reports pain "all over" and is poor historian. She is in no acute distress. Will obtain trauma CT scans and dedicated imaging of LUE. Will reassess following imaging, low suspicion for acute fracture given that patient at baseline and ambulating without difficulty, able to hold purse with L hand.

## 2023-03-19 NOTE — ED PROVIDER NOTE - PHYSICAL EXAMINATION
PHYSICAL EXAM:    GENERAL: Alert, appears stated age, well appearing, non-toxic, no external evidence of trauma.   SKIN: Warm, pink and dry.   HEAD: NC, AT, no step offs   EYE: Normal lids/conjunctiva, PERRL, EOMI  ENT: Normal hearing, patent oropharynx w  NECK: +supple. No meningismus, or JVD, +Trachea midline. no tenderness/step offs.   Pulm: Bilateral BS, normal resp effort, no wheezes, stridor, or retractions  CV: RRR, no M/R/G, 2+and = radial pulses  Abd: soft, non-tender, non-distended, no rebound/guarding. no CVA tenderness.   Mskel: no erythema, cyanosis, edema. no calf tenderness. +diffuse ttp to L shoulder/Lhumerus/L forearm. +mild L ankle ttp. when distracted no ttp.   Neuro: AAOx3. No speech slurring, pronator drift, facial asymmetry. normal finger-to-nose b/l. 5/5 strength throughout. normal gait with assistance.

## 2023-03-19 NOTE — ED PROVIDER NOTE - NS ED ROS FT
Review of Systems    Constitutional: (-) fever   Eyes/ENT: (-) vision changes  Cardiovascular: (-) chest pain, (-) syncope (-) palpitations  Respiratory: (-) cough, (-) shortness of breath  Gastrointestinal: (-) vomiting, (-) diarrhea (-)black/bloody stools (-) abdominal pain  Genitourinary:  (-) dysuria   Musculoskeletal: (-) neck pain, (-) back pain, (-) leg pain/swelling. see hpi   Integumentary: (-) rash, (-) edema  Neurological: (-) headache  Hematologic: (-) easy bruising

## 2023-03-19 NOTE — ED ADULT TRIAGE NOTE - CHIEF COMPLAINT QUOTE
as per daughter, pt fell down 2 steps yesterday and landed on her left side. c/o left side body pain and headache. denies head injury, no LOC. takes Eliquis daily.

## 2023-03-19 NOTE — ED PROVIDER NOTE - CLINICAL SUMMARY MEDICAL DECISION MAKING FREE TEXT BOX
pan ct and xrs neg.   Reviewed all results and necessity for follow up. Counseled on red flags and to return for them.  Patient appears well on discharge.  ambulatory with steady gait with cane and clear speech

## 2023-03-21 ENCOUNTER — APPOINTMENT (OUTPATIENT)
Dept: NEUROLOGY | Facility: CLINIC | Age: 88
End: 2023-03-21

## 2023-03-22 LAB
FAT STL QN: NORMAL
FAT STL QN: NORMAL

## 2023-03-24 LAB — PANCREATIC ELASTASE, FECAL: 332 CD:794062645

## 2023-03-27 NOTE — HEALTH RISK ASSESSMENT
[Patient/Caregiver unclear of wishes] : , patient/caregiver unclear of wishes [FreeTextEntry2] : daughter assist with care [Audit-CScore] : 2 [de-identified] : good [de-identified] : cane [AdvancecareDate] : 02/23 [de-identified] : Quit at age 40

## 2023-03-27 NOTE — PHYSICAL EXAM
[de-identified] : Telehealth precludes traditional, comprehensive physical exam. Patient appeared stable and alert.

## 2023-03-27 NOTE — HISTORY OF PRESENT ILLNESS
[Home] : at home, [unfilled] , at the time of the visit. [Other Location: e.g. Home (Enter Location, City,State)___] : at [unfilled] [Verbal consent obtained from patient] : the patient, [unfilled] [de-identified] : [facility] quality initiative provider note: 89 year old female with history of HTN, HLD, CVAs (on Plavix), s/p right VATS and wedge resection RUL with 2018 for organizing pneumonia, GERD, CHF. Patient doing well. No complaints offered at this time. Awake, alert and oriented x4, in no acute distress. Denies any chest pain, shortness of breath, palpitation or dizziness. Patient verified medications and medical diagnosis. Report taking medications as prescribed. \par \par CHF - daughter confirmed dx, follow by cardio, on metoprolol, Laxis, digoxin. Denies any shortness of breath at this time\par CKD - GFR 44\par Dementia - daughter confirmed dx, on rivastigmine\par Afib - daughter confirmed dx, on Eliquis 2.5 mg BID, patient denies palpitation or dizziness at this time.\par COPD - daughter confirmed dx, on symbicort, no recent exacerbation \par Stroke - daughter confirmed dx, denies any residual\par

## 2023-04-17 ENCOUNTER — EMERGENCY (EMERGENCY)
Facility: HOSPITAL | Age: 88
LOS: 0 days | Discharge: HOME | End: 2023-04-18
Attending: EMERGENCY MEDICINE
Payer: MEDICARE

## 2023-04-17 DIAGNOSIS — I50.9 HEART FAILURE, UNSPECIFIED: ICD-10-CM

## 2023-04-17 DIAGNOSIS — Z98.49 CATARACT EXTRACTION STATUS, UNSPECIFIED EYE: Chronic | ICD-10-CM

## 2023-04-17 DIAGNOSIS — F02.80 DEMENTIA IN OTHER DISEASES CLASSIFIED ELSEWHERE, UNSPECIFIED SEVERITY, WITHOUT BEHAVIORAL DISTURBANCE, PSYCHOTIC DISTURBANCE, MOOD DISTURBANCE, AND ANXIETY: ICD-10-CM

## 2023-04-17 DIAGNOSIS — Z98.890 OTHER SPECIFIED POSTPROCEDURAL STATES: Chronic | ICD-10-CM

## 2023-04-17 DIAGNOSIS — J45.909 UNSPECIFIED ASTHMA, UNCOMPLICATED: ICD-10-CM

## 2023-04-17 DIAGNOSIS — G30.9 ALZHEIMER'S DISEASE, UNSPECIFIED: ICD-10-CM

## 2023-04-17 DIAGNOSIS — I11.0 HYPERTENSIVE HEART DISEASE WITH HEART FAILURE: ICD-10-CM

## 2023-04-17 DIAGNOSIS — M54.2 CERVICALGIA: ICD-10-CM

## 2023-04-17 DIAGNOSIS — Z79.01 LONG TERM (CURRENT) USE OF ANTICOAGULANTS: ICD-10-CM

## 2023-04-17 DIAGNOSIS — Z87.19 PERSONAL HISTORY OF OTHER DISEASES OF THE DIGESTIVE SYSTEM: ICD-10-CM

## 2023-04-17 DIAGNOSIS — Z88.8 ALLERGY STATUS TO OTHER DRUGS, MEDICAMENTS AND BIOLOGICAL SUBSTANCES: ICD-10-CM

## 2023-04-17 DIAGNOSIS — F41.9 ANXIETY DISORDER, UNSPECIFIED: ICD-10-CM

## 2023-04-17 DIAGNOSIS — E78.5 HYPERLIPIDEMIA, UNSPECIFIED: ICD-10-CM

## 2023-04-17 DIAGNOSIS — Z90.49 ACQUIRED ABSENCE OF OTHER SPECIFIED PARTS OF DIGESTIVE TRACT: Chronic | ICD-10-CM

## 2023-04-17 DIAGNOSIS — I48.91 UNSPECIFIED ATRIAL FIBRILLATION: ICD-10-CM

## 2023-04-17 DIAGNOSIS — Z86.73 PERSONAL HISTORY OF TRANSIENT ISCHEMIC ATTACK (TIA), AND CEREBRAL INFARCTION WITHOUT RESIDUAL DEFICITS: ICD-10-CM

## 2023-04-17 DIAGNOSIS — Z90.49 ACQUIRED ABSENCE OF OTHER SPECIFIED PARTS OF DIGESTIVE TRACT: ICD-10-CM

## 2023-04-17 PROCEDURE — 99284 EMERGENCY DEPT VISIT MOD MDM: CPT

## 2023-04-18 VITALS
SYSTOLIC BLOOD PRESSURE: 118 MMHG | HEIGHT: 66 IN | TEMPERATURE: 98 F | HEART RATE: 78 BPM | WEIGHT: 134.92 LBS | OXYGEN SATURATION: 98 % | DIASTOLIC BLOOD PRESSURE: 77 MMHG | RESPIRATION RATE: 20 BRPM

## 2023-04-18 VITALS
DIASTOLIC BLOOD PRESSURE: 83 MMHG | SYSTOLIC BLOOD PRESSURE: 129 MMHG | TEMPERATURE: 98 F | OXYGEN SATURATION: 96 % | HEART RATE: 79 BPM | RESPIRATION RATE: 18 BRPM

## 2023-04-18 RX ORDER — BIMATOPROST 0.3 MG/ML
1 SOLUTION/ DROPS OPHTHALMIC
Qty: 0 | Refills: 0 | DISCHARGE

## 2023-04-18 RX ORDER — ACETAMINOPHEN 500 MG
650 TABLET ORAL ONCE
Refills: 0 | Status: COMPLETED | OUTPATIENT
Start: 2023-04-18 | End: 2023-04-18

## 2023-04-18 RX ORDER — CYCLOSPORINE 0.5 MG/ML
1 EMULSION OPHTHALMIC
Qty: 0 | Refills: 0 | DISCHARGE

## 2023-04-18 RX ADMIN — Medication 650 MILLIGRAM(S): at 03:32

## 2023-04-18 NOTE — ED ADULT TRIAGE NOTE - GLASGOW COMA SCALE: BEST VERBAL RESPONSE, MLM
[facility] Progress Note  Patient: Jorge Verduzco Date: 2021   : 1949 Attending: Nirmal Monique MD         Admission date: 2021  ICU admit date:  2021  Intubation date:  N/A  Chief Complaint: S/p right retroperitoneal mass excision, right hemicolectomy, and right nephrectomy    Hospital Course:  Jorge Verduzco is a 71 year old male who had right retroperitoneal mass noted on CT imaging in . It was suspicious for liposarcoma, but biopsy at that time was negative and he has been undergoing surveillance imaging since. In spring 2021 he had feelings of abdominal fullness and CT imaging demonstrated increased size of mass abuting the colon and R kidney. Biopsy was consistent with liposarcoma. He underwent radiation prior to surgery.    He presented to the SICU s/p right retroperitoneal mass excision, right hemicolectomy, and right nephrectomy (, Evangelist Monique Waples).    24hr Events: POD 1. Increase in Scr.     Impression:  -- Liposarcoma of R retroperitoneum s/p radiation   -- S/p right retroperitoneal mass excision, right hemicolectomy, and right nephrectomy (, Evangelist Monique Waples)  -- Acute post op pain  -- Acute post op blood loss anemia  -- Acute renal failure, pre-renal   -- Essential hypertension     DISCUSSION/PLAN  Neuro: Alert and oriented.  Pain controlled with epidural. No focal deficits.   -- Fentanyl/bupivicaine epidural   -- Scheduled tylenol   -- PT/OT following    Pulmonary: On RA.   -- Pulmonary hygiene: C/DB/IS    CV: NSR. Normotensive.   -- Hold PTA Losartan in immediate perioperative period      Renal: Increase in Scr. Adequate UO via arreguin. Baseline Scr 1.1-1.2. Still appears hypovolemic, FENa pre-renal.  -- 500 5% x1  -- Increase LR to 150 ml/hr   -- Maintain rareguin for accurate I/Os    GI: S/p OR as above.   -- NPO except meds  -- Clamping trial of NG, may start clear liquids later today   -- Surgery oncology following as  attending    Heme: Acute post op blood loss anemia. Normal platelet count. No evidence of overt bleeding.   -- Heparin subcutaneous for DVT prophylaxis     Endocrine: Hyperglycemia stress induced post op. Hx hypothyroidism  -- SSI  -- PTA Synthroid     ID: Afebrile, no leukocytosis. Does not appear overtly infected.  -- Complete perioperative Zosyn     Disposition: Maintain in ICU    PERTINENT DIAGNOSTICS/PROCEDURES:  9/22 S/p right retroperitoneal mass excision, right hemicolectomy, and right nephrectomy (9/22, Evangelist Monique Waples)    BEST PRACTICES:  - VTE prophylaxis: Heparin subcutaneous, SCDs  - SUP: N/A  - LDA: L rad art (9/22), RIJ CVC (9/22), PIV, Valles (9/22)   - Nutrition: NPO  - Therapy/mobilization: PT/OT  - Goals of care note documented: 9/22    ================================================================    Subjective: Pain controlled. Denies chest pain, shortness of breath, nausea or vomiting.     I/O last 3 completed shifts:  In: 5091.9 [I.V.:2891.9; IV Piggyback:2200]  Out: 1555 [Urine:1555]  I/O this shift:  In: 1533.5 [I.V.:1003.5; NG/GT:30; IV Piggyback:500]  Out: 680 [Urine:355; Drains:325]    Vital Last Value 24 Hour Range   Temperature 98.2 °F (36.8 °C) (09/23/21 0445) Temp  Min: 97.3 °F (36.3 °C)  Max: 98.8 °F (37.1 °C)   Pulse 72 (09/23/21 0445) Pulse  Min: 66  Max: 96   Respiratory 16 (09/23/21 0445) Resp  Min: 12  Max: 25   Non-Invasive  Blood Pressure 135/65 (09/23/21 0400) BP  Min: 117/61  Max: 166/76   Pulse Oximetry 100 % (09/23/21 0445) SpO2  Min: 98 %  Max: 100 %   Arterial   Blood Pressure (!) 168/67 (09/23/21 0445) Arterial Line BP  Min: 117/90  Max: 183/77        Physical Exam:  General: Sitting up in chair. Appears stated age. NAD.  Neuro: A&Ox3. Moves all extremities to command with equal strength bilaterally. No focal deficits. Sensation intact.   HEENT: PERRL 2 mm/brisk. Moist oral mucous membranes.  Neck: Supple  Chest: Clear breath sounds to auscultation bilaterally.  No rhonchi, wheezing, tachypnea or accessory muscle use.   Heart: NSR. RRR. S1, S2. No m/r/g.   Abdomen: Soft, non-tender, non-distended. Rare BS. Midline incision with prevena VAC in place.  Extremities: Warm and well perfused. 2+ bilateral radial and 2+ bilateral pedal pulses. No edema.   Skin: Warm and dry. No rashes or lesions. Abdominal incision as above.    Pertinent Reviewed: Allergies, Medications, Labs, Imaging and Physician and Nursing Notes    ACCS Attestation       Mr. Verduzco is ill as documented above. I evaluated the patient and reviewed imaging and laboratory data with Dr. Bean Chapman. Together we formulated the therapeutic strategy documented above.  Services I provided [id number] ([facility] ([place], [place]) hospital care, level III). Time spent is exclusive of time spent by other providers.    ZEFERINO Jaquez  [facility]  [phone]       (V5) oriented

## 2023-04-18 NOTE — ED PROVIDER NOTE - PATIENT PORTAL LINK FT
You can access the FollowMyHealth Patient Portal offered by [facility] by registering at the following website: [email or web address]. By joining [employer]’s FollowMyHealth portal, you will also be able to view your health information using other applications (apps) compatible with our system.

## 2023-04-18 NOTE — ED ADULT TRIAGE NOTE - CHIEF COMPLAINT QUOTE
per daughter pt has had  bodyache (chronic),  all day today, decreased appetite  (PMH-HTN).  daughter appears overwhelmed with mother complaints of chronic pain

## 2023-04-18 NOTE — ED PROVIDER NOTE - OBJECTIVE STATEMENT
89F hx pancreas insuff, htn, chf, afib on dig and eliquis pw body aches. per daughter. pt does not really have body aches but complains of pain when she wants attention. today pt wanted attention because daughter went out. daughter says this has been ongoing x1 yr. pt now says she has a little neck ache but wants to go home and sleep. no cp, sob, vomiting, fever

## 2023-04-18 NOTE — ED ADULT NURSE NOTE - NSIMPLEMENTINTERV_GEN_ALL_ED
Implemented All Fall with Harm Risk Interventions:  [place] to call system. Call bell, personal items and telephone within reach. Instruct patient to call for assistance. Room bathroom lighting operational. Non-slip footwear when patient is off stretcher. Physically safe environment: no spills, clutter or unnecessary equipment. Stretcher in lowest position, wheels locked, appropriate side rails in place. Provide visual cue, wrist band, yellow gown, etc. Monitor gait and stability. Monitor for mental status changes and reorient to person, place, and time. Review medications for side effects contributing to fall risk. Reinforce activity limits and safety measures with patient and family. Provide visual clues: red socks.

## 2023-04-18 NOTE — ED ADULT NURSE NOTE - OBJECTIVE STATEMENT
Pt a&O x4, Amharic speaking and daughter at bedside translating. Pt  brought in c/o generalized pain, and brought in because daughter was unable to read her O2 sat. daughter states the pt doesn't eat well and has lost weight over the past few months.

## 2023-04-18 NOTE — ED PROVIDER NOTE - CLINICAL SUMMARY MEDICAL DECISION MAKING FREE TEXT BOX
pt pw neck ache but seems to be moving around comfortably. she and daughter declined work up from me saying that theyd rather fu w pmd in 2 days. the have dentist today. daughter say this is ongoing x1 year and there is no acute process. vitals and exam reassuring. dc home.

## 2023-04-25 ENCOUNTER — APPOINTMENT (OUTPATIENT)
Dept: GASTROENTEROLOGY | Facility: CLINIC | Age: 88
End: 2023-04-25
Payer: MEDICARE

## 2023-04-25 VITALS
HEART RATE: 76 BPM | WEIGHT: 138.31 LBS | DIASTOLIC BLOOD PRESSURE: 78 MMHG | OXYGEN SATURATION: 91 % | HEIGHT: 62 IN | SYSTOLIC BLOOD PRESSURE: 122 MMHG | TEMPERATURE: 96.7 F | BODY MASS INDEX: 25.45 KG/M2

## 2023-04-25 PROCEDURE — 99214 OFFICE O/P EST MOD 30 MIN: CPT

## 2023-04-25 NOTE — PHYSICAL EXAM
[Alert] : alert [Normal Voice/Communication] : normal voice/communication [Sclera] : the sclera and conjunctiva were normal [Hearing Threshold Finger Rub Not [place]] : hearing was normal [Normal Appearance] : the appearance of the neck was normal [No Respiratory Distress] : no respiratory distress [No Acc Muscle Use] : no accessory muscle use [Respiration, Rhythm And Depth] : normal respiratory rhythm and effort [Heart Rate And Rhythm] : heart rate was normal and rhythm regular [Normal S1, S2] : normal S1 and S2 [Bowel Sounds] : normal bowel sounds [Abdomen Tenderness] : non-tender [No Masses] : no abdominal mass palpated [Abdomen Soft] : soft [No Spinal Tenderness] : no spinal tenderness [Abnormal Walk] : normal gait [Normal Color / Pigmentation] : normal skin color and pigmentation [No Focal Deficits] : no focal deficits [Oriented To Time, Place, And Person] : oriented to person, place, and time

## 2023-04-25 NOTE — ASSESSMENT
[FreeTextEntry1] : Impression:\par 1. Severe exocrine pancreatic insufficiency - stable with change of lipase supplementation to Zenpep instead\par 2. Multiple Bms - suspect more incontinence as it's described as more stool in underwear, and not actual diarrhea\par \par Plan:\par -Continue Zenpep with meals\par -Asked pt to try fiber supplementation to bulk up stool for the incontinence; start with once a day Metamucil\par -Asked patient to try timed BMs before she goes out to avoid accidents\par -Given her age and frailty, she will not be able to tolerate ARM or pelvic floor rehab\par -Patient just had blood work with PMD - asked for those to be faxed over\par -Reassured patient that she has had cross sectional imaging and endoscopic evaluation which did not show anything else significant\par \par RTC in 3 months

## 2023-04-25 NOTE — REASON FOR VISIT
[Follow-up] : a follow-up of an existing diagnosis [Family Member] : family member [Source: ______] : History obtained from [unfilled] [FreeTextEntry1] : Exocrine pancreatic insufficiency

## 2023-04-25 NOTE — HISTORY OF PRESENT ILLNESS
[de-identified] : TTE 4/6/22:\par CONCLUSIONS:\par 1. Mitral annular calcification, otherwise normal mitral\par valve. Mild-moderate mitral regurgitation.\par 2. Calcified trileaflet aortic valve with decreased\par opening. Peak transaortic valve gradient equals 32 mm Hg,\par mean transaortic valve gradient equals 16 mm Hg, estimated\par aortic valve area equals 1.7 sqcm (by continuity equation),\par consistent with mild aortic stenosis. Mild-moderate aortic\par regurgitation.\par 3. Moderately dilated left atrium.  LA volume index = 46\par cc/m2.\par 4. Normal left ventricular internal dimensions and wall\par thicknesses.\par 5. Normal left ventricular systolic function. No segmental\par wall motion abnormalities.\par 6. Mild diastolic dysfunction (Stage I).\par 7. Normal right ventricular size and function.\par \par \par \par Stress test 4/8/22:\par IMPRESSIONS:Normal Study\par * The left ventricle was normal in size.\par * Tracer uptake was homogeneous throughout the left\par ventricle.\par * Normal study; no evidence for myocardial infarction or\par ischemia.\par * Post-stress gated wall motion analysis was performed\par (LVEF = 67 %;LVEDV = 54 ml.), revealing normal LV\par function. The RV function appeared normal. [de-identified] : 6/8/22\par FINDINGS:\par LOWER CHEST: Aortic valve calcifications. Atherosclerotic calcifications \par of the aorta.\par \par LIVER: Within normal limits.\par BILE DUCTS: Normal caliber.\par GALLBLADDER: Within normal limits.\par SPLEEN: Redemonstrated indeterminate 1.5 cm low-attenuation lesion, not \par significantly changed.\par PANCREAS: Redemonstrated diffuse main pancreatic duct dilation up to 4 mm.\par ADRENALS: Stable indeterminate 2 cm left adrenal nodule.\par KIDNEYS/URETERS: Left renal cyst mild right-sided hydroureteronephrosis \par and right perinephric fat. Bilateral renal subcentimeter hypodense \par lesions which are too small to characterize. No hydronephrosis. Symmetric \par nonspecific perinephric stranding is similar to the prior exam. No \par nephrolithiasis.\par \par BLADDER: Within normal limits.\par REPRODUCTIVE ORGANS: Uterus and adnexa within normal limits.\par \par BOWEL: No bowel obstruction. Appendix is not visualized. Colonic \par diverticulosis without evidence of diverticulitis.\par PERITONEUM: No ascites.\par VESSELS: Atherosclerotic changes. Infrarenal abdominal aortic aneurysm \par measuring up to 3.4 cm, not significant changed.\par RETROPERITONEUM/LYMPH NODES: No lymphadenopathy.\par ABDOMINAL WALL: Small fat-containing left inguinal hernia.\par BONES: Degenerative changes of the spine.\par \par IMPRESSION:\par Diverticulosis without evidence of diverticulitis. No acute finding on \par this noncontrast study to explain patient's symptomatology. [FreeTextEntry1] : 3/11/22\par FINDINGS:\par LOWER CHEST: Within normal limits.\par \par LIVER: Scattered subcentimeter cysts are noted. Otherwise within normal limits.\par BILE DUCTS: Normal caliber.\par GALLBLADDER: Within normal limits.\par SPLEEN: Nonspecific 1.2 cm T2 hyperintense lesion in the spleen.\par PANCREAS: Mild diffuse parenchymal atrophy. There is no pancreatic mass identified. Pancreatic duct is dilated to 5 mm in the neck and 6 mm in the head. It is normal caliber within the tail.\par ADRENALS: Within normal limits.\par KIDNEYS/URETERS: There are subcentimeter bilateral cortical cysts. There is no solid renal mass. There is no hydronephrosis.\par \par BLADDER: Within normal limits.\par REPRODUCTIVE ORGANS: Uterus and adnexa within normal limits.\par \par BOWEL: No bowel obstruction. There is colonic diverticulosis.\par PERITONEUM: No ascites.\par VESSELS: There is fusiform dilatation of the infrarenal abdominal aorta, measuring up to 3.2 cm.\par RETROPERITONEUM/LYMPH NODES: No lymphadenopathy.\par ABDOMINAL WALL: Small calcification is seen superficial to the left gluteus savage muscle.\par BONES: Within normal limits.\par \par IMPRESSION:\par Mild diffuse pancreatic atrophic changes and dilatation of the pancreatic duct up to 6 mm in the head without identifiable cause.\par \par 3.2 cm infrarenal abdominal aortic aneurysm.\par \par No adenopathy or mass in the pelvis. [de-identified] : UGI series 6/22/22:\par FINDINGS:\par \par  image is within normal limits.\par \par The patient swallowed barium without difficulty.  However, there is slightly abnormal esophageal motility with mild reflux and occasional tertiary waves. The stomach is well outlined and there is no evidence of obstruction or significant delay.  The duodenal bulb and sweep as well as the visualized small bowel are unremarkable.\par \par IMPRESSION:\par \par Slightly abnormal esophageal motility with mild reflux and occasional tertiary waves.\par \par No gastric outlet obstruction.

## 2023-05-17 NOTE — ED ADULT NURSE NOTE - NSFALLRSKINDICATORS_ED_ALL_ED
Heart-Healthy Diet: Care Instructions  Your Care Instructions     A heart-healthy diet has lots of vegetables, fruits, nuts, beans, and whole grains, and is low in salt. It limits foods that are high in saturated fat, such as meats, cheeses, and fried foods. It may be hard to change your diet, but even small changes can lower your risk of heart attack and heart disease. Follow-up care is a key part of your treatment and safety. Be sure to make and go to all appointments, and call your doctor if you are having problems. It's also a good idea to know your test results and keep a list of the medicines you take. How can you care for yourself at home? Watch your portions  Use food labels to learn what the recommended servings are for the foods you eat. Eat only the number of calories you need to stay at a healthy weight. If you need to lose weight, eat fewer calories than your body burns (through exercise and other physical activity). Eat more fruits and vegetables  Eat a variety of fruit and vegetables every day. Dark green, deep orange, red, or yellow fruits and vegetables are especially good for you. Examples include spinach, carrots, peaches, and berries. Keep carrots, celery, and other veggies handy for snacks. Buy fruit that is in season and store it where you can see it so that you will be tempted to eat it. Cook dishes that have a lot of veggies in them, such as stir-fries and soups. Limit saturated fat  Read food labels, and try to avoid saturated fats. They increase your risk of heart disease. Use olive or canola oil when you cook. Bake, broil, grill, or steam foods instead of frying them. Choose lean meats instead of high-fat meats such as hot dogs and sausages. Cut off all visible fat when you prepare meat. Eat fish, skinless poultry, and meat alternatives such as soy products instead of high-fat meats. Soy products, such as tofu, may be especially good for your heart.   Choose low-fat or fat-free yes

## 2023-07-31 ENCOUNTER — APPOINTMENT (OUTPATIENT)
Dept: GASTROENTEROLOGY | Facility: CLINIC | Age: 88
End: 2023-07-31

## 2023-08-05 NOTE — ED ADULT TRIAGE NOTE - PAIN: PRESENCE, MLM
urine output  Heme: s/p 1 unit pRBC 8/4, monitor H&H no overt signs of bleeding, continue Protonix twice daily, follow-up occult stool, platelets-WNL  ID: White count-trending down, blood cultures, MRSA nares-negative, continue empiric antibiotic de-escalated to Rocephin 8/4  MSK: Left-sided weakness  Endo: Maintain euglycemia, not a known diabetic/hypothyroid    Diet Diet NPO  ADULT TUBE FEEDING; Orogastric; Peptide Based; Continuous; 15; Yes; 10; Q 6 hours; 45; 105; Q 6 hours   DVT Prophylaxis [x] Lovenox, []  Heparin, [] SCDs, [] Ambulation,  [] Eliquis, [] Xarelto  [] Coumadin   Code Status Full Code   Disposition From: Home  Expected Disposition: TBD   Estimated Date of Discharge: TBD  Patient requires continued admission due to continued management-on mechanical ventilator   Surrogate Decision Maker/ TIM Rosenberg     Subjective:     Patient seen and examined this AM. He remains extubated. He is awake and is able to follow simple commands. He did fail his swallow evaluation. He is coughing while I am in the room but is not expectorating anything. He does not overtly complain of anything for me. Review of Systems:      10 point review of systems complete. Negative unless stated above. Objective:      Intake/Output Summary (Last 24 hours) at 8/5/2023 1563  Last data filed at 8/5/2023 1086  Gross per 24 hour   Intake 1901 ml   Output 2000 ml   Net -99 ml          Vitals:   Vitals:    08/05/23 0829   BP:    Pulse: 84   Resp: 23   Temp:    SpO2: 100%       Physical Exam:     General: Patient is a 55-year-old male, appears older than stated age, not in distress  Eyes: Right gaze preference, able to cross midline, no scleral icterus/conjunctival injection  ENT: Semithick secretions+, poor cough, poor dental hygiene  Cardiovascular: Regular rate, on phenylephrine drip  Respiratory: Diffuse bilateral coarse rhonchi+  gastrointestinal: Soft, non tender  Genitourinary: no suprapubic tube tip is acceptable. NG tube courses into the stomach and off the exam. 2.  Increasing hazy opacities and basilar opacities at the left lung. Could be infectious or inflammatory. 3.  The large right mass is again noted and has had previous workup. VL DUP UPPER EXTREMITY ARTERIES BILATERAL    Result Date: 8/1/2023  EXAMINATION: DUPLEX ARTERAL ULTRASOUND OF THE BILATERAL UPPER EXTREMITIES  7/31/2023 2:52 pm TECHNIQUE: Duplex ultrasound using B-mode/gray scaled imaging, Doppler spectral analysis and color flow Doppler was obtained of the bilateral arterial upper extremities. COMPARISON: None. HISTORY: ORDERING SYSTEM PROVIDED HISTORY: purple fingers, slightly cold TECHNOLOGIST PROVIDED HISTORY: Reason for exam:->purple fingers, slightly cold FINDINGS: Right: Arterial waveforms are mostly triphasic with the exception of the radial and ulnar arteries were biphasic waveforms are seen. Flow velocities were measured as follows: Carotid        63 cm/sec Subclavian   109 cm/sec Axillary        78 cm/sec Brachial       124 cm/sec Radial         58 cm/sec Ulnar           46 cm/sec Left: Arterial waveforms on the left are mostly triphasic and biphasic. Flow velocities were measured as follows: Carotid        89 cm/sec Subclavian   66 cm/sec Axillary        54 cm/sec Brachial       83 cm/sec Radial          71 cm/sec Ulnar           51 cm/sec     No evidence of hemodynamic stenosis or occlusion in either upper extremity. Arteries are patent with velocities which are not elevated and arterial waveforms which are not indicative of hemodynamic stenosis.      Total critical care time-35 minutes  Patient is critical due to acute respiratory failure, acute CVA  Critical care time does not include separately billable procedures    Electronically signed by ALANA Parada CNP on 8/5/2023 at 9:26 AM complains of pain/discomfort

## 2023-08-17 RX ORDER — PANCRELIPASE LIPASE, PANCRELIPASE PROTEASE, PANCRELIPASE AMYLASE 25000; 79000; 105000 [USP'U]/1; [USP'U]/1; [USP'U]/1
25000-79000 CAPSULE, DELAYED RELEASE ORAL 3 TIMES DAILY
Qty: 270 | Refills: 2 | Status: ACTIVE | COMMUNITY
Start: 2023-01-30 | End: 1900-01-01

## 2023-12-05 ENCOUNTER — APPOINTMENT (OUTPATIENT)
Dept: GASTROENTEROLOGY | Facility: CLINIC | Age: 88
End: 2023-12-05

## 2023-12-22 PROCEDURE — 99214 OFFICE O/P EST MOD 30 MIN: CPT

## 2024-01-19 PROCEDURE — 99214 OFFICE O/P EST MOD 30 MIN: CPT

## 2024-01-19 PROCEDURE — 90836 PSYTX W PT W E/M 45 MIN: CPT

## 2024-01-30 ENCOUNTER — APPOINTMENT (OUTPATIENT)
Dept: CARE COORDINATION | Facility: HOME HEALTH | Age: 89
End: 2024-01-30
Payer: MEDICARE

## 2024-01-30 VITALS — WEIGHT: 138 LBS | HEIGHT: 62 IN | BODY MASS INDEX: 25.4 KG/M2

## 2024-01-30 DIAGNOSIS — I12.9 HYPERTENSIVE CHRONIC KIDNEY DISEASE WITH STAGE 1 THROUGH STAGE 4 CHRONIC KIDNEY DISEASE, OR UNSPECIFIED CHRONIC KIDNEY DISEASE: ICD-10-CM

## 2024-01-30 DIAGNOSIS — I48.91 UNSPECIFIED ATRIAL FIBRILLATION: ICD-10-CM

## 2024-01-30 DIAGNOSIS — F03.90 UNSPECIFIED DEMENTIA W/OUT BEHAVIORAL DISTURBANCE: ICD-10-CM

## 2024-01-30 DIAGNOSIS — J44.9 CHRONIC OBSTRUCTIVE PULMONARY DISEASE, UNSPECIFIED: ICD-10-CM

## 2024-01-30 DIAGNOSIS — N18.4 HYPERTENSIVE CHRONIC KIDNEY DISEASE WITH STAGE 1 THROUGH STAGE 4 CHRONIC KIDNEY DISEASE, OR UNSPECIFIED CHRONIC KIDNEY DISEASE: ICD-10-CM

## 2024-01-30 DIAGNOSIS — I50.9 HEART FAILURE, UNSPECIFIED: ICD-10-CM

## 2024-01-30 LAB
25(OH)D3 SERPL-MCNC: 30.5 NG/ML
ALBUMIN SERPL ELPH-MCNC: 3.7 G/DL
ALP BLD-CCNC: 99 U/L
ALT SERPL-CCNC: 30 U/L
ANION GAP SERPL CALC-SCNC: 13 MMOL/L
AST SERPL-CCNC: 38 U/L
BASOPHILS # BLD AUTO: 0.04 K/UL
BASOPHILS NFR BLD AUTO: 0.5 %
BILIRUB SERPL-MCNC: 0.3 MG/DL
BUN SERPL-MCNC: 59 MG/DL
CALCIUM SERPL-MCNC: 9 MG/DL
CHLORIDE SERPL-SCNC: 100 MMOL/L
CO2 SERPL-SCNC: 29 MMOL/L
CREAT SERPL-MCNC: 1.63 MG/DL
EGFR: 29 ML/MIN/1.73M2
EOSINOPHIL # BLD AUTO: 0.14 K/UL
EOSINOPHIL NFR BLD AUTO: 1.7 %
ESTIMATED AVERAGE GLUCOSE: 108 MG/DL
FERRITIN SERPL-MCNC: 279 NG/ML
GLUCOSE SERPL-MCNC: 90 MG/DL
HBA1C MFR BLD HPLC: 5.4 %
HCT VFR BLD CALC: 37.2 %
HGB BLD-MCNC: 11.8 G/DL
IMM GRANULOCYTES NFR BLD AUTO: 0.4 %
INR PPP: 1.42 RATIO
IRON SATN MFR SERPL: 17 %
IRON SERPL-MCNC: 50 UG/DL
LYMPHOCYTES # BLD AUTO: 1.96 K/UL
LYMPHOCYTES NFR BLD AUTO: 23.1 %
MAN DIFF?: NORMAL
MCHC RBC-ENTMCNC: 31.7 GM/DL
MCHC RBC-ENTMCNC: 31.7 PG
MCV RBC AUTO: 100 FL
MONOCYTES # BLD AUTO: 0.69 K/UL
MONOCYTES NFR BLD AUTO: 8.1 %
NEUTROPHILS # BLD AUTO: 5.61 K/UL
NEUTROPHILS NFR BLD AUTO: 66.2 %
PLATELET # BLD AUTO: 176 K/UL
POTASSIUM SERPL-SCNC: 3.6 MMOL/L
PREALB SERPL NEPH-MCNC: 25 MG/DL
PROT SERPL-MCNC: 7.1 G/DL
PT BLD: 15.9 SEC
RBC # BLD: 3.72 M/UL
RBC # FLD: 13.2 %
SODIUM SERPL-SCNC: 142 MMOL/L
TIBC SERPL-MCNC: 291 UG/DL
TSH SERPL-ACNC: 0.78 UIU/ML
UIBC SERPL-MCNC: 241 UG/DL
VIT B12 SERPL-MCNC: 607 PG/ML
WBC # FLD AUTO: 8.47 K/UL

## 2024-01-30 PROCEDURE — 99350 HOME/RES VST EST HIGH MDM 60: CPT | Mod: 95

## 2024-01-30 RX ORDER — METOPROLOL TARTRATE 25 MG/1
25 TABLET, FILM COATED ORAL
Refills: 0 | Status: ACTIVE | COMMUNITY

## 2024-01-30 RX ORDER — FOLIC ACID 1 MG/1
1 TABLET ORAL
Refills: 0 | Status: ACTIVE | COMMUNITY

## 2024-01-30 RX ORDER — MIRTAZAPINE 15 MG/1
15 TABLET, FILM COATED ORAL
Refills: 0 | Status: ACTIVE | COMMUNITY

## 2024-01-30 RX ORDER — CYCLOSPORINE 0.5 MG/ML
0.05 EMULSION OPHTHALMIC
Refills: 0 | Status: ACTIVE | COMMUNITY

## 2024-01-30 RX ORDER — METOPROLOL SUCCINATE 50 MG/1
50 TABLET, EXTENDED RELEASE ORAL TWICE DAILY
Refills: 0 | Status: DISCONTINUED | COMMUNITY
Start: 2019-06-12 | End: 2024-01-30

## 2024-01-30 NOTE — HEALTH RISK ASSESSMENT
[Excellent] : ~his/her~ current health as excellent [Good] : ~his/her~  mood as  good [No] : No [No falls in past year] : Patient reported no falls in the past year [Assistive Device] : Patient uses an assistive device [Medical reason not done] : Medical reason not done [Audit-CScore] : 0 [de-identified] : cane and walker [de-identified] : unable to assess secondary to dementia [Change in mental status noted] : Change in mental status noted [Language] : difficulty with language [Behavior] : difficulty with behavior [Learning/Retaining New Information] : difficulty learning/retaining new information [Handling Complex Tasks] : difficulty handling complex tasks [Reasoning] : difficulty with reasoning [Spatial Ability and Orientation] : difficulty with spatial ability and orientation [Behavioral] : behavioral [With Family] : lives with family [Retired] : retired [Sexually Active] : not sexually active [Feels Safe at Home] : Feels safe at home [Reports changes in hearing] : Reports changes in hearing [Reports changes in vision] : Reports changes in vision [Reports normal functional visual acuity (ie: able to read med bottle)] : Reports normal functional visual acuity [Reports changes in dental health] : Reports no changes in dental health [Smoke Detector] : smoke detector [Carbon Monoxide Detector] : carbon monoxide detector [de-identified] : per daughter [de-identified] : dependent on HHA and family [de-identified] : dependent on HHA and family [de-identified] : bilateral hearing aids [With Patient/Caregiver] : , with patient/caregiver [Reviewed no changes] : Reviewed, no changes [Designated Healthcare Proxy] : Designated healthcare proxy [Name: ___] : Health Care Proxy's Name: [unfilled]  [Relationship: ___] : Relationship: [unfilled] [I will adhere to the patient's wishes.] : I will adhere to the patient's wishes. [Time Spent: ___ minutes] : Time Spent: [unfilled] minutes [AdvancecareDate] : 01/23 [FreeTextEntry4] : hcp completed, content not disclosed. [Never] : Never

## 2024-01-30 NOTE — PHYSICAL EXAM
[Supple] : supple [No Respiratory Distress] : no respiratory distress  [No Accessory Muscle Use] : no accessory muscle use [Soft] : abdomen soft [Non Tender] : non-tender [Non-distended] : non-distended [No Masses] : no abdominal mass palpated [Normal] : no rash [de-identified] : w/ glasses [de-identified] : w/ hearing aids bilaterally [de-identified] : poor gait [de-identified] : confused AAOx2 mood swings, forgets people and what she's doing at times.

## 2024-01-30 NOTE — PLAN
[FreeTextEntry1] : Patient seen in home, enforced w/ pt the need for daily weight/bp monitoring, low salt diet/fat and educated pt to avoid processed/canned food and limit take out, increase vegetable/fiber and fruit intake (portion control).  Daily exercise w/ easy to reach realistic goals.  Continue w/ current regimen and medication as ordered.  Adhere to follow up w/ pcp.  Pt advised to call with any questions/concerns

## 2024-01-30 NOTE — COUNSELING
[Fall prevention counseling provided] : Fall prevention counseling provided [Adequate lighting] : Adequate lighting [No throw rugs] : No throw rugs [Use proper foot wear] : Use proper foot wear [Use recommended devices] : Use recommended devices [FreeTextEntry1] : assistive device cane and walker use [Behavioral health counseling provided] : Behavioral health counseling provided [Sleep ___ hours/day] : Sleep [unfilled] hours/day [Engage in a relaxing activity] : Engage in a relaxing activity [Plan in advance] : Plan in advance [No] : Risk of tobacco use and health benefits of smoking cessation discussed: No [Potential consequences of obesity discussed] : Potential consequences of obesity discussed [Benefits of weight loss discussed] : Benefits of weight loss discussed [Encouraged to maintain food diary] : Encouraged to maintain food diary [Encouraged to increase physical activity] : Encouraged to increase physical activity [Encouraged to use exercise tracking device] : Encouraged to use exercise tracking device [Weigh Self Weekly] : weigh self weekly [Decrease Portions] : decrease portions [Keep Food Diary] : keep food diary

## 2024-01-30 NOTE — REVIEW OF SYSTEMS
[Vision Problems] : vision problems [Hearing Loss] : hearing loss [Incontinence] : incontinence [Confusion] : confusion [Memory Loss] : memory loss [Unsteady Walking] : ataxia [Negative] : Psychiatric [FreeTextEntry3] : w/ glasses [FreeTextEntry4] : bilateral hearing aids [FreeTextEntry8] : w/ pull ups

## 2024-01-31 ENCOUNTER — APPOINTMENT (OUTPATIENT)
Dept: GASTROENTEROLOGY | Facility: CLINIC | Age: 89
End: 2024-01-31
Payer: MEDICARE

## 2024-01-31 VITALS
BODY MASS INDEX: 25.4 KG/M2 | HEIGHT: 62 IN | OXYGEN SATURATION: 94 % | HEART RATE: 81 BPM | DIASTOLIC BLOOD PRESSURE: 78 MMHG | SYSTOLIC BLOOD PRESSURE: 119 MMHG | WEIGHT: 138 LBS

## 2024-01-31 DIAGNOSIS — K52.9 NONINFECTIVE GASTROENTERITIS AND COLITIS, UNSPECIFIED: ICD-10-CM

## 2024-01-31 DIAGNOSIS — K86.81 EXOCRINE PANCREATIC INSUFFICIENCY: ICD-10-CM

## 2024-01-31 PROCEDURE — 99214 OFFICE O/P EST MOD 30 MIN: CPT

## 2024-01-31 NOTE — HISTORY OF PRESENT ILLNESS
[de-identified] : TTE 4/6/22:\par  CONCLUSIONS:\par  1. Mitral annular calcification, otherwise normal mitral\par  valve. Mild-moderate mitral regurgitation.\par  2. Calcified trileaflet aortic valve with decreased\par  opening. Peak transaortic valve gradient equals 32 mm Hg,\par  mean transaortic valve gradient equals 16 mm Hg, estimated\par  aortic valve area equals 1.7 sqcm (by continuity equation),\par  consistent with mild aortic stenosis. Mild-moderate aortic\par  regurgitation.\par  3. Moderately dilated left atrium.  LA volume index = 46\par  cc/m2.\par  4. Normal left ventricular internal dimensions and wall\par  thicknesses.\par  5. Normal left ventricular systolic function. No segmental\par  wall motion abnormalities.\par  6. Mild diastolic dysfunction (Stage I).\par  7. Normal right ventricular size and function.\par  \par  \par  \par  Stress test 4/8/22:\par  IMPRESSIONS:Normal Study\par  * The left ventricle was normal in size.\par  * Tracer uptake was homogeneous throughout the left\par  ventricle.\par  * Normal study; no evidence for myocardial infarction or\par  ischemia.\par  * Post-stress gated wall motion analysis was performed\par  (LVEF = 67 %;LVEDV = 54 ml.), revealing normal LV\par  function. The RV function appeared normal. [de-identified] : 6/8/22\par  FINDINGS:\par  LOWER CHEST: Aortic valve calcifications. Atherosclerotic calcifications \par  of the aorta.\par  \par  LIVER: Within normal limits.\par  BILE DUCTS: Normal caliber.\par  GALLBLADDER: Within normal limits.\par  SPLEEN: Redemonstrated indeterminate 1.5 cm low-attenuation lesion, not \par  significantly changed.\par  PANCREAS: Redemonstrated diffuse main pancreatic duct dilation up to 4 mm.\par  ADRENALS: Stable indeterminate 2 cm left adrenal nodule.\par  KIDNEYS/URETERS: Left renal cyst mild right-sided hydroureteronephrosis \par  and right perinephric fat. Bilateral renal subcentimeter hypodense \par  lesions which are too small to characterize. No hydronephrosis. Symmetric \par  nonspecific perinephric stranding is similar to the prior exam. No \par  nephrolithiasis.\par  \par  BLADDER: Within normal limits.\par  REPRODUCTIVE ORGANS: Uterus and adnexa within normal limits.\par  \par  BOWEL: No bowel obstruction. Appendix is not visualized. Colonic \par  diverticulosis without evidence of diverticulitis.\par  PERITONEUM: No ascites.\par  VESSELS: Atherosclerotic changes. Infrarenal abdominal aortic aneurysm \par  measuring up to 3.4 cm, not significant changed.\par  RETROPERITONEUM/LYMPH NODES: No lymphadenopathy.\par  ABDOMINAL WALL: Small fat-containing left inguinal hernia.\par  BONES: Degenerative changes of the spine.\par  \par  IMPRESSION:\par  Diverticulosis without evidence of diverticulitis. No acute finding on \par  this noncontrast study to explain patient's symptomatology. [FreeTextEntry1] : 3/11/22\par  FINDINGS:\par  LOWER CHEST: Within normal limits.\par  \par  LIVER: Scattered subcentimeter cysts are noted. Otherwise within normal limits.\par  BILE DUCTS: Normal caliber.\par  GALLBLADDER: Within normal limits.\par  SPLEEN: Nonspecific 1.2 cm T2 hyperintense lesion in the spleen.\par  PANCREAS: Mild diffuse parenchymal atrophy. There is no pancreatic mass identified. Pancreatic duct is dilated to 5 mm in the neck and 6 mm in the head. It is normal caliber within the tail.\par  ADRENALS: Within normal limits.\par  KIDNEYS/URETERS: There are subcentimeter bilateral cortical cysts. There is no solid renal mass. There is no hydronephrosis.\par  \par  BLADDER: Within normal limits.\par  REPRODUCTIVE ORGANS: Uterus and adnexa within normal limits.\par  \par  BOWEL: No bowel obstruction. There is colonic diverticulosis.\par  PERITONEUM: No ascites.\par  VESSELS: There is fusiform dilatation of the infrarenal abdominal aorta, measuring up to 3.2 cm.\par  RETROPERITONEUM/LYMPH NODES: No lymphadenopathy.\par  ABDOMINAL WALL: Small calcification is seen superficial to the left gluteus savage muscle.\par  BONES: Within normal limits.\par  \par  IMPRESSION:\par  Mild diffuse pancreatic atrophic changes and dilatation of the pancreatic duct up to 6 mm in the head without identifiable cause.\par  \par  3.2 cm infrarenal abdominal aortic aneurysm.\par  \par  No adenopathy or mass in the pelvis. [de-identified] : UGI series 6/22/22:\par  FINDINGS:\par  \par   image is within normal limits.\par  \par  The patient swallowed barium without difficulty.  However, there is slightly abnormal esophageal motility with mild reflux and occasional tertiary waves. The stomach is well outlined and there is no evidence of obstruction or significant delay.  The duodenal bulb and sweep as well as the visualized small bowel are unremarkable.\par  \par  IMPRESSION:\par  \par  Slightly abnormal esophageal motility with mild reflux and occasional tertiary waves.\par  \par  No gastric outlet obstruction.

## 2024-01-31 NOTE — PHYSICAL EXAM
[Alert] : alert [Normal Voice/Communication] : normal voice/communication [Sclera] : the sclera and conjunctiva were normal [Hearing Threshold Finger Rub Not [place]] : hearing was normal [Normal Appearance] : the appearance of the neck was normal [No Respiratory Distress] : no respiratory distress [No Acc Muscle Use] : no accessory muscle use [Respiration, Rhythm And Depth] : normal respiratory rhythm and effort [Heart Rate And Rhythm] : heart rate was normal and rhythm regular [Normal S1, S2] : normal S1 and S2 [Bowel Sounds] : normal bowel sounds [Abdomen Tenderness] : non-tender [No Masses] : no abdominal mass palpated [Abdomen Soft] : soft [No Spinal Tenderness] : no spinal tenderness [Abnormal Walk] : normal gait [Normal Color / Pigmentation] : normal skin color and pigmentation [No Focal Deficits] : no focal deficits [Oriented To Time, Place, And Person] : oriented to person, place, and time

## 2024-01-31 NOTE — REASON FOR VISIT
[Follow-up] : a follow-up of an existing diagnosis [Family Member] : family member [Source: ______] : History obtained from [unfilled] [FreeTextEntry1] : Pancreatic insfficiency

## 2024-01-31 NOTE — ASSESSMENT
[FreeTextEntry1] : Impression: 1. Severe exocrine pancreatic insufficiency - stable on Zenpep one pill with meals currently  Plan: -Weight has been increasing, albumin and prealbumin are normal -Awaiting rest of serum vitamin levels; given INR is elevated, suspect vitamin K deficiency, will await serum vitamin K level and supplement if needed -Advised continued Zenpep with meals and with large snacks -Can continue PPI once daily for now to prevent pancrealipase degradation -Asked patient to make sure to see  nephrology given significant decline in GFR with rise in creatinine   RTC in 6 months if remains stable

## 2024-02-02 LAB
RETINOL BIND PROT SERPL-MCNC: 8.4 MG/DL
VIT A SERPL-MCNC: 76.4 UG/DL

## 2024-02-08 LAB — MENADIONE SERPL-MCNC: 0.54 NG/ML

## 2024-02-12 PROCEDURE — 90833 PSYTX W PT W E/M 30 MIN: CPT

## 2024-02-12 PROCEDURE — 99214 OFFICE O/P EST MOD 30 MIN: CPT

## 2024-03-01 NOTE — ED ADULT NURSE NOTE - CARDIO WDL
Spoke with radiology tech who states that she will request the imaging report be expedited.   Normal rate, regular rhythm, normal S1, S2 heart sounds heard.

## 2024-03-11 PROCEDURE — 90833 PSYTX W PT W E/M 30 MIN: CPT

## 2024-03-11 PROCEDURE — 99214 OFFICE O/P EST MOD 30 MIN: CPT

## 2024-03-21 ENCOUNTER — APPOINTMENT (OUTPATIENT)
Dept: NEUROLOGY | Facility: CLINIC | Age: 89
End: 2024-03-21

## 2024-03-21 NOTE — ED ADULT NURSE NOTE - NSHOSCREENINGQ1_ED_ALL_ED
Pt is throwing up s/p K of 6.7 and seems unfit to be on the unit pt bp197/98, HS BP Meds given, pt refuse VS reassesment, at appox 0110am pt c/o headache and dizziness, pt denies chest pain . /66 ACP GILES Rodriguez aware Patient refusing all medications and fingersticks. Can not monitor blood glucose and can not give insulin. Patient refusing blood pressure and psych medications.  Provider notifed. Pt /82 No

## 2024-04-25 PROCEDURE — 90833 PSYTX W PT W E/M 30 MIN: CPT

## 2024-04-25 PROCEDURE — 99214 OFFICE O/P EST MOD 30 MIN: CPT

## 2024-08-27 PROCEDURE — 99214 OFFICE O/P EST MOD 30 MIN: CPT

## 2024-08-27 PROCEDURE — 90833 PSYTX W PT W E/M 30 MIN: CPT

## 2024-09-16 ENCOUNTER — INPATIENT (INPATIENT)
Facility: HOSPITAL | Age: 89
LOS: 3 days | Discharge: HOME | End: 2024-09-20
Attending: GENERAL PRACTICE | Admitting: GENERAL PRACTICE
Payer: MEDICARE

## 2024-09-16 VITALS
DIASTOLIC BLOOD PRESSURE: 61 MMHG | SYSTOLIC BLOOD PRESSURE: 91 MMHG | RESPIRATION RATE: 16 BRPM | WEIGHT: 132.94 LBS | TEMPERATURE: 98 F | HEART RATE: 85 BPM | OXYGEN SATURATION: 98 %

## 2024-09-16 DIAGNOSIS — Z98.890 OTHER SPECIFIED POSTPROCEDURAL STATES: Chronic | ICD-10-CM

## 2024-09-16 DIAGNOSIS — Z90.49 ACQUIRED ABSENCE OF OTHER SPECIFIED PARTS OF DIGESTIVE TRACT: Chronic | ICD-10-CM

## 2024-09-16 DIAGNOSIS — Z98.49 CATARACT EXTRACTION STATUS, UNSPECIFIED EYE: Chronic | ICD-10-CM

## 2024-09-16 LAB
ALBUMIN SERPL ELPH-MCNC: 3.7 G/DL — SIGNIFICANT CHANGE UP (ref 3.3–5)
ALP SERPL-CCNC: 110 U/L — SIGNIFICANT CHANGE UP (ref 40–120)
ALT FLD-CCNC: 22 U/L — SIGNIFICANT CHANGE UP (ref 4–33)
ANION GAP SERPL CALC-SCNC: 12 MMOL/L — SIGNIFICANT CHANGE UP (ref 7–14)
APPEARANCE UR: CLEAR — SIGNIFICANT CHANGE UP
APTT BLD: 31.9 SEC — SIGNIFICANT CHANGE UP (ref 24.5–35.6)
AST SERPL-CCNC: 32 U/L — SIGNIFICANT CHANGE UP (ref 4–32)
BASOPHILS # BLD AUTO: 0.02 K/UL — SIGNIFICANT CHANGE UP (ref 0–0.2)
BASOPHILS NFR BLD AUTO: 0.2 % — SIGNIFICANT CHANGE UP (ref 0–2)
BILIRUB SERPL-MCNC: 0.6 MG/DL — SIGNIFICANT CHANGE UP (ref 0.2–1.2)
BILIRUB UR-MCNC: NEGATIVE — SIGNIFICANT CHANGE UP
BUN SERPL-MCNC: 58 MG/DL — HIGH (ref 7–23)
CALCIUM SERPL-MCNC: 9.7 MG/DL — SIGNIFICANT CHANGE UP (ref 8.4–10.5)
CHLORIDE SERPL-SCNC: 101 MMOL/L — SIGNIFICANT CHANGE UP (ref 98–107)
CO2 SERPL-SCNC: 22 MMOL/L — SIGNIFICANT CHANGE UP (ref 22–31)
COLOR SPEC: YELLOW — SIGNIFICANT CHANGE UP
CREAT SERPL-MCNC: 1.43 MG/DL — HIGH (ref 0.5–1.3)
DIFF PNL FLD: NEGATIVE — SIGNIFICANT CHANGE UP
EGFR: 34 ML/MIN/1.73M2 — LOW
EOSINOPHIL # BLD AUTO: 0.05 K/UL — SIGNIFICANT CHANGE UP (ref 0–0.5)
EOSINOPHIL NFR BLD AUTO: 0.6 % — SIGNIFICANT CHANGE UP (ref 0–6)
GAS PNL BLDV: SIGNIFICANT CHANGE UP
GLUCOSE SERPL-MCNC: 90 MG/DL — SIGNIFICANT CHANGE UP (ref 70–99)
GLUCOSE UR QL: NEGATIVE MG/DL — SIGNIFICANT CHANGE UP
HCT VFR BLD CALC: 36 % — SIGNIFICANT CHANGE UP (ref 34.5–45)
HGB BLD-MCNC: 12.1 G/DL — SIGNIFICANT CHANGE UP (ref 11.5–15.5)
IANC: 5.9 K/UL — SIGNIFICANT CHANGE UP (ref 1.8–7.4)
IMM GRANULOCYTES NFR BLD AUTO: 0.4 % — SIGNIFICANT CHANGE UP (ref 0–0.9)
INR BLD: 1.22 RATIO — HIGH (ref 0.85–1.18)
KETONES UR-MCNC: NEGATIVE MG/DL — SIGNIFICANT CHANGE UP
LEUKOCYTE ESTERASE UR-ACNC: NEGATIVE — SIGNIFICANT CHANGE UP
LIDOCAIN IGE QN: 28 U/L — SIGNIFICANT CHANGE UP (ref 7–60)
LYMPHOCYTES # BLD AUTO: 1.43 K/UL — SIGNIFICANT CHANGE UP (ref 1–3.3)
LYMPHOCYTES # BLD AUTO: 17.8 % — SIGNIFICANT CHANGE UP (ref 13–44)
MCHC RBC-ENTMCNC: 32.7 PG — SIGNIFICANT CHANGE UP (ref 27–34)
MCHC RBC-ENTMCNC: 33.6 GM/DL — SIGNIFICANT CHANGE UP (ref 32–36)
MCV RBC AUTO: 97.3 FL — SIGNIFICANT CHANGE UP (ref 80–100)
MONOCYTES # BLD AUTO: 0.62 K/UL — SIGNIFICANT CHANGE UP (ref 0–0.9)
MONOCYTES NFR BLD AUTO: 7.7 % — SIGNIFICANT CHANGE UP (ref 2–14)
NEUTROPHILS # BLD AUTO: 5.9 K/UL — SIGNIFICANT CHANGE UP (ref 1.8–7.4)
NEUTROPHILS NFR BLD AUTO: 73.3 % — SIGNIFICANT CHANGE UP (ref 43–77)
NITRITE UR-MCNC: NEGATIVE — SIGNIFICANT CHANGE UP
NRBC # BLD: 0 /100 WBCS — SIGNIFICANT CHANGE UP (ref 0–0)
NRBC # FLD: 0 K/UL — SIGNIFICANT CHANGE UP (ref 0–0)
PH UR: 5.5 — SIGNIFICANT CHANGE UP (ref 5–8)
PLATELET # BLD AUTO: 132 K/UL — LOW (ref 150–400)
POTASSIUM SERPL-MCNC: 4.8 MMOL/L — SIGNIFICANT CHANGE UP (ref 3.5–5.3)
POTASSIUM SERPL-SCNC: 4.8 MMOL/L — SIGNIFICANT CHANGE UP (ref 3.5–5.3)
PROT SERPL-MCNC: 7.4 G/DL — SIGNIFICANT CHANGE UP (ref 6–8.3)
PROT UR-MCNC: NEGATIVE MG/DL — SIGNIFICANT CHANGE UP
PROTHROM AB SERPL-ACNC: 13.7 SEC — HIGH (ref 9.5–13)
RBC # BLD: 3.7 M/UL — LOW (ref 3.8–5.2)
RBC # FLD: 12.3 % — SIGNIFICANT CHANGE UP (ref 10.3–14.5)
SODIUM SERPL-SCNC: 135 MMOL/L — SIGNIFICANT CHANGE UP (ref 135–145)
SP GR SPEC: 1.01 — SIGNIFICANT CHANGE UP (ref 1–1.03)
UROBILINOGEN FLD QL: 0.2 MG/DL — SIGNIFICANT CHANGE UP (ref 0.2–1)
WBC # BLD: 8.05 K/UL — SIGNIFICANT CHANGE UP (ref 3.8–10.5)
WBC # FLD AUTO: 8.05 K/UL — SIGNIFICANT CHANGE UP (ref 3.8–10.5)

## 2024-09-16 PROCEDURE — 99285 EMERGENCY DEPT VISIT HI MDM: CPT

## 2024-09-16 PROCEDURE — 74177 CT ABD & PELVIS W/CONTRAST: CPT | Mod: 26,MC

## 2024-09-16 RX ORDER — SODIUM CHLORIDE 9 MG/ML
1000 INJECTION INTRAMUSCULAR; INTRAVENOUS; SUBCUTANEOUS ONCE
Refills: 0 | Status: COMPLETED | OUTPATIENT
Start: 2024-09-16 | End: 2024-09-16

## 2024-09-16 RX ADMIN — SODIUM CHLORIDE 1000 MILLILITER(S): 9 INJECTION INTRAMUSCULAR; INTRAVENOUS; SUBCUTANEOUS at 18:54

## 2024-09-16 NOTE — ED PROVIDER NOTE - NSICDXPASTSURGICALHX_GEN_ALL_CORE_FT
No
PAST SURGICAL HISTORY:  H/O surgical biopsy right lung; 05/2018    H/O umbilical hernia repair     H/O varicose vein ligation and stripping both legs    History of abdominoplasty     History of appendectomy     History of bilateral breast reduction surgery     History of cataract extraction bilateral, with lens implants    History of cosmetic plastic surgery face lift    History of D&C     History of lung biopsy     History of vaginoplasty     Status post coronary angiogram 2017

## 2024-09-16 NOTE — ED PROVIDER NOTE - OBJECTIVE STATEMENT
The patient is a 89F with PMHx dementia, pancreatic insufficiency, HTN, HLD, CVAs x4 s/p TPA, s/p appendectomy, presenting for 2 days of abdominal pain. For 2 days patient has not been eating more than 1-2 bites of food but continues taking creon for pancreatic insufficiency and has therefore been having yellowish diarrhea approx. 7x/d, patient found to be hypotensive at PCP today. When asked, patient also reported abdominal pain starting today. Denies vomiting, nausea, hematochezia, melena, fever, recent travel, cough, ataxia, headache. The patient is a 89F with PMHx dementia, pancreatic insufficiency, HTN, HLD, CVAs x4 s/p TPA, s/p appendectomy, presenting for abdominal pain. For 2 days patient has not been eating more than 1-2 bites of food but continues taking creon for pancreatic insufficiency and has therefore been having yellowish diarrhea approx. 7x/d, patient found to be hypotensive at PCP today. When asked, patient also reported abdominal pain starting today. Daughter believes the patient has gone on hunger strike as she has a lifelong psychiatric history (diagnosis unknown) and in recent years refuses to eat at times, leading to diarrhea. Denies vomiting, nausea, hematochezia, melena, fever, recent travel, cough, ataxia, headache.

## 2024-09-16 NOTE — ED ADULT TRIAGE NOTE - CHIEF COMPLAINT QUOTE
Pt presents for abdominal pain, decreased PO intake, hypotension and diarrhea for the past 2 days, evaluated by MD and sent to ED for further eval/treatment.

## 2024-09-16 NOTE — ED PROVIDER NOTE - CHIEF COMPLAINT
The patient is a 89y Female complaining of  The patient is a 89y Female complaining of abdominal pain x2 days.

## 2024-09-16 NOTE — ED ADULT NURSE NOTE - NSFALLHARMRISKINTERV_ED_ALL_ED

## 2024-09-16 NOTE — ED PROVIDER NOTE - NSICDXPASTMEDICALHX_GEN_ALL_CORE_FT
PAST MEDICAL HISTORY:  Alzheimer disease     Anxiety     Arthritis     Asthma, mild     Benign essential hypertension     Benign essential tremor     Chronic kidney disease, unspecified CKD stage     COPD, mild     CVA (cerebral infarction) x4 last was 4 years ago. s/p TPA    Dyslipidemia     Glaucoma     Hypertension     Other nonspecific abnormal finding of lung field     Pancreatic insufficiency

## 2024-09-16 NOTE — ED PROVIDER NOTE - CLINICAL SUMMARY MEDICAL DECISION MAKING FREE TEXT BOX
Patient is a 89-year-old female accompanied by daughter (Andrew Anne) past medical history of pancreatic insufficiency, hypertension, CHF, A-fib on dig and Eliquis presents sent in by PCP for chief complaint abdominal pain, diarrhea, poor PO intake over the past 2 days. Daughter at bedside states the patient is very stuborn and does not like to eat regular meals and takes her pancreatic insuff medications but then doesn't eat for several hours. Estimated 6 episodes diffuse watery non-bloody diarrhea over the past 2 days. no recent travel or abx use. Denies fevers, chills, n/v. Abd pain described as lower abdomen radiating into her back. Denies dysuria, hematuria. Denies CP, SOB.   VS notable for softer blood pressures with MAP>65, not tachycardic hr 85. Afebrile.  DDx- diarrhea iso pancreatic insuff. Less likely infectious/cdiff. Will obtain CT abdomen pelvis to eval for acute intraabdominal surgical pathology including appendicitis. Possible diverticulitis. Will obtain labs to eval for lyte abnormalities given diffuse diarrhea.

## 2024-09-16 NOTE — ED PROVIDER NOTE - PHYSICAL EXAMINATION
T(C): 36.4 (09-16-24 @ 17:35), Max: 36.4 (09-16-24 @ 17:35)  HR: 85 (09-16-24 @ 17:35) (85 - 85)  BP: 91/61 (09-16-24 @ 17:35) (91/61 - 91/61)  RR: 16 (09-16-24 @ 17:35) (16 - 16)  SpO2: 98% (09-16-24 @ 17:35) (98% - 98%)    CONSTITUTIONAL: Confused, elderly patient in no apparent distress  EYES: PERRLA and symmetric, EOMI, No conjunctival or scleral injection, non-icteric  RESP: No respiratory distress, no use of accessory muscles; CTA b/l, no WRR  CV: RRR, +S1S2, no MRG; no JVD; no peripheral edema  GI: Soft, NT, ND, no rebound, no guarding; no palpable masses; no hepatosplenomegaly; no hernia palpated  MSK: Shuffling gait with cane  SKIN: Bruising of LUE  PSYCH: A+O x 1

## 2024-09-16 NOTE — ED ADULT NURSE NOTE - OBJECTIVE STATEMENT
Pt with abdominal pain, diarrhea and decreased PO intake x 2 days. 20g IV placed by ALAN Vargas and labs drawn as ordered. Pt awaiting ct scan. safety maintained. daughter at bedside assisting in care.

## 2024-09-16 NOTE — ED PROVIDER NOTE - ATTENDING CONTRIBUTION TO CARE
89 yo female with PMH dementia, pancreatic insufficiency, HTN, HLD, CVA, s/p appendectomy for evaluation of abdominal pain PE: Well appearing, RRR, CTA BL lungs, abd soft NTND. A/P Labs, imaging, medicate, reassess

## 2024-09-17 DIAGNOSIS — K57.92 DIVERTICULITIS OF INTESTINE, PART UNSPECIFIED, WITHOUT PERFORATION OR ABSCESS WITHOUT BLEEDING: ICD-10-CM

## 2024-09-17 DIAGNOSIS — N17.9 ACUTE KIDNEY FAILURE, UNSPECIFIED: ICD-10-CM

## 2024-09-17 DIAGNOSIS — R19.7 DIARRHEA, UNSPECIFIED: ICD-10-CM

## 2024-09-17 DIAGNOSIS — I10 ESSENTIAL (PRIMARY) HYPERTENSION: ICD-10-CM

## 2024-09-17 DIAGNOSIS — I50.9 HEART FAILURE, UNSPECIFIED: ICD-10-CM

## 2024-09-17 DIAGNOSIS — K86.89 OTHER SPECIFIED DISEASES OF PANCREAS: ICD-10-CM

## 2024-09-17 DIAGNOSIS — I48.91 UNSPECIFIED ATRIAL FIBRILLATION: ICD-10-CM

## 2024-09-17 DIAGNOSIS — F32.9 MAJOR DEPRESSIVE DISORDER, SINGLE EPISODE, UNSPECIFIED: ICD-10-CM

## 2024-09-17 DIAGNOSIS — R62.7 ADULT FAILURE TO THRIVE: ICD-10-CM

## 2024-09-17 DIAGNOSIS — Z29.9 ENCOUNTER FOR PROPHYLACTIC MEASURES, UNSPECIFIED: ICD-10-CM

## 2024-09-17 DIAGNOSIS — R79.1 ABNORMAL COAGULATION PROFILE: ICD-10-CM

## 2024-09-17 LAB
A1C WITH ESTIMATED AVERAGE GLUCOSE RESULT: 5.3 % — SIGNIFICANT CHANGE UP (ref 4–5.6)
ANION GAP SERPL CALC-SCNC: 13 MMOL/L — SIGNIFICANT CHANGE UP (ref 7–14)
APPEARANCE UR: CLEAR — SIGNIFICANT CHANGE UP
APTT BLD: 29.2 SEC — SIGNIFICANT CHANGE UP (ref 24.5–35.6)
BASOPHILS # BLD AUTO: 0.02 K/UL — SIGNIFICANT CHANGE UP (ref 0–0.2)
BASOPHILS NFR BLD AUTO: 0.3 % — SIGNIFICANT CHANGE UP (ref 0–2)
BILIRUB UR-MCNC: NEGATIVE — SIGNIFICANT CHANGE UP
BUN SERPL-MCNC: 50 MG/DL — HIGH (ref 7–23)
CALCIUM SERPL-MCNC: 8.8 MG/DL — SIGNIFICANT CHANGE UP (ref 8.4–10.5)
CHLORIDE SERPL-SCNC: 102 MMOL/L — SIGNIFICANT CHANGE UP (ref 98–107)
CO2 SERPL-SCNC: 22 MMOL/L — SIGNIFICANT CHANGE UP (ref 22–31)
COLOR SPEC: YELLOW — SIGNIFICANT CHANGE UP
CREAT ?TM UR-MCNC: 42 MG/DL — SIGNIFICANT CHANGE UP
CREAT SERPL-MCNC: 1.26 MG/DL — SIGNIFICANT CHANGE UP (ref 0.5–1.3)
DIFF PNL FLD: NEGATIVE — SIGNIFICANT CHANGE UP
EGFR: 39 ML/MIN/1.73M2 — LOW
EOSINOPHIL # BLD AUTO: 0.1 K/UL — SIGNIFICANT CHANGE UP (ref 0–0.5)
EOSINOPHIL NFR BLD AUTO: 1.6 % — SIGNIFICANT CHANGE UP (ref 0–6)
ESTIMATED AVERAGE GLUCOSE: 105 — SIGNIFICANT CHANGE UP
GLUCOSE SERPL-MCNC: 89 MG/DL — SIGNIFICANT CHANGE UP (ref 70–99)
GLUCOSE UR QL: NEGATIVE MG/DL — SIGNIFICANT CHANGE UP
HCT VFR BLD CALC: 33.8 % — LOW (ref 34.5–45)
HGB BLD-MCNC: 11.3 G/DL — LOW (ref 11.5–15.5)
IANC: 3.97 K/UL — SIGNIFICANT CHANGE UP (ref 1.8–7.4)
IMM GRANULOCYTES NFR BLD AUTO: 0.5 % — SIGNIFICANT CHANGE UP (ref 0–0.9)
INR BLD: 1.21 RATIO — HIGH (ref 0.85–1.18)
KETONES UR-MCNC: NEGATIVE MG/DL — SIGNIFICANT CHANGE UP
LEUKOCYTE ESTERASE UR-ACNC: NEGATIVE — SIGNIFICANT CHANGE UP
LYMPHOCYTES # BLD AUTO: 1.79 K/UL — SIGNIFICANT CHANGE UP (ref 1–3.3)
LYMPHOCYTES # BLD AUTO: 27.9 % — SIGNIFICANT CHANGE UP (ref 13–44)
MCHC RBC-ENTMCNC: 32.4 PG — SIGNIFICANT CHANGE UP (ref 27–34)
MCHC RBC-ENTMCNC: 33.4 GM/DL — SIGNIFICANT CHANGE UP (ref 32–36)
MCV RBC AUTO: 96.8 FL — SIGNIFICANT CHANGE UP (ref 80–100)
MONOCYTES # BLD AUTO: 0.5 K/UL — SIGNIFICANT CHANGE UP (ref 0–0.9)
MONOCYTES NFR BLD AUTO: 7.8 % — SIGNIFICANT CHANGE UP (ref 2–14)
NEUTROPHILS # BLD AUTO: 3.97 K/UL — SIGNIFICANT CHANGE UP (ref 1.8–7.4)
NEUTROPHILS NFR BLD AUTO: 61.9 % — SIGNIFICANT CHANGE UP (ref 43–77)
NITRITE UR-MCNC: NEGATIVE — SIGNIFICANT CHANGE UP
NRBC # BLD: 0 /100 WBCS — SIGNIFICANT CHANGE UP (ref 0–0)
NRBC # FLD: 0 K/UL — SIGNIFICANT CHANGE UP (ref 0–0)
OSMOLALITY UR: 439 MOSM/KG — SIGNIFICANT CHANGE UP (ref 50–1200)
PH UR: 6 — SIGNIFICANT CHANGE UP (ref 5–8)
PLATELET # BLD AUTO: 113 K/UL — LOW (ref 150–400)
POTASSIUM SERPL-MCNC: 4.3 MMOL/L — SIGNIFICANT CHANGE UP (ref 3.5–5.3)
POTASSIUM SERPL-SCNC: 4.3 MMOL/L — SIGNIFICANT CHANGE UP (ref 3.5–5.3)
POTASSIUM UR-SCNC: 22.2 MMOL/L — SIGNIFICANT CHANGE UP
PROT ?TM UR-MCNC: 12 MG/DL — SIGNIFICANT CHANGE UP
PROT UR-MCNC: SIGNIFICANT CHANGE UP MG/DL
PROT/CREAT UR-RTO: 0.3 RATIO — HIGH (ref 0–0.2)
PROTHROM AB SERPL-ACNC: 13.5 SEC — HIGH (ref 9.5–13)
RBC # BLD: 3.49 M/UL — LOW (ref 3.8–5.2)
RBC # FLD: 12.3 % — SIGNIFICANT CHANGE UP (ref 10.3–14.5)
SODIUM SERPL-SCNC: 137 MMOL/L — SIGNIFICANT CHANGE UP (ref 135–145)
SODIUM UR-SCNC: 24 MMOL/L — SIGNIFICANT CHANGE UP
SP GR SPEC: 1.03 — HIGH (ref 1–1.03)
UROBILINOGEN FLD QL: 0.2 MG/DL — SIGNIFICANT CHANGE UP (ref 0.2–1)
UUN UR-MCNC: 670.4 MG/DL — SIGNIFICANT CHANGE UP
WBC # BLD: 6.41 K/UL — SIGNIFICANT CHANGE UP (ref 3.8–10.5)
WBC # FLD AUTO: 6.41 K/UL — SIGNIFICANT CHANGE UP (ref 3.8–10.5)

## 2024-09-17 PROCEDURE — 71045 X-RAY EXAM CHEST 1 VIEW: CPT | Mod: 26

## 2024-09-17 PROCEDURE — 99223 1ST HOSP IP/OBS HIGH 75: CPT

## 2024-09-17 PROCEDURE — G0316 PROLONG INPT EVAL ADD15 M: CPT

## 2024-09-17 RX ORDER — ONDANSETRON 2 MG/ML
4 INJECTION, SOLUTION INTRAMUSCULAR; INTRAVENOUS EVERY 8 HOURS
Refills: 0 | Status: DISCONTINUED | OUTPATIENT
Start: 2024-09-17 | End: 2024-09-20

## 2024-09-17 RX ORDER — CYCLOSPORINE 0.05 %
1 DROPPERETTE, SINGLE-USE DROP DISPENSER OPHTHALMIC (EYE)
Refills: 0 | DISCHARGE

## 2024-09-17 RX ORDER — FERROUS SULFATE 325(65) MG
325 TABLET ORAL
Refills: 0 | DISCHARGE

## 2024-09-17 RX ORDER — METOPROLOL TARTRATE 100 MG/1
25 TABLET ORAL
Refills: 0 | Status: DISCONTINUED | OUTPATIENT
Start: 2024-09-17 | End: 2024-09-17

## 2024-09-17 RX ORDER — PIPERACILLIN SODIUM AND TAZOBACTAM SODIUM 3; .375 G/15ML; G/15ML
3.38 INJECTION, POWDER, FOR SOLUTION INTRAVENOUS ONCE
Refills: 0 | Status: COMPLETED | OUTPATIENT
Start: 2024-09-17 | End: 2024-09-17

## 2024-09-17 RX ORDER — PIPERACILLIN SODIUM AND TAZOBACTAM SODIUM 3; .375 G/15ML; G/15ML
3.38 INJECTION, POWDER, FOR SOLUTION INTRAVENOUS EVERY 12 HOURS
Refills: 0 | Status: DISCONTINUED | OUTPATIENT
Start: 2024-09-17 | End: 2024-09-20

## 2024-09-17 RX ORDER — MIRTAZAPINE 30 MG
22.5 TABLET ORAL DAILY
Refills: 0 | Status: DISCONTINUED | OUTPATIENT
Start: 2024-09-17 | End: 2024-09-20

## 2024-09-17 RX ORDER — MAGNESIUM, ALUMINUM HYDROXIDE 200-225/5
30 SUSPENSION, ORAL (FINAL DOSE FORM) ORAL EVERY 4 HOURS
Refills: 0 | Status: DISCONTINUED | OUTPATIENT
Start: 2024-09-17 | End: 2024-09-20

## 2024-09-17 RX ORDER — FOLIC ACID 1 MG
1 TABLET ORAL DAILY
Refills: 0 | Status: DISCONTINUED | OUTPATIENT
Start: 2024-09-17 | End: 2024-09-20

## 2024-09-17 RX ORDER — PANCRELIPASE 16000; 60500; 57500 [USP'U]/1; [USP'U]/1; [USP'U]/1
1 CAPSULE, DELAYED RELEASE ORAL
Refills: 0 | DISCHARGE

## 2024-09-17 RX ORDER — SERTRALINE HYDROCHLORIDE 50 MG/1
1 TABLET, FILM COATED ORAL
Refills: 0 | DISCHARGE

## 2024-09-17 RX ORDER — PIPERACILLIN SODIUM AND TAZOBACTAM SODIUM 3; .375 G/15ML; G/15ML
2.25 INJECTION, POWDER, FOR SOLUTION INTRAVENOUS EVERY 6 HOURS
Refills: 0 | Status: DISCONTINUED | OUTPATIENT
Start: 2024-09-17 | End: 2024-09-17

## 2024-09-17 RX ORDER — METOPROLOL TARTRATE 100 MG/1
25 TABLET ORAL
Refills: 0 | Status: DISCONTINUED | OUTPATIENT
Start: 2024-09-17 | End: 2024-09-20

## 2024-09-17 RX ORDER — PYRIDOXINE HCL (VITAMIN B6) 25 MG
1 TABLET ORAL
Refills: 0 | DISCHARGE

## 2024-09-17 RX ORDER — FLUTICASONE FUROATE AND VILANTEROL TRIFENATATE 200; 25 UG/1; UG/1
1 POWDER RESPIRATORY (INHALATION)
Refills: 0 | DISCHARGE

## 2024-09-17 RX ORDER — PYRIDOXINE HCL (VITAMIN B6) 25 MG
100 TABLET ORAL DAILY
Refills: 0 | Status: DISCONTINUED | OUTPATIENT
Start: 2024-09-17 | End: 2024-09-20

## 2024-09-17 RX ORDER — TIMOLOL MALEATE 5 MG/ML
1 SOLUTION/ DROPS OPHTHALMIC DAILY
Refills: 0 | Status: DISCONTINUED | OUTPATIENT
Start: 2024-09-17 | End: 2024-09-20

## 2024-09-17 RX ORDER — L.ACID,PARA/B.BIFIDUM/S.THERM 8B CELL
1 CAPSULE ORAL
Refills: 0 | Status: DISCONTINUED | OUTPATIENT
Start: 2024-09-17 | End: 2024-09-20

## 2024-09-17 RX ORDER — TIOTROPIUM BROMIDE INHALATION SPRAY 3.12 UG/1
2 SPRAY, METERED RESPIRATORY (INHALATION) DAILY
Refills: 0 | Status: DISCONTINUED | OUTPATIENT
Start: 2024-09-17 | End: 2024-09-20

## 2024-09-17 RX ORDER — CYANOCOBALAMIN (VITAMIN B-12) 500MCG/0.1
1000 GEL (ML) NASAL DAILY
Refills: 0 | Status: DISCONTINUED | OUTPATIENT
Start: 2024-09-17 | End: 2024-09-20

## 2024-09-17 RX ORDER — METOPROLOL TARTRATE 100 MG/1
1 TABLET ORAL
Refills: 0 | DISCHARGE

## 2024-09-17 RX ORDER — MIRTAZAPINE 30 MG
1.5 TABLET ORAL
Refills: 0 | DISCHARGE

## 2024-09-17 RX ORDER — PANCRELIPASE 16000; 60500; 57500 [USP'U]/1; [USP'U]/1; [USP'U]/1
1 CAPSULE, DELAYED RELEASE ORAL
Refills: 0 | Status: DISCONTINUED | OUTPATIENT
Start: 2024-09-17 | End: 2024-09-20

## 2024-09-17 RX ORDER — TIMOLOL MALEATE 5 MG/ML
1 SOLUTION/ DROPS OPHTHALMIC
Refills: 0 | DISCHARGE

## 2024-09-17 RX ORDER — HEPARIN SODIUM,BOVINE 1000/ML
5000 VIAL (ML) INJECTION EVERY 8 HOURS
Refills: 0 | Status: DISCONTINUED | OUTPATIENT
Start: 2024-09-17 | End: 2024-09-17

## 2024-09-17 RX ORDER — FUROSEMIDE 40 MG
20 TABLET ORAL
Refills: 0 | Status: DISCONTINUED | OUTPATIENT
Start: 2024-09-17 | End: 2024-09-17

## 2024-09-17 RX ORDER — FERROUS SULFATE 325(65) MG
325 TABLET ORAL DAILY
Refills: 0 | Status: DISCONTINUED | OUTPATIENT
Start: 2024-09-17 | End: 2024-09-20

## 2024-09-17 RX ORDER — APIXABAN 5 MG/1
2.5 TABLET, FILM COATED ORAL EVERY 12 HOURS
Refills: 0 | Status: DISCONTINUED | OUTPATIENT
Start: 2024-09-17 | End: 2024-09-20

## 2024-09-17 RX ORDER — ACETAMINOPHEN 325 MG/1
650 TABLET ORAL EVERY 6 HOURS
Refills: 0 | Status: DISCONTINUED | OUTPATIENT
Start: 2024-09-17 | End: 2024-09-20

## 2024-09-17 RX ORDER — CALCIUM CARBONATE/VITAMIN D3 500MG-5MCG
1 TABLET ORAL DAILY
Refills: 0 | Status: DISCONTINUED | OUTPATIENT
Start: 2024-09-17 | End: 2024-09-20

## 2024-09-17 RX ORDER — MIRTAZAPINE 30 MG
2 TABLET ORAL
Refills: 0 | DISCHARGE

## 2024-09-17 RX ORDER — FOLIC ACID 1 MG
1 TABLET ORAL
Refills: 0 | DISCHARGE

## 2024-09-17 RX ORDER — LATANOPROST 50 UG/ML
1 SOLUTION OPHTHALMIC AT BEDTIME
Refills: 0 | Status: DISCONTINUED | OUTPATIENT
Start: 2024-09-17 | End: 2024-09-20

## 2024-09-17 RX ORDER — SERTRALINE HYDROCHLORIDE 50 MG/1
25 TABLET, FILM COATED ORAL DAILY
Refills: 0 | Status: DISCONTINUED | OUTPATIENT
Start: 2024-09-17 | End: 2024-09-20

## 2024-09-17 RX ORDER — DICLOFENAC POTASSIUM 25 MG/1
0 CAPSULE, LIQUID FILLED ORAL
Refills: 0 | DISCHARGE

## 2024-09-17 RX ORDER — PANTOPRAZOLE SODIUM 40 MG
40 TABLET, DELAYED RELEASE (ENTERIC COATED) ORAL DAILY
Refills: 0 | Status: DISCONTINUED | OUTPATIENT
Start: 2024-09-17 | End: 2024-09-20

## 2024-09-17 RX ORDER — MIRTAZAPINE 30 MG
1 TABLET ORAL
Refills: 0 | DISCHARGE

## 2024-09-17 RX ORDER — FLUTICASONE PROPIONATE AND SALMETEROL 250; 50 UG/1; UG/1
1 POWDER RESPIRATORY (INHALATION)
Refills: 0 | Status: DISCONTINUED | OUTPATIENT
Start: 2024-09-17 | End: 2024-09-20

## 2024-09-17 RX ORDER — OMEPRAZOLE 40 MG/1
1 CAPSULE, DELAYED RELEASE ORAL
Refills: 0 | DISCHARGE

## 2024-09-17 RX ADMIN — FLUTICASONE PROPIONATE AND SALMETEROL 1 DOSE(S): 250; 50 POWDER RESPIRATORY (INHALATION) at 15:45

## 2024-09-17 RX ADMIN — Medication 75 MILLILITER(S): at 02:58

## 2024-09-17 RX ADMIN — METOPROLOL TARTRATE 25 MILLIGRAM(S): 100 TABLET ORAL at 18:50

## 2024-09-17 RX ADMIN — APIXABAN 2.5 MILLIGRAM(S): 5 TABLET, FILM COATED ORAL at 06:24

## 2024-09-17 RX ADMIN — TIMOLOL MALEATE 1 DROP(S): 5 SOLUTION/ DROPS OPHTHALMIC at 15:46

## 2024-09-17 RX ADMIN — PANCRELIPASE 1 CAPSULE(S): 16000; 60500; 57500 CAPSULE, DELAYED RELEASE ORAL at 13:13

## 2024-09-17 RX ADMIN — Medication 1000 MICROGRAM(S): at 12:59

## 2024-09-17 RX ADMIN — Medication 1 TABLET(S): at 18:50

## 2024-09-17 RX ADMIN — Medication 1 TABLET(S): at 12:59

## 2024-09-17 RX ADMIN — Medication 10 MILLIGRAM(S): at 22:48

## 2024-09-17 RX ADMIN — SERTRALINE HYDROCHLORIDE 25 MILLIGRAM(S): 50 TABLET, FILM COATED ORAL at 12:59

## 2024-09-17 RX ADMIN — Medication 22.5 MILLIGRAM(S): at 13:03

## 2024-09-17 RX ADMIN — Medication 325 MILLIGRAM(S): at 13:03

## 2024-09-17 RX ADMIN — Medication 1 MILLIGRAM(S): at 13:02

## 2024-09-17 RX ADMIN — Medication 40 MILLIGRAM(S): at 12:53

## 2024-09-17 RX ADMIN — Medication 100 MILLIGRAM(S): at 15:45

## 2024-09-17 RX ADMIN — PIPERACILLIN SODIUM AND TAZOBACTAM SODIUM 25 GRAM(S): 3; .375 INJECTION, POWDER, FOR SOLUTION INTRAVENOUS at 22:48

## 2024-09-17 RX ADMIN — APIXABAN 2.5 MILLIGRAM(S): 5 TABLET, FILM COATED ORAL at 18:50

## 2024-09-17 RX ADMIN — TIOTROPIUM BROMIDE INHALATION SPRAY 2 PUFF(S): 3.12 SPRAY, METERED RESPIRATORY (INHALATION) at 15:46

## 2024-09-17 RX ADMIN — Medication 1 TABLET(S): at 13:03

## 2024-09-17 RX ADMIN — PIPERACILLIN SODIUM AND TAZOBACTAM SODIUM 200 GRAM(S): 3; .375 INJECTION, POWDER, FOR SOLUTION INTRAVENOUS at 01:57

## 2024-09-17 RX ADMIN — PIPERACILLIN SODIUM AND TAZOBACTAM SODIUM 25 GRAM(S): 3; .375 INJECTION, POWDER, FOR SOLUTION INTRAVENOUS at 12:58

## 2024-09-17 NOTE — PHYSICAL THERAPY INITIAL EVALUATION ADULT - PERTINENT HX OF CURRENT PROBLEM, REHAB EVAL
89 year old Female with past medical history stated below, presenting for decreased PO intake and abdominal pain for the past few days. Pt's daughter providing collateral states that pt has had abdominal pain for the past 3-4 days with associated diarrhea (6 episodes a day). Pt's daughter reports decreased PO intake and reports pt has had similar episodes in the past in which pt refuses to eat and takes creon which leads to diarrheal episodes.

## 2024-09-17 NOTE — PHYSICAL THERAPY INITIAL EVALUATION ADULT - ADDITIONAL COMMENTS
Pt lives in an apartment with her daughter, +stairs to negotiate, was independent with all functional mobility and ADL performance using a Single Axis Cane.    Pt left semi-supine in bed, all lines intact, all needs in reach, bed alarm set, in NAD. Heart rate 80 beats per minute.

## 2024-09-17 NOTE — PATIENT PROFILE ADULT - FALL HARM RISK - HARM RISK INTERVENTIONS

## 2024-09-17 NOTE — H&P ADULT - NSHPREVIEWOFSYSTEMS_GEN_ALL_CORE
REVIEW OF SYSTEMS:    CONSTITUTIONAL:  No weakness, fevers or chills  EYES/ENT:  No visual changes;  No vertigo or throat pain   NECK:  No pain or stiffness  RESPIRATORY:  No cough, wheezing, hemoptysis; No shortness of breath  CARDIOVASCULAR:  No chest pain or palpitations  GASTROINTESTINAL:  + abdominal pain, + diarrhea No nausea, vomiting, or hematemesis; No diarrhea or constipation. No melena or hematochezia.  GENITOURINARY:  No dysuria, frequency or hematuria  MUSCULOSKELETAL:  FROM all extremities, normal strength, No calf tenderness  NEUROLOGICAL:  No numbness or weakness  SKIN:  No itching, rashes

## 2024-09-17 NOTE — ED ADULT NURSE REASSESSMENT NOTE - NS ED NURSE REASSESS COMMENT FT1
Break RN: ipad ; King (ID#[id number]) used during patient encounter. Patient awake and resting in stretcher; respirations even and unlabored, no signs/symptoms of acute distress. Patient denies dyspnea, shortness of breath, and chest pain. Patient denies pain and offers no complaints at this time. 22G IV placed to right hand, patient cleaned and changed, medications administered per eMAR, safety measures in place and call bell within reach.
Break RN: pt resting comfortably in stretcher, breathing even and unlabored. Offers no complaints at this time. Instructed to call for assistance. Stretcher in lowest position, wheels locked, appropriate side rails in place, call bell in reach. Pending admitted bed assignment
PT is resting in stretcher, easily arousable to verbal stimuli. no apparent distress noted at this time. hand off will be given to primary nurse when return to area.
Pt received in hallway spot 26A in no acute distress. Denies headache, dizziness, nausea, vomiting, abd pain, chest pain, SOB. RR even and unlabored. Pt safety maintained. Pending bed assignment.
Pt noted to be in no acute distress. Denies headache, dizziness, nausea, vomiting, abd pain, chest pain, SOB. RR even and unlabored. Pt safety maintained. Pending bed assignment.

## 2024-09-17 NOTE — H&P ADULT - NSHPPHYSICALEXAM_GEN_ALL_CORE
Vital Signs Last 24 Hrs  T(C): 36.6 (17 Sep 2024 00:20), Max: 36.6 (17 Sep 2024 00:20)  T(F): 97.8 (17 Sep 2024 00:20), Max: 97.8 (17 Sep 2024 00:20)  HR: 77 (17 Sep 2024 00:20) (77 - 85)  BP: 116/84 (17 Sep 2024 00:20) (91/61 - 116/84)  BP(mean): --  RR: 18 (17 Sep 2024 00:20) (16 - 18)  SpO2: 100% (17 Sep 2024 00:20) (98% - 100%)    Parameters below as of 17 Sep 2024 00:20  Patient On (Oxygen Delivery Method): room air    GENERAL: NAD, lying in bed comfortably  HEAD:  Atraumatic, normocephalic  EYES: EOMI, PERRLA, conjunctiva and sclera clear  NECK: Supple, trachea midline, no JVD  HEART: Regular rate and rhythm, no murmurs, rubs, or gallops  LUNGS: Unlabored respirations.  Clear to auscultation bilaterally, no crackles, wheezing, or rhonchi  ABDOMEN: Soft, nontender, nondistended, +BS  EXTREMITIES: 2+ peripheral pulses bilaterally. No clubbing, cyanosis, or edema  NERVOUS SYSTEM:  A&Ox3, moving all extremities, no focal deficits   SKIN: No rashes or lesions

## 2024-09-17 NOTE — H&P ADULT - PROBLEM SELECTOR PLAN 2
-SCr 1.43  - baseline Cr 1.0-1.1   - most likely iso of prerenal 2/2 to decreased PO intake   -s/p 1L bolus     - cw IVF   - fu urine studies

## 2024-09-17 NOTE — H&P ADULT - HISTORY OF PRESENT ILLNESS
89yoF with Hx of pancreatic insufficiency on creon, HTN, CHF, afib on Eliquis, CVA x4 s/p TPA presenting for decreased PO intake and abdominal pain for the past few days. Pt's daughter providing colateral states that pt has had abdominal pain for the pat few days with associated diarrhea (6 episodes a day). Pt's daughter reports decreased PO intake and reports pt has had similar episodes in the past in which pt refuses to eat and takes creon which leads to diarrheal episodes. No fever at home or recent travel history. Denies dysuria, hematuria, CP, SOB  accompanied by daughter (Andrew Anne) past medical history of pancreatic insufficiency, hypertension, CHF, A-fib on dig and Eliquis presents sent in by PCP for chief complaint abdominal pain, diarrhea, poor PO intake over the past 2 days. Daughter at bedside states the patient is very stuborn and does not like to eat regular meals and takes her pancreatic insuff medications but then doesn't eat for several hours. Estimated 6 episodes diffuse watery non-bloody diarrhea over the past 2 days. no recent travel or abx use. Denies fevers, chills, n/v.     In the ED- VS sigf for  BP systolic 91/51, afebrile, HR 80s sating well on RA.  1L bolus and 1x zosyn.  89yoF with Hx of pancreatic insufficiency on creon, HTN, CHF, afib on Eliquis, CVA x4 s/p TPA (last in 2022) presenting for decreased PO intake and abdominal pain for the past few days. Pt's daughter providing collateral states that pt has had abdominal pain for the past 3-4 days  with associated diarrhea (6 episodes a day). Pt's daughter reports decreased PO intake and reports pt has had similar episodes in the past in which pt refuses to eat and takes creon which leads to diarrheal episodes. No fever at home or recent travel history or recent antibiotic use. Denies dysuria, hematuria, CP, SOB  Denies fevers, chills, n/v.     In the ED- VS sigf for  BP systolic 91/51, afebrile, HR 80s sating well on RA.  1L bolus and 1x zosyn. At bedside- pt reports last episode of diarrhea was 9/16PM and denies having any active abdominal pain currently.

## 2024-09-17 NOTE — H&P ADULT - PROBLEM SELECTOR PLAN 4
- afibb diagnosed on outpt cardiologist   - fu ECG   - on home metoprolol tartrate 25mg BID   - eliquis 2.5mg BID     - fu ECG   - cw metoprolol 25mg BID   - cw eliquis 2.5mg BID

## 2024-09-17 NOTE — H&P ADULT - NSICDXPASTSURGICALHX_GEN_ALL_CORE_FT
PAST SURGICAL HISTORY:  H/O surgical biopsy right lung; 05/2018    H/O umbilical hernia repair     H/O varicose vein ligation and stripping both legs    History of abdominoplasty     History of appendectomy     History of bilateral breast reduction surgery     History of cataract extraction bilateral, with lens implants    History of cosmetic plastic surgery face lift    History of D&C     History of lung biopsy     History of vaginoplasty     Status post coronary angiogram 2017

## 2024-09-17 NOTE — H&P ADULT - ATTENDING COMMENTS
89yoF with Hx of pancreatic insufficiency on creon, HTN, CHF, afib on Eliquis, CVA x4 s/p TPA (last in ) presenting for decreased PO intake and abdominal pain for the past few days. Patient is overall a poor historian even with a , difficult to obtain details of the history. The patient stated that she has abdominal pain in the lower quadrant b/l. Unclear for how long. Also endorsed diarrhea that is waterry, unclear for how long. Would have 4-5 bowel movements a day.    Physical exam shows an elderly female NAD, comfortable at bedside, chest CTA b/l no wheezing, cardiac w1w2 RRR no mumrmurs, abdomen soft, + tenderness on the lower quadrants b/l, no LE edema noted    Labs:                        11.3   6.41  )-----------( 113      ( 17 Sep 2024 06:15 )             33.8           135  |  101  |  58<H>  ----------------------------<  90  4.8   |  22  |  1.43<H>    Ca    9.7      16 Sep 2024 18:13    TPro  7.4  /  Alb  3.7  /  TBili  0.6  /  DBili  x   /  AST  32  /  ALT  22  /  AlkPhos  110  -            PT/INR - ( 16 Sep 2024 18:13 )   PT: 13.7 sec;   INR: 1.22 ratio         PTT - ( 16 Sep 2024 18:13 )  PTT:31.9 sec    18:13 - VBG - pH: 7.31  | pCO2: 56    | pO2: <20   | Lactate: 1.2        Urinalysis Basic - ( 16 Sep 2024 19:20 )    Color: Yellow / Appearance: Clear / S.014 / pH: x  Gluc: x / Ketone: Negative mg/dL  / Bili: Negative / Urobili: 0.2 mg/dL   Blood: x / Protein: Negative mg/dL / Nitrite: Negative   Leuk Esterase: Negative / RBC: x / WBC x   Sq Epi: x / Non Sq Epi: x / Bacteria: x        EKG: As per my read - afib QTc 425 ms    CT:  IMPRESSION: Pancolonic diverticulosis WITH evidence of acute   diverticulitis involving the mid descending colon. No evidence of   perforation or pericolonic abscess.    The patient is being admitted for abdominal pain found to have diverticulitis on imaging.    Diverticulitis - patient found to have imaging evidence of diverticulitis. Will start zosyn for now. Monitor for sepsis. c/w IV fluids. Can encourage oral diet, f/u blood cultures    LORENZO - patient likely has pre-renal LORENZO secondary to diarrhea. Will c/w IV fluids for now. Trend Cr. CT scan did not show evidence of hydronephrosis    diarrhea- patient has symptoms of diarrhea, could be from pancreatic insufficiency and colitis/diverticulitis. It is unclear if she takes her Creon and has a psych hx of "going on hunger strikes". Daughter also a poor historian in regards to the patient medical condition. Will c/w Creon for now. Will obtain GI PCR, C diff to r/o colitis    CHF - patient is currently euvolemic, hold PO diuretics due to LORENZO, c/w eliquis for afib. Monitor for GI bleeding while on eliquis. Monitor closely while on maintenance fluid for LORENZO management. c/w metoprolol for rate control    Failure to thrive - if patient actively refusing food in hospital, may consider psychiatry consult for ongoing dementia - Day team to follow    Spent an extra 15 minutes with  ([place] #[id number]) to obtain history as above from a poor historian.

## 2024-09-17 NOTE — H&P ADULT - PROBLEM SELECTOR PLAN 6
- INR 1.31   - may be 2/2 of vitamin K deficiency iso of decreased PO intake     - fu vit K level   - cw to trend INR

## 2024-09-17 NOTE — H&P ADULT - NSHPLABSRESULTS_GEN_ALL_CORE
LABS:                          12.1   8.05  )-----------( 132      ( 16 Sep 2024 18:13 )             36.0         135  |  101  |  58<H>  ----------------------------<  90  4.8   |  22  |  1.43<H>    Ca    9.7      16 Sep 2024 18:13    TPro  7.4  /  Alb  3.7  /  TBili  0.6  /  DBili  x   /  AST  32  /  ALT  22  /  AlkPhos  110      LIVER FUNCTIONS - ( 16 Sep 2024 18:13 )  Alb: 3.7 g/dL / Pro: 7.4 g/dL / ALK PHOS: 110 U/L / ALT: 22 U/L / AST: 32 U/L / GGT: x           PT/INR - ( 16 Sep 2024 18:13 )   PT: 13.7 sec;   INR: 1.22 ratio         PTT - ( 16 Sep 2024 18:13 )  PTT:31.9 sec  Urinalysis Basic - ( 16 Sep 2024 19:20 )    Color: Yellow / Appearance: Clear / S.014 / pH: x  Gluc: x / Ketone: Negative mg/dL  / Bili: Negative / Urobili: 0.2 mg/dL   Blood: x / Protein: Negative mg/dL / Nitrite: Negative   Leuk Esterase: Negative / RBC: x / WBC x   Sq Epi: x / Non Sq Epi: x / Bacteria: x    < from: CT Abdomen and Pelvis w/ IV Cont (24 @ 22:45) >    ADDENDUM TO THE  IMPRESSION: Pancolonic diverticulosis WITH evidence of acute   diverticulitis involving the mid descending colon. No evidence of   perforation or pericolonic abscess.    < end of copied text >    < from: CT Abdomen and Pelvis w/ IV Cont (24 @ 22:45) >    FINDINGS:  LOWER CHEST: Lung bases are essentially clear. Heart is enlarged. Aortic   valvular calcifications. Calcifications of the mitral annulus.    LIVER: Few scattered tiny subcentimeter hypodense lesions that are too   small to characterize. Calcified granuloma in the right hepatic lobe.  BILE DUCTS: Normal caliber.  GALLBLADDER: Within normal limits.  SPLEEN: Splenic cyst measuring 2.9 cm seen on prior exam.  PANCREAS: Within normal limits.  ADRENALS: Mild thickening of the adrenal glands, unchanged.  KIDNEYS/URETERS: Kidneys enhance symmetrically without hydronephrosis.   Left renal cyst and subcentimeter low-attenuation lesions that are too   small to characterize.    BLADDER: Within normal limits.  REPRODUCTIVE ORGANS: Uterus and adnexa within normal limits.    BOWEL: No bowel obstruction. Appendix is not visualized, however, no   secondary CT findings to suggest appendicitis. Pancolonic diverticulosis   with short segment thickening of the mid descending colon about an   inflamed diverticulum. Surrounding inflammatory change.  PERITONEUM/RETROPERITONEUM: Small volume free fluid in the left paracolic   gutter. No pneumoperitoneum or loculated collection to suggest abscess.  VESSELS: Atherosclerotic calcifications of the aortoiliac tree. Ectatic   infrarenal abdominal aorta measuring up to 2.9 cm in AP diameter.  LYMPH NODES: No lymphadenopathy.  ABDOMINAL WALL: Postsurgical changes. Small fat-containing left inguinal   hernia. Calcified granulomain the left gluteal subcutaneous fat.  BONES: Degenerative changes. Grade 1 L3 on L4 anterolisthesis.    < end of copied text >

## 2024-09-17 NOTE — H&P ADULT - PROBLEM SELECTOR PLAN 1
- CT imaging sigf for acute diverticulitis  - s/p zosyn x1 in ED   - UA negative     - fu on GI/PCR, stool culture, urine culture,   - cw zosyn q12h ( renally dose)

## 2024-09-17 NOTE — H&P ADULT - PROBLEM SELECTOR PLAN 7
- on furosemide 20mg BID, metoprolol tartrate 25 BID   - euvolemic on exam   - TTE 2022: EF 61%, normal LV and RV systolic fcn and WMA     - hold furosemide and metoprolol iso of initial hypotension, can reintroduce - on furosemide 20mg BID, metoprolol tartrate 25 BID   - euvolemic on exam   - TTE 2022: EF 61%, normal LV and RV systolic fcn and WMA     - hold furosemide and iso of initial LORENZO, can reintroduce

## 2024-09-17 NOTE — H&P ADULT - ASSESSMENT
89yoF with Hx of pancreatic insufficiency on creon, HTN, depression,  CHF, afib on Eliquis, CVA x4 s/p TPA (last in 2022) presenting for decreased PO intake and abdominal pain with associated diarrhea found to have diverticulitis on CT imaging and LORENZO on labs, admitted for further management

## 2024-09-17 NOTE — CHART NOTE - NSCHARTNOTEFT_GEN_A_CORE
Patient seen, examined, and evaluated by me, case discussed, chart reviewed, H/P done earlier by hospitalist..  patient admitted with acute diverticulitises  patient doing ok, already on diet, on IVH for renal insufficiency  Continue Current medications otherwise and monitor.   will follow  Dr. RICO Mcdonnell ([phone])

## 2024-09-17 NOTE — H&P ADULT - PROBLEM SELECTOR PLAN 10
- diarrhea episodes 5-6 x a day for the past few days   - no recent abx use     - fu GI PCR, fu C diff

## 2024-09-17 NOTE — PATIENT PROFILE ADULT - NSPROMEDSADMININFO_GEN_A_NUR
[FreeTextEntry2] : pt here for eval lt shoulder \par W/C injury 5/11/22\par fell at work and landed on lt shoulder \par reports pain and loss of motion \par reports prior lt shoulder surgery about 24 y/o, reports he was doing well and power lifting until this injury  crush pills for administration

## 2024-09-17 NOTE — PHYSICAL THERAPY INITIAL EVALUATION ADULT - NSPTDISCHREC_GEN_A_CORE
Discharge to home with home PT services to address current functional limitations to optimize safety within the home environment with the least restrictive assistive device for MRADLs./Home PT

## 2024-09-17 NOTE — CONSULT NOTE ADULT - SUBJECTIVE AND OBJECTIVE BOX
CHIEF COMPLAINT: abd pain    HISTORY OF PRESENT ILLNESS:  89yoF with Hx of pancreatic insufficiency on creon, HTN, CHF, afib on Eliquis, CVA x4 s/p TPA (last in 2022) presenting for decreased PO intake and abdominal pain for the past few days. Pt's daughter providing collateral states that pt has had abdominal pain for the past 3-4 days  with associated diarrhea (6 episodes a day). Pt's daughter reports decreased PO intake and reports pt has had similar episodes in the past in which pt refuses to eat and takes creon which leads to diarrheal episodes. No fever at home or recent travel history or recent antibiotic use. Denies dysuria, hematuria, CP, SOB  Denies fevers, chills, n/v.     In the ED- VS sigf for  BP systolic 91/51, afebrile, HR 80s sating well on RA.  1L bolus and 1x zosyn. At bedside- pt reports last episode of diarrhea was 9/16PM and denies having any active abdominal pain currently.         Allergies    Benadryl (Other)    Intolerances    	    MEDICATIONS:  apixaban 2.5 milliGRAM(s) Oral every 12 hours  metoprolol tartrate 25 milliGRAM(s) Oral two times a day    piperacillin/tazobactam IVPB.. 3.375 Gram(s) IV Intermittent every 12 hours    fluticasone propionate/ salmeterol 100-50 MICROgram(s) Diskus 1 Dose(s) Inhalation two times a day  tiotropium 2.5 MICROgram(s) Inhaler 2 Puff(s) Inhalation daily    acetaminophen     Tablet .. 650 milliGRAM(s) Oral every 6 hours PRN  mirtazapine 22.5 milliGRAM(s) Oral daily  ondansetron Injectable 4 milliGRAM(s) IV Push every 8 hours PRN  sertraline 25 milliGRAM(s) Oral daily    aluminum hydroxide/magnesium hydroxide/simethicone Suspension 30 milliLiter(s) Oral every 4 hours PRN  pancrelipase (ZENPEP 25,000 Lipase Units) 1 Capsule(s) Oral three times a day with meals  pantoprazole  Injectable 40 milliGRAM(s) IV Push daily    atorvastatin 10 milliGRAM(s) Oral at bedtime    calcium carbonate 1250 mG  + Vitamin D (OsCal 500 + D) 1 Tablet(s) Oral daily  cyanocobalamin 1000 MICROGram(s) Oral daily  ferrous    sulfate 325 milliGRAM(s) Oral daily  folic acid 1 milliGRAM(s) Oral daily  lactated ringers. 1000 milliLiter(s) IV Continuous <Continuous>  latanoprost 0.005% Ophthalmic Solution 1 Drop(s) Both EYES at bedtime  multivitamin 1 Tablet(s) Oral daily  pyridoxine 100 milliGRAM(s) Oral daily  timolol 0.25% Solution 1 Drop(s) Right EYE daily      PAST MEDICAL & SURGICAL HISTORY:  Benign essential hypertension      Dyslipidemia      CVA (cerebral infarction)  x4 last was 4 years ago. s/p TPA      Alzheimer disease      Hypertension      Asthma, mild      Other nonspecific abnormal finding of lung field      Anxiety      COPD, mild      Arthritis      Pancreatic insufficiency      Chronic kidney disease, unspecified CKD stage      Benign essential tremor      Glaucoma      History of bilateral breast reduction surgery      History of appendectomy      History of cosmetic plastic surgery  face lift      History of abdominoplasty      H/O surgical biopsy  right lung; 05/2018      History of cataract extraction  bilateral, with lens implants      H/O varicose vein ligation and stripping  both legs      H/O umbilical hernia repair      Status post coronary angiogram  2017      History of D&C      History of vaginoplasty      History of lung biopsy          FAMILY HISTORY:  No pertinent family history in first degree relatives        SOCIAL HISTORY:    non smoker. indep in adl      REVIEW OF SYSTEMS:  See HPI, otherwise complete 10 point review of systems negative    [ ] All others negative	      PHYSICAL EXAM:  T(C): 36.4 (09-17-24 @ 04:25), Max: 36.6 (09-17-24 @ 00:20)  HR: 91 (09-17-24 @ 04:25) (77 - 91)  BP: 110/70 (09-17-24 @ 04:25) (91/61 - 116/84)  RR: 18 (09-17-24 @ 04:25) (16 - 18)  SpO2: 95% (09-17-24 @ 04:25) (95% - 100%)  Wt(kg): --  I&O's Summary      Appearance: No Acute Distress	  HEENT:  Normal oral mucosa, PERRL, EOMI	  Cardiovascular: Normal S1 S2, No JVD, No murmurs/rubs/gallops  Respiratory: Lungs clear to auscultation bilaterally  Gastrointestinal:  Soft, Non-tender, + BS	  Skin: No rashes, No ecchymoses, No cyanosis	  Neurologic: Non-focal  Extremities: No clubbing, cyanosis or edema  Vascular: Peripheral pulses palpable 2+ bilaterally  Psychiatry: A & O x 3, Mood & affect appropriate    Laboratory Data:	 	    CBC Full  -  ( 17 Sep 2024 06:15 )  WBC Count : 6.41 K/uL  Hemoglobin : 11.3 g/dL  Hematocrit : 33.8 %  Platelet Count - Automated : 113 K/uL  Mean Cell Volume : 96.8 fL  Mean Cell Hemoglobin : 32.4 pg  Mean Cell Hemoglobin Concentration : 33.4 gm/dL  Auto Neutrophil # : 3.97 K/uL  Auto Lymphocyte # : 1.79 K/uL  Auto Monocyte # : 0.50 K/uL  Auto Eosinophil # : 0.10 K/uL  Auto Basophil # : 0.02 K/uL  Auto Neutrophil % : 61.9 %  Auto Lymphocyte % : 27.9 %  Auto Monocyte % : 7.8 %  Auto Eosinophil % : 1.6 %  Auto Basophil % : 0.3 %    09-17    137  |  102  |  50<H>  ----------------------------<  89  4.3   |  22  |  1.26  09-16    135  |  101  |  58<H>  ----------------------------<  90  4.8   |  22  |  1.43<H>    Ca    8.8      17 Sep 2024 06:15  Ca    9.7      16 Sep 2024 18:13    TPro  7.4  /  Alb  3.7  /  TBili  0.6  /  DBili  x   /  AST  32  /  ALT  22  /  AlkPhos  110  09-16      proBNP:   Lipid Profile:   HgA1c:   TSH:       CARDIAC MARKERS:            Interpretation of Telemetry: 	    ECG:  	  RADIOLOGY:  OTHER: 	    PREVIOUS DIAGNOSTIC TESTING:    [ ] Echocardiogram:  [ ] Catheterization:  [ ] Stress Test:  	    Assessment:  89yoF with Hx of pancreatic insufficiency on creon, HTN, CHF, afib on Eliquis, CVA x4 s/p TPA (last in 2022) presenting for decreased PO intake and abdominal pain for the past few days.    Recs:  cardiac stable  no e/o acs or chf  cw AC and rate control meds  vol status stable. resume diuretics as needed  creatinine at baseline (compared to recent outpatient labs)  abx per primary team  GI follow up  will follow          Greater than 60 minutes spent on total encounter; more than 50% of the visit was spent counseling and/or coordinating care by the attending physician.   	  Redd Delacruz MD   Cardiovascular Diseases  [phone]

## 2024-09-18 LAB
ANION GAP SERPL CALC-SCNC: 15 MMOL/L — HIGH (ref 7–14)
APTT BLD: 28.2 SEC — SIGNIFICANT CHANGE UP (ref 24.5–35.6)
BUN SERPL-MCNC: 38 MG/DL — HIGH (ref 7–23)
CALCIUM SERPL-MCNC: 9.1 MG/DL — SIGNIFICANT CHANGE UP (ref 8.4–10.5)
CHLORIDE SERPL-SCNC: 100 MMOL/L — SIGNIFICANT CHANGE UP (ref 98–107)
CO2 SERPL-SCNC: 23 MMOL/L — SIGNIFICANT CHANGE UP (ref 22–31)
CREAT SERPL-MCNC: 1.33 MG/DL — HIGH (ref 0.5–1.3)
CULTURE RESULTS: SIGNIFICANT CHANGE UP
EGFR: 37 ML/MIN/1.73M2 — LOW
GLUCOSE SERPL-MCNC: 101 MG/DL — HIGH (ref 70–99)
HCT VFR BLD CALC: 35 % — SIGNIFICANT CHANGE UP (ref 34.5–45)
HGB BLD-MCNC: 11.6 G/DL — SIGNIFICANT CHANGE UP (ref 11.5–15.5)
INR BLD: 1.35 RATIO — HIGH (ref 0.85–1.18)
MAGNESIUM SERPL-MCNC: 1.5 MG/DL — LOW (ref 1.6–2.6)
MCHC RBC-ENTMCNC: 32.4 PG — SIGNIFICANT CHANGE UP (ref 27–34)
MCHC RBC-ENTMCNC: 33.1 GM/DL — SIGNIFICANT CHANGE UP (ref 32–36)
MCV RBC AUTO: 97.8 FL — SIGNIFICANT CHANGE UP (ref 80–100)
NRBC # BLD: 0 /100 WBCS — SIGNIFICANT CHANGE UP (ref 0–0)
NRBC # FLD: 0 K/UL — SIGNIFICANT CHANGE UP (ref 0–0)
PHOSPHATE SERPL-MCNC: 3 MG/DL — SIGNIFICANT CHANGE UP (ref 2.5–4.5)
PLATELET # BLD AUTO: 127 K/UL — LOW (ref 150–400)
POTASSIUM SERPL-MCNC: 4.8 MMOL/L — SIGNIFICANT CHANGE UP (ref 3.5–5.3)
POTASSIUM SERPL-SCNC: 4.8 MMOL/L — SIGNIFICANT CHANGE UP (ref 3.5–5.3)
PROTHROM AB SERPL-ACNC: 15.1 SEC — HIGH (ref 9.5–13)
RBC # BLD: 3.58 M/UL — LOW (ref 3.8–5.2)
RBC # FLD: 12.2 % — SIGNIFICANT CHANGE UP (ref 10.3–14.5)
SODIUM SERPL-SCNC: 138 MMOL/L — SIGNIFICANT CHANGE UP (ref 135–145)
SPECIMEN SOURCE: SIGNIFICANT CHANGE UP
WBC # BLD: 6.2 K/UL — SIGNIFICANT CHANGE UP (ref 3.8–10.5)
WBC # FLD AUTO: 6.2 K/UL — SIGNIFICANT CHANGE UP (ref 3.8–10.5)

## 2024-09-18 RX ADMIN — METOPROLOL TARTRATE 25 MILLIGRAM(S): 100 TABLET ORAL at 06:42

## 2024-09-18 RX ADMIN — Medication 40 MILLIGRAM(S): at 12:40

## 2024-09-18 RX ADMIN — PIPERACILLIN SODIUM AND TAZOBACTAM SODIUM 25 GRAM(S): 3; .375 INJECTION, POWDER, FOR SOLUTION INTRAVENOUS at 22:05

## 2024-09-18 RX ADMIN — PANCRELIPASE 1 CAPSULE(S): 16000; 60500; 57500 CAPSULE, DELAYED RELEASE ORAL at 12:40

## 2024-09-18 RX ADMIN — Medication 10 MILLIGRAM(S): at 22:06

## 2024-09-18 RX ADMIN — Medication 22.5 MILLIGRAM(S): at 12:41

## 2024-09-18 RX ADMIN — SERTRALINE HYDROCHLORIDE 25 MILLIGRAM(S): 50 TABLET, FILM COATED ORAL at 12:42

## 2024-09-18 RX ADMIN — APIXABAN 2.5 MILLIGRAM(S): 5 TABLET, FILM COATED ORAL at 17:56

## 2024-09-18 RX ADMIN — TIOTROPIUM BROMIDE INHALATION SPRAY 2 PUFF(S): 3.12 SPRAY, METERED RESPIRATORY (INHALATION) at 12:40

## 2024-09-18 RX ADMIN — PANCRELIPASE 1 CAPSULE(S): 16000; 60500; 57500 CAPSULE, DELAYED RELEASE ORAL at 17:55

## 2024-09-18 RX ADMIN — APIXABAN 2.5 MILLIGRAM(S): 5 TABLET, FILM COATED ORAL at 06:42

## 2024-09-18 RX ADMIN — TIMOLOL MALEATE 1 DROP(S): 5 SOLUTION/ DROPS OPHTHALMIC at 15:10

## 2024-09-18 RX ADMIN — Medication 1 TABLET(S): at 17:55

## 2024-09-18 RX ADMIN — Medication 325 MILLIGRAM(S): at 12:42

## 2024-09-18 RX ADMIN — Medication 1 TABLET(S): at 08:58

## 2024-09-18 RX ADMIN — PIPERACILLIN SODIUM AND TAZOBACTAM SODIUM 25 GRAM(S): 3; .375 INJECTION, POWDER, FOR SOLUTION INTRAVENOUS at 08:58

## 2024-09-18 RX ADMIN — PANCRELIPASE 1 CAPSULE(S): 16000; 60500; 57500 CAPSULE, DELAYED RELEASE ORAL at 08:59

## 2024-09-18 RX ADMIN — Medication 1 TABLET(S): at 12:42

## 2024-09-18 RX ADMIN — Medication 1 TABLET(S): at 12:41

## 2024-09-18 RX ADMIN — FLUTICASONE PROPIONATE AND SALMETEROL 1 DOSE(S): 250; 50 POWDER RESPIRATORY (INHALATION) at 12:40

## 2024-09-18 RX ADMIN — Medication 100 MILLIGRAM(S): at 12:42

## 2024-09-18 RX ADMIN — Medication 25 GRAM(S): at 15:09

## 2024-09-18 RX ADMIN — METOPROLOL TARTRATE 25 MILLIGRAM(S): 100 TABLET ORAL at 17:56

## 2024-09-18 RX ADMIN — Medication 1000 MICROGRAM(S): at 12:41

## 2024-09-18 RX ADMIN — Medication 1 MILLIGRAM(S): at 12:41

## 2024-09-18 NOTE — DIETITIAN INITIAL EVALUATION ADULT - PROBLEM SELECTOR PLAN 7
- on furosemide 20mg BID, metoprolol tartrate 25 BID   - euvolemic on exam   - TTE 2022: EF 61%, normal LV and RV systolic fcn and WMA     - hold furosemide and iso of initial LORENZO, can reintroduce

## 2024-09-18 NOTE — DIETITIAN INITIAL EVALUATION ADULT - PROBLEM SELECTOR PLAN 8
Problem: Safety  Goal: Will remain free from injury  Outcome: PROGRESSING AS EXPECTED  Non-skid socks in use. Call light in reach. Pt instructed to call for help. Hourly rounding complete. Bed locked and in low position. Pt has expressive aphasia but responds appropriately with yes/no. Pt demonstrates ability to call for help.       Problem: Knowledge Deficit  Goal: Knowledge of disease process/condition, treatment plan, diagnostic tests, and medications will improve  Outcome: PROGRESSING AS EXPECTED  Pt knowledge level assessed. Pt educated on disease process/condition, POC, diagnostic tests, and medications. Pt has expressive aphasia but responds appropriately with yes/no.          - home  metoprolol 25mg BID    - hold iso of initial hypotension

## 2024-09-18 NOTE — DIETITIAN INITIAL EVALUATION ADULT - PERTINENT MEDS FT
MEDICATIONS  (STANDING):  apixaban 2.5 milliGRAM(s) Oral every 12 hours  atorvastatin 10 milliGRAM(s) Oral at bedtime  calcium carbonate 1250 mG  + Vitamin D (OsCal 500 + D) 1 Tablet(s) Oral daily  cyanocobalamin 1000 MICROGram(s) Oral daily  ferrous    sulfate 325 milliGRAM(s) Oral daily  fluticasone propionate/ salmeterol 100-50 MICROgram(s) Diskus 1 Dose(s) Inhalation two times a day  folic acid 1 milliGRAM(s) Oral daily  lactobacillus acidophilus 1 Tablet(s) Oral two times a day with meals  latanoprost 0.005% Ophthalmic Solution 1 Drop(s) Both EYES at bedtime  magnesium sulfate  IVPB 2 Gram(s) IV Intermittent once  metoprolol tartrate 25 milliGRAM(s) Oral two times a day  mirtazapine 22.5 milliGRAM(s) Oral daily  multivitamin 1 Tablet(s) Oral daily  pancrelipase (ZENPEP 25,000 Lipase Units) 1 Capsule(s) Oral three times a day with meals  pantoprazole  Injectable 40 milliGRAM(s) IV Push daily  piperacillin/tazobactam IVPB.. 3.375 Gram(s) IV Intermittent every 12 hours  pyridoxine 100 milliGRAM(s) Oral daily  sertraline 25 milliGRAM(s) Oral daily  timolol 0.25% Solution 1 Drop(s) Right EYE daily  tiotropium 2.5 MICROgram(s) Inhaler 2 Puff(s) Inhalation daily    MEDICATIONS  (PRN):  acetaminophen     Tablet .. 650 milliGRAM(s) Oral every 6 hours PRN Temp greater or equal to 38C (100.4F), Mild Pain (1 - 3)  aluminum hydroxide/magnesium hydroxide/simethicone Suspension 30 milliLiter(s) Oral every 4 hours PRN Dyspepsia  ondansetron Injectable 4 milliGRAM(s) IV Push every 8 hours PRN Nausea and/or Vomiting

## 2024-09-18 NOTE — DIETITIAN INITIAL EVALUATION ADULT - OTHER INFO
Nutrition assessment for consult.    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      RD obtained  as patient Sami speaking (no family present at time of visit).   became frustrated and patient appeared to have difficulty understanding  and not appropriately answering questions.  RD reviewed chart / spoke to RN for collateral. Patient so far tolerating diet, noted with fair PO intake last evening (dinner meal) as recorded in RN flowsheets.  Unable to obtain diet and weight hx from patient due to mental status / poor historian, likely in setting of Alzheimer's dementia.  Weight per HIE - 62.6kg in Jan 2024.  Current weight - 60.1kg (9/18). Non-significant weight loss of 2.5kg / 4% in ~9 months.  Patient noted with psych history / going on hunger strikes in the past (see H&P 9/17/24).  Will continue to monitor adequacy of PO intake and make recommendation for provision of oral nutritional supplement.  Patient not a candidate for diet education at this time.      Of note, patient takes creon for pancreatic insufficiency.

## 2024-09-18 NOTE — DIETITIAN INITIAL EVALUATION ADULT - PROBLEM SELECTOR PROBLEM 4
[FreeTextEntry1] : WARREN JOHNSON is an 88 year old male with PMH of HTN, HLD, DM 2, CHF, Afib on Eliquis, admitted to [facility] ED 6/30/24 p/w COVID, s/p 2 recent falls without headstrike. CTH showed bilateral subacute/chronic subdural hematoma, minimal mass effect. Eliquis continued.  Today, patient and wife present for follow up phone call to review results of repeat CT head obtained on 8/14/24. Half dose Eliquis restarted at last visit on 7/31/24. No new neurologic complaints since our last visit. Atrial fibrillation

## 2024-09-18 NOTE — DIETITIAN INITIAL EVALUATION ADULT - ETIOLOGY
in setting of acute diverticulitis - decreased PO intake, abdominal pain, diarrhea for a few days PTA

## 2024-09-18 NOTE — DIETITIAN INITIAL EVALUATION ADULT - PERTINENT LABORATORY DATA
09-18    138  |  100  |  38[H]  ----------------------------<  101[H]  4.8   |  23  |  1.33[H]    Ca    9.1      18 Sep 2024 03:40  Phos  3.0     09-18  Mg     1.50     09-18    TPro  7.4  /  Alb  3.7  /  TBili  0.6  /  DBili  x   /  AST  32  /  ALT  22  /  AlkPhos  110  09-16    A1C with Estimated Average Glucose Result: 5.3 % (09-17-24 @ 06:15)

## 2024-09-18 NOTE — DIETITIAN INITIAL EVALUATION ADULT - ADD RECOMMEND
1) Monitor weights, PO intake/diet tolerance, skin integrity, pertinent labs.   2) Please consistently document % PO intake in nursing flowsheets to assess adequacy of nutritional intake/monitor need for further nutritional intervention(s).   3) Consider bedside swallow assessment to determine most appropriate diet / liquid consistencies.

## 2024-09-19 LAB
ANION GAP SERPL CALC-SCNC: 12 MMOL/L — SIGNIFICANT CHANGE UP (ref 7–14)
APTT BLD: 31.2 SEC — SIGNIFICANT CHANGE UP (ref 24.5–35.6)
BUN SERPL-MCNC: 35 MG/DL — HIGH (ref 7–23)
C DIFF GDH STL QL: NEGATIVE — SIGNIFICANT CHANGE UP
C DIFF GDH STL QL: SIGNIFICANT CHANGE UP
CALCIUM SERPL-MCNC: 8.8 MG/DL — SIGNIFICANT CHANGE UP (ref 8.4–10.5)
CHLORIDE SERPL-SCNC: 102 MMOL/L — SIGNIFICANT CHANGE UP (ref 98–107)
CO2 SERPL-SCNC: 23 MMOL/L — SIGNIFICANT CHANGE UP (ref 22–31)
CREAT SERPL-MCNC: 1.42 MG/DL — HIGH (ref 0.5–1.3)
EGFR: 34 ML/MIN/1.73M2 — LOW
GI PCR PANEL: SIGNIFICANT CHANGE UP
GLUCOSE SERPL-MCNC: 91 MG/DL — SIGNIFICANT CHANGE UP (ref 70–99)
HCT VFR BLD CALC: 34.4 % — LOW (ref 34.5–45)
HGB BLD-MCNC: 11.3 G/DL — LOW (ref 11.5–15.5)
INR BLD: 1.34 RATIO — HIGH (ref 0.85–1.18)
MAGNESIUM SERPL-MCNC: 1.9 MG/DL — SIGNIFICANT CHANGE UP (ref 1.6–2.6)
MCHC RBC-ENTMCNC: 32 PG — SIGNIFICANT CHANGE UP (ref 27–34)
MCHC RBC-ENTMCNC: 32.8 GM/DL — SIGNIFICANT CHANGE UP (ref 32–36)
MCV RBC AUTO: 97.5 FL — SIGNIFICANT CHANGE UP (ref 80–100)
MRSA PCR RESULT.: SIGNIFICANT CHANGE UP
NRBC # BLD: 0 /100 WBCS — SIGNIFICANT CHANGE UP (ref 0–0)
NRBC # FLD: 0 K/UL — SIGNIFICANT CHANGE UP (ref 0–0)
PHOSPHATE SERPL-MCNC: 2.6 MG/DL — SIGNIFICANT CHANGE UP (ref 2.5–4.5)
PLATELET # BLD AUTO: 122 K/UL — LOW (ref 150–400)
POTASSIUM SERPL-MCNC: 4.9 MMOL/L — SIGNIFICANT CHANGE UP (ref 3.5–5.3)
POTASSIUM SERPL-SCNC: 4.9 MMOL/L — SIGNIFICANT CHANGE UP (ref 3.5–5.3)
PROTHROM AB SERPL-ACNC: 14.9 SEC — HIGH (ref 9.5–13)
RBC # BLD: 3.53 M/UL — LOW (ref 3.8–5.2)
RBC # FLD: 12.1 % — SIGNIFICANT CHANGE UP (ref 10.3–14.5)
S AUREUS DNA NOSE QL NAA+PROBE: SIGNIFICANT CHANGE UP
SODIUM SERPL-SCNC: 137 MMOL/L — SIGNIFICANT CHANGE UP (ref 135–145)
WBC # BLD: 6.19 K/UL — SIGNIFICANT CHANGE UP (ref 3.8–10.5)
WBC # FLD AUTO: 6.19 K/UL — SIGNIFICANT CHANGE UP (ref 3.8–10.5)

## 2024-09-19 RX ORDER — SODIUM CHLORIDE 9 MG/ML
1000 INJECTION INTRAMUSCULAR; INTRAVENOUS; SUBCUTANEOUS
Refills: 0 | Status: DISCONTINUED | OUTPATIENT
Start: 2024-09-19 | End: 2024-09-20

## 2024-09-19 RX ORDER — CHLORHEXIDINE GLUCONATE 40 MG/ML
1 SOLUTION TOPICAL DAILY
Refills: 0 | Status: DISCONTINUED | OUTPATIENT
Start: 2024-09-19 | End: 2024-09-20

## 2024-09-19 RX ADMIN — PIPERACILLIN SODIUM AND TAZOBACTAM SODIUM 25 GRAM(S): 3; .375 INJECTION, POWDER, FOR SOLUTION INTRAVENOUS at 21:03

## 2024-09-19 RX ADMIN — PANCRELIPASE 1 CAPSULE(S): 16000; 60500; 57500 CAPSULE, DELAYED RELEASE ORAL at 09:39

## 2024-09-19 RX ADMIN — TIMOLOL MALEATE 1 DROP(S): 5 SOLUTION/ DROPS OPHTHALMIC at 12:35

## 2024-09-19 RX ADMIN — METOPROLOL TARTRATE 25 MILLIGRAM(S): 100 TABLET ORAL at 07:05

## 2024-09-19 RX ADMIN — Medication 22.5 MILLIGRAM(S): at 12:33

## 2024-09-19 RX ADMIN — Medication 1 MILLIGRAM(S): at 12:34

## 2024-09-19 RX ADMIN — Medication 10 MILLIGRAM(S): at 21:01

## 2024-09-19 RX ADMIN — Medication 1 TABLET(S): at 09:37

## 2024-09-19 RX ADMIN — METOPROLOL TARTRATE 25 MILLIGRAM(S): 100 TABLET ORAL at 18:17

## 2024-09-19 RX ADMIN — APIXABAN 2.5 MILLIGRAM(S): 5 TABLET, FILM COATED ORAL at 18:17

## 2024-09-19 RX ADMIN — FLUTICASONE PROPIONATE AND SALMETEROL 1 DOSE(S): 250; 50 POWDER RESPIRATORY (INHALATION) at 21:04

## 2024-09-19 RX ADMIN — TIOTROPIUM BROMIDE INHALATION SPRAY 2 PUFF(S): 3.12 SPRAY, METERED RESPIRATORY (INHALATION) at 09:37

## 2024-09-19 RX ADMIN — Medication 325 MILLIGRAM(S): at 12:34

## 2024-09-19 RX ADMIN — APIXABAN 2.5 MILLIGRAM(S): 5 TABLET, FILM COATED ORAL at 07:05

## 2024-09-19 RX ADMIN — Medication 1 TABLET(S): at 12:34

## 2024-09-19 RX ADMIN — PANCRELIPASE 1 CAPSULE(S): 16000; 60500; 57500 CAPSULE, DELAYED RELEASE ORAL at 12:33

## 2024-09-19 RX ADMIN — SERTRALINE HYDROCHLORIDE 25 MILLIGRAM(S): 50 TABLET, FILM COATED ORAL at 12:35

## 2024-09-19 RX ADMIN — FLUTICASONE PROPIONATE AND SALMETEROL 1 DOSE(S): 250; 50 POWDER RESPIRATORY (INHALATION) at 09:37

## 2024-09-19 RX ADMIN — Medication 3 MILLIGRAM(S): at 21:01

## 2024-09-19 RX ADMIN — Medication 1 TABLET(S): at 18:17

## 2024-09-19 RX ADMIN — ACETAMINOPHEN 650 MILLIGRAM(S): 325 TABLET ORAL at 16:29

## 2024-09-19 RX ADMIN — Medication 1000 MICROGRAM(S): at 12:34

## 2024-09-19 RX ADMIN — Medication 40 MILLIGRAM(S): at 12:33

## 2024-09-19 RX ADMIN — CHLORHEXIDINE GLUCONATE 1 APPLICATION(S): 40 SOLUTION TOPICAL at 16:01

## 2024-09-19 RX ADMIN — PIPERACILLIN SODIUM AND TAZOBACTAM SODIUM 25 GRAM(S): 3; .375 INJECTION, POWDER, FOR SOLUTION INTRAVENOUS at 09:37

## 2024-09-19 RX ADMIN — Medication 3 MILLIGRAM(S): at 02:34

## 2024-09-19 RX ADMIN — ACETAMINOPHEN 650 MILLIGRAM(S): 325 TABLET ORAL at 15:59

## 2024-09-19 RX ADMIN — Medication 1 TABLET(S): at 12:33

## 2024-09-19 RX ADMIN — Medication 100 MILLIGRAM(S): at 12:34

## 2024-09-19 RX ADMIN — PANCRELIPASE 1 CAPSULE(S): 16000; 60500; 57500 CAPSULE, DELAYED RELEASE ORAL at 18:16

## 2024-09-19 NOTE — PROGRESS NOTE ADULT - CONVERSATION DETAILS
*** Advance directive /  goals of care discussion      I had a long discussion with patient's daughterJanice about patient's overall diagnosis, expected prognosis, and potential complications.       Discussed treatment options, comfort care / hospice as appropriate, and all other potential options of care.         Discussed risks, benefits, and alternatives of treatment as well.          Opportunity given for and all questions answered.            Reviewed available advance directives as available > none available      At this time patient to be > full code, with continuation of current medical therapy.     daughter leaning towards DNR but wants to discuss further with her sister before making final decisions       patient likely with some dementia and unable to make decisions and daughter unsure of her wishes..      Goal is for pt to return > home and follow up as outpatient with current doctors      will continue to discuss GOC with pt and family and update plan as needed.     Patient's family have my contact information and will contact me with questions.     Additional time spent on Goals of care: 22 min.

## 2024-09-19 NOTE — CHART NOTE - NSCHARTNOTEFT_GEN_A_CORE
Chart reviewed for consult triggered on 9/18 for "MST Score 2 or >".  Patient assessed yesterday (9/18) for Consult (assessment), therefore, additional assessment not indicated at this time.

## 2024-09-19 NOTE — CHART NOTE - NSCHARTNOTEFT_GEN_A_CORE
Consult received for "MST Score 2 or >". Please refer to Dietitian Initial Assessment (09/18/2024) for complete recommendations. RDN remains available for assessment per protocol or as needed.     Saige Carbajal RDN, CDN #[id number]  Also available on [employer] Teams

## 2024-09-20 ENCOUNTER — TRANSCRIPTION ENCOUNTER (OUTPATIENT)
Age: 89
End: 2024-09-20

## 2024-09-20 VITALS
OXYGEN SATURATION: 97 % | DIASTOLIC BLOOD PRESSURE: 57 MMHG | TEMPERATURE: 97 F | SYSTOLIC BLOOD PRESSURE: 129 MMHG | HEART RATE: 72 BPM | RESPIRATION RATE: 18 BRPM

## 2024-09-20 LAB
ANION GAP SERPL CALC-SCNC: 15 MMOL/L — HIGH (ref 7–14)
APTT BLD: 31.1 SEC — SIGNIFICANT CHANGE UP (ref 24.5–35.6)
BUN SERPL-MCNC: 33 MG/DL — HIGH (ref 7–23)
CALCIUM SERPL-MCNC: 8.8 MG/DL — SIGNIFICANT CHANGE UP (ref 8.4–10.5)
CHLORIDE SERPL-SCNC: 105 MMOL/L — SIGNIFICANT CHANGE UP (ref 98–107)
CO2 SERPL-SCNC: 20 MMOL/L — LOW (ref 22–31)
CREAT SERPL-MCNC: 1.41 MG/DL — HIGH (ref 0.5–1.3)
EGFR: 34 ML/MIN/1.73M2 — LOW
GLUCOSE SERPL-MCNC: 87 MG/DL — SIGNIFICANT CHANGE UP (ref 70–99)
HCT VFR BLD CALC: 33.6 % — LOW (ref 34.5–45)
HGB BLD-MCNC: 11.2 G/DL — LOW (ref 11.5–15.5)
INR BLD: 1.32 RATIO — HIGH (ref 0.85–1.18)
MAGNESIUM SERPL-MCNC: 1.8 MG/DL — SIGNIFICANT CHANGE UP (ref 1.6–2.6)
MCHC RBC-ENTMCNC: 32.2 PG — SIGNIFICANT CHANGE UP (ref 27–34)
MCHC RBC-ENTMCNC: 33.3 GM/DL — SIGNIFICANT CHANGE UP (ref 32–36)
MCV RBC AUTO: 96.6 FL — SIGNIFICANT CHANGE UP (ref 80–100)
NRBC # BLD: 0 /100 WBCS — SIGNIFICANT CHANGE UP (ref 0–0)
NRBC # FLD: 0 K/UL — SIGNIFICANT CHANGE UP (ref 0–0)
PHOSPHATE SERPL-MCNC: 3.1 MG/DL — SIGNIFICANT CHANGE UP (ref 2.5–4.5)
PLATELET # BLD AUTO: 117 K/UL — LOW (ref 150–400)
POTASSIUM SERPL-MCNC: 4.1 MMOL/L — SIGNIFICANT CHANGE UP (ref 3.5–5.3)
POTASSIUM SERPL-SCNC: 4.1 MMOL/L — SIGNIFICANT CHANGE UP (ref 3.5–5.3)
PROTHROM AB SERPL-ACNC: 14.8 SEC — HIGH (ref 9.5–13)
RBC # BLD: 3.48 M/UL — LOW (ref 3.8–5.2)
RBC # FLD: 12 % — SIGNIFICANT CHANGE UP (ref 10.3–14.5)
SODIUM SERPL-SCNC: 140 MMOL/L — SIGNIFICANT CHANGE UP (ref 135–145)
WBC # BLD: 5.05 K/UL — SIGNIFICANT CHANGE UP (ref 3.8–10.5)
WBC # FLD AUTO: 5.05 K/UL — SIGNIFICANT CHANGE UP (ref 3.8–10.5)

## 2024-09-20 RX ORDER — CYANOCOBALAMIN (VITAMIN B-12) 500MCG/0.1
1 GEL (ML) NASAL
Refills: 0 | DISCHARGE

## 2024-09-20 RX ORDER — L.ACID,PARA/B.BIFIDUM/S.THERM 8B CELL
1 CAPSULE ORAL
Qty: 0 | Refills: 0 | DISCHARGE
Start: 2024-09-20

## 2024-09-20 RX ORDER — AMOXICILLIN AND CLAVULANATE POTASSIUM 250; 125 MG/1; MG/1
875 TABLET, FILM COATED ORAL
Qty: 6 | Refills: 0
Start: 2024-09-20 | End: 2024-09-22

## 2024-09-20 RX ORDER — FUROSEMIDE 40 MG
1 TABLET ORAL
Refills: 0 | DISCHARGE

## 2024-09-20 RX ADMIN — Medication 1 TABLET(S): at 09:26

## 2024-09-20 RX ADMIN — TIMOLOL MALEATE 1 DROP(S): 5 SOLUTION/ DROPS OPHTHALMIC at 12:07

## 2024-09-20 RX ADMIN — CHLORHEXIDINE GLUCONATE 1 APPLICATION(S): 40 SOLUTION TOPICAL at 12:06

## 2024-09-20 RX ADMIN — TIOTROPIUM BROMIDE INHALATION SPRAY 2 PUFF(S): 3.12 SPRAY, METERED RESPIRATORY (INHALATION) at 09:28

## 2024-09-20 RX ADMIN — PIPERACILLIN SODIUM AND TAZOBACTAM SODIUM 25 GRAM(S): 3; .375 INJECTION, POWDER, FOR SOLUTION INTRAVENOUS at 09:26

## 2024-09-20 RX ADMIN — Medication 22.5 MILLIGRAM(S): at 12:05

## 2024-09-20 RX ADMIN — Medication 40 MILLIGRAM(S): at 12:03

## 2024-09-20 RX ADMIN — Medication 325 MILLIGRAM(S): at 12:03

## 2024-09-20 RX ADMIN — PANCRELIPASE 1 CAPSULE(S): 16000; 60500; 57500 CAPSULE, DELAYED RELEASE ORAL at 09:26

## 2024-09-20 RX ADMIN — Medication 1000 MICROGRAM(S): at 12:04

## 2024-09-20 RX ADMIN — Medication 100 MILLIGRAM(S): at 12:04

## 2024-09-20 RX ADMIN — Medication 1 TABLET(S): at 12:03

## 2024-09-20 RX ADMIN — APIXABAN 2.5 MILLIGRAM(S): 5 TABLET, FILM COATED ORAL at 05:26

## 2024-09-20 RX ADMIN — PANCRELIPASE 1 CAPSULE(S): 16000; 60500; 57500 CAPSULE, DELAYED RELEASE ORAL at 12:05

## 2024-09-20 RX ADMIN — SERTRALINE HYDROCHLORIDE 25 MILLIGRAM(S): 50 TABLET, FILM COATED ORAL at 12:03

## 2024-09-20 RX ADMIN — METOPROLOL TARTRATE 25 MILLIGRAM(S): 100 TABLET ORAL at 05:26

## 2024-09-20 RX ADMIN — Medication 1 MILLIGRAM(S): at 12:04

## 2024-09-20 RX ADMIN — FLUTICASONE PROPIONATE AND SALMETEROL 1 DOSE(S): 250; 50 POWDER RESPIRATORY (INHALATION) at 09:27

## 2024-09-20 RX ADMIN — Medication 1 TABLET(S): at 12:04

## 2024-09-20 NOTE — DISCHARGE NOTE PROVIDER - NSDCMRMEDTOKEN_GEN_ALL_CORE_FT
apixaban 2.5 mg oral tablet: 1 tab(s) orally every 12 hours  atorvastatin 10 mg oral tablet: 1 tab(s) orally once a day  Breo Ellipta 100 mcg-25 mcg/inh inhalation powder: 1 puff(s) inhaled 2 times a day  calcium-vitamin D 500 mg-5 mcg (200 intl units) oral tablet: 1 tab(s) orally once a day  cyanocobalamin 1000 mcg oral tablet: 1 tab(s) orally once a day  cyanocobalamin 1000 mcg oral tablet: 1 tab(s) orally once a day  ferrous sulfate: 325 milligram(s) orally once a day  folic acid: 1 tab(s) orally once a day  furosemide 20 mg oral tablet: 1 tab(s) orally 2 times a day  Lumigan 0.01% ophthalmic solution: 1 drop(s) to each affected eye 2 times a week  metoprolol tartrate 25 mg oral tablet: 1 tab(s) orally 2 times a day  mirtazapine 15 mg oral tablet: 1.5 tab(s) orally once a day  Multiple Vitamins oral tablet: 1 tab(s) orally once a day  omeprazole 40 mg oral delayed release capsule: 1 cap(s) orally once a day  pancrelipase 25,000 units-79,000 units-105,000 units oral delayed release capsule: 1 tab(s) orally 3 times a day  pyridoxine 100 mg oral tablet: 1 tab(s) orally once a day  Restasis 0.05% ophthalmic emulsion: 1 drop(s) in each affected eye  sertraline 25 mg oral tablet: 1 tab(s) orally once a day  timolol maleate 0.25% ophthalmic solution: 1 drop(s) to each affected eye once a day   amoxicillin-clavulanate 875 mg-125 mg oral tablet: 875 milligram(s) orally 2 times a day  apixaban 2.5 mg oral tablet: 1 tab(s) orally every 12 hours  atorvastatin 10 mg oral tablet: 1 tab(s) orally once a day  Breo Ellipta 100 mcg-25 mcg/inh inhalation powder: 1 puff(s) inhaled 2 times a day  calcium-vitamin D 500 mg-5 mcg (200 intl units) oral tablet: 1 tab(s) orally once a day  cyanocobalamin 1000 mcg oral tablet: 1 tab(s) orally once a day  ferrous sulfate: 325 milligram(s) orally once a day  folic acid: 1 tab(s) orally once a day  lactobacillus acidophilus oral tablet: 1 tab(s) orally once a day This is a probiotic. It can be purchased over the counter.  Lumigan 0.01% ophthalmic solution: 1 drop(s) to each affected eye 2 times a week  metoprolol tartrate 25 mg oral tablet: 1 tab(s) orally 2 times a day  mirtazapine 15 mg oral tablet: 1.5 tab(s) orally once a day  Multiple Vitamins oral tablet: 1 tab(s) orally once a day  omeprazole 40 mg oral delayed release capsule: 1 cap(s) orally once a day  pancrelipase 25,000 units-79,000 units-105,000 units oral delayed release capsule: 1 tab(s) orally 3 times a day  pyridoxine 100 mg oral tablet: 1 tab(s) orally once a day  Restasis 0.05% ophthalmic emulsion: 1 drop(s) in each affected eye  sertraline 25 mg oral tablet: 1 tab(s) orally once a day  timolol maleate 0.25% ophthalmic solution: 1 drop(s) to each affected eye once a day

## 2024-09-20 NOTE — DISCHARGE NOTE PROVIDER - NSDCCAREPROVSEEN_GEN_ALL_CORE_FT
Christine Mcdonnell Beverly Santillan  Hoorbjusto Delshaddru  Hoorbod Delshadfar  Luiz Kirk  Ordering Physician  User ADM  Redd Cuenca  Team [facility]      [ Greater than 35 min spent for discharge services.   RICO Mcdonnell ]       ( Note written / Date of service is the same as last day of patient stay  in the hospital ( for billing purposes)))

## 2024-09-20 NOTE — DISCHARGE NOTE PROVIDER - NSDCCPCAREPLAN_GEN_ALL_CORE_FT
PRINCIPAL DISCHARGE DIAGNOSIS  Diagnosis: Diverticulitis  Assessment and Plan of Treatment: You presented to the hospital with abdominal pain. You were found to have diverticulitis on your CT scan of the abdomen. This is an inflammation of the interstines. You were treated with IV antibiotics and transitioned to oral antibiotics to continue on discharge. Please follow a low fiber diet.  Please follow up with your primary care doctor in 1 week.     PRINCIPAL DISCHARGE DIAGNOSIS  Diagnosis: Diverticulitis  Assessment and Plan of Treatment: You presented to the hospital with abdominal pain. You were found to have diverticulitis on your CT scan of the abdomen. This is an inflammation of the interstines. You were treated with IV antibiotics and transitioned to oral antibiotics to continue on discharge. Please follow a low fiber diet.  Please follow up with your primary care doctor in 1 week. Please try to follow a low fiber/low residual ( avoid seedes items) diet.

## 2024-09-20 NOTE — DISCHARGE NOTE NURSING/CASE MANAGEMENT/SOCIAL WORK - PATIENT PORTAL LINK FT
You can access the FollowMyHealth Patient Portal offered by [facility] by registering at the following website: [email or web address]. By joining [employer]’s FollowMyHealth portal, you will also be able to view your health information using other applications (apps) compatible with our system.

## 2024-09-20 NOTE — DISCHARGE NOTE NURSING/CASE MANAGEMENT/SOCIAL WORK - NSDCVIVACCINE_GEN_ALL_CORE_FT
influenza, injectable, quadrivalent, preservative free; 13-Mar-2015 18:44; Umm Mcdaniel (RN); [employer]; [id number]; IntraMuscular; Deltoid Left.; 0.5 milliLiter(s); VIS (VIS Published: 19-Aug-2014, VIS Presented: 13-Mar-2015);   influenza, injectable, quadrivalent, preservative free; 08-Sep-2017 13:53; Hans Grey); [employer]; [id number]; IntraMuscular; Deltoid Right.; 0.5 milliLiter(s); VIS (VIS Published: 07-Aug-2015, VIS Presented: 08-Sep-2017);

## 2024-09-20 NOTE — DISCHARGE NOTE PROVIDER - CARE PROVIDER_API CALL
Reilly Ptael  Pulmonary Disease  [id number] [address] [place] [address]  Phone: [phone]  Fax: [phone]  Follow Up Time: 2 weeks

## 2024-09-20 NOTE — PROGRESS NOTE ADULT - ASSESSMENT
M E D I C A L   A T T E N D I N G    F O L L O W    U P   N O T E  (09-20-24 )                                     ------------------------------------------------------------------------------------------------    patient evaluated by me, case discussed with team, chart, medications, and physical exam reviewed, labs / tests  and vitals reviewed by me, as bellow.   Patient is stable for discharge today. patient doing ok, going home.. to finish antibiotics.. discussed with patient and educated and all questions answered.   Patient to follow up with  PMD, GI..   See discharge document for full note (discussed with ACP).  [Greater than 35 min spent for these services. ]          ( Note written / Date of service 09-20-24 ( This is certified to be the same as "ENTERED" date above ( for billing purposes)))    ==================>> MEDICATIONS <<====================    apixaban 2.5 milliGRAM(s) Oral every 12 hours  atorvastatin 10 milliGRAM(s) Oral at bedtime  calcium carbonate 1250 mG  + Vitamin D (OsCal 500 + D) 1 Tablet(s) Oral daily  chlorhexidine 2% Cloths 1 Application(s) Topical daily  cyanocobalamin 1000 MICROGram(s) Oral daily  ferrous    sulfate 325 milliGRAM(s) Oral daily  fluticasone propionate/ salmeterol 100-50 MICROgram(s) Diskus 1 Dose(s) Inhalation two times a day  folic acid 1 milliGRAM(s) Oral daily  lactobacillus acidophilus 1 Tablet(s) Oral two times a day with meals  latanoprost 0.005% Ophthalmic Solution 1 Drop(s) Both EYES at bedtime  melatonin      melatonin 3 milliGRAM(s) Oral at bedtime  metoprolol tartrate 25 milliGRAM(s) Oral two times a day  mirtazapine 22.5 milliGRAM(s) Oral daily  multivitamin 1 Tablet(s) Oral daily  pancrelipase (ZENPEP 25,000 Lipase Units) 1 Capsule(s) Oral three times a day with meals  pantoprazole  Injectable 40 milliGRAM(s) IV Push daily  piperacillin/tazobactam IVPB.. 3.375 Gram(s) IV Intermittent every 12 hours  pyridoxine 100 milliGRAM(s) Oral daily  sertraline 25 milliGRAM(s) Oral daily  sodium chloride 0.9%. 1000 milliLiter(s) IV Continuous <Continuous>  timolol 0.25% Solution 1 Drop(s) Right EYE daily  tiotropium 2.5 MICROgram(s) Inhaler 2 Puff(s) Inhalation daily    MEDICATIONS  (PRN):  acetaminophen     Tablet .. 650 milliGRAM(s) Oral every 6 hours PRN Temp greater or equal to 38C (100.4F), Mild Pain (1 - 3)  aluminum hydroxide/magnesium hydroxide/simethicone Suspension 30 milliLiter(s) Oral every 4 hours PRN Dyspepsia  ondansetron Injectable 4 milliGRAM(s) IV Push every 8 hours PRN Nausea and/or Vomiting    ___________  Active diet:  Diet, Soft and Bite Sized:   Fiber/Residue Restricted (LOWFIBER)  Supplement Feeding Modality:  Oral  Ensure Clear Cans or Servings Per Day:  1       Frequency:  Two Times a day  ___________________    ==================>> VITAL SIGNS <<==================    T(C): 36.3 (09-20-24 @ 10:43), Max: 36.8 (09-19-24 @ 21:00)  HR: 72 (09-20-24 @ 10:43) (68 - 74)  BP: 129/57 (09-20-24 @ 10:43) (119/82 - 129/64)  BP(mean): --  RR: 18 (09-20-24 @ 10:43) (16 - 18)  SpO2: 97% (09-20-24 @ 10:43) (94% - 97%)         I&O's Summary    19 Sep 2024 07:01  -  20 Sep 2024 07:00  --------------------------------------------------------  IN: 480 mL / OUT: 300 mL / NET: 180 mL       ==================>> LAB AND IMAGING <<==================                        11.2   5.05  )-----------( 117      ( 20 Sep 2024 06:22 )             33.6        09-20    140  |  105  |  33[H]  ----------------------------<  87  4.1   |  20[L]  |  1.41[H]    Ca    8.8      20 Sep 2024 06:22  Phos  3.1     09-20  Mg     1.80     09-20      PT/INR - ( 20 Sep 2024 06:22 )   PT: 14.8 sec;   INR: 1.32 ratio    PTT - ( 20 Sep 2024 06:22 )  PTT:31.1 sec                 Urinalysis:  09-17-24 @ 07:15  Leuk. Est.: Negative  Nirite: Negative  WBC: --  Blood: Negative     salt Crystal: --     calcium crystal: --     Bili: Negative     cast: --  color: Yellow  Bacteria: --  Epith. cell: --  Yeast: --     Ketone: Negative     Protein: Trace     Glucose: Negative     sperm: --     Spec.Gravity: 1.035          HgA1C:   (09-17-24)          (09-17-24)      5.3    WBC count:   5.05 <<== ,  6.19 <<== ,  6.20 <<== ,  6.41 <<== ,  8.05 <<==   Hemoglobin:   11.2 <<==,  11.3 <<==,  11.6 <<==,  11.3 <<==,  12.1 <<==  platelets:  117 <==, 122 <==, 127 <==, 113 <==, 132 <==    Creatinine:  1.41  <<==, 1.42  <<==, 1.33  <<==, 1.26  <<==, 1.43  <<==  Sodium:   140  <==, 137  <==, 138  <==, 137  <==, 135  <==          ( Note written / Date of service 09-20-24 ( This is certified to be the same as "ENTERED" date above ( for billing Purposes )))  
{\rtf1\uchpzk62643\ansi\hhdorov7759\ftnbj\uc1\deff0  {\fonttbl{\f0 \fnil Segoe UI;}{\f1 \fnil \fcharset0 Segoe UI;}{\f2 \fnil Times New Richar;}}  {\colortbl ;\tsm086\tjlst411\egwx711 ;\red0\green0\blue0 ;\red0\green0\qhcg643 ;\red0\green0\blue0 ;}  {\stylesheet{\f0\fs20 Normal;}{\cs1 Default Paragraph Font;}{\cs2\f0\fs16 Line Number;}{\cs3\f2\fs24\ul\cf3 Hyperlink;}}  {\*\revtbl{Unknown;}}  \wfqcsy52739\qocbbs00989\rdxkx1060\yzpji1335\gulpp4295\vxcvi0245\rdushoz616\zliugbl818\nogrowautofit\nkkunt669\formshade\nofeaturethrottle1\dntblnsbdb\fet4\aendnotes\aftnnrlc\pgbrdrhead\pgbrdrfoot  \sectd\kbmtoo14743\lcgodo64358\guttersxn0\ycugqjhy6620\pwfkwreh0294\qhvgtzin5957\qgmezdnk3887\xnjeylu131\siswdtm471\sbkpage\pgncont\pgndec  \plain\plain\f0\fs24\pard\plain\f0\fs24\plain\f0\fs20\eore0179\hich\f0\dbch\f0\loch\f0\fs20\par  _________________________________________________________________________________________\par  ========>>  M E D I C A L   A T T E N D I N G    F O L L O W  U P  N O T E  <<=========\par  -----------------------------------------------------------------------------------------------------\par  \par  - Patient seen and examined by me earlier today. \par  - In summary,  HOWARD SCHULZ is a 89y year old woman admitted for diarrhea, diverticulitis \par  - Patient today overall doing ok, comfortable, eating OK.  no significant pain in abd reported  , no diarrhea.. \par  \par  ==================>> REVIEW OF SYSTEM <<=================\par  \par  GEN: no fever, no chills, as above \par  RESP: no SOB, no cough, no sputum\par  CVS: no chest pain, no palpitations\par  GI: no abdominal pain, no nausea, no constipation, no diarrhea\par  : no dysuria, no frequency\par  Neuro: no headache, no dizziness\par  \par  ==================>> PHYSICAL EXAM <<=================\par  \par  GEN: A&O X 2 , NAD , comfortable, pleasant, calm \par  HEENT: NCAT, PERRL, MMM, hearing intact\par  CVS: S1S2 , regular , No M/R/G appreciated\par  PULM: CTA B/L,  no W/R/R appreciated\par  ABD.: soft. non tender, non distended,  bowel sounds present\par  Extrem: intact pulses , no edema \par  \par        ( Note written / Date of service 09-18-24 ( This is certified to be the same as "ENTERED" date above ( for billing purposes)))\par    ==================>> MEDICATIONS <<====================\par  \par  MEDICATIONS  (STANDING):\par  apixaban 2.5 milliGRAM(s) Oral every 12 hours\par  atorvastatin 10 milliGRAM(s) Oral at bedtime\par  calcium carbonate 1250 mG  + Vitamin D (OsCal 500 + D) 1 Tablet(s) Oral daily\par  cyanocobalamin 1000 MICROGram(s) Oral daily\par  ferrous    sulfate 325 milliGRAM(s) Oral daily\par  fluticasone propionate/ salmeterol 100-50 MICROgram(s) Diskus 1 Dose(s) Inhalation two times a day\par  folic acid 1 milliGRAM(s) Oral daily\par  lactobacillus acidophilus 1 Tablet(s) Oral two times a day with meals\par  latanoprost 0.005% Ophthalmic Solution 1 Drop(s) Both EYES at bedtime\par  metoprolol tartrate 25 milliGRAM(s) Oral two times a day\par  mirtazapine 22.5 milliGRAM(s) Oral daily\par  multivitamin 1 Tablet(s) Oral daily\par  pancrelipase (ZENPEP 25,000 Lipase Units) 1 Capsule(s) Oral three times a day with meals\par  pantoprazole  Injectable 40 milliGRAM(s) IV Push daily\par  piperacillin/tazobactam IVPB.. 3.375 Gram(s) IV Intermittent every 12 hours\par  pyridoxine 100 milliGRAM(s) Oral daily\par  sertraline 25 milliGRAM(s) Oral daily\par  timolol 0.25% Solution 1 Drop(s) Right EYE daily\par  tiotropium 2.5 MICROgram(s) Inhaler 2 Puff(s) Inhalation daily\par  \par  MEDICATIONS  (PRN):\par  acetaminophen     Tablet .. 650 milliGRAM(s) Oral every 6 hours PRN Temp greater or equal to 38C (100.4F), Mild Pain (1 - 3)\par  aluminum hydroxide/magnesium hydroxide/simethicone Suspension 30 milliLiter(s) Oral every 4 hours PRN Dyspepsia\par  ondansetron Injectable 4 milliGRAM(s) IV Push every 8 hours PRN Nausea and/or Vomiting\par  \par  ___________\par  Active diet:\par  Diet, Soft and Bite Sized\par  ___________________\par  \par  ==================>> VITAL SIGNS <<==================\par  \par  T(C): 36.4 (09-18-24 @ 16:58), Max: 36.7 (09-18-24 @ 06:51)\par  HR: 71 (09-18-24 @ 16:58) (71 - 81)\par  BP: 126/67 (09-18-24 @ 16:58) (105/62 - 126/67)\par  BP(mean): 101 (09-17-24 @ 19:41)\par  RR: 18 (09-18-24 @ 16:58) (18 - 18)\par  SpO2: 95% (09-18-24 @ 16:58) (95% - 98%) \par  \par   ==================>> LAB AND IMAGING <<==================\par           \par             11.6 \par  6.20  )-----------( 127      ( 18 Sep 2024 03:40 )\par             35.0 \par     \par  09-18\par  \par  138  |  100  |  38[H]\par  ----------------------------<  101[H]\par  4.8   |  23  |  1.33[H]\par  \par  \plain\f1\fs20\tiri3599\hich\f1\dbch\f1\loch\f1\cf2\fs20\b Creatinine:  1.33  <<==, 1.26  <<==, 1.43  <<==\par  \plain\f0\fs20\vcbb6638\hich\f0\dbch\f0\loch\f0\fs20\par  Ca    9.1      18 Sep 2024 03:40\par  Phos  3.0     09-18\par  Mg     1.50     09-18\par  \par  TPro  7.4  /  Alb  3.7  /  TBili  0.6  /  DBili  x   /  AST  32  /  ALT  22  /  AlkPhos  110  09-16\par  \par  PT/INR - ( 18 Sep 2024 03:40 )   PT: 15.1 sec;   INR: 1.35 ratio  \par  PTT - ( 18 Sep 2024 03:40 )  PTT:28.2 sec          \par   \par  Urinalysis:\par  09-17-24 @ 07:15\par  Leuk. Est.: Negative\par  Nirite: Negative\par  WBC: --\par  Blood: Negative\par     salt Crystal: --\par     calcium crystal: --\par     Bili: Negative\par     cast: --\par  color: Yellow\par  Bacteria: --\par  Epith. cell: --\par  Yeast: --\par     Ketone: Negative\par     Protein: Trace\par     Glucose: Negative\par     sperm: --\par     Spec.Gravity: 1.035\par  \par  HgA1C:   (09-17-24)      \par    (09-17-24)      5.3\par  \par  ___________________________________________________________________________________\par  ===============>>  A S S E S S M E N T   A N D   P L A N <<===============\par  ------------------------------------------------------------------------------------------\par  \par  \trowd\oobiza10\lastrow\wgbwanz22\trpaddfl3\svzorat96\trpaddfr3\trpaddt0\trpaddft3\trpaddb0\trpaddfb3\trleft0  \clvertalt\keizui93\clpadft3\xuhqrb31\clpadfr3\clpadl0\clpadfl3\clpadb0\clpadfb3\izhmi4017  \clvertalt\wzbyud75\clpadft3\jqyhuq71\clpadfr3\clpadl0\clpadfl3\clpadb0\clpadfb3\dmkrk2320  \pard\intbl\ssparaaux0\s0\fi-120\li120\ql\plain\f0\fs24{\*\bkmkstart rj20900883963}{\*\bkmkend lk80819269557}\plain\f0\fs20\ryhf7698\hich\f0\dbch\f0\loch\f0\fs20 \'b7 \plain\f1\fs20\cqhh7296\hich\f1\dbch\f1\loch\f1\cf2\fs20\b Assessment\plain\f0\fs20\bfco6467\hich\f0\dbch\f0\loch\f0\fs20\cell  \pard\intbl\ssparaaux0\s0\li300\ri300\ql\plain\f0\fs24\plain\f0\fs20\nwfw0198\hich\f0\dbch\f0\loch\f0\fs20\cell  \intbl\row  \pard\ssparaaux0\s0\ql\plain\f0\fs24\plain\f0\fs20\yifg3448\hich\f0\dbch\f0\loch\f0\fs20 89yoF with Hx of pancreatic insufficiency on creon, HTN, depression,  CHF, afib on Eliquis, 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 \par  \'b7  {\*\bkmkstart ba48115434912}{\*\bkmkend nj69688784727}Plan: {\*\bkmkstart il37720936013}{\*\bkmkend ok08023329422}- CT imaging sigf for acute diverticulitis \par  - s/p zosyn x1 in ED \par  - UA negative \par  - fu on GI/PCR, stool culture, urine culture, \par  - cw zosyn q12h ( renally dose).\par  - on diet and tolorating\par  - possible change to Oral antibiotics for discharge planing if remains stable \par  \par  \plain\f1\fs20\bdwv1215\hich\f1\dbch\f1\loch\f1\cf2\fs20\b\ul{\field{\*\fldinst HYPERLINK [id number] }{\fldrslt Problem/Plan - 2:}}\plain\f0\fs20\obzv2968\hich\f0\dbch\f0\loch\f0\fs20\ql\par  \'b7  {\*\bkmkstart bx41053551661}{\*\bkmkend tl46553124541}Problem: {\*\bkmkstart qq16213158280}{\*\bkmkend ap93037854308}LORENZO (acute kidney injury). \par  \'b7  {\*\bkmkstart ke26775373833}{\*\bkmkend ka50633013148}Plan: {\*\bkmkstart vw56556315620}{\*\bkmkend kw34172298917}-SCr 1.43\par  - baseline Cr 1.0-1.1 \par  - most likely iso of prerenal 2/2 to decreased PO intake \par  - cw IVF  / PO hydration as able \par  \par  \plain\f1\fs20\svoq9602\hich\f1\dbch\f1\loch\f1\cf2\fs20\b\ul{\field{\*\fldinst HYPERLINK [id number] }{\fldrslt Problem/Plan - 3:}}\plain\f0\fs20\babx9542\hich\f0\dbch\f0\loch\f0\fs20\ql\par  \'b7  {\*\bkmkstart pw65884270366}{\*\bkmkend la12627353115}Problem: {\*\bkmkstart kn51299338788}{\*\bkmkend zt22225077205}Adult failure to thrive. \par  \'b7  {\*\bkmkstart fb96742944581}{\*\bkmkend xz82363395731}Plan: {\*\bkmkstart or71570767244}{\*\bkmkend wi20907900271}-decreased PO intake \par  - cw mirtazapine \par  - nutrition consult.\par  - patient encourage : per RN today patient ate well\par  \par  \plain\f1\fs20\aefr0959\hich\f1\dbch\f1\loch\f1\cf2\fs20\b\ul{\field{\*\fldinst HYPERLINK [id number] }{\fldrslt Problem/Plan - 4:}}\plain\f0\fs20\njmo9454\hich\f0\dbch\f0\loch\f0\fs20\ql\par  \'b7  {\*\bkmkstart bz47593701133}{\*\bkmkend ss51752718244}Problem: {\*\bkmkstart uz74041470320}{\*\bkmkend tg46345353396}Atrial fibrillation. \par  \'b7  {\*\bkmkstart xk03592917321}{\*\bkmkend dd39968400461}Plan: {\*\bkmkstart wk15902716247}{\*\bkmkend dj86248844287}- afibb diagnosed on outpt cardiologist \par  - fu ECG \par  - on home metoprolol tartrate 25mg BID \par  - eliquis 2.5mg BID \par  - fu ECG \par  - cw metoprolol 25mg BID \par  - cw eliquis 2.5mg BID.\par  - cardio followup\par  \par  \plain\f1\fs20\bywy4750\hich\f1\dbch\f1\loch\f1\cf2\fs20\b\ul{\field{\*\fldinst HYPERLINK [id number] }{\fldrslt Problem/Plan - 5:}}\plain\f0\fs20\kdbl0093\hich\f0\dbch\f0\loch\f0\fs20\ql\par  \'b7  {\*\bkmkstart qq67320510506}{\*\bkmkend nx99411064571}Problem: {\*\bkmkstart tg01601940532}{\*\bkmkend rg42901897418}Pancreatic insufficiency. \par  \'b7  {\*\bkmkstart uj72615073538}{\*\bkmkend xi21865627534}Plan: {\*\bkmkstart qv57053086864}{\*\bkmkend zl38309916768}- pancreatic insufficiency follows outpt GI \par  - cw creon 1 tab TID.\par  \par  \plain\f1\fs20\jqyd5447\hich\f1\dbch\f1\loch\f1\cf2\fs20\b\ul{\field{\*\fldinst HYPERLINK [id number] }{\fldrslt Problem/Plan - 6:}}\plain\f0\fs20\ihta4534\hich\f0\dbch\f0\loch\f0\fs20\ql\par  \'b7  {\*\bkmkstart pa32728096252}{\*\bkmkend iu59938273825}Problem: {\*\bkmkstart xp35520247373}{\*\bkmkend ig95145910465}Elevated INR. \par  \'b7  {\*\bkmkstart ma69687413271}{\*\bkmkend wm05146593141}Plan: {\*\bkmkstart iv43721490908}{\*\bkmkend mq19690791211}- INR 1.31 \par  - may be 2/2 of vitamin K deficiency iso of decreased PO intake \par  - fu vit K level \par  - cw to trend INR.\par  \pard\plain\f0\fs24\plain\f0\fs20\iqks7421\hich\f0\dbch\f0\loch\f0\fs20 I N R :  1.35  <<==, 1.21  <<==, 1.22  <<==\par  \par  \ql\plain\f0\fs24\plain\f1\fs20\sgtt8086\hich\f1\dbch\f1\loch\f1\cf2\fs20\b\ul{\field{\*\fldinst HYPERLINK [id number] }{\fldrslt Problem/Plan - 7:}}\plain\f0\fs20\ytzl9213\hich\f0\dbch\f0\loch\f0\fs20\ql\par  \'b7  {\*\bkmkstart kc18142559743}{\*\bkmkend eu70758992120}Problem: {\*\bkmkstart dv78293662486}{\*\bkmkend vs81938107442}Heart possible chronic diastolic failure .. stable \par  \'b7  {\*\bkmkstart rr87922986346}{\*\bkmkend en35885313576}Plan: {\*\bkmkstart bl32372312219}{\*\bkmkend cc36625827329}- on furosemide 20mg BID, metoprolol tartrate 25 BID \par  - euvolemic on exam \par  - TTE 2022: EF 61%, normal LV and RV systolic fcn and WMA \par  - hold furosemide and iso of initial LORENZO, can reintroduce.\plain\f1\fs20\zjis1345\hich\f1\dbch\f1\loch\f1\cf2\fs20\strike\plain\f0\fs20\stls3429\hich\f0\dbch\f0\loch\f0\fs20\par  \plain\f1\fs20\rtil7572\hich\f1\dbch\f1\loch\f1\cf2\fs20\b\ul{\field{\*\fldinst HYPERLINK [id number] }{\fldrslt Problem/Plan - 8:}}\plain\f0\fs20\ztvq1954\hich\f0\dbch\f0\loch\f0\fs20\ql\par  \'b7  {\*\bkmkstart rb64923949961}{\*\bkmkend ad27499547593}Problem: {\*\bkmkstart do94224037576}{\*\bkmkend pu25735224325}HTN (hypertension). \par  \'b7  {\*\bkmkstart fv15607555280}{\*\bkmkend ht07500075294}Plan: {\*\bkmkstart qq02880057892}{\*\bkmkend ol83886589396}- home  metoprolol 25mg BID\par  - hold iso of initial hypotension.\par  \par  \plain\f1\fs20\tjjn5716\hich\f1\dbch\f1\loch\f1\cf2\fs20\b\ul{\field{\*\fldinst HYPERLINK [id number] }{\fldrslt Problem/Plan - 9:}}\plain\f0\fs20\rmld4165\hich\f0\dbch\f0\loch\f0\fs20\ql\par  \'b7  {\*\bkmkstart ju22547841609}{\*\bkmkend uf04870360735}Problem: {\*\bkmkstart nn88761240414}{\*\bkmkend et23456640386}Major depression. \par  \'b7  {\*\bkmkstart su82980304212}{\*\bkmkend hy29355877521}Plan: {\*\bkmkstart vx59795393764}{\*\bkmkend ei38058001607}- cw home sertraline \par  - cw home mirtazapine.\par  \par  \plain\f1\fs20\eygi2865\hich\f1\dbch\f1\loch\f1\cf2\fs20\b\ul{\field{\*\fldinst HYPERLINK [id number] }{\fldrslt Problem/Plan - 10:}}\plain\f0\fs20\kjia6822\hich\f0\dbch\f0\loch\f0\fs20\ql\par  \'b7  {\*\bkmkstart qy19135791106}{\*\bkmkend sa93770023156}Problem: {\*\bkmkstart dc44787401818}{\*\bkmkend ef95421024489}Diarrhea. \par  \'b7  {\*\bkmkstart cl87433064202}{\*\bkmkend zo40047964116}Plan; {\*\bkmkstart cu86509220147}{\*\bkmkend dc22271756043}- diarrhea episodes 5-6 x a day for the past few days \par  - no recent abx use \par  - fu GI PCR, fu C diff.\par  \par  \plain\f1\fs20\qlly0469\hich\f1\dbch\f1\loch\f1\cf2\fs20\b\ul{\field{\*\fldinst HYPERLINK [id number] }{\fldrslt Problem/Plan - 11:}}\plain\f0\fs20\cffs5485\hich\f0\dbch\f0\loch\f0\fs20\ql\par  \'b7  {\*\bkmkstart yt741022972212}{\*\bkmkend lo537161723229}Problem: {\*\bkmkstart xv014512808007}{\*\bkmkend xz534706515913}Need for prophylactic measure. \par  \'b7  {\*\bkmkstart cm869313906448}{\*\bkmkend zg072870375447}Plan: {\*\bkmkstart om703003826499}{\*\bkmkend cl725877302716}diet: regular\par  dvt ppx: eliquis \par  code: full\par  \par  dispo: pending clinical course.:: possible next 48 hrs \par  \pard\plain\f0\fs24\plain\f0\fs20\japk6724\hich\f0\dbch\f0\loch\f0\fs20\par  --------------------------------------------\par  Case discussed with patient, RN.. ( will try to reach out to family.. )\par  Education given on findings and plan of care\par  ___________________________\par  RICO Mcdonnell D.O.\par  Pager: [phone]\par  \par  \ql\plain\f0\fs24\plain\f0\fs20\wnln8273\hich\f0\dbch\f0\loch\f0\fs20\par  }  
  _________________________________________________________________________________________  ========>>  M E D I C A L   A T T E N D I N G    F O L L O W  U P  N O T E  <<=========  -----------------------------------------------------------------------------------------------------    - Patient seen and examined by me earlier today.   - In summary,  HOWARD SCHULZ is a 89y year old woman admitted for diarrhea, diverticulitis   - Patient today overall doing ok, comfortable, eating OK.  no significant pain in abd reported  , little diarrhea last night     ==================>> REVIEW OF SYSTEM <<=================    GEN: no fever, no chills, as above   RESP: no SOB, no cough, no sputum  CVS: no chest pain, no palpitations  GI: no abdominal pain, no nausea, no constipation, no diarrhea  : no dysuria, no frequency  Neuro: no headache, no dizziness    ==================>> PHYSICAL EXAM <<=================    GEN: A&O X 2 , NAD , comfortable, pleasant, calm   HEENT: NCAT, PERRL, MMM, hearing intact  CVS: S1S2 , regular , No M/R/G appreciated  PULM: CTA B/L,  no W/R/R appreciated  ABD.: soft. non tender, non distended,  bowel sounds present  Extrem: intact pulses , no edema          ( Note written / Date of service 09-19-24 ( This is certified to be the same as "ENTERED" date above ( for billing purposes)))    ==================>> MEDICATIONS <<====================    apixaban 2.5 milliGRAM(s) Oral every 12 hours  atorvastatin 10 milliGRAM(s) Oral at bedtime  calcium carbonate 1250 mG  + Vitamin D (OsCal 500 + D) 1 Tablet(s) Oral daily  chlorhexidine 2% Cloths 1 Application(s) Topical daily  cyanocobalamin 1000 MICROGram(s) Oral daily  ferrous    sulfate 325 milliGRAM(s) Oral daily  fluticasone propionate/ salmeterol 100-50 MICROgram(s) Diskus 1 Dose(s) Inhalation two times a day  folic acid 1 milliGRAM(s) Oral daily  lactobacillus acidophilus 1 Tablet(s) Oral two times a day with meals  latanoprost 0.005% Ophthalmic Solution 1 Drop(s) Both EYES at bedtime  melatonin      melatonin 3 milliGRAM(s) Oral at bedtime  metoprolol tartrate 25 milliGRAM(s) Oral two times a day  mirtazapine 22.5 milliGRAM(s) Oral daily  multivitamin 1 Tablet(s) Oral daily  pancrelipase (ZENPEP 25,000 Lipase Units) 1 Capsule(s) Oral three times a day with meals  pantoprazole  Injectable 40 milliGRAM(s) IV Push daily  piperacillin/tazobactam IVPB.. 3.375 Gram(s) IV Intermittent every 12 hours  pyridoxine 100 milliGRAM(s) Oral daily  sertraline 25 milliGRAM(s) Oral daily  sodium chloride 0.9%. 1000 milliLiter(s) IV Continuous <Continuous>  timolol 0.25% Solution 1 Drop(s) Right EYE daily  tiotropium 2.5 MICROgram(s) Inhaler 2 Puff(s) Inhalation daily    MEDICATIONS  (PRN):  acetaminophen     Tablet .. 650 milliGRAM(s) Oral every 6 hours PRN Temp greater or equal to 38C (100.4F), Mild Pain (1 - 3)  aluminum hydroxide/magnesium hydroxide/simethicone Suspension 30 milliLiter(s) Oral every 4 hours PRN Dyspepsia  ondansetron Injectable 4 milliGRAM(s) IV Push every 8 hours PRN Nausea and/or Vomiting    ___________  Active diet:  Diet, Soft and Bite Sized:   Fiber/Residue Restricted (LOWFIBER)  Supplement Feeding Modality:  Oral  Ensure Clear Cans or Servings Per Day:  1       Frequency:  Two Times a day  ___________________    ==================>> VITAL SIGNS <<==================    Weight (kg): 60.3  Vital Signs Last 24 HrsT(C): 36.4 (09-19-24 @ 10:13)  T(F): 97.5 (09-19-24 @ 10:13), Max: 97.8 (09-18-24 @ 22:00)  HR: 62 (09-19-24 @ 10:13) (62 - 73)  BP: 115/68 (09-19-24 @ 10:13)  RR: 18 (09-19-24 @ 10:13) (18 - 18)  SpO2: 96% (09-19-24 @ 10:13) (95% - 96%)       ==================>> LAB AND IMAGING <<==================                        11.3   6.19  )-----------( 122      ( 19 Sep 2024 03:05 )             34.4        09-19    137  |  102  |  35[H]  ----------------------------<  91  4.9   |  23  |  1.42[H]    Ca    8.8      19 Sep 2024 03:05  Phos  2.6     09-19  Mg     1.90     09-19      WBC count:   6.19 <<== ,  6.20 <<== ,  6.41 <<== ,  8.05 <<==   Hemoglobin:   11.3 <<==,  11.6 <<==,  11.3 <<==,  12.1 <<==  platelets:  122 <==, 127 <==, 113 <==, 132 <==    Creatinine:  1.42  <<==, 1.33  <<==, 1.26  <<==, 1.43  <<==  Sodium:   137  <==, 138  <==, 137  <==, 135  <==    ____________________________    M I C R O B I O L O G Y :    Culture - Urine (collected 16 Sep 2024 20:07)  Source: Clean Catch Clean Catch (Midstream)  Final Report (18 Sep 2024 07:59):    >=3 organisms. Probable collection contamination.      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   ·  Plan: - CT imaging sigf for acute diverticulitis   - cw zosyn q12h >>> can change to Augmentin to complete 7 days on DC home   - on diet and tolorating > encouraged increased PO intake   - per daughter patient with pancreatic insufficiency and chronic diarrhea.. uses pancreatic enzymes, which she has been on here.. continue Remeron     Problem/Plan - 2:  ·  Problem: patient likely with CKD III   creatinine likely now near baseline   - encourage PO intak  - patient following with nephrologist as outpatient already and daughter to make appointment for follow up    Problem/Plan - 3:  ·  Problem: Adult failure to thrive.   ·  Plan: -decreased PO intake   - cw mirtazapine   - nutrition consult.  - patient encourage : per RN today patient ate well    Problem/Plan - 4:  ·  Problem: Atrial fibrillation.   ·  Plan: - afibb diagnosed on outpt cardiologist   - fu ECG   - on home metoprolol tartrate 25mg BID   - eliquis 2.5mg BID   - fu ECG   - cw metoprolol 25mg BID   - cw eliquis 2.5mg BID.  - cardio followup    Problem/Plan - 5:  ·  Problem: Pancreatic insufficiency.   ·  Plan: - pancreatic insufficiency follows outpt GI   - cw creon 1 tab TID.    Problem/Plan - 6:  ·  Problem: Elevated INR.   ·  Plan: - INR 1.31   - may be 2/2 of vitamin K deficiency iso of decreased PO intake   - fu vit K level   - cw to trend INR.  I N R :  1.34  <<==, 1.35  <<==, 1.21  <<==, 1.22  <<==    Problem/Plan - 7:  ·  Problem: Heart possible chronic diastolic failure .. stable   ·  Plan: - on furosemide 20mg BID, metoprolol tartrate 25 BID   - euvolemic on exam   - TTE 2022: EF 61%, normal LV and RV systolic fcn and WMA   - hold furosemide and iso of initial LORENZO, can reintroduce.    Problem/Plan - 8:  ·  Problem: HTN (hypertension).   ·  Plan: - home  metoprolol 25mg BID  - hold iso of initial hypotension.    Problem/Plan - 9:  ·  Problem: Major depression.   ·  Plan: - cw home sertraline   - cw home mirtazapine.    Problem/Plan - 10:  ·  Problem: Need for prophylactic measure.   ·  Plan: diet: regular  dvt ppx: eliquis   code: full    dispo: plan to go home tomorrow     --------------------------------------------  Case discussed with patient, daughter, grandson  Education given on findings and plan of care  ___________________________  H. TERI Mcdonnell.  Pager: [phone]

## 2024-09-20 NOTE — DISCHARGE NOTE PROVIDER - HOSPITAL COURSE
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   ·  Plan: - CT imaging sigf for acute diverticulitis   - cw zosyn q12h >>> can change to Augmentin to complete 7 days on DC home   - on diet and tolorating > encouraged increased PO intake   - per daughter patient with pancreatic insufficiency and chronic diarrhea.. uses pancreatic enzymes, which she has been on here.. continue Remeron      Problem/Plan - 2:  ·  Problem: patient likely with CKD III   creatinine likely now near baseline   - encourage PO intak  - patient following with nephrologist as outpatient already and daughter to make appointment for follow up     Problem/Plan - 3:  ·  Problem: Adult failure to thrive.   ·  Plan: -decreased PO intake   - cw mirtazapine   - nutrition consult.  - patient encourage : per RN today patient ate well     Problem/Plan - 4:  ·  Problem: Atrial fibrillation.   ·  Plan: - afibb diagnosed on outpt cardiologist   - fu ECG   - on home metoprolol tartrate 25mg BID   - eliquis 2.5mg BID   - fu ECG   - cw metoprolol 25mg BID   - cw eliquis 2.5mg BID.  - cardio followup     Problem/Plan - 5:  ·  Problem: Pancreatic insufficiency.   ·  Plan: - pancreatic insufficiency follows outpt GI   - cw creon 1 tab TID.     Problem/Plan - 6:  ·  Problem: Elevated INR.   ·  Plan: - INR 1.31   - may be 2/2 of vitamin K deficiency iso of decreased PO intake   - fu vit K level   - cw to trend INR.  I N R :  1.34  <<==, 1.35  <<==, 1.21  <<==, 1.22  <<==     Problem/Plan - 7:  ·  Problem: Heart possible chronic diastolic failure .. stable   ·  Plan: - on furosemide 20mg BID, metoprolol tartrate 25 BID   - euvolemic on exam   - TTE 2022: EF 61%, normal LV and RV systolic fcn and WMA   - hold furosemide and iso of initial LORENZO, can reintroduce.     Problem/Plan - 8:  ·  Problem: HTN (hypertension).   ·  Plan: - home  metoprolol 25mg BID  - hold iso of initial hypotension.     Problem/Plan - 9:  ·  Problem: Major depression.   ·  Plan: - cw home sertraline   - cw home mirtazapine.     Problem/Plan - 10:  ·  Problem: Need for prophylactic measure.   ·  Plan: diet: regular  dvt ppx: eliquis   code: full      Discussed with Dr. Mcdonnell, pt medically cleared to be discharged to home on 9/20/2024. 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  Patient was otherwise stable and had no other complications. See chart for further details.  Patient was discharged to home in stable condition to follow up with primary doctor as outpatient for follow up and further care.    Discussed with Dr. Mcdonnell, pt medically cleared to be discharged to home on 9/20/2024.

## 2024-09-20 NOTE — DISCHARGE NOTE NURSING/CASE MANAGEMENT/SOCIAL WORK - NSSCTYPOFSERV_GEN_ALL_CORE
Skilled Nursing - Nurse to visit on the day following discharge.  Please contact the home care agency at the above phone number if you have not heard from them by approximately 12 noon on the day after your hospital discharge.

## 2024-09-20 NOTE — DISCHARGE NOTE PROVIDER - NSDCFUSCHEDAPPT_GEN_ALL_CORE_FT
Jerrod Dudley  [facility]  [address] [facility]  Scheduled Appointment: 10/09/2024

## 2024-09-20 NOTE — DISCHARGE NOTE NURSING/CASE MANAGEMENT/SOCIAL WORK - NSDCPETBCESMAN_GEN_ALL_CORE
5.5  flap OU. If you are a smoker, it is important for your health to stop smoking. Please be aware that second hand smoke is also harmful.

## 2024-10-01 PROCEDURE — 99214 OFFICE O/P EST MOD 30 MIN: CPT

## 2024-10-01 PROCEDURE — 90833 PSYTX W PT W E/M 30 MIN: CPT

## 2024-10-09 ENCOUNTER — APPOINTMENT (OUTPATIENT)
Dept: GASTROENTEROLOGY | Facility: CLINIC | Age: 89
End: 2024-10-09
Payer: MEDICARE

## 2024-10-09 VITALS
WEIGHT: 136 LBS | SYSTOLIC BLOOD PRESSURE: 110 MMHG | HEIGHT: 62 IN | BODY MASS INDEX: 25.03 KG/M2 | OXYGEN SATURATION: 94 % | HEART RATE: 81 BPM | DIASTOLIC BLOOD PRESSURE: 60 MMHG

## 2024-10-09 DIAGNOSIS — K86.81 EXOCRINE PANCREATIC INSUFFICIENCY: ICD-10-CM

## 2024-10-09 DIAGNOSIS — K52.9 NONINFECTIVE GASTROENTERITIS AND COLITIS, UNSPECIFIED: ICD-10-CM

## 2024-10-09 DIAGNOSIS — K57.92 DIVERTICULITIS OF INTESTINE, PART UNSPECIFIED, W/OUT PERFORATION OR ABSCESS W/OUT BLEEDING: ICD-10-CM

## 2024-10-09 PROBLEM — J44.9 CHRONIC OBSTRUCTIVE PULMONARY DISEASE, UNSPECIFIED: Chronic | Status: ACTIVE | Noted: 2024-09-16

## 2024-10-09 PROBLEM — H40.9 UNSPECIFIED GLAUCOMA: Chronic | Status: ACTIVE | Noted: 2024-09-16

## 2024-10-09 PROBLEM — G25.0 ESSENTIAL TREMOR: Chronic | Status: ACTIVE | Noted: 2024-09-16

## 2024-10-09 PROBLEM — N18.9 CHRONIC KIDNEY DISEASE, UNSPECIFIED: Chronic | Status: ACTIVE | Noted: 2024-09-16

## 2024-10-09 PROBLEM — M19.90 UNSPECIFIED OSTEOARTHRITIS, UNSPECIFIED SITE: Chronic | Status: ACTIVE | Noted: 2024-09-16

## 2024-10-09 PROBLEM — K86.89 OTHER SPECIFIED DISEASES OF PANCREAS: Chronic | Status: ACTIVE | Noted: 2024-09-16

## 2024-10-09 PROCEDURE — 99214 OFFICE O/P EST MOD 30 MIN: CPT

## 2025-01-14 ENCOUNTER — APPOINTMENT (OUTPATIENT)
Dept: GASTROENTEROLOGY | Facility: CLINIC | Age: 89
End: 2025-01-14

## 2025-02-11 NOTE — DISCHARGE NOTE ADULT - MEDICATION SUMMARY - MEDICATIONS TO CHANGE
I will SWITCH the dose or number of times a day I take the medications listed below when I get home from the hospital:  None

## 2025-04-17 NOTE — DIETITIAN INITIAL EVALUATION ADULT - PROBLEM SELECTOR PROBLEM 9
Problem: Potential for Falls  Goal: Patient will remain free of falls  Description: INTERVENTIONS:- Educate patient/family on patient safety including physical limitations- Instruct patient to call for assistance with activity - Consult OT/PT to assist with strengthening/mobility - Keep Call bell within reach- Keep bed low and locked with side rails adjusted as appropriate- Keep care items and personal belongings within reach- Initiate and maintain comfort rounds- Make Fall Risk Sign visible to staff-  Apply yellow socks and bracelet for high fall risk patients- Consider moving patient to room near nurses station  Outcome: Progressing      Major depression

## 2025-04-28 NOTE — HISTORY OF PRESENT ILLNESS
[facility]    CONSULTATION NOTE    Patient was personally examined by this writer via telehealth.  The patient was at [facility] and this writer was working remotely from [place], [place].    Time spent with patient: 50-minute consultation including chart review and medication review    HPI    Patient is an 83-year-old female with a history of hyperlipidemia, hypertension, CKD 3 who presented with confusion.  Approximately 4 days prior to admission patient was noted to have visual and auditory hallucinations.  New confusion.  Swelling of both legs.  Patient's grandson lives with patient.    On examination patient is lying in bed dressed in hospital gown.  Introduced self and described nature of the visit.     When asked if patient could describe why she was in the hospital she states \"I do not know I was not feeling good\".  She cannot recall periods of confusion or hallucinations.    Presently patient denies any significant mood issues.  No current hallucinations.  Denies suicidal ideation.    She appeared to know she was at the hospital but could not describe the month or year.  She could not name the current president.    Lab review notes positive RPR.  Medical team to address.    Spoke on phone  to her SDM who reports that recent behavior and hallucinations were out of character for her.  She had not experienced hallucinations prior to that.  She patched in patient's grandson during call.  They had concerns about underlying dementia.  At this time difficult to determine as patient is quite confused.  Recommended outpatient neuropsych testing.    Had a long césar discussion regarding recent behavior including hallucinations.  Discussed Seroquel including risk and benefits.  At this time they are agreeable to low-dose Seroquel at night.    A review of the MAR notes that patient has prescribed Haldol as needed but has not utilized thus far.    Anselmo mentioned that he visited patient  yesterday and she looks much improved from initial presentation.  SDM concurs      Visit Vitals  BP (!) 160/78 (Patient Position: Supine)   Pulse 67   Temp 99.1 °F (37.3 °C) (Oral)   Resp 18   Ht 5' 1.81\" (1.57 m)   Wt 77.1 kg (170 lb)   SpO2 98%   BMI 31.28 kg/m²        Current Facility-Administered Medications   Medication    QUEtiapine (SEROquel) tablet 25 mg    QUEtiapine (SEROquel) tablet 25 mg    lidocaine 2% urethral (UROJET) 2 % jelly 10 mL    perflutren lipid microsphere (DEFINITY) injection 2 mL    melatonin tablet 3 mg    enoxaparin (LOVENOX) injection 40 mg    guaiFENesin-DM (ROBITUSSIN DM) 100-10 MG/5ML syrup 10 mL    hydrALAZINE (APRESOLINE) injection 10 mg    hydrALAZINE (APRESOLINE) tablet 25 mg    sodium chloride (NORMAL SALINE) 0.9 % bolus 500 mL    Potassium Standard Replacement Protocol (Levels 3.5 and lower)    Magnesium Standard Replacement Protocol    Phosphorus Standard Replacement Protocol    ondansetron (ZOFRAN ODT) disintegrating tablet 4 mg    Or    ondansetron (ZOFRAN) injection 4 mg    acetaminophen (TYLENOL) tablet 650 mg    Or    acetaminophen (TYLENOL) suppository 650 mg    HYDROcodone-acetaminophen (NORCO) 5-325 MG per tablet 1 tablet    polyethylene glycol (MIRALAX) packet 17 g    aluminum-magnesium hydroxide-simethicone (MAALOX) 200-200-20 MG/5ML suspension 30 mL    triamterene-hydroCHLOROthiazide (MAXZIDE-25) 37.5-25 MG per tablet 1 tablet    atorvastatin (LIPITOR) tablet 10 mg    haloperidol lactate (HALDOL) 5 MG/ML short-acting injection 2 mg    tamsulosin (FLOMAX) capsule 0.4 mg        Past Medical History:   Diagnosis Date    Benign hypertension with chronic kidney disease, stage III  (CMD)     Cataract     HTN (hypertension)     Hyperlipidemia     Hypertensive kidney disease with stage 2 chronic kidney disease 3/5/2021    Obesity     Osteoarthritis         Labs            PSYCHIATRIC HISTORY    No history of psychiatric care, psychiatric hospitalization or psychotherapy.  No  history of suicidal ideation or attempt.    No known history of psychotropic medications        SUBSTANCE USE HISTORY      Denies EtOH, illicit drugs or smoking        FAMIILY PSYCHIATRIC HISTORY        Denies history of psychiatric illness within family      SOCIAL HISTORY      Patient's grandson lives with patient    6 children      MENTAL STATUS EXAM    Received patient lying in bed dressed in hospital gown.  Oriented to \"hospital\".  She could not describe the month or year.  She could not name the current president.  Speech is soft, impoverished.  Eye contact is fair.  Patient does not answer questions regarding mood.  Affect is blunted.  Thought form is disoriented.  Thought content: Denies suicidal ideation or plan.  Denies homicidal ideation.  Denies any auditory or visual hallucinations currently.  No delusions verbalized.  Judgment and insight are impaired  Musculoskeletal: No involuntary movements        DIAGNOSIS      Delirium    Rule out psychosis    Rule out neurocognitive disorder    Rule out underlying mood disorder    IMPRESSION AND PLAN       Patient is an 83-year-old female with a history of hyperlipidemia, hypertension, CKD 3 who presented with confusion.  Approximately 4 days prior to admission patient was noted to have visual and auditory hallucinations.  New confusion.  Swelling of both legs.  Patient's grandson lives with patient.    On examination patient was disoriented.  Denied suicidal ideation, denied current hallucinations.    As patient was a poor historian reached out to her SDM.  Also spoke to her grandson with whom she lives.    They note that recent confusion and hallucinations are new.  They do not recall her having experienced hallucinations in the past.    Deny past psychiatric history.    Discussed the risks and benefits of Seroquel.  At this time they are amenable to low-dose Seroquel at night.    -Start Seroquel 25 mg nightly  -May receive Seroquel 25 mg every 6 hours as  needed agitation/psychosis  -May continue Haldol as needed  -Continue to monitor EKG for QTc prolongation-last QTc 456  -RPR positive-medical team to address that matter  -Social work on consult.  Family is interested in any home services that are available.  - Patient will benefit from frequent reorientation, minimization of nighttime interruptions and maintenance of the sleep-wake cycle  - Would benefit from out of bed up to chair as much as tolerated/allowed  - Integrated behavioral health is available for collaboration of care and available via [employer]      [FreeTextEntry1] : HOWARD SCHULZ is a 89 year old female here on 06/24/2021, 2 months after she was last seen. At her last office visit, she had complaints of dyspnea and LE edema and a repeat TTE was recommended to assess for progression of TR along with blood work. The TTE demonstrated moderate MR, mild AS, mild AR, mild LVH, mild TR and LVEF of 63%. \par \par She comes to the office today reporting needing cardiac evaluation prior to surgery. As per her daughter, she is having right knee arthroscopy under spinal anesthesia by Dr. Agustin Patterson at [facility] on 7/6/2021 ([phone] - fax;  Surgery scheduler - Lorna Charles phone [phone]). She has had no previous reaction to anesthesia and there is no FH anesthesia reaction. \par \par She does get short of breath when climbing a flight of stairs which is chronic for her. She currently denies fever, chills, cough, loss of smell or taste, palpitations, angina, dizziness, lightheadedness, syncope, or peripheral edema. Her energy level is good. her daughter states that she is very active but she does sleep a lot. Her appetite is good. Denies bowel or bladder problems though has constipation off and on. She does not exercise. No new hospitalizations, emergency room/urgent care visits, new medical diagnoses, or surgery since her last office visit. She had a TTE on 4/23/2021. She had recent labs in April and is going to have labs done for her surgery. She is fully vaccinated against COVID (Pfizer, last dose 6/9/2021). She did fall last week and hurt her right shoulder. She has pain and swelling in both knees with edema of both knees. Her daughter states that the left knee looks worse but the right knee causes her more pain so they are going to do the right knee first. \par \par PCP: Dr. Reilly Patel\par \par \par

## 2025-05-22 NOTE — DISCHARGE NOTE ADULT - NS AS DC STROKE DX YN
Likely due to waning progesterone effects of IUD. Desires removal/reinsertion to help with bleeding and PMS/PMDD symptoms. Tried add-back therapy with OCPs in the past with no improvement in length of bleeding. Desires Valium for insertion, aware she must have a  to and from appointment. UPT negative in clinic.    no

## 2025-07-15 NOTE — ED ADULT NURSE NOTE - NSFALLRISKASMT_ED_ALL_ED_DT
[Nutrition/ Exercise/ Weight Management] : nutrition, exercise, weight management [Lab Results] : lab results [Medication Management] : medication management [Pre/Post Op Instructions] : pre/post op instructions [Other ___] : [unfilled] 16-Sep-2024 20:41

## (undated) DEVICE — BRUSH COLONOSCOPY CYTOLOGY

## (undated) DEVICE — TUBING IV SET GRAVITY 3Y 100" MACRO

## (undated) DEVICE — SYR LUER LOK 50CC

## (undated) DEVICE — FOLEY HOLDER STATLOCK 2 WAY ADULT

## (undated) DEVICE — CATH IV SAFE BC 20G X 1.16" (PINK)

## (undated) DEVICE — PACK IV START WITH CHG

## (undated) DEVICE — FORCEP RADIAL JAW 4 JUMBO 2.8MM 3.2MM 240CM ORANGE DISP

## (undated) DEVICE — IRRIGATOR BIO SHIELD

## (undated) DEVICE — POLY TRAP ETRAP

## (undated) DEVICE — ELCTR GROUNDING PAD ADULT COVIDIEN

## (undated) DEVICE — SOL INJ NS 0.9% 500ML 2 PORT

## (undated) DEVICE — BIOPSY FORCEP RADIAL JAW 4 STANDARD WITH NEEDLE

## (undated) DEVICE — TUBING SUCTION CONN 6FT STERILE

## (undated) DEVICE — CLAMP BX HOT RAD JAW 3

## (undated) DEVICE — SENSOR O2 FINGER ADULT

## (undated) DEVICE — CATH IV SAFE BC 22G X 1" (BLUE)

## (undated) DEVICE — TUBING SUCTION 20FT

## (undated) DEVICE — SUCTION YANKAUER NO CONTROL VENT